# Patient Record
Sex: FEMALE | Race: BLACK OR AFRICAN AMERICAN | Employment: OTHER | URBAN - METROPOLITAN AREA
[De-identification: names, ages, dates, MRNs, and addresses within clinical notes are randomized per-mention and may not be internally consistent; named-entity substitution may affect disease eponyms.]

---

## 2017-07-27 ENCOUNTER — DOCUMENTATION ONLY (OUTPATIENT)
Dept: FAMILY PLANNING/WOMEN'S HEALTH CLINIC | Age: 57
End: 2017-07-27

## 2017-07-27 NOTE — PROGRESS NOTES
Spoke with pt after she qualified for EWL. She has been recommended to have a breast bx and appt was made for her with the [facility] on 8/8/17 at 11am. Appt information given to her and we will meet her there to fill out our EWL paperwork.

## 2017-08-08 ENCOUNTER — OFFICE VISIT (OUTPATIENT)
Dept: SURGERY | Age: 57
End: 2017-08-08

## 2017-08-08 ENCOUNTER — DOCUMENTATION ONLY (OUTPATIENT)
Dept: SURGERY | Age: 57
End: 2017-08-08

## 2017-08-08 ENCOUNTER — HOSPITAL ENCOUNTER (OUTPATIENT)
Dept: LAB | Age: 57
Discharge: HOME | End: 2017-08-08

## 2017-08-08 VITALS
HEART RATE: 90 BPM | BODY MASS INDEX: 53.92 KG/M2 | HEIGHT: 62 IN | WEIGHT: 293 LBS | SYSTOLIC BLOOD PRESSURE: 143 MMHG | DIASTOLIC BLOOD PRESSURE: 78 MMHG

## 2017-08-08 DIAGNOSIS — R59.0 AXILLARY LYMPHADENOPATHY: Primary | ICD-10-CM

## 2017-08-08 PROCEDURE — 88365 INSITU HYBRIDIZATION (FISH): CPT | Performed by: SURGERY

## 2017-08-08 PROCEDURE — 88360 TUMOR IMMUNOHISTOCHEM/MANUAL: CPT | Performed by: SURGERY

## 2017-08-08 PROCEDURE — 88342 IMHCHEM/IMCYTCHM 1ST ANTB: CPT | Performed by: SURGERY

## 2017-08-08 RX ORDER — AMITRIPTYLINE HYDROCHLORIDE 25 MG/1
TABLET, FILM COATED ORAL
COMMUNITY
End: 2022-02-14

## 2017-08-08 RX ORDER — FLUTICASONE PROPIONATE AND SALMETEROL 100; 50 UG/1; UG/1
1 POWDER RESPIRATORY (INHALATION) EVERY 12 HOURS
Status: ON HOLD | COMMUNITY
End: 2022-09-13 | Stop reason: SDUPTHER

## 2017-08-08 RX ORDER — AMLODIPINE BESYLATE 10 MG/1
10 TABLET ORAL
COMMUNITY
End: 2022-08-16

## 2017-08-08 NOTE — PROGRESS NOTES
HISTORY OF PRESENT ILLNESS  Noah Rader is a 65 y.o. female. HPI NEW patient referral for consultation by [place] [address] clinic for bilateral axillary lymphadenopathy identified on screening mammogram.  Patient cannot feel the lumps and does not have any axillary discomfort. She also denies nipple inversion or discharge or breast masses. Night sweats and hot flashes and irregular periods for the past 2 years. No weight loss. No itchy skin. Previous mammogram was > 10 years ago  Obstetric History     No data available      Obstetric Comments   Menarche:  Age 16   LMP: unsure, very irregular. # of Children:  1  Age at Delivery of First Child: 33   Hysterectomy/oophorectomy: no/no. Breast Bx: no.  Hx of Breast Feeding: no. BCP: 8995-5703. Hormone therapy: no.      Family history - Maternal aunt  in her 63's of breast cancer. Another maternal aunt had breast cancer in her 63's and survived. Maternal great aunt  from breast cancer in her 42's. Maternal cousin  from breast cancer in her 42's. Mammogram and ultrasound done in 2017 BI RADS 4B   The breasts are almost entirely fatty. Bilateral prominent axillary lymph nodes, the largest is RIGHT 4.5cm. Review of Systems   Constitutional: Negative. HENT: Negative. Eyes: Negative. Respiratory: Negative. Cardiovascular: Positive for claudication and leg swelling. Gastrointestinal: Positive for constipation. Genitourinary: Positive for frequency. Musculoskeletal: Positive for back pain and joint pain. Skin: Negative. Neurological: Negative. Endo/Heme/Allergies: Bruises/bleeds easily. Psychiatric/Behavioral: Negative. Physical Exam   Constitutional: She is oriented to person, place, and time. She appears well-developed and well-nourished. Cardiovascular: Normal rate, regular rhythm and normal heart sounds.     Pulmonary/Chest: Effort normal and breath sounds normal. Right breast exhibits no inverted nipple, no mass, no nipple discharge, no skin change and no tenderness. Left breast exhibits no inverted nipple, no mass, no nipple discharge, no skin change and no tenderness. Breasts are symmetrical.       Lymphadenopathy:     She has no cervical adenopathy. She has axillary adenopathy. Left axillary: Pectoral adenopathy present. Right: No supraclavicular adenopathy present. Left: No supraclavicular adenopathy present. Neurological: She is alert and oriented to person, place, and time. Skin: Skin is warm and dry. US - Guided Core Biopsy  Indication : Bilateral axillary lymphadenopathy. Palpable LEFT axillary lymph node. Ultrasound Findings: 2 enlarged lymph nodes LEFT, 2 cm and 1.5 cm. RIGHT 4 cm axillary node  Prep : alcohol. Anesthesia : 1% lidocaine with epinephrine, 10 cc. Device : The hand-held 10 gauge BARD needle was inserted through the lesion and captured tissue with real-time Ultrasound Confirmation. .   Core Sampling :  3 cores were obtained from the largest lymph node in the RIGHT axilla. Marker: clip placed   Dressing : Steristrips, gauze and tape. Instructions : Remove gauze this evening. Remove steristrips in one week. Tolerance: Pt tolerated procedure with no discomfort  Pathology : In summary, the combined morphologic and phenotypic findings indicate  benign lymph node with a mixed population of B and T cells. Ben Fairfield is no  evidence of lymphoma or other malignancy. Concordance: yes  ASSESSMENT and PLAN    ICD-10-CM ICD-9-CM    1. Axillary lymphadenopathy R59.0 785.6      RIGHT axillary node biopsy  Mentioned oncology referral if the lymph node is positive for cancer.     Called patient  Benign lymph node  PRN follow up

## 2017-08-08 NOTE — PROGRESS NOTES
[facility]  OFFICE PROCEDURE PROGRESS NOTE        Chart reviewed for the following:   I, Dr. Gaurav Hanson, have reviewed the History, Physical and updated the Allergic reactions for [address] performed immediately prior to start of procedure:   I,Dr. Gaurav Hanson, have performed the following reviews on Aurora Grijalva prior to the start of the procedure:            * Patient was identified by name and date of birth   * Agreement on procedure being performed was verified  * Risks and Benefits explained to the patient  * Procedure site verified and marked as necessary  * Patient was positioned for comfort  * Consent was signed and verified     Time: 1200pm      Date of procedure: 8/8/2017    Procedure performed by:  Dr. Gaurav Hanson    Provider assisted by: Sho Bedoya RN    Patient assisted by: Trenton Holly    How tolerated by patient: Patient tolerated the procedure well without complications. Denies pain post biopsy. Post Procedural Pain Scale: 0    Comments:Written and verbal post biopsy instructions reviewed with and given to patient with her understanding.

## 2017-08-08 NOTE — MR AVS SNAPSHOT
Visit Information Date & Time Provider Department Dept. Phone Encounter #  
 8/8/2017 11:00 AM Gaurav Hanson MD [facility] [phone] [id number] Upcoming Health Maintenance Date Due Hepatitis C Screening 1960 DTaP/Tdap/Td series (1 - Tdap) 5/12/1982 PAP AKA CERVICAL CYTOLOGY 5/12/1982 BREAST CANCER SCRN MAMMOGRAM 5/12/2011 FOBT Q 1 YEAR AGE 50-75 5/12/2011 INFLUENZA AGE 9 TO ADULT 8/1/2017 Allergies as of 8/8/2017  Review Complete On: 8/8/2017 By: Gaurav Hanson MD  
  
 Severity Noted Reaction Type Reactions Nuts [Tree Nut]  08/08/2017    Rash Current Immunizations  Never Reviewed No immunizations on file. Not reviewed this visit You Were Diagnosed With   
  
 Codes Comments Axillary lymphadenopathy    -  Primary ICD-10-CM: R59.0 ICD-9-CM: 515. 6 Vitals BP Pulse Height(growth percentile) Weight(growth percentile) LMP BMI  
 143/78 90 5' 2\" (1.575 m) 302 lb 8 oz (137.2 kg) 10/26/2016 (Approximate) 55.33 kg/m2 Smoking Status Never Smoker Vitals History BMI and BSA Data Body Mass Index Body Surface Area  
 55.33 kg/m 2 2.45 m 2 Your Updated Medication List  
  
   
This list is accurate as of: 8/8/17  4:16 PM.  Always use your most recent med list.  
  
  
  
  
 ADVAIR DISKUS 100-50 mcg/dose diskus inhaler Generic drug:  fluticasone-salmeterol Take 1 Puff by inhalation every twelve (12) hours. amitriptyline 25 mg tablet Commonly known as:  ELAVIL Take  by mouth nightly. amLODIPine 10 mg tablet Commonly known as:  [place] Take  by mouth daily. We Performed the Following SURGICAL PATHOLOGY [NBX3503 Custom] Patient Instructions Breast Lumps: Care Instructions Your Care Instructions Breast lumps are common, especially in women between ages 27 and 48.  Many womens breasts feel lumpy and tender before their menstrual period. Women also may have lumps when they are breastfeeding. Breast lumps may go away after menopause. All new breast lumps in women after menopause should be checked by a doctor. Although lumps may be normal for you, it is important to have your doctor check any lump or thickness that is not like the rest of your breast to make sure it is not cancer. A lump may be larger, harder, or different from the rest of your breast tissue. Follow-up care is a key part of your treatment and safety. Be sure to make and go to all appointments, and call your doctor if you are having problems. Its also a good idea to know your test results and keep a list of the medicines you take. How can you care for yourself at home? · Make an appointment to have a mammogram and other follow-up visits as recommended by your doctor. When should you call for help? Call your doctor now or seek immediate medical care if: 
· You have new changes in a breast, such as: ¨ A lump or thickening in your breast or armpit. ¨ A change in the breast's size or shape. ¨ Skin changes, such as dimples or puckers. ¨ Nipple discharge. ¨ A change in the color or feel of the skin of your breast or the darker area around the nipple (areola). ¨ A change in the shape of the nipple (it may look like it's being pulled into the breast). · You have symptoms of a breast infection, such as: 
¨ Increased pain, swelling, redness, or warmth around a breast. 
¨ Red streaks extending from the breast. 
¨ Pus draining from a breast. 
¨ A fever. Watch closely for changes in your health, and be sure to contact your doctor if: 
· You do not get better as expected. Where can you learn more? Go to http://jackelin-galindo.info/. Enter O815 in the search box to learn more about \"Breast Lumps: Care Instructions. \" Current as of: October 13, 2016 Content Version: 11.3 © 8945-1636 Healthwise, Incorporated. Care instructions adapted under license by [facility] (which disclaims liability or warranty for this information). If you have questions about a medical condition or this instruction, always ask your healthcare professional. [address] any warranty or liability for your use of this information. Introducing [facility]! [address] introduces [employer] patient portal. Now you can access parts of your medical record, email your doctor's office, and request medication refills online. 1. In your internet browser, go to [email or web address] 2. Click on the First Time User? Click Here link in the Sign In box. You will see the New Member Sign Up page. 3. Enter your [employer] Access Code exactly as it appears below. You will not need to use this code after youve completed the sign-up process. If you do not sign up before the expiration date, you must request a new code. · [employer] Access Code: [email or web address] Expires: 11/6/2017 10:31 AM 
 
4. Enter the last four digits of your Social Security Number (xxxx) and Date of Birth (mm/dd/yyyy) as indicated and click Submit. You will be taken to the next sign-up page. 5. Create a [employer] ID. This will be your [employer] login ID and cannot be changed, so think of one that is secure and easy to remember. 6. Create a [employer] password. You can change your password at any time. 7. Enter your Password Reset Question and Answer. This can be used at a later time if you forget your password. 8. Enter your e-mail address. You will receive e-mail notification when new information is available in [address] 9. Click Sign Up. You can now view and download portions of your medical record. 10. Click the Download Summary menu link to download a portable copy of your medical information.  
 
If you have questions, please visit the Frequently Asked Questions section of the edPULSE. Remember, [employer] is NOT to be used for urgent needs. For medical emergencies, dial 911. Now available from your iPhone and Android! Please provide this summary of care documentation to your next provider. If you have any questions after today's visit, please call [phone].

## 2017-08-08 NOTE — PATIENT INSTRUCTIONS
Breast Lumps: Care Instructions  Your Care Instructions  Breast lumps are common, especially in women between ages 27 and 48. Many womens breasts feel lumpy and tender before their menstrual period. Women also may have lumps when they are breastfeeding. Breast lumps may go away after menopause. All new breast lumps in women after menopause should be checked by a doctor. Although lumps may be normal for you, it is important to have your doctor check any lump or thickness that is not like the rest of your breast to make sure it is not cancer. A lump may be larger, harder, or different from the rest of your breast tissue. Follow-up care is a key part of your treatment and safety. Be sure to make and go to all appointments, and call your doctor if you are having problems. Its also a good idea to know your test results and keep a list of the medicines you take. How can you care for yourself at home? · Make an appointment to have a mammogram and other follow-up visits as recommended by your doctor. When should you call for help? Call your doctor now or seek immediate medical care if:  · You have new changes in a breast, such as:  ¨ A lump or thickening in your breast or armpit. ¨ A change in the breast's size or shape. ¨ Skin changes, such as dimples or puckers. ¨ Nipple discharge. ¨ A change in the color or feel of the skin of your breast or the darker area around the nipple (areola). ¨ A change in the shape of the nipple (it may look like it's being pulled into the breast). · You have symptoms of a breast infection, such as:  ¨ Increased pain, swelling, redness, or warmth around a breast.  ¨ Red streaks extending from the breast.  ¨ Pus draining from a breast.  ¨ A fever. Watch closely for changes in your health, and be sure to contact your doctor if:  · You do not get better as expected. Where can you learn more? Go to http://jackelin-galindo.info/.   Enter D488 in the search box to learn more about \"Breast Lumps: Care Instructions. \"  Current as of: October 13, 2016  Content Version: 11.3  © 8264-4924 [employer]High, [employer]. Care instructions adapted under license by [employer] (which disclaims liability or warranty for this information). If you have questions about a medical condition or this instruction, always ask your healthcare professional. [address] any warranty or liability for your use of this information.

## 2017-08-15 NOTE — COMMUNICATION BODY
HISTORY OF PRESENT ILLNESS  Shalom James is a 65 y.o. female. HPI NEW patient referral for consultation by [place] [address] clinic for bilateral axillary lymphadenopathy identified on screening mammogram.  Patient cannot feel the lumps and does not have any axillary discomfort. She also denies nipple inversion or discharge or breast masses. Night sweats and hot flashes and irregular periods for the past 2 years. No weight loss. No itchy skin. Previous mammogram was > 10 years ago  Obstetric History     No data available      Obstetric Comments   Menarche:  Age 16   LMP: unsure, very irregular. # of Children:  1  Age at Delivery of First Child: 33   Hysterectomy/oophorectomy: no/no. Breast Bx: no.  Hx of Breast Feeding: no. BCP: 9114-5907. Hormone therapy: no.      Family history - Maternal aunt  in her 63's of breast cancer. Another maternal aunt had breast cancer in her 63's and survived. Maternal great aunt  from breast cancer in her 42's. Maternal cousin  from breast cancer in her 42's. Mammogram and ultrasound done in 2017 BI RADS 4B   The breasts are almost entirely fatty. Bilateral prominent axillary lymph nodes, the largest is RIGHT 4.5cm. Review of Systems   Constitutional: Negative. HENT: Negative. Eyes: Negative. Respiratory: Negative. Cardiovascular: Positive for claudication and leg swelling. Gastrointestinal: Positive for constipation. Genitourinary: Positive for frequency. Musculoskeletal: Positive for back pain and joint pain. Skin: Negative. Neurological: Negative. Endo/Heme/Allergies: Bruises/bleeds easily. Psychiatric/Behavioral: Negative. Physical Exam   Constitutional: She is oriented to person, place, and time. She appears well-developed and well-nourished. Cardiovascular: Normal rate, regular rhythm and normal heart sounds.     Pulmonary/Chest: Effort normal and breath sounds normal. Right breast exhibits no inverted nipple, no mass, no nipple discharge, no skin change and no tenderness. Left breast exhibits no inverted nipple, no mass, no nipple discharge, no skin change and no tenderness. Breasts are symmetrical.       Lymphadenopathy:     She has no cervical adenopathy. She has axillary adenopathy. Left axillary: Pectoral adenopathy present. Right: No supraclavicular adenopathy present. Left: No supraclavicular adenopathy present. Neurological: She is alert and oriented to person, place, and time. Skin: Skin is warm and dry. US - Guided Core Biopsy  Indication : Bilateral axillary lymphadenopathy. Palpable LEFT axillary lymph node. Ultrasound Findings: 2 enlarged lymph nodes LEFT, 2 cm and 1.5 cm. RIGHT 4 cm axillary node  Prep : alcohol. Anesthesia : 1% lidocaine with epinephrine, 10 cc. Device : The hand-held 10 gauge BARD needle was inserted through the lesion and captured tissue with real-time Ultrasound Confirmation. .   Core Sampling :  3 cores were obtained from the largest lymph node in the RIGHT axilla. Marker: clip placed   Dressing : Steristrips, gauze and tape. Instructions : Remove gauze this evening. Remove steristrips in one week. Tolerance: Pt tolerated procedure with no discomfort  Pathology : In summary, the combined morphologic and phenotypic findings indicate  benign lymph node with a mixed population of B and T cells. Rondi Fails is no  evidence of lymphoma or other malignancy. Concordance: yes  ASSESSMENT and PLAN    ICD-10-CM ICD-9-CM    1. Axillary lymphadenopathy R59.0 785.6      RIGHT axillary node biopsy  Mentioned oncology referral if the lymph node is positive for cancer.     Called patient  Benign lymph node  PRN follow up

## 2022-01-11 ENCOUNTER — TELEPHONE (OUTPATIENT)
Dept: FAMILY PLANNING/WOMEN'S HEALTH CLINIC | Age: 62
End: 2022-01-11

## 2022-01-11 NOTE — TELEPHONE ENCOUNTER
[facility] received a call from patient requesting an appointment for an abnormal pap. Patient would also need a bracket test - left side mass     Previous Provider: Dr Bethanie Cooney     When Diagnosed/Seen : 12/17/21    Best day of the week for Gynecologist appointment?  Tuesday     Do you prefer Morning or afternoon? morning    \"Staff at [facility] will try their best to schedule the appointment base on your preferences, but is not guatanteed due to availability, they will call you with further details and instructions within 5 business days if you do not hear from them \"

## 2022-01-13 NOTE — TELEPHONE ENCOUNTER
On 1/11/2022 Ravindra Ramirez Rn spoke with patient and patient needs clarified. She wants to be enrolled in EWL due to abnormal results of screening mammogram not abnormal pap smear. Patient was also advised that our program does not pay for genetic testing. Per patient she received a free screening mammogram from [facility]. Shalini Lutz called the [facility] and spoke with Mindi Eduardo the Nurse practitioner who states that patient needs additional imaging at this time and that they can cover this service for patient. The NP stated that she will be in touch with patient to follow up and schedule this.     If the patient needs a Bx then she will refer patient to our EWL program.

## 2022-02-01 ENCOUNTER — DOCUMENTATION ONLY (OUTPATIENT)
Dept: SURGERY | Age: 62
End: 2022-02-01

## 2022-02-01 ENCOUNTER — HOSPITAL ENCOUNTER (OUTPATIENT)
Dept: LAB | Age: 62
Discharge: HOME | End: 2022-02-01

## 2022-02-01 ENCOUNTER — OFFICE VISIT (OUTPATIENT)
Dept: SURGERY | Age: 62
End: 2022-02-01
Payer: MEDICAID

## 2022-02-01 VITALS
WEIGHT: 293 LBS | DIASTOLIC BLOOD PRESSURE: 66 MMHG | HEIGHT: 62 IN | SYSTOLIC BLOOD PRESSURE: 181 MMHG | BODY MASS INDEX: 53.92 KG/M2

## 2022-02-01 DIAGNOSIS — N63.20 BREAST MASS, LEFT: Primary | ICD-10-CM

## 2022-02-01 DIAGNOSIS — R92.8 ABNORMAL ULTRASOUND OF BREAST: ICD-10-CM

## 2022-02-01 DIAGNOSIS — R92.8 ABNORMAL ULTRASOUND OF BREAST: Primary | ICD-10-CM

## 2022-02-01 PROCEDURE — 19083 BX BREAST 1ST LESION US IMAG: CPT | Performed by: SURGERY

## 2022-02-01 PROCEDURE — 99203 OFFICE O/P NEW LOW 30 MIN: CPT | Performed by: SURGERY

## 2022-02-01 RX ORDER — METFORMIN HYDROCHLORIDE 500 MG/1
TABLET ORAL 2 TIMES DAILY WITH MEALS
COMMUNITY
End: 2022-02-28

## 2022-02-01 NOTE — PROGRESS NOTES
[facility]  OFFICE PROCEDURE PROGRESS NOTE        Chart reviewed for the following:   Viki Bob MD, have reviewed the History, Physical and updated the Allergic reactions for Renuka Hayden performed immediately prior to start of procedure:   Viki Bob MD, have performed the following reviews on Arturo Patches prior to the start of the procedure:            * Patient was identified by name and date of birth   * Agreement on procedure being performed was verified  * Risks and Benefits explained to the patient  * Procedure site verified and marked as necessary  * Patient was positioned for comfort  * Consent was signed and verified     Time: 14:12      Date of procedure: 2/1/2022    Procedure performed by:  Kenneth Sampson MD    Provider assisted by: Boris Burch RN    Patient assisted by: self    How tolerated by patient: tolerated the procedure well with no complications    Post Procedural Pain Scale: 0 - No Hurt    Comments: I have reviewed the provider's instructions with the patient, answering all questions to her satisfaction.

## 2022-02-01 NOTE — PROGRESS NOTES
HISTORY OF PRESENT ILLNESS  Drew Alvarado is a 62 y.o. female. HPI NEW patient consult referred by Jhoana Fairchild for LEFT breast mass found on screening mammogram.  Pt did not feel a mass. She has some tenderness at the nipple. No other changes to the breast area. Bilateral axillary lymphadenopathy. LEFT axillary lymph node biopsied 2017. bening    Family History:  Maternal Aunt  from Breast cancer dx in 62s  Maternal Aunt had breast cancer in her 66's, survivor  Maternal Great Aunt  from Breast cancer age 36. Chilo Singh aunt's daughter  from breast cancer in her 42's  28 yr old daughter    Mammogram, 22 , BIRADS 4c  LEFT 8:00 1.3cm spiculated mass  Enlarged LEFT axillary nodes, stable since 2017  Past Medical History:   Diagnosis Date    Hypertension     Neuropathy      Past Surgical History:   Procedure Laterality Date    HX CHOLECYSTECTOMY      HX GYN      c section    HX GYN      d&c      OB History    No obstetric history on file. Obstetric Comments   Menarche:  Age 16   LMP: unsure, very irregular. # of Children:  1  Age at Delivery of First Child: 33   Hysterectomy/oophorectomy: no/no. Breast Bx: no.  Hx of Breast Feeding: no. BCP: 8432-7968. Hormone therapy: no.            Family History   Problem Relation Age of Onset    Hypertension Mother     Heart Disease Mother     Heart Disease Father     Hypertension Father     Breast Cancer Maternal Aunt     Breast Cancer Other     Breast Cancer Maternal Aunt      Social History     Tobacco Use    Smoking status: Never Smoker    Smokeless tobacco: Never Used   Substance Use Topics    Alcohol use: Yes     Comment: social      Prior to Admission medications    Medication Sig Start Date End Date Taking? Authorizing Provider   metFORMIN (GLUCOPHAGE) 500 mg tablet Take  by mouth two (2) times daily (with meals).    Yes Provider, Historical   fluticasone-salmeterol (ADVAIR DISKUS) 100-50 mcg/dose diskus inhaler Take 1 Puff by inhalation every twelve (12) hours. Yes Provider, Historical   amLODIPine (NORVASC) 10 mg tablet Take  by mouth daily. Yes Provider, Historical   amitriptyline (ELAVIL) 25 mg tablet Take  by mouth nightly. Yes Provider, Historical      Allergies   Allergen Reactions    Nuts [Tree Nut] Rash       ROS    Physical Exam  Constitutional:       Appearance: She is well-developed. She is obese. Cardiovascular:      Rate and Rhythm: Normal rate and regular rhythm. Heart sounds: Normal heart sounds. Pulmonary:      Effort: Pulmonary effort is normal.      Breath sounds: Normal breath sounds. Chest:   Breasts: Breasts are symmetrical.      Right: No swelling, inverted nipple, mass, nipple discharge, skin change, tenderness, axillary adenopathy or supraclavicular adenopathy. Left: No swelling, inverted nipple, mass, nipple discharge, skin change, tenderness, axillary adenopathy or supraclavicular adenopathy. Lymphadenopathy:      Upper Body:      Right upper body: No supraclavicular or axillary adenopathy. Left upper body: No supraclavicular or axillary adenopathy. Skin:     General: Skin is warm and dry. Neurological:      Mental Status: She is alert and oriented to person, place, and time. US - Guided Core Biopsy  Indication : Left Breast mass upper inner quadrant. Not palpable. Ultrasound Findings: LEFT 8:00 1.3cm spiculated mass  Prep : alcohol. Anesthesia : 1% lidocaine with epinephrine, 6cc. Device : The hand-held 10 gauge BARD needle was inserted through the lesion and captured tissue with real-time Ultrasound Confirmation. .   Core Sampling :  2 cores were obtained. Marker: clip placed   Dressing : Steristrips, gauze and tape. Instructions : Remove gauze this evening. Remove steristrips in one week.    Tolerance: Pt tolerated procedure with no discomfort  Pathology : invasive ductal carcinoma, grade 2, triple negative, ki 40%  Concordance: yes  ASSESSMENT and PLAN    ICD-10-CM ICD-9-CM    1. Breast mass, left  N63.20 611.72    2. Abnormal ultrasound of breast  R92.8 793.89      Total time spent with patient: 30 minutes   LEFT breast mass found on screening mammogram    Pt has a strong family hx of breast cancer  Will send a BRCA test    We have her mammograms    Spoke with patient  Will refer to med onc  Discussed a portacath for chemo. I will biopsy her LEFT axillary enlarged lymph nodes at that time.   She had RIGHT axillary enlarged lymph nodes biopsied 2017

## 2022-02-01 NOTE — H&P (VIEW-ONLY)
HISTORY OF PRESENT ILLNESS  Maggie Miller is a 27[address] [facility] y.o. female. HPI NEW patient consult referred by Anup Montiel for LEFT breast mass found on screening mammogram.  Pt did not feel a mass. She has some tenderness at the nipple. No other changes to the breast area. Bilateral axillary lymphadenopathy. LEFT axillary lymph node biopsied 2017. rhiannoning    Family History:  Maternal Aunt  from Breast cancer dx in 62s  Maternal Aunt had breast cancer in her 66's, survivor  Maternal Great Aunt  from Breast cancer age 36. Lucita Vivas aunt's daughter  from breast cancer in her 42's  28 yr old daughter    Mammogram, 22 , BIRADS 4c  LEFT 8:00 1.3cm spiculated mass  Enlarged LEFT axillary nodes, stable since 2017  Past Medical History:   Diagnosis Date    Hypertension     Neuropathy      Past Surgical History:   Procedure Laterality Date    HX CHOLECYSTECTOMY      HX GYN      c section    HX GYN      d&c      OB History    No obstetric history on file. Obstetric Comments   Menarche:  Age 16   LMP: unsure, very irregular. # of Children:  1  Age at Delivery of First Child: 33   Hysterectomy/oophorectomy: no/no. Breast Bx: no.  Hx of Breast Feeding: no. BCP: 3201-9620. Hormone therapy: no.            Family History   Problem Relation Age of Onset    Hypertension Mother     Heart Disease Mother     Heart Disease Father     Hypertension Father     Breast Cancer Maternal Aunt     Breast Cancer Other     Breast Cancer Maternal Aunt      Social History     Tobacco Use    Smoking status: Never Smoker    Smokeless tobacco: Never Used   Substance Use Topics    Alcohol use: Yes     Comment: social      Prior to Admission medications    Medication Sig Start Date End Date Taking? Authorizing Provider   metFORMIN (GLUCOPHAGE) 500 mg tablet Take  by mouth two (2) times daily (with meals).    Yes Provider, Historical   fluticasone-salmeterol (ADVAIR DISKUS) 100-50 mcg/dose diskus inhaler Take 1 Puff by inhalation every twelve (12) hours. Yes Provider, Historical   amLODIPine (NORVASC) 10 mg tablet Take  by mouth daily. Yes Provider, Historical   amitriptyline (ELAVIL) 25 mg tablet Take  by mouth nightly. Yes Provider, Historical      Allergies   Allergen Reactions    Nuts [Tree Nut] Rash       ROS    Physical Exam  Constitutional:       Appearance: She is well-developed. She is obese. Cardiovascular:      Rate and Rhythm: Normal rate and regular rhythm. Heart sounds: Normal heart sounds. Pulmonary:      Effort: Pulmonary effort is normal.      Breath sounds: Normal breath sounds. Chest:   Breasts: Breasts are symmetrical.      Right: No swelling, inverted nipple, mass, nipple discharge, skin change, tenderness, axillary adenopathy or supraclavicular adenopathy. Left: No swelling, inverted nipple, mass, nipple discharge, skin change, tenderness, axillary adenopathy or supraclavicular adenopathy. Lymphadenopathy:      Upper Body:      Right upper body: No supraclavicular or axillary adenopathy. Left upper body: No supraclavicular or axillary adenopathy. Skin:     General: Skin is warm and dry. Neurological:      Mental Status: She is alert and oriented to person, place, and time. US - Guided Core Biopsy  Indication : Left Breast mass upper inner quadrant. Not palpable. Ultrasound Findings: LEFT 8:00 1.3cm spiculated mass  Prep : alcohol. Anesthesia : 1% lidocaine with epinephrine, 6cc. Device : The hand-held 10 gauge BARD needle was inserted through the lesion and captured tissue with real-time Ultrasound Confirmation. .   Core Sampling :  2 cores were obtained. Marker: clip placed   Dressing : Steristrips, gauze and tape. Instructions : Remove gauze this evening. Remove steristrips in one week.    Tolerance: Pt tolerated procedure with no discomfort  Pathology : invasive ductal carcinoma, grade 2, triple negative, ki 40%  Concordance: yes  ASSESSMENT and PLAN    ICD-10-CM ICD-9-CM    1. Breast mass, left  N63.20 611.72    2. Abnormal ultrasound of breast  R92.8 793.89      Total time spent with patient: 30 minutes   LEFT breast mass found on screening mammogram    Pt has a strong family hx of breast cancer  Will send a BRCA test    We have her mammograms    Spoke with patient  Will refer to med onc  Discussed a portacath for chemo. I will biopsy her LEFT axillary enlarged lymph nodes at that time.   She had RIGHT axillary enlarged lymph nodes biopsied 2017

## 2022-02-02 ENCOUNTER — DOCUMENTATION ONLY (OUTPATIENT)
Dept: SURGERY | Age: 62
End: 2022-02-02

## 2022-02-03 PROBLEM — C50.912 BREAST CANCER, LEFT (HCC): Status: ACTIVE | Noted: 2022-02-03

## 2022-02-07 ENCOUNTER — TELEPHONE (OUTPATIENT)
Dept: SURGERY | Age: 62
End: 2022-02-07

## 2022-02-07 DIAGNOSIS — Z17.1 MALIGNANT NEOPLASM OF LEFT BREAST IN FEMALE, ESTROGEN RECEPTOR NEGATIVE, UNSPECIFIED SITE OF BREAST (HCC): Primary | ICD-10-CM

## 2022-02-07 DIAGNOSIS — C50.912 MALIGNANT NEOPLASM OF LEFT BREAST IN FEMALE, ESTROGEN RECEPTOR NEGATIVE, UNSPECIFIED SITE OF BREAST (HCC): Primary | ICD-10-CM

## 2022-02-07 NOTE — TELEPHONE ENCOUNTER
Correction patient has an appointment with Dr Bhakti Padilla;  2/15/2022 arriving at 12:45 pm  [address] [place] [address] [phone]

## 2022-02-08 ENCOUNTER — TELEPHONE (OUTPATIENT)
Dept: FAMILY PLANNING/WOMEN'S HEALTH CLINIC | Age: 62
End: 2022-02-08

## 2022-02-08 NOTE — TELEPHONE ENCOUNTER
Medicaid application was completed and faxed back to the office by patient. Nurse faxed application to [facility] department of  02/08/22. Application to be process no later than 2/22/2022.  Victorina Osorio RN

## 2022-02-11 ENCOUNTER — NURSE NAVIGATOR (OUTPATIENT)
Dept: CASE MANAGEMENT | Age: 62
End: 2022-02-11

## 2022-02-11 NOTE — NURSE NAVIGATOR
[address]  Breast Navigator Encounter    Name: Lou Jaquez  Age: 62 y.o.  : 1960  Diagnosis: Left breast IDC; ER-/AR-/HER2-    Encounter type:  [x]Initial Navigator Encounter  []Patient Initiated  []Navigator Follow-up []Pre-op  []Post-op []Check-in Prior to First Treatment []Treatment Modality Change   []Other:     Narrative:   Called pt to introduce self/role of Breast Navigator and to discuss upcoming appointment with Dr. Rayne Maher. Explained process for the appointment and what to expect. She plans to bring her daughter with her. No immediate questions for NN at this time. Contact info given and pt encouraged to reach out as needed. Will follow-up.      Interdisciplinary Team:  Med-Onc: Dr. Jean Stanton MD  Surg-Onc: Dr. Kylah Milian MD  Rad-Onc:  Plastics:  :   Nurse Navigator:  CHRISTIAN Ocasio BSN, RN, [facility]  Breast Cancer Nurse [address] [place] [address] Nw  W: [phone]  F: [phone]  [email or web address]   Good Help to Those in [address] [place] [address]

## 2022-02-14 ENCOUNTER — OFFICE VISIT (OUTPATIENT)
Dept: ONCOLOGY | Age: 62
End: 2022-02-14
Payer: MEDICAID

## 2022-02-14 ENCOUNTER — DOCUMENTATION ONLY (OUTPATIENT)
Dept: SURGERY | Age: 62
End: 2022-02-14

## 2022-02-14 VITALS
OXYGEN SATURATION: 94 % | HEART RATE: 88 BPM | SYSTOLIC BLOOD PRESSURE: 166 MMHG | TEMPERATURE: 98 F | BODY MASS INDEX: 53.92 KG/M2 | RESPIRATION RATE: 18 BRPM | HEIGHT: 62 IN | WEIGHT: 293 LBS | DIASTOLIC BLOOD PRESSURE: 68 MMHG

## 2022-02-14 DIAGNOSIS — Z17.1 MALIGNANT NEOPLASM OF LOWER-INNER QUADRANT OF LEFT BREAST IN FEMALE, ESTROGEN RECEPTOR NEGATIVE (HCC): Primary | ICD-10-CM

## 2022-02-14 DIAGNOSIS — C50.312 MALIGNANT NEOPLASM OF LOWER-INNER QUADRANT OF LEFT BREAST IN FEMALE, ESTROGEN RECEPTOR NEGATIVE (HCC): Primary | ICD-10-CM

## 2022-02-14 PROCEDURE — 99205 OFFICE O/P NEW HI 60 MIN: CPT | Performed by: INTERNAL MEDICINE

## 2022-02-14 RX ORDER — LISINOPRIL 10 MG/1
TABLET ORAL
COMMUNITY
End: 2022-04-13

## 2022-02-14 NOTE — PROGRESS NOTES
[facility] [place] at [address], [address] [facility]  Claudene Luke: [phone]  F: [phone]      Reason for Visit:   Tamie Joseph is a 62 y.o. female who is seen in consultation at the request of Dr. Bridgette Shanks for evaluation of therapy for breast cancer    Treatment History:   · 17:  Right ax LN:  Core bx:  Benign  · 22 left breast mass core bx: IDC with sclerosis, 6 mm, gr 2, no LVI, ER negative, DC negative, HER 2 negative at IHC 0, ki67 40%    History of Present Illness: An abnormal mammogram led to the pathology above    FH:  Maternal aunt  of breast cancer in her 62s; maternal aunt breast cancer in her 76s; maternal great aunt  from breast cancer at age 36; great aunt's daughter also  of breast cancer at age 36; no ovarian, prostate, pancreas cancer    Past Medical History:   Diagnosis Date    Hypertension     Neuropathy       Past Surgical History:   Procedure Laterality Date    HX CHOLECYSTECTOMY      HX GYN      c section    HX GYN      d&c      Social History     Tobacco Use    Smoking status: Never Smoker    Smokeless tobacco: Never Used   Substance Use Topics    Alcohol use: Yes     Comment: social      Family History   Problem Relation Age of Onset    Hypertension Mother     Heart Disease Mother     Heart Disease Father     Hypertension Father     Breast Cancer Maternal Aunt     Breast Cancer Other     Breast Cancer Maternal Aunt      Current Outpatient Medications   Medication Sig    metFORMIN (GLUCOPHAGE) 500 mg tablet Take  by mouth two (2) times daily (with meals).  fluticasone-salmeterol (ADVAIR DISKUS) 100-50 mcg/dose diskus inhaler Take 1 Puff by inhalation every twelve (12) hours.  amLODIPine (NORVASC) 10 mg tablet Take  by mouth daily.  amitriptyline (ELAVIL) 25 mg tablet Take  by mouth nightly. No current facility-administered medications for this visit.       Allergies   Allergen Reactions    Nuts [Tree Nut] Rash        Review of Systems: A complete review of systems was obtained, negative except as described above. Physical Exam:   There were no vitals taken for this visit. ECOG PS: 0    Constitutional: Appears well-developed and well-nourished in no apparent distress      Mental status: Alert and awake, Oriented to person/place/time, Able to follow commands    Eyes: EOM normal, Sclera normal, No visible ocular discharge    HENT: Normocephalic, atraumatic    Mouth/Throat: Moist mucous membranes    External Ears normal    Neck: No visualized mass    Pulmonary/Chest: Respiratory effort normal, No visualized signs of difficulty breathing or respiratory distress    Musculoskeletal: Normal gait with no signs of ataxia, Normal range of motion of neck    Neurological: No facial asymmetry (Cranial nerve 7 motor function), No gaze palsy    Skin: No significant exanthematous lesions or discoloration noted on facial skin    Psychiatric: Normal affect, No hallucinations     Left breast:  8:00, 7 cmfn, 1.8 cm mass, no LAD          Results:   No results found for: WBC, HGB, HCT, PLT, MCV, ANEU, HGBPOC, HCTPOC, HGBEXT, HCTEXT, PLTEXT  No results found for: NA, K, CL, CO2, GLU, BUN, CREA, GFRAA, GFRNA, CA, NAPOC, KPOCT, CLPOC, GLUCPOC, IBUN, CREAPOC, ICAI  No results found for: TBILI, ALT, AP, TP, ALB, GLOB      1/13/22 breast US bilateral; L mammogram  L breast mass 8:00, 7 cmfn, 1.3 cm mass  Bilateral LN seen, appear benign    Records reviewed and summarized above. Pathology report(s) reviewed above. Radiology report(s) reviewed above. Assessment/plan:   1. Left IDC, gr 2, triple negative, cT1c cN0 cM0, stage IA, prognostic stage IB    We explained to the patient that the goal of systemic adjuvant therapy is to improve the chances for cure and decrease the risk of relapse.  We explained why a patient can have microscopic cancer spread now even though physical examination, laboratory studies and imaging studies are negative for cancer. We explained that the same treatments used now as adjuvant or preventive treatments rarely if ever are curative in women who develop metastases. We discussed that there is no difference in overall survival between neoadjuvant and adjuvant chemotherapy. We discussed the rationale for neoadjuvant chemotherapy, if chemotherapy is warranted, as it is in this case: to avoid any potential delays in giving chemotherapy following surgery, to be able to see the response of the tumor to chemotherapy, and to potentially downstage the tumor prior to surgery. Discussed with Dr. Aly Sevilla. She will core bx her LN -- if +, will proceed with neoadj chemo; if negative, will consider surgery first for clinical stage I tumor and proceed with adjuvant chemo. If neoadj:    I discussed the potential risks of dose-dense chemotherapy with the patient. (DD AC-T, adriamycin 60 mg/m2; cyclophosphamide 600 mg/m2 q 2 weeks x 4; paclitaxel 80 mg/m2 qweekly x 12). Major toxicities include nausea and vomiting, stomatitis, fatigue, and a small risk of heart damage. Anemia frequently results and occasionally requires transfusions. Neutropenic fever is uncommon, but can be a life-threatening problem. Also, there is a small but increased risk of myelodysplasia and acute leukemia. We provided the patient with detailed information concerning toxicity including frequent toxicities that only last a few days, such as nausea, vomiting, mouth sores, arthralgia, myalgia, and potentially allergic reactions to paclitaxel, as well as toxicities which can be longer lasting including total alopecia, fatigue, anemia and neuropathy. We provided the patient with detailed information concerning the toxicities of their regimen in addition to our verbal discussion.     We discussed the potential addition of carboplatin auc 6 q 3 weeks during weekly paclitaxel as evaluated in CALGB 78529, showing a significant improvement in pCR for patients with stage II-III triple negative breast cancer. Additional side effects of added myelosuppression, fatigue, renal damage with dehydration, and nausea and vomiting were discussed. AUC 5 will be used as that is the dose being used in all known current investigations. Will consider therapy as in keynote 522 as the addition of pembro shows an improvement in [facility], 200 mg q3 weeks during the entire chemo backbone, and then up to 9 cycles adjuvantly    We discussed the risks and benefits of pembrolizumab therapy today. Potential side effects include, but are not limited to fatigue, rash, auto-immune issues, infertility, allergic reactions, and rarely, death. If adjuvant:  Would consider TC    We discussed the toxicities of TC chemotherapy (docetaxel 75mg/m2/cyclophosphamide 600 mg/m2 q 3 weeks x 4)  in detail. This chemotherapy frequently causes a low white blood cell count and a small percent of patients require hospital admission for treatment of neutropenic fever. We explained that on occasion we consider the use of growth factors to minimize this risk. We explained to the patient that some side effects, if they occur, only last a few days including nausea, vomiting, stomatitis, arthralgia, myalgia, and allergic reactions to Taxotere. We told the patient that severe nausea and vomiting were very uncommon and that some side effects, if they occur, will last longer: this includes hair loss, which will be seen in all patients treated with these agents and fatigue, which will be seen in most. The patient was also told that if she is having menstrual function that she could develop chemotherapy-induced amenorrhea and infertility. We also told the patient that there was a very small risk of acute leukemia. We also informed the patient that heart damage is rare with these agents    She will return next week after her LN biopsy to finalize her therapy plan      2.  Emotional well being:  She has excellent support and is coping well with her disease    3. Genetic testing:   discussed potential ramifications of a positive test including the potential need for bilateral mastectomies and bilateral oophorectomies and the risk then for her family members to also have a mutation. VUS also discussed. She has the kit from Dr. Calvin Stephen, but has not yet done the testing    4. DM2:  On metformin    I appreciate the opportunity to participate in Ms. Kacy Wan's care. Signed By: Benny Werner MD      No orders of the defined types were placed in this encounter.

## 2022-02-16 ENCOUNTER — TELEPHONE (OUTPATIENT)
Dept: FAMILY PLANNING/WOMEN'S HEALTH CLINIC | Age: 62
End: 2022-02-16

## 2022-02-16 NOTE — TELEPHONE ENCOUNTER
Nurse contacted patient to follow up; patient states that she received a call DSS that her medicaid (under [place])has been approved. Patient will have follow up appt. In the following days and treatment start date will be decided soon. Patient aware that we are here if she has questions/support. Will [facility] for additional resources.   Vinay Real RN

## 2022-02-17 ENCOUNTER — DOCUMENTATION ONLY (OUTPATIENT)
Dept: SURGERY | Age: 62
End: 2022-02-17

## 2022-02-17 ENCOUNTER — HOSPITAL ENCOUNTER (OUTPATIENT)
Dept: LAB | Age: 62
Discharge: HOME | End: 2022-02-17

## 2022-02-17 ENCOUNTER — OFFICE VISIT (OUTPATIENT)
Dept: SURGERY | Age: 62
End: 2022-02-17
Payer: MEDICAID

## 2022-02-17 VITALS — WEIGHT: 293 LBS | BODY MASS INDEX: 53.92 KG/M2 | HEIGHT: 62 IN

## 2022-02-17 DIAGNOSIS — Z17.1 MALIGNANT NEOPLASM OF LOWER-INNER QUADRANT OF LEFT BREAST IN FEMALE, ESTROGEN RECEPTOR NEGATIVE (HCC): Primary | ICD-10-CM

## 2022-02-17 DIAGNOSIS — R93.89 ABNORMAL ULTRASOUND: ICD-10-CM

## 2022-02-17 DIAGNOSIS — C50.312 MALIGNANT NEOPLASM OF LOWER-INNER QUADRANT OF LEFT BREAST IN FEMALE, ESTROGEN RECEPTOR NEGATIVE (HCC): Primary | ICD-10-CM

## 2022-02-17 PROCEDURE — 10035 PLMT SFT TISS LOCLZJ DEV 1ST: CPT | Performed by: SURGERY

## 2022-02-17 PROCEDURE — 38505 NEEDLE BIOPSY LYMPH NODES: CPT | Performed by: SURGERY

## 2022-02-17 PROCEDURE — 99212 OFFICE O/P EST SF 10 MIN: CPT | Performed by: SURGERY

## 2022-02-17 NOTE — PROGRESS NOTES
HISTORY OF PRESENT ILLNESS  Harmony Blum is a 62 y.o. female. HPI ESTABLISHED patient here today for a LEFT axillary lymph node biopsy per Dr. Jesus Swartz. The patient has a recent diagnosis of LEFT breast cancer. 2/2022 LEFT invasive ductal carcinoma, grade 2, triple neg, Ki 40%    ROS    Physical Exam   deferred  US - Guided Core Biopsy  Indication : Left axillary lymph nodes, palpable. Ultrasound Findings: multiple large, 2+ cm LEFT axillary lymph nodes, absent deshawn  Prep : alcohol. Anesthesia : 1% lidocaine with epinephrine, 6cc. Device : The hand-held 10 gauge BARD needle was inserted through the lesion and captured tissue with real-time Ultrasound Confirmation. .   Core Sampling :  4 cores were obtained. Marker: clip placed   Dressing : Steristrips, gauze and tape. Instructions : Remove gauze this evening. Remove steristrips in one week. Tolerance: Pt tolerated procedure with no discomfort until I place the clip. Pathology : Lymph node, left axilla lymph node, excision:        Negative for metastatic carcinoma. See comment. Negative for morphologic evidence of lymphoma. Negative for granuloma. Concordance:  ASSESSMENT and PLAN    ICD-10-CM ICD-9-CM    1. Malignant neoplasm of lower-inner quadrant of left breast in female, estrogen receptor negative (HCC)  C50.312 174.3     Z17.1 V86.1    2. Abnormal ultrasound  R93.89 793.99 SURGICAL PATHOLOGY     Total time spent with patient: 15 minutes   LEFT axillary lymph nodes biopsied  Pt seeing Dr Jesus Swartz next week for treatment plan based on biopsy results.

## 2022-02-17 NOTE — PROGRESS NOTES
[facility]  OFFICE PROCEDURE PROGRESS NOTE        Chart reviewed for the following:   IDr. Shelley have reviewed the History, Physical and updated the Allergic reactions for [address] performed immediately prior to start of procedure:   IDr. Shelley, have performed the following reviews on Siena Flick prior to the start of the procedure:            * Patient was identified by name and date of birth   * Agreement on procedure being performed was verified  * Risks and Benefits explained to the patient  * Procedure site verified and marked as necessary  * Patient was positioned for comfort  * Consent was signed and verified     Time: 9:30am      Date of procedure: 2/17/2022    Procedure performed by:  Shelley Thompson MD    Provider assisted by: Irma Frank RN    Patient assisted by: Irma Frank RN    How tolerated by patient: Patient tolerated the procedure well without complications. Denies pain post biopsy. Post Procedural Pain Scale: 0 none    Comments: Written and verbal post biopsy instructions reviewed with and given to patient with her understanding.

## 2022-02-23 ENCOUNTER — OFFICE VISIT (OUTPATIENT)
Dept: ONCOLOGY | Age: 62
End: 2022-02-23
Payer: MEDICAID

## 2022-02-23 VITALS
DIASTOLIC BLOOD PRESSURE: 57 MMHG | WEIGHT: 293 LBS | HEART RATE: 82 BPM | RESPIRATION RATE: 18 BRPM | OXYGEN SATURATION: 96 % | SYSTOLIC BLOOD PRESSURE: 162 MMHG | TEMPERATURE: 97.8 F | HEIGHT: 62 IN | BODY MASS INDEX: 53.92 KG/M2

## 2022-02-23 DIAGNOSIS — C50.312 MALIGNANT NEOPLASM OF LOWER-INNER QUADRANT OF LEFT BREAST IN FEMALE, ESTROGEN RECEPTOR NEGATIVE (HCC): Primary | ICD-10-CM

## 2022-02-23 DIAGNOSIS — Z17.1 MALIGNANT NEOPLASM OF LOWER-INNER QUADRANT OF LEFT BREAST IN FEMALE, ESTROGEN RECEPTOR NEGATIVE (HCC): Primary | ICD-10-CM

## 2022-02-23 PROCEDURE — 99215 OFFICE O/P EST HI 40 MIN: CPT | Performed by: INTERNAL MEDICINE

## 2022-02-23 NOTE — PROGRESS NOTES
[facility] [place] [facility] [place] [address] [address] [facility]: [phone]  F: [phone]      Reason for Visit:   Nadeem Coburn is a 62 y.o. female who is seen in consultation at the request of Dr. Lawrence Urrutia for evaluation of therapy for breast cancer    Treatment History:   · 17:  Right ax LN:  Core bx:  Benign  · 22 left breast mass core bx: IDC with sclerosis, 6 mm, gr 2, no LVI, ER negative, IL negative, HER 2 negative at IHC 0, ki67 40%  · 22 lymph node, left axilla excision, negative for metastatic cancer, negative for lymphoma    History of Present Illness: An abnormal mammogram led to the pathology above    Interval history:  Reports gr 1 constipation, gr 1 fatigue, gr 1 nausea, gr 1 hot flashes, gr 1 insomnia    FH:  Maternal aunt  of breast cancer in her 62s; maternal aunt breast cancer in her 76s; maternal great aunt  from breast cancer at age 36; great aunt's daughter also  of breast cancer at age 36; no ovarian, prostate, pancreas cancer    Past Medical History:   Diagnosis Date    Cancer ([facility]     Hypertension     Neuropathy       Past Surgical History:   Procedure Laterality Date    HX CHOLECYSTECTOMY      HX GYN      c section    HX GYN      d&c      Social History     Tobacco Use    Smoking status: Never Smoker    Smokeless tobacco: Never Used   Substance Use Topics    Alcohol use: Yes     Comment: social      Family History   Problem Relation Age of Onset    Hypertension Mother     Heart Disease Mother     Heart Disease Father     Hypertension Father     Breast Cancer Maternal Aunt     Breast Cancer Other     Breast Cancer Maternal Aunt      Current Outpatient Medications   Medication Sig    lisinopriL (PRINIVIL, ZESTRIL) 10 mg tablet Take  by mouth daily.  metFORMIN (GLUCOPHAGE) 500 mg tablet Take  by mouth two (2) times daily (with meals).     fluticasone-salmeterol (ADVAIR DISKUS) 100-50 mcg/dose diskus inhaler Take 1 Puff by inhalation every twelve (12) hours.  amLODIPine (NORVASC) 10 mg tablet Take  by mouth daily. No current facility-administered medications for this visit. Allergies   Allergen Reactions    Nuts [Tree Nut] Rash        Review of Systems: A complete review of systems was obtained, negative except as described above. Physical Exam:     Visit Vitals  BP (!) 162/57 Comment: Pt. took BP med today and reports being asymptomatic. Pulse 82   Temp 97.8 °F (36.6 °C) (Temporal)   Resp 18   Ht 5' 2\" (1.575 m)   Wt 312 lb 12.8 oz (141.9 kg)   SpO2 96%   BMI 57.21 kg/m²     ECOG PS: 0    Constitutional: Appears well-developed and well-nourished in no apparent distress      Mental status: Alert and awake, Oriented to person/place/time, Able to follow commands    Eyes: EOM normal, Sclera normal, No visible ocular discharge    HENT: Normocephalic, atraumatic    Mouth/Throat: Moist mucous membranes    External Ears normal    Neck: No visualized mass    Pulmonary/Chest: Respiratory effort normal, No visualized signs of difficulty breathing or respiratory distress    Musculoskeletal: Normal gait with no signs of ataxia, Normal range of motion of neck    Neurological: No facial asymmetry (Cranial nerve 7 motor function), No gaze palsy    Skin: No significant exanthematous lesions or discoloration noted on facial skin    Psychiatric: Normal affect, No hallucinations     Left breast:  8:00, 7 cmfn, 1.8 cm mass, no LAD          Results:   No results found for: WBC, HGB, HCT, PLT, MCV, ANEU, HGBPOC, HCTPOC, HGBEXT, HCTEXT, PLTEXT, HGBEXT, HCTEXT, PLTEXT  No results found for: NA, K, CL, CO2, GLU, BUN, CREA, GFRAA, GFRNA, CA, NAPOC, KPOCT, CLPOC, GLUCPOC, IBUN, CREAPOC, ICAI  No results found for: TBILI, ALT, AP, TP, ALB, GLOB      1/13/22 breast US bilateral; L mammogram  L breast mass 8:00, 7 cmfn, 1.3 cm mass  Bilateral LN seen, appear benign    Records reviewed and summarized above.   Pathology report(s) reviewed above. Radiology report(s) reviewed above. Assessment/plan:   1. Left IDC, gr 2, triple negative, cT1c cN0 cM0, stage IA, prognostic stage IB    We explained to the patient that the goal of systemic adjuvant therapy is to improve the chances for cure and decrease the risk of relapse. We explained why a patient can have microscopic cancer spread now even though physical examination, laboratory studies and imaging studies are negative for cancer. We explained that the same treatments used now as adjuvant or preventive treatments rarely if ever are curative in women who develop metastases. We discussed that there is no difference in overall survival between neoadjuvant and adjuvant chemotherapy. We discussed the rationale for neoadjuvant chemotherapy, if chemotherapy is warranted, as it is in this case: to avoid any potential delays in giving chemotherapy following surgery, to be able to see the response of the tumor to chemotherapy, and to potentially downstage the tumor prior to surgery. Discussed with Dr. Robson Aguilera. Since LN is negative to date, suggest surgery first for clinical stage I tumor and then  proceed with adjuvant chemo. If adjuvant:  Would consider TC    We discussed the toxicities of TC chemotherapy (docetaxel 75mg/m2/cyclophosphamide 600 mg/m2 q 3 weeks x 4)  in detail. This chemotherapy frequently causes a low white blood cell count and a small percent of patients require hospital admission for treatment of neutropenic fever. We explained that on occasion we consider the use of growth factors to minimize this risk. We explained to the patient that some side effects, if they occur, only last a few days including nausea, vomiting, stomatitis, arthralgia, myalgia, and allergic reactions to Taxotere.  We told the patient that severe nausea and vomiting were very uncommon and that some side effects, if they occur, will last longer: this includes hair loss, which will be seen in all patients treated with these agents and fatigue, which will be seen in most. The patient was also told that if she is having menstrual function that she could develop chemotherapy-induced amenorrhea and infertility. We also told the patient that there was a very small risk of acute leukemia. We also informed the patient that heart damage is rare with these agents    Discussed that if the tumor is greater in size than we think it is, we may re-eval the plan following surgery. She is agreeable to port placement at the time of surgery      2. Emotional well being:  She has excellent support and is coping well with her disease    3. Genetic testing:   discussed potential ramifications of a positive test including the potential need for bilateral mastectomies and bilateral oophorectomies and the risk then for her family members to also have a mutation. VUS also discussed. She has the kit from Dr. Aly Sevilla, but has not yet done the testing    4. DM2:  On metformin    I appreciate the opportunity to participate in Ms. Kanika Wan's care. Signed By: Darrin Wise MD      No orders of the defined types were placed in this encounter.

## 2022-02-23 NOTE — PROGRESS NOTES
Samuel Kendall is a 62 y.o. female Follow up for the evaluation of breast cancer. 1. Have you been to the ER, urgent care clinic since your last visit? Hospitalized since your last visit? No    2. Have you seen or consulted any other health care providers outside of the [address] [place] [address] since your last visit? Include any pap smears or colon screening.  No

## 2022-02-24 ENCOUNTER — TELEPHONE (OUTPATIENT)
Dept: SURGERY | Age: 62
End: 2022-02-24

## 2022-02-25 DIAGNOSIS — C50.912 MALIGNANT NEOPLASM OF LEFT FEMALE BREAST, UNSPECIFIED ESTROGEN RECEPTOR STATUS, UNSPECIFIED SITE OF BREAST (HCC): Primary | ICD-10-CM

## 2022-02-25 NOTE — TELEPHONE ENCOUNTER
Patient notified; Patient Surgery Information Sheet        Patient Name:  Wendy Starr  Surgery Date:  March 2, 2022  Type of Surgery:  left breast lumpectomy, port insertion. Time of Surgery:  11:30 am  Pre-Operative Testing Department will call prior to surgery to determine if you need to come in and will schedule if needed. Arrival Time on the day of Surgery: 9:45 am    Hospital:  [address] [place] [address]    Check in is located:   [place] [[facility]]-go to main entrance , take elevator on left side  past volunteer desk  to 2nd floor, turn right out of elevator, sign in at desk    [address]    Pre-Operative Instructions:       Nothing to eat or drink after midnight the night before surgery  Do not wear makeup, lotion, deodorant, perfume  Please have a  to take you home from the hospital, failure to have a  could result in canceled surgery  All valuables should be left at home, the hospital Is not responsible for valuables  Special Instructions if needed:

## 2022-02-28 ENCOUNTER — HOSPITAL ENCOUNTER (OUTPATIENT)
Dept: PREADMISSION TESTING | Age: 62
Discharge: HOME | End: 2022-02-28
Attending: SURGERY
Payer: MEDICAID

## 2022-02-28 VITALS
WEIGHT: 293 LBS | OXYGEN SATURATION: 98 % | DIASTOLIC BLOOD PRESSURE: 81 MMHG | HEART RATE: 91 BPM | HEIGHT: 62 IN | SYSTOLIC BLOOD PRESSURE: 187 MMHG | RESPIRATION RATE: 20 BRPM | TEMPERATURE: 97.1 F | BODY MASS INDEX: 53.92 KG/M2

## 2022-02-28 LAB
ANION GAP SERPL CALC-SCNC: 7 MMOL/L (ref 5–15)
BUN SERPL-MCNC: 14 MG/DL (ref 6–20)
BUN/CREAT SERPL: 19 (ref 12–20)
CALCIUM SERPL-MCNC: 10.8 MG/DL (ref 8.5–10.1)
CHLORIDE SERPL-SCNC: 109 MMOL/L (ref 97–108)
CO2 SERPL-SCNC: 25 MMOL/L (ref 21–32)
CREAT SERPL-MCNC: 0.73 MG/DL (ref 0.55–1.02)
GLUCOSE SERPL-MCNC: 103 MG/DL (ref 65–100)
POTASSIUM SERPL-SCNC: 4.4 MMOL/L (ref 3.5–5.1)
SODIUM SERPL-SCNC: 141 MMOL/L (ref 136–145)

## 2022-02-28 PROCEDURE — 36415 COLL VENOUS BLD VENIPUNCTURE: CPT

## 2022-02-28 PROCEDURE — 80048 BASIC METABOLIC PNL TOTAL CA: CPT

## 2022-02-28 PROCEDURE — U0005 INFEC AGEN DETEC AMPLI PROBE: HCPCS

## 2022-02-28 PROCEDURE — 93005 ELECTROCARDIOGRAM TRACING: CPT

## 2022-02-28 RX ORDER — METFORMIN HYDROCHLORIDE 500 MG/1
1 TABLET, FILM COATED, EXTENDED RELEASE ORAL
COMMUNITY
End: 2022-08-16

## 2022-02-28 RX ORDER — ALBUTEROL SULFATE 90 UG/1
2 AEROSOL, METERED RESPIRATORY (INHALATION)
COMMUNITY
End: 2022-02-28

## 2022-02-28 RX ORDER — IPRATROPIUM BROMIDE AND ALBUTEROL SULFATE 2.5; .5 MG/3ML; MG/3ML
3 SOLUTION RESPIRATORY (INHALATION)
COMMUNITY
End: 2022-08-16

## 2022-02-28 RX ORDER — ALBUTEROL SULFATE 90 UG/1
AEROSOL, METERED RESPIRATORY (INHALATION)
Status: ON HOLD | COMMUNITY
End: 2022-09-13 | Stop reason: SDUPTHER

## 2022-02-28 NOTE — PERIOP NOTES
Called Main OR and reported patient's weight of 314.7lb to Friday harbor. Lyn Wray in Dr. Wendy Lopez office to notify her that the patient's pharmacy is [facility] in [place]- this pharmacy does not come up in the list of pharmacies in [place].

## 2022-02-28 NOTE — PERIOP NOTES
[facility], [address] [facility]   MAIN OR                                  [phone]   MAIN PRE OP                          [phone]                                                                                AMBULATORY PRE OP          [phone]  PRE-ADMISSION TESTING    [phone]   Surgery Date: Wednesday 3/2/2022       Is surgery arrival time given by surgeon? NO  If NO, [facility] staff will call you between 3 and 7pm the day before your surgery with your arrival time. (If your surgery is on a Monday, we will call you the Friday before.)    Call [phone] after 7pm Monday-Friday if you did not receive this call. INSTRUCTIONS BEFORE YOUR SURGERY   When You  Arrive Arrive at the 2nd [address] [facility] on the day of your surgery  Have your insurance card, photo ID, and any copayment (if needed)   Food   and   Drink NO food or drink after midnight the night before surgery    This means NO water, gum, mints, coffee, juice, etc.  No alcohol (beer, wine, liquor) 24 hours before and after surgery   Medications to   TAKE   Morning of Surgery MEDICATIONS TO TAKE THE MORNING OF SURGERY WITH A SIP OF WATER:    Inhalers (Advair and Albuterol if needed)   Amlodipine    Bring Albuterol Inhaler the day of surgery   Medications  To  STOP      7 days before surgery  Non-Steroidal anti-inflammatory Drugs (NSAID's): for example, Ibuprofen (Advil, Motrin), Naproxen (Aleve)   Aspirin, if taking for pain    Herbal supplements, vitamins, and fish oil   Other:  (Pain medications not listed above, including Tylenol may be taken)   Blood  Thinners     Bathing Clothing  Jewelry  Valuables      If you shower the morning of surgery, please do not apply anything to your skin (lotions, powders, deodorant, or makeup, especially mascara)   Follow Chlorhexidine Care Fusion body wash instructions provided to you during PAT appointment. Begin 3 days prior to surgery.  Do not shave or trim anywhere 24 hours before surgery   Wear your hair loose or down; no pony-tails, buns, or metal hair clips   Wear loose, comfortable, clean clothes   Wear glasses instead of contacts   Leave money, valuables, and jewelry, including body piercings, at home       Going Home - or Spending the Night  SAME-DAY SURGERY: You must have a responsible adult drive you home and stay with you 24 hours after surgery   ADMITS: If your doctor is keeping you in the hospital after surgery, leave personal belongings/luggage in your car until you have a hospital room number. Hospital discharge time is 12 noon  Drivers must be here before 12 noon unless you are told differently   Special Instructions It is now mandated that all surgical patients be tested for COVID-19 prior to surgery. Testing has to be exactly 4 days prior to surgery. Your COVID test date is Today         Patients are advised to self-quarantine at home after testing and prior to your surgery date. You will be notified if your results are positive. What to watch for:   Coronavirus (COVID-19) affects different people in different ways   It also appears with a wide range of symptoms from mild to severe   Signs usually appear 2-14 days after exposure     If you develop any of the following, notify your doctor immediately:  o Fever  o Chills, with or without a shiver  o Muscle pain  o Headache  o Sore throat  o Dry cough  o New loss of taste or smell  o Tiredness      If you develop any of the following, call 911:  o Shortness of breath  o Difficulty breathing  o Chest pain  o New confusion  o Blueness of fingers and/or lips       Follow all instructions so your surgery wont be cancelled. Please, be on time. If a situation occurs and you are delayed the day of surgery, call [phone].     If your physical condition changes (like a fever, cold, flu, etc.) call your surgeon. Home medication(s) reviewed and verified via      LIST   VERBAL   during PAT appointment. The patient was contacted by    IN-PERSON  The patient verbalizes understanding of all instructions and   DOES NOT   need reinforcement.

## 2022-03-01 ENCOUNTER — ANESTHESIA EVENT (OUTPATIENT)
Dept: SURGERY | Age: 62
End: 2022-03-01
Payer: MEDICAID

## 2022-03-01 LAB
ATRIAL RATE: 84 BPM
CALCULATED P AXIS, ECG09: 63 DEGREES
CALCULATED R AXIS, ECG10: 25 DEGREES
CALCULATED T AXIS, ECG11: 69 DEGREES
DIAGNOSIS, 93000: NORMAL
P-R INTERVAL, ECG05: 164 MS
Q-T INTERVAL, ECG07: 352 MS
QRS DURATION, ECG06: 86 MS
QTC CALCULATION (BEZET), ECG08: 415 MS
SARS-COV-2, XPLCVT: NOT DETECTED
SOURCE, COVRS: NORMAL
VENTRICULAR RATE, ECG03: 84 BPM

## 2022-03-02 ENCOUNTER — APPOINTMENT (OUTPATIENT)
Dept: GENERAL RADIOLOGY | Age: 62
End: 2022-03-02
Attending: SURGERY
Payer: MEDICAID

## 2022-03-02 ENCOUNTER — ANESTHESIA (OUTPATIENT)
Dept: SURGERY | Age: 62
End: 2022-03-02
Payer: MEDICAID

## 2022-03-02 ENCOUNTER — HOSPITAL ENCOUNTER (OUTPATIENT)
Age: 62
Setting detail: OUTPATIENT SURGERY
Discharge: HOME | End: 2022-03-02
Attending: SURGERY | Admitting: SURGERY
Payer: MEDICAID

## 2022-03-02 VITALS
DIASTOLIC BLOOD PRESSURE: 78 MMHG | RESPIRATION RATE: 10 BRPM | TEMPERATURE: 97.5 F | SYSTOLIC BLOOD PRESSURE: 169 MMHG | HEART RATE: 100 BPM | BODY MASS INDEX: 53.92 KG/M2 | HEIGHT: 62 IN | OXYGEN SATURATION: 95 % | WEIGHT: 293 LBS

## 2022-03-02 DIAGNOSIS — C50.912 MALIGNANT NEOPLASM OF LEFT FEMALE BREAST, UNSPECIFIED ESTROGEN RECEPTOR STATUS, UNSPECIFIED SITE OF BREAST (HCC): ICD-10-CM

## 2022-03-02 LAB
GLUCOSE BLD STRIP.AUTO-MCNC: 121 MG/DL (ref 65–117)
SERVICE CMNT-IMP: ABNORMAL

## 2022-03-02 PROCEDURE — 71045 X-RAY EXAM CHEST 1 VIEW: CPT

## 2022-03-02 PROCEDURE — 2709999900 HC NON-CHARGEABLE SUPPLY: Performed by: SURGERY

## 2022-03-02 PROCEDURE — 76060000064 HC AMB SURG ANES 2 TO 2.5 HR: Performed by: SURGERY

## 2022-03-02 PROCEDURE — 77030031139 HC SUT VCRL2 J&J -A: Performed by: SURGERY

## 2022-03-02 PROCEDURE — 2709999900 HC NON-CHARGEABLE SUPPLY

## 2022-03-02 PROCEDURE — 88307 TISSUE EXAM BY PATHOLOGIST: CPT

## 2022-03-02 PROCEDURE — 76937 US GUIDE VASCULAR ACCESS: CPT | Performed by: SURGERY

## 2022-03-02 PROCEDURE — 77030002996 HC SUT SLK J&J -A: Performed by: SURGERY

## 2022-03-02 PROCEDURE — 82962 GLUCOSE BLOOD TEST: CPT

## 2022-03-02 PROCEDURE — 74011000250 HC RX REV CODE- 250: Performed by: ANESTHESIOLOGY

## 2022-03-02 PROCEDURE — 77030040922 HC BLNKT HYPOTHRM STRY -A

## 2022-03-02 PROCEDURE — 74011250636 HC RX REV CODE- 250/636: Performed by: ANESTHESIOLOGY

## 2022-03-02 PROCEDURE — 88342 IMHCHEM/IMCYTCHM 1ST ANTB: CPT

## 2022-03-02 PROCEDURE — 88365 INSITU HYBRIDIZATION (FISH): CPT

## 2022-03-02 PROCEDURE — 19302 P-MASTECTOMY W/LN REMOVAL: CPT | Performed by: SURGERY

## 2022-03-02 PROCEDURE — 76210000036 HC AMBSU PH I REC 1.5 TO 2 HR: Performed by: SURGERY

## 2022-03-02 PROCEDURE — 74011000250 HC RX REV CODE- 250: Performed by: STUDENT IN AN ORGANIZED HEALTH CARE EDUCATION/TRAINING PROGRAM

## 2022-03-02 PROCEDURE — 76210000056 HC AMBSU PH II REC 1.5 TO 2 HR: Performed by: SURGERY

## 2022-03-02 PROCEDURE — 36561 INSERT TUNNELED CV CATH: CPT | Performed by: SURGERY

## 2022-03-02 PROCEDURE — C1788 PORT, INDWELLING, IMP: HCPCS | Performed by: SURGERY

## 2022-03-02 PROCEDURE — 88341 IMHCHEM/IMCYTCHM EA ADD ANTB: CPT

## 2022-03-02 PROCEDURE — 77001 FLUOROGUIDE FOR VEIN DEVICE: CPT | Performed by: SURGERY

## 2022-03-02 PROCEDURE — 74011250636 HC RX REV CODE- 250/636: Performed by: STUDENT IN AN ORGANIZED HEALTH CARE EDUCATION/TRAINING PROGRAM

## 2022-03-02 PROCEDURE — 74011250636 HC RX REV CODE- 250/636: Performed by: SURGERY

## 2022-03-02 PROCEDURE — 74011000250 HC RX REV CODE- 250: Performed by: SURGERY

## 2022-03-02 PROCEDURE — 88360 TUMOR IMMUNOHISTOCHEM/MANUAL: CPT

## 2022-03-02 PROCEDURE — 77030040361 HC SLV COMPR DVT MDII -B

## 2022-03-02 PROCEDURE — 76030000004 HC AMB SURG OR TIME 2 TO 2.5: Performed by: SURGERY

## 2022-03-02 DEVICE — POWERPORT CLEARVUE IMPLANTABLE PORT WITH ATTACHABLE 6F POLYURETHANE OPEN-ENDED SINGLE-LUMEN VENOUS CATHETER INTERMEDIATE KIT
Type: IMPLANTABLE DEVICE | Site: CHEST | Status: FUNCTIONAL
Brand: POWERPORT CLEARVUE

## 2022-03-02 RX ORDER — SODIUM CHLORIDE, SODIUM LACTATE, POTASSIUM CHLORIDE, CALCIUM CHLORIDE 600; 310; 30; 20 MG/100ML; MG/100ML; MG/100ML; MG/100ML
125 INJECTION, SOLUTION INTRAVENOUS CONTINUOUS
Status: DISCONTINUED | OUTPATIENT
Start: 2022-03-02 | End: 2022-03-02 | Stop reason: HOSPADM

## 2022-03-02 RX ORDER — BUPIVACAINE HYDROCHLORIDE AND EPINEPHRINE 5; 5 MG/ML; UG/ML
30 INJECTION, SOLUTION EPIDURAL; INTRACAUDAL; PERINEURAL
Status: DISCONTINUED | OUTPATIENT
Start: 2022-03-02 | End: 2022-03-02 | Stop reason: HOSPADM

## 2022-03-02 RX ORDER — NEOSTIGMINE METHYLSULFATE 1 MG/ML
INJECTION, SOLUTION INTRAVENOUS AS NEEDED
Status: DISCONTINUED | OUTPATIENT
Start: 2022-03-02 | End: 2022-03-02 | Stop reason: HOSPADM

## 2022-03-02 RX ORDER — PROPOFOL 10 MG/ML
INJECTION, EMULSION INTRAVENOUS AS NEEDED
Status: DISCONTINUED | OUTPATIENT
Start: 2022-03-02 | End: 2022-03-02 | Stop reason: HOSPADM

## 2022-03-02 RX ORDER — ONDANSETRON 2 MG/ML
INJECTION INTRAMUSCULAR; INTRAVENOUS AS NEEDED
Status: DISCONTINUED | OUTPATIENT
Start: 2022-03-02 | End: 2022-03-02 | Stop reason: HOSPADM

## 2022-03-02 RX ORDER — LIDOCAINE HYDROCHLORIDE 10 MG/ML
0.1 INJECTION, SOLUTION EPIDURAL; INFILTRATION; INTRACAUDAL; PERINEURAL AS NEEDED
Status: DISCONTINUED | OUTPATIENT
Start: 2022-03-02 | End: 2022-03-02 | Stop reason: HOSPADM

## 2022-03-02 RX ORDER — DEXAMETHASONE SODIUM PHOSPHATE 100 MG/10ML
INJECTION INTRAMUSCULAR; INTRAVENOUS AS NEEDED
Status: DISCONTINUED | OUTPATIENT
Start: 2022-03-02 | End: 2022-03-02 | Stop reason: HOSPADM

## 2022-03-02 RX ORDER — SUCCINYLCHOLINE CHLORIDE 20 MG/ML
INJECTION INTRAMUSCULAR; INTRAVENOUS AS NEEDED
Status: DISCONTINUED | OUTPATIENT
Start: 2022-03-02 | End: 2022-03-02 | Stop reason: HOSPADM

## 2022-03-02 RX ORDER — HYDROCODONE BITARTRATE AND ACETAMINOPHEN 5; 325 MG/1; MG/1
1 TABLET ORAL
Qty: 15 TABLET | Refills: 0 | Status: SHIPPED | OUTPATIENT
Start: 2022-03-02 | End: 2022-03-09

## 2022-03-02 RX ORDER — GLYCOPYRROLATE 0.2 MG/ML
INJECTION INTRAMUSCULAR; INTRAVENOUS AS NEEDED
Status: DISCONTINUED | OUTPATIENT
Start: 2022-03-02 | End: 2022-03-02 | Stop reason: HOSPADM

## 2022-03-02 RX ORDER — BUPIVACAINE HYDROCHLORIDE AND EPINEPHRINE 5; 5 MG/ML; UG/ML
INJECTION, SOLUTION EPIDURAL; INTRACAUDAL; PERINEURAL AS NEEDED
Status: DISCONTINUED | OUTPATIENT
Start: 2022-03-02 | End: 2022-03-02 | Stop reason: HOSPADM

## 2022-03-02 RX ORDER — FENTANYL CITRATE 50 UG/ML
INJECTION, SOLUTION INTRAMUSCULAR; INTRAVENOUS AS NEEDED
Status: DISCONTINUED | OUTPATIENT
Start: 2022-03-02 | End: 2022-03-02 | Stop reason: HOSPADM

## 2022-03-02 RX ORDER — ONDANSETRON 2 MG/ML
4 INJECTION INTRAMUSCULAR; INTRAVENOUS AS NEEDED
Status: DISCONTINUED | OUTPATIENT
Start: 2022-03-02 | End: 2022-03-02 | Stop reason: HOSPADM

## 2022-03-02 RX ORDER — MIDAZOLAM HYDROCHLORIDE 1 MG/ML
INJECTION, SOLUTION INTRAMUSCULAR; INTRAVENOUS AS NEEDED
Status: DISCONTINUED | OUTPATIENT
Start: 2022-03-02 | End: 2022-03-02 | Stop reason: HOSPADM

## 2022-03-02 RX ORDER — HYDROMORPHONE HYDROCHLORIDE 1 MG/ML
.5-1 INJECTION, SOLUTION INTRAMUSCULAR; INTRAVENOUS; SUBCUTANEOUS
Status: DISCONTINUED | OUTPATIENT
Start: 2022-03-02 | End: 2022-03-02 | Stop reason: HOSPADM

## 2022-03-02 RX ORDER — LIDOCAINE HYDROCHLORIDE 20 MG/ML
INJECTION, SOLUTION EPIDURAL; INFILTRATION; INTRACAUDAL; PERINEURAL AS NEEDED
Status: DISCONTINUED | OUTPATIENT
Start: 2022-03-02 | End: 2022-03-02 | Stop reason: HOSPADM

## 2022-03-02 RX ORDER — ROCURONIUM BROMIDE 10 MG/ML
INJECTION, SOLUTION INTRAVENOUS AS NEEDED
Status: DISCONTINUED | OUTPATIENT
Start: 2022-03-02 | End: 2022-03-02 | Stop reason: HOSPADM

## 2022-03-02 RX ORDER — HEPARIN SODIUM (PORCINE) LOCK FLUSH IV SOLN 100 UNIT/ML 100 UNIT/ML
SOLUTION INTRAVENOUS AS NEEDED
Status: DISCONTINUED | OUTPATIENT
Start: 2022-03-02 | End: 2022-03-02 | Stop reason: HOSPADM

## 2022-03-02 RX ORDER — IPRATROPIUM BROMIDE AND ALBUTEROL SULFATE 2.5; .5 MG/3ML; MG/3ML
3 SOLUTION RESPIRATORY (INHALATION)
Status: COMPLETED | OUTPATIENT
Start: 2022-03-02 | End: 2022-03-02

## 2022-03-02 RX ADMIN — SODIUM CHLORIDE, POTASSIUM CHLORIDE, SODIUM LACTATE AND CALCIUM CHLORIDE: 600; 310; 30; 20 INJECTION, SOLUTION INTRAVENOUS at 13:43

## 2022-03-02 RX ADMIN — PROPOFOL 200 MG: 10 INJECTION, EMULSION INTRAVENOUS at 12:17

## 2022-03-02 RX ADMIN — LIDOCAINE HYDROCHLORIDE 100 MG: 20 INJECTION, SOLUTION INTRAVENOUS at 12:06

## 2022-03-02 RX ADMIN — PROPOFOL 200 MG: 10 INJECTION, EMULSION INTRAVENOUS at 12:06

## 2022-03-02 RX ADMIN — HYDROMORPHONE HYDROCHLORIDE 0.5 MG: 1 INJECTION, SOLUTION INTRAMUSCULAR; INTRAVENOUS; SUBCUTANEOUS at 14:49

## 2022-03-02 RX ADMIN — FENTANYL CITRATE 50 MCG: 50 INJECTION, SOLUTION INTRAMUSCULAR; INTRAVENOUS at 13:38

## 2022-03-02 RX ADMIN — FENTANYL CITRATE 100 MCG: 50 INJECTION, SOLUTION INTRAMUSCULAR; INTRAVENOUS at 12:03

## 2022-03-02 RX ADMIN — HYDROMORPHONE HYDROCHLORIDE 1 MG: 1 INJECTION, SOLUTION INTRAMUSCULAR; INTRAVENOUS; SUBCUTANEOUS at 14:13

## 2022-03-02 RX ADMIN — FENTANYL CITRATE 50 MCG: 50 INJECTION, SOLUTION INTRAMUSCULAR; INTRAVENOUS at 13:08

## 2022-03-02 RX ADMIN — ROCURONIUM BROMIDE 20 MG: 10 INJECTION INTRAVENOUS at 12:49

## 2022-03-02 RX ADMIN — ONDANSETRON HYDROCHLORIDE 4 MG: 2 SOLUTION INTRAMUSCULAR; INTRAVENOUS at 12:49

## 2022-03-02 RX ADMIN — SUCCINYLCHOLINE CHLORIDE 100 MG: 20 INJECTION, SOLUTION INTRAMUSCULAR; INTRAVENOUS at 12:18

## 2022-03-02 RX ADMIN — DEXAMETHASONE SODIUM PHOSPHATE 10 MG: 10 INJECTION INTRAMUSCULAR; INTRAVENOUS at 12:49

## 2022-03-02 RX ADMIN — HYDROMORPHONE HYDROCHLORIDE 0.5 MG: 1 INJECTION, SOLUTION INTRAMUSCULAR; INTRAVENOUS; SUBCUTANEOUS at 14:31

## 2022-03-02 RX ADMIN — IPRATROPIUM BROMIDE AND ALBUTEROL SULFATE 3 ML: .5; 3 SOLUTION RESPIRATORY (INHALATION) at 15:03

## 2022-03-02 RX ADMIN — SUCCINYLCHOLINE CHLORIDE 100 MG: 20 INJECTION, SOLUTION INTRAMUSCULAR; INTRAVENOUS at 12:06

## 2022-03-02 RX ADMIN — MIDAZOLAM 2 MG: 1 INJECTION, SOLUTION INTRAMUSCULAR; INTRAVENOUS at 12:03

## 2022-03-02 RX ADMIN — Medication 3 MG: at 13:50

## 2022-03-02 RX ADMIN — ROCURONIUM BROMIDE 30 MG: 10 INJECTION INTRAVENOUS at 12:26

## 2022-03-02 RX ADMIN — GLYCOPYRROLATE 0.2 MG: 0.2 INJECTION INTRAMUSCULAR; INTRAVENOUS at 13:51

## 2022-03-02 NOTE — OP NOTES
[facility]difata[facility] Guerin [facility] [address]  [address] [facility] [address]    Name: Margaret Cotto  : 1960    Left Breast Lumpectomy with Axillary Node Dissection  portacath   Operative Report      Date of Surgery: 3/2/2022  Preoperative Diagnosis:  Left breast cancer, LIQ  Postoperative Diagnosis: same   Surgeon: Dr Enrico Ochoa  Anesthesia: MAC  Procedure: Left breast lumpectomy with axillary node dissection  Indication: LEFT breast cancer, triple negative  Enlarged axillary nodes (biopsy negative)     Procedure Note:  Margaret Cotto was brought into the operating room and placed supine on the table. She was induced with general anesthesia. The chest and axillae were then prepped and draped in the usual sterile fashion. 0.5% marcaine was used for local anesthesia. The right internal jugular vein was identified with ultrasound, and under ultrasound guidance an 18 gauge needle was inserted into the internal jugular vein. A guidewire was passed through the needle and under fluoroscopic guidance was advanced into the superior vena cava. A 3cm horizontal incision was made just inferior to the clavicle and a pocket was created for the port. The catheter was tunneled from the neck to the chest incision. A dilator with pull-away sheath was inserted over the wire and the wire and dilator were removed leaving the pull-away sheath in place. Under fluoroscopic guidance the catheter was inserted and positioned at the junction of the right atrium and the superior vena cava. The pull-away sheath was removed . The catheter was trimmed to size and attached to the port. The port was tacked to the pectoralis major fascia with 3-0 Vicryl suture. The Portacath was flushed with Heparin. The incision was closed in 2 layers. The dermis was re-approximated with 3-0 Vicryl and the skin was closed with 4-0 Monocryl. The wounds were dressed with skin glue. A portable chest x-ray will be obtained in the PACU.     A 3cm incision was made in the lower axilla and 4 very large axillary lymph nodes were removed. No other lymph nodes were palpated. The nodes were x-rayed to confirm that the previously biopsied node was included in the specimen. Hemostatis was controlled with clamps and 3-0 vicryl ties. The breast tumor was in the lower inner quadrant and was identified with ultrasound. A 5cm horizontal incision was made. Wide excision of the mass and biopsy clip was performed using sharp dissection and electrocautery. The specimen was marked with sutures for orientation purposes, xrayed with the Mozart and sent to pathology. The Mozart showed that the tumor was adjacent to the posterior margin. A posterior margin was obtained, oriented with sutures and sent to pathology. Hemostasis was controlled with electrocautery and powdered surgicell in the breast and the axilla. The breast and axillary tissues were reapproximated with multiple interrupted 3-0 Vicryl sutures. Both incisions were closed in 2 layers with 3-0 vicryl for the dermis and 4-0 Monocryl for the skin. The wounds were dressed with skin glue and the patient was awakened and extubated and taken to the recovery room. Sponge, needle and instrument counts were reported be correct. Findings:  Large axillary nodes  Estimated Blood Loss:  30 cc       Specimens:   ID Type Source Tests Collected by Time Destination   1 : Left Axillary Lymph Node Dissection Preservative Axillary  Jamil Maravilla MD 3/2/2022 1306 Pathology   2 : Left Breast Lumpectomy Preservative Breast  Jamil Maravilla MD 3/2/2022 1314 Pathology   3 : Left Breast Posterior Margin Preservative Breast  Jamil Maravilla MD 3/2/2022 1327 Pathology      Complications: none.   Difficult intubation  Implants: portacath  Assistant: Veronika Alegria, [address] [facility] and Mary Boykin, [address] [facility]    Signed:  Jorge A Cruz MD

## 2022-03-02 NOTE — PERIOP NOTES
Care assumed from [address] at handoff. Pt was sleepy and required frequent intervention to cough/Deep Breathe to maintain sats off of O2. Discussed previously with anesthesia and was to continue to monitor until able to maintain sats independently without RN intervention. Ready to go now. Spoke briefly with caregiver accompanying pt and the importance of cough/deep breathe and frequent incentive spirometry use with required 10x/hr but suggested 4-5x/every commercial break on TV. Questions asked and answered. Able to maintain sats independent of RN interventions. Discharge and follow up and medication instructions verbalized understood. POST ANESTHESIA CARE    DISCHARGE / TRANSFER NOTE  Franky Torrez was:    [x] discharged        via   [x] Wheelchair          to [x] Private Vehicle     [] transferred   [] Carried   [] Taxi / Vehicle \"for Hire\"  [] Walk out  [] Ambulance / Medical Transportation   [] Stretcher  [] Hospital room _**_          [] Bed      Patient was escorted by:      [x] Nurse   [] Volunteer [] Transporter / Technician  [] Parent      [] Spouse / Family /      Patient verbalized     [x] appreciation and was very pleased with care received   [] frustration with care received       throughout their stay. Patient was discharged in     [x] pleasant mood  [] sad mood  [] mad mood . Pain at discharge/transfer was      3  /10. Discharge, medication and follow-up instructions were verbalized as understood prior to discharge  (if applicable for same-day procedures being discharged.)    All personal belongings have been returned to patient, and patient/family verbally confirm receiving belongings as all present.

## 2022-03-02 NOTE — PERIOP NOTES
Discharge instructions given to daughter, verbalized understanding via teachback. All questions answered.

## 2022-03-02 NOTE — INTERVAL H&P NOTE
Update History & Physical    The Patient's History and Physical of February 1, 2022   was reviewed with the patient and I examined the patient. There was no change. The surgical site was confirmed by the patient and me. Plan:  The risk, benefits, expected outcome, and alternative to the recommended procedure have been discussed with the patient. Patient understands and wants to proceed with the procedure.     Electronically signed by Misty Oconnor MD on 3/2/2022 at 9:55 AM

## 2022-03-02 NOTE — ANESTHESIA POSTPROCEDURE EVALUATION
Procedure(s):  LEFT BREAST LUMPECTOMY WITH LEFT BREAST SENTINEL NODE BIOPSY WITH BLUE DYE AND PORT A CATH INSERTION (URGENT). No value filed. Anesthesia Post Evaluation      Multimodal analgesia: multimodal analgesia used between 6 hours prior to anesthesia start to PACU discharge  Patient location during evaluation: PACU  Patient participation: complete - patient participated  Level of consciousness: awake and alert  Pain management: adequate  Airway patency: patent  Anesthetic complications: no  Cardiovascular status: acceptable  Respiratory status: acceptable  Hydration status: acceptable  Post anesthesia nausea and vomiting:  none  Final Post Anesthesia Temperature Assessment:  Normothermia (36.0-37.5 degrees C)      INITIAL Post-op Vital signs:   Vitals Value Taken Time   /52 03/02/22 1525   Temp 36.4 °C (97.5 °F) 03/02/22 1408   Pulse 93 03/02/22 1527   Resp 10 03/02/22 1527   SpO2 88 % 03/02/22 1527   Vitals shown include unvalidated device data.

## 2022-03-02 NOTE — PERIOP NOTES
Patient states she feels it was difficult to breathe, anesthesia aware, duo-neb given as ordered. Patient still requiring assessment for oxygen needs. Will continue to monitor.

## 2022-03-02 NOTE — DISCHARGE INSTRUCTIONS
Discharge Instructions from Dr. Anya Daniel    Patient Discharge Instructions    Arturo Parrish / [id number] : 1960    Admitted 3/2/2022 Discharged: 3/2/2022   What to do at Home  Diet:Regular  Activity: As tolerated. No driving if taking pain medication. Okay to shower or take a bath. You may chose to wear a bra to sleep in for extra support for the next few days. Pain control: Ice pack 20 minutes of every hour until you go to bed tonight. You may use over-the-counter medication as needed (acetominophen, aspirin, ibuprofen). Tomorrow you may put a heat pack on the breast.  Dressing: The skin glue is waterproof. It is okay to wash normally at this site. If the glue is still present after 10 days you should peel it off. Follow up: If needed, call the office to make an appointment. [phone]. I will call with results within one week. [address] [facility], [address]  Problems/Questions: Call Kenneth Sampson MD on my cell phone. [phone]        DISCHARGE SUMMARY from your Nurse      PATIENT INSTRUCTIONS    After general anesthesia or intravenous sedation, for 24 hours or while taking prescription Narcotics:  · Limit your activities  · Do not drive and operate hazardous machinery  · Do not make important personal or business decisions  · Do  not drink alcoholic beverages  · If you have not urinated within 8 hours after discharge, please contact your surgeon on call.     Report the following to your surgeon:  · Excessive pain, swelling, redness or odor of or around the surgical area  · Temperature over 100.5  · Nausea and vomiting lasting longer than 4 hours or if unable to take medications  · Any signs of decreased circulation or nerve impairment to extremity: change in color, persistent  numbness, tingling, coldness or increase pain  · Any questions      GOOD HELP TO FIGHT AN INFECTION  Here are a few tip to help reduce the chance of getting an infection after surgery:   Wash Your Hands   Good handwashing is the most important thing you and your caregiver can do.  Wash before and after caring for any wounds. Dry your hand with a clean towel.  Wash with soap and water for at least 20 seconds. A TIP: sing the \"Happy Birthday\" song through one time while washing to help with the timing.  Use a hand  in between washings.  Shower   When your surgeon says it is OK to take a shower, use a new bar of antibacterial soap (if that is what you use, and keep that bar of soap ONLY for your use), or antibacterial body wash.  Use a clean wash cloth or sponge when you bathe.  Dry off with a clean towel  after every bath - be careful around any wounds, skin staples, sutures or surgical glue over/on wounds.  Do not enter swimming pools, hot tubs, lakes, rivers and/or ocean until wounds are healed and your doctor/surgeon says it is OK.  Use Clean Sheets   Sleep on freshly laundered sheets after your surgery.  Keep the surgery site covered with a clean, dry bandage (if instructed to do so). If the bandage becomes soiled, reapply a new, dry, clean bandage.  Do not allow pets to sleep with you while your wound is healing.  Lifestyle Modification and Controlling Your Blood Sugar   Smoking slows wound healing. Stop smoking and limit exposure to second-hand smoke.  High blood sugar slows wound healing. Eat a well-balanced diet to provide proper nutrition while healing   Monitor your blood sugar (if you are a diabetic) and take your medications as you are suppose to so you can control you blood sugar after surgery. COUGH AND DEEP BREATHE    Breathing deeply and coughing are very important exercises to do after surgery. Deep breathing and coughing open the little air tubes and air sacks in your lungs. You take deep breaths every day. You may not even notice - it is just something you do when you sigh or yawn.   It is a natural exercise you do to keep these air passages open. After surgery, take deep breaths and cough, on purpose. DIRECTIONS:  · Take 10 to 15 slow deep breaths every hour while awake. · Breathe in deeply, and hold it for 2 seconds. · Exhale slowly through puckered lips, like blowing up a balloon. · After every 4th or 5th deep breath, hug your pillow to your chest or belly and give a hard, deep cough. Yes, it will probably hurt. But doing this exercise is a very important part of healing after surgery. Take your pain medicine to help you do this exercise without too much pain. Coughing and deep breathing help prevent bronchitis and pneumonia after surgery. If you had chest or belly surgery, use a pillow as a \"hug irene\" and hold it tightly to your chest or belly when you cough. ANKLE PUMPS    Ankle pumps increase the circulation of oxygenated blood to your lower extremities and decrease your risk for circulation problems such as blood clots. They also stretch the muscles, tendons and ligaments in your foot and ankle, and prevent joint contracture in the ankle and foot, especially after surgeries on the legs. It is important to do ankle pump exercises regularly after surgery because immobility increases your risk for developing a blood clot. Your doctor may also have you take an Aspirin for the next few days as well. If your doctor did not ask you to take an Aspirin, consult with him before starting Aspirin therapy on your own. The exercise is quite simple. · Slowly point your foot forward, feeling the muscles on the top of your lower leg stretch, and hold this position for 5 seconds. · Next, pull your foot back toward you as far as possible, stretching the calf muscles, and hold that position for 5 seconds. · Repeat with the other foot.   · Perform 10 repetitions every hour while awake for both ankles if possible (down and then up with the foot once is one repetition). You should feel gentle stretching of the muscles in your lower leg when doing this exercise. If you feel pain, or your range of motion is limited, don't push too hard. Only go the limit your joint and muscles will let you go. If you have increasing pain, progressively worsening leg warmth or swelling, STOP the exercise and call your doctor. MEDICATION AND   SIDE EFFECT GUIDE    The [facility] MEDICATION AND SIDE EFFECT GUIDE was provided to the PATIENT AND CARE PROVIDER. Information provided includes instruction about drug purpose and common side effects for the following medications:   · norco 5/325        These are general instructions for a healthy lifestyle:    *   Please give a list of your current medications to your Primary Care Provider. *   Please update this list whenever your medications are discontinued, doses are changed, or new medications (including over-the-counter products) are added. *   Please carry medication information at all times in case of emergency situations. About Smoking  No smoking / No tobacco products  Avoid exposure to second hand smoke     Surgeon General's Warning:  Quitting smoking now greatly reduces serious risk to your health. Obesity, smoking, and sedentary lifestyle greatly increases your risk for illness and disease. A healthy diet, regular physical exercise & weight monitoring are important for maintaining a healthy lifestyle. Congestive Heart Failure  You may be retaining fluid if you have a history of heart failure or if you experience any of the following symptoms:  Weight gain of 3 pounds or more overnight or 5 pounds in a week, increased swelling in your hands or feet or shortness of breath while lying flat in bed. Please call your doctor as soon as you notice any of these symptoms; do not wait until your next office visit.       Recognize signs and symptoms of STROKE:  F -  Face looks uneven  A -  Arms unable to move or move evenly  S -  Speech slurred or non-existent  T -  Time-call 911 as soon as signs and symptoms begin-DO NOT go          back to bed or wait to see if you get better-TIME IS BRAIN. Warning Signs of HEART ATTACK   Call 911 if you have these symptoms:     Chest discomfort. Most heart attacks involve discomfort in the center of the chest that lasts more than a few minutes, or that goes away and comes back. It can feel like uncomfortable pressure, squeezing, fullness, or pain.  Discomfort in other areas of the upper body. Symptoms can include pain or discomfort in one or both arms, the back, neck, jaw, or stomach.  Shortness of breath with or without chest discomfort.  Other signs may include breaking out in a cold sweat, nausea, or lightheadedness. Don't wait more than five minutes to call 911 - MINUTES MATTER! Fast action can save your life. Calling 911 is almost always the fastest way to get lifesaving treatment. Emergency Medical Services staff can begin treatment when they arrive -- up to an hour sooner than if someone gets to the hospital by car. Learning About Coronavirus [phone])  Coronavirus [phone]): Overview  What is coronavirus (COVID-19)? The coronavirus disease (COVID-19) is caused by a virus. It is an illness that was first found in [place], [place], in December 2019. It has since spread worldwide. The virus can cause fever, cough, and trouble breathing. In severe cases, it can cause pneumonia and make it hard to breathe without help. It can cause death. Coronaviruses are a large group of viruses. They cause the common cold. They also cause more serious illnesses like Middle East respiratory syndrome (MERS) and severe acute respiratory syndrome (SARS). COVID-19 is caused by a novel coronavirus. That means it's a new type that has not been seen in people before. This virus spreads person-to-person through droplets from coughing and sneezing.  It can also spread when you are close to someone who is infected. And it can spread when you touch something that has the virus on it, such as a doorknob or a tabletop. What can you do to protect yourself from coronavirus (COVID-19)? The best way to protect yourself from getting sick is to:  · Avoid areas where there is an outbreak. · Avoid contact with people who may be infected. · Wash your hands often with soap or alcohol-based hand sanitizers. · Avoid crowds and try to stay at least 6 feet away from other people. · Wash your hands often, especially after you cough or sneeze. Use soap and water, and scrub for at least 20 seconds. If soap and water aren't available, use an alcohol-based hand . · Avoid touching your mouth, nose, and eyes. What can you do to avoid spreading the virus to others? To help avoid spreading the virus to others:  · Cover your mouth with a tissue when you cough or sneeze. Then throw the tissue in the trash. · Use a disinfectant to clean things that you touch often. · Stay home if you are sick or have been exposed to the virus. Don't go to school, work, or public areas. And don't use public transportation. · If you are sick:  ? Leave your home only if you need to get medical care. But call the doctor's office first so they know you're coming. And wear a face mask, if you have one.  ? If you have a face mask, wear it whenever you're around other people. It can help stop the spread of the virus when you cough or sneeze. ? Clean and disinfect your home every day. Use household  and disinfectant wipes or sprays. Take special care to clean things that you grab with your hands. These include doorknobs, remote controls, phones, and handles on your refrigerator and microwave. And don't forget countertops, tabletops, bathrooms, and computer keyboards. When to call for help  Call 911 anytime you think you may need emergency care. For example, call if:  · You have severe trouble breathing.  (You can't talk at all.)  · You have constant chest pain or pressure. · You are severely dizzy or lightheaded. · You are confused or can't think clearly. · Your face and lips have a blue color. · You pass out (lose consciousness) or are very hard to wake up. Call your doctor now if you develop symptoms such as:  · Shortness of breath. · Fever. · Cough. If you need to get care, call ahead to the doctor's office for instructions before you go. Make sure you wear a face mask, if you have one, to prevent exposing other people to the virus. Where can you get the latest information? The following health organizations are tracking and studying this virus. Their websites contain the most up-to-date information. Prema Jesus also learn what to do if you think you may have been exposed to the virus. · U.S. Centers for Disease Control and Prevention (CDC): The CDC provides updated news about the disease and travel advice. The website also tells you how to prevent the spread of infection. www.cdc.gov  · World Health Organization [facility]): WHO offers information about the virus outbreaks. WHO also has travel advice. www.who.int  Current as of: April 1, 2020               Content Version: 12.4  © 2006-2020 Healthwise, Incorporated. Care instructions adapted under license by your healthcare professional. If you have questions about a medical condition or this instruction, always ask your healthcare professional. [address] any warranty or liability for your use of this information. The discharge information has been reviewed with the patient and caregiver. Any questions and concerns from the patient and caregiver have been addressed. The patient and caregiver verbalized understanding.         CONTENTS FOUND IN YOUR DISCHARGE ENVELOPE:  [x]     Surgeon and Hospital Discharge Instructions  [x]     [facility] Care Provider Card  [x]     Medication & Side Effect Guide            (your newly prescribed medications have been marked/highlighted showing the most common side effects from   the classes of drugs on your prescriptions)  [x]     Medication Prescription(s) x 1 ( [x] These have been sent electronically to your pharmacy by your surgeon,   - OR -       your surgeon has already provided these to you during a previous/pre-op office visit)  []     [address]  []     Physical Therapy Prescription  []     Follow-up Appointment Cards  []     Surgery-related Pictures/Media  []     Pain block and/or block with On-Q Catheter from Anesthesia Service (information included in your instructions above)  []     Medical device information sheets/pamphlets from their    []     School/work excuse note. []     /parent work excuse note. The following personal items collected during your admission are returned to you:   Dental Appliance: Dental Appliances: [address] [place] [address] home  Vision: Visual Aid: Glasses,With patient  Hearing Aid:    Jewelry: Jewelry: None  Clothing: Clothing: Footwear,Pants,Shirt,Undergarments,Socks  Other Valuables:  Other Valuables: Eyeglasses  Valuables sent to safe:

## 2022-03-02 NOTE — PERIOP NOTES
INCENTIVE SPIROMETER  Your nurse may send an incentive spirometer home with you to help with your breathing. The incentive spirometer provides another way to make the lungs work better. DIRECTIONS:  · Exhale - begin with empty lungs. · Place lips around the mouthpiece; take a SLOW and DEEP breath in.  · Keep the small blue \"float\" on the RIGHT between the top and bottom arrows. If you suck the small blue float all the way to the top, YOU ARE CHEATING. SLOW DOWN when you breathe in.  · Your nurse will help you decide your target level for the big blue \"float\" that rises. Usually, that number is over the 1500 level. Your goal number is based upon your height and age, giving an estimate goal for your lung volume. · The sliding arrow on the left side marks your goal.  Always try to go past your goal. Raise the arrow when you make a new goal.    · When you take a full deep breath in, hold it for 2 seconds. Exhale normally. Don't be afraid to push yourself; rest a little between each attempt. Use the Incentive spirometer 10 times every hour while awake. It is OK to break the 10 to 12 attempts into smaller numbers if this exercise makes your tired. For example, perform 2 times every 10 minutes.     The Predictive Nomogram - Inspiratory Capacity  FEMALE   Ht  Age 4'10\"  (58\") 5'  (60\") 5'2\"  (62\") 5'4\"  (64\") 5'6\"  (66\") 5'8\"  (68\") 5'10\"  (70\") 6'  (72\") 6'2\"  (74\")   [phone]     The Predictive Nomogram shows a predicted inspiratory capacity (the amount of air one should be able to breathe in.)    These numbers are based on research, and they take into account three things:    Your gender   Your height (that is the size of your chest/lung capacity), and    Your age (the relative health of your lungs.)    The Incentive Spirometer tool only goes as high as 2500 - everything shown in the dark print. Your nurse can print this page off and [place] your target value. Do your best.  Follow the exercise instructions. (Formula used in the above Nomogram from Manual of Pulmonary Function Testing by OLAF Caicedo.  2021 [facility] 6th ed. P-507, 1994)

## 2022-03-02 NOTE — ANESTHESIA PREPROCEDURE EVALUATION
Relevant Problems   PERSONAL HX & FAMILY HX OF CANCER   (+) Breast cancer, left (HCC)       Anesthetic History               Review of Systems / Medical History  Patient summary reviewed, nursing notes reviewed and pertinent labs reviewed    Pulmonary        Sleep apnea: CPAP    Asthma : well controlled       Neuro/Psych              Cardiovascular    Hypertension              Exercise tolerance: <4 METS     GI/Hepatic/Renal                Endo/Other    Diabetes: poorly controlled, type 2    Morbid obesity and arthritis     Other Findings              Physical Exam    Airway  Mallampati: III  TM Distance: 4 - 6 cm  Neck ROM: short neck   Mouth opening: Diminished (comment)     Cardiovascular  Regular rate and rhythm,  S1 and S2 normal,  no murmur, click, rub, or gallop             Dental    Dentition: Edentulous     Pulmonary  Breath sounds clear to auscultation               Abdominal         Other Findings            Anesthetic Plan    ASA: 3  Anesthesia type: general          Induction: Intravenous  Anesthetic plan and risks discussed with: Patient

## 2022-03-08 ENCOUNTER — TELEPHONE (OUTPATIENT)
Dept: SURGERY | Age: 62
End: 2022-03-08

## 2022-03-08 DIAGNOSIS — Z17.1 MALIGNANT NEOPLASM OF LOWER-INNER QUADRANT OF LEFT BREAST IN FEMALE, ESTROGEN RECEPTOR NEGATIVE (HCC): Primary | ICD-10-CM

## 2022-03-08 DIAGNOSIS — C50.312 MALIGNANT NEOPLASM OF LOWER-INNER QUADRANT OF LEFT BREAST IN FEMALE, ESTROGEN RECEPTOR NEGATIVE (HCC): Primary | ICD-10-CM

## 2022-03-08 RX ORDER — OXYCODONE AND ACETAMINOPHEN 10; 325 MG/1; MG/1
1 TABLET ORAL
Qty: 20 TABLET | Refills: 0 | Status: SHIPPED | OUTPATIENT
Start: 2022-03-08 | End: 2022-03-13

## 2022-03-08 NOTE — TELEPHONE ENCOUNTER
Pt called.    POD#6  She has a lot of pain after surgery  Percocet prescription sent to pharmacy  If pain does not resolve, pt will see me in the office

## 2022-03-15 ENCOUNTER — OFFICE VISIT (OUTPATIENT)
Dept: SURGERY | Age: 62
End: 2022-03-15
Payer: MEDICAID

## 2022-03-15 VITALS — WEIGHT: 293 LBS | BODY MASS INDEX: 53.92 KG/M2 | HEIGHT: 62 IN

## 2022-03-15 DIAGNOSIS — Z85.3 HX OF BREAST CANCER: Primary | ICD-10-CM

## 2022-03-15 DIAGNOSIS — N64.89 SEROMA OF BREAST: ICD-10-CM

## 2022-03-15 PROCEDURE — 99024 POSTOP FOLLOW-UP VISIT: CPT | Performed by: SURGERY

## 2022-03-15 NOTE — PROGRESS NOTES
HISTORY OF PRESENT ILLNESS  You Powell is a 62 y.o. female. HPI ESTABLISHED patient her for POD # 13 LEFT lumpectomy SLNB. She is \"leaking\" from both the breast and axillary incision. The breast incision is draining a larger amount which is darker in color. She has been saturating her night gowns. 2/2022 LEFT lumpectomy 19mm invasive ductal carcinoma, grade 3, 0/3 nodes, triple neg, Ki 40%    ROS    Physical Exam  Chest:   Breasts:      Left: Skin change (unusual thick nodular tissue at the axillary incision) present. No swelling or tenderness. Comments: Thick serous drainage from the breast incision      ASPIRATION OF SEROMA  Indication : Seroma: LEFT breast and axilla  Prep : Alcohol. Guidance : Ultrasound guidance. Yield :  Breast: 50cc of thick serous fluid. Axilla: 70cc sero was aspirated with an 18 gauge needle. Effect : Seromas aspirated. Blood drained into the axillary cavity after the aspiration   Tolerance: Pt tolerated the procedure with minimal discomfort  Disposition:  Follow up in one week if swelling recurs  ASSESSMENT and PLAN    ICD-10-CM ICD-9-CM    1. Hx of breast cancer  Z85.3 V10.3    2. Seroma of breast  N64.89 998.13      1. Breast and axillary seromas aspirated  2. In the axilla I hit a blood vessel so the cavity will fill up with a hematoma  3. Breast seroma may continue to drain over the next few days  4.  Follow up 1 week  5. appt with Dr Williams Quinn in 2 weeks  Pt will remain out of work until then

## 2022-03-15 NOTE — LETTER
3/15/2022 12:48 PM    Ms. Bella Hollis  [address]    The patient is being seen by [facility] for LEFT breast cancer. Her surgery was 3/2/22. She will need to be out of work through 3/29/22. If you have any further questions please feel free to call us.           Sincerely,      Lloyd Roman MD

## 2022-03-20 PROBLEM — C50.912 BREAST CANCER, LEFT (HCC): Status: ACTIVE | Noted: 2022-02-03

## 2022-03-22 ENCOUNTER — OFFICE VISIT (OUTPATIENT)
Dept: SURGERY | Age: 62
End: 2022-03-22
Payer: MEDICAID

## 2022-03-22 VITALS — HEIGHT: 62 IN | BODY MASS INDEX: 53.92 KG/M2 | WEIGHT: 293 LBS

## 2022-03-22 DIAGNOSIS — N64.89 SEROMA OF BREAST: ICD-10-CM

## 2022-03-22 DIAGNOSIS — Z85.3 HX OF BREAST CANCER: Primary | ICD-10-CM

## 2022-03-22 PROCEDURE — 99024 POSTOP FOLLOW-UP VISIT: CPT | Performed by: SURGERY

## 2022-03-22 NOTE — PROGRESS NOTES
HISTORY OF PRESENT ILLNESS  Drew Alvarado is a 62 y.o. female. HPI ESTABLISHED patient 3 weeks s/p LEFT breast lumpectomy and SLNB. Large seromas aspirated 1 weeks ago. She has some drainage from the axilla, none from the breast. She describes the breast incision as being \"pink and irritated. \"   2/2022 LEFT lumpectomy 19mm invasive ductal carcinoma, grade 3, 0/3 nodes, triple neg, Ki 40%    ROS    Physical Exam  Chest:   Breasts:      Left: Skin change (bumpy scar in the axilla.  skin is peeling) and tenderness present. ASPIRATION OF SEROMA  Indication : Seroma: LEFT axilla and breast  Prep : Alcohol. Lidocaine injected in the breast before aspiration  Guidance : Ultrasound guidance. Yield : breast 45cc thick serous and axilla 5cc serous fluid was aspirated with a 14 gauge needle. Effect : Seromas aspirated. Tolerance: Pt tolerated the procedure with minimal discomfort  Disposition:  Follow up in one week if swelling recurs  ASSESSMENT and PLAN    ICD-10-CM ICD-9-CM    1. Hx of breast cancer  Z85.3 V10.3    2.  Seroma of breast  N64.89 998.13      Breast and axillary seromas aspirated  appt with Dr Tiffani Verdin next week

## 2022-03-24 ENCOUNTER — TELEPHONE (OUTPATIENT)
Dept: FAMILY PLANNING/WOMEN'S HEALTH CLINIC | Age: 62
End: 2022-03-24

## 2022-03-24 NOTE — TELEPHONE ENCOUNTER
[facility] faxed patients bills from when she was under EWL. BS LAB  [facility] office visit   [address] pathology    This nurse has sent information to our vendors to apply charges under [employer]. Nurse called patient to let pt aware that we have received the fax and that I have notified the vendors. That it may take a few weeks for billing problem to be solved. Pt did not answer- left voicemail on identifiable voicemail about us receiving fax with bills and that we are working on this.  Balaji Akhtar, CHRISTIAN

## 2022-03-29 ENCOUNTER — DOCUMENTATION ONLY (OUTPATIENT)
Dept: SURGERY | Age: 62
End: 2022-03-29

## 2022-03-29 ENCOUNTER — OFFICE VISIT (OUTPATIENT)
Dept: ONCOLOGY | Age: 62
End: 2022-03-29
Payer: MEDICAID

## 2022-03-29 ENCOUNTER — RESEARCH ENCOUNTER (OUTPATIENT)
Dept: ONCOLOGY | Age: 62
End: 2022-03-29

## 2022-03-29 VITALS
BODY MASS INDEX: 57.61 KG/M2 | DIASTOLIC BLOOD PRESSURE: 76 MMHG | SYSTOLIC BLOOD PRESSURE: 195 MMHG | HEIGHT: 62 IN | HEART RATE: 86 BPM | OXYGEN SATURATION: 98 % | RESPIRATION RATE: 18 BRPM | TEMPERATURE: 98.7 F

## 2022-03-29 DIAGNOSIS — C50.312 MALIGNANT NEOPLASM OF LOWER-INNER QUADRANT OF LEFT BREAST IN FEMALE, ESTROGEN RECEPTOR NEGATIVE (HCC): Primary | ICD-10-CM

## 2022-03-29 DIAGNOSIS — D47.Z2 CASTLEMAN DISEASE (HCC): ICD-10-CM

## 2022-03-29 DIAGNOSIS — Z17.1 MALIGNANT NEOPLASM OF LOWER-INNER QUADRANT OF LEFT BREAST IN FEMALE, ESTROGEN RECEPTOR NEGATIVE (HCC): Primary | ICD-10-CM

## 2022-03-29 PROCEDURE — 99215 OFFICE O/P EST HI 40 MIN: CPT | Performed by: INTERNAL MEDICINE

## 2022-03-29 RX ORDER — DEXAMETHASONE 4 MG/1
TABLET ORAL
Qty: 16 TABLET | Refills: 1 | Status: SHIPPED | OUTPATIENT
Start: 2022-03-29 | End: 2022-05-04 | Stop reason: SDUPTHER

## 2022-03-29 RX ORDER — ONDANSETRON HYDROCHLORIDE 8 MG/1
8 TABLET, FILM COATED ORAL
Qty: 30 TABLET | Refills: 3 | Status: SHIPPED | OUTPATIENT
Start: 2022-03-29 | End: 2022-08-16

## 2022-03-29 RX ORDER — LIDOCAINE AND PRILOCAINE 25; 25 MG/G; MG/G
CREAM TOPICAL
Qty: 30 G | Refills: 0 | Status: SHIPPED | OUTPATIENT
Start: 2022-03-29

## 2022-03-29 RX ORDER — PROCHLORPERAZINE MALEATE 10 MG
10 TABLET ORAL
Qty: 30 TABLET | Refills: 2 | Status: SHIPPED | OUTPATIENT
Start: 2022-03-29 | End: 2022-08-16

## 2022-03-29 NOTE — PROGRESS NOTES
Received request for medical records and disability forms from [place] on 03/28/2022. Called patient. Patient paid 25.00 for disability forms. Will scan receipt to Robin Morin forms to [employer] and fax ov notes to [phone]. Will scan disability and receipt in cc.

## 2022-03-29 NOTE — PROGRESS NOTES
[facility] [place] [address] [facility]  Jacky Nedra: [phone]  F: [phone]      Reason for Visit:   Harmony Blum is a 62 y.o. female who is seen in follow up for breast cancer    Breast Surgeon: Dr. 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 Reports gr 2 loss of appetite, gr 1 bleeding, gr 1 constipation, gr 2 fatigue, gr 1 nausea, gr 1 hot flashes, gr 1 insomnia, gr 1 pain/cramping to left underarm and menstrual cramps rated 4/10, gr 1 dyspnea, gr 1 tachycardia, gr 1 itching, gr 2 arm pain due to surgery, gr 2 edema, gr 1 headache, gr 1 urinary frequency, gr 2 urinary incontinence, gr 1 rectal discomfort.      LMP: 3/7/22    FH:  Maternal aunt  of breast cancer in her 62s; maternal aunt breast cancer in her 76s; maternal great aunt  from breast cancer at age 36; great aunt's daughter also  of breast cancer at age 36; no ovarian, prostate, pancreas cancer    Past Medical History:   Diagnosis Date    Arrhythmia     heart murmur    Arthritis     Right knee, DJD left knee    Asthma     Cancer ([address]     COVID-19 2021    Diabetes ([address]     Pre diabetes    Hypertension     Morbid obesity ([address]     Neuropathy     Patient reports numbness and tingling in her legs and feet from her back    Sleep apnea     was ordered a CPAP years ago, but never used      Past Surgical History: Procedure Laterality Date    HX BREAST LUMPECTOMY Left 3/2/2022    LEFT BREAST LUMPECTOMY WITH LEFT BREAST SENTINEL NODE BIOPSY WITH BLUE DYE AND PORT A CATH INSERTION (URGENT) performed by Elo Arreola MD at [address]    HX CHOLECYSTECTOMY      HX GYN      c section    HX GYN      d&c    HX WISDOM TEETH EXTRACTION Bilateral       Social History     Tobacco Use    Smoking status: Never Smoker    Smokeless tobacco: Never Used   Substance Use Topics    Alcohol use: Yes     Comment: social once per month      Family History   Problem Relation Age of Onset    Hypertension Mother     Heart Disease Mother     Heart Disease Father     Hypertension Father     Breast Cancer Maternal Aunt     Breast Cancer Other     Breast Cancer Maternal Aunt      Current Outpatient Medications   Medication Sig    lidocaine-prilocaine (EMLA) topical cream Apply thin layer to port 30-60 minutes prior to infusion    dexAMETHasone (DECADRON) 4 mg tablet Take 8mg (2 tablets) by mouth twice daily on the day before and day after chemotherapy.  ondansetron hcl (ZOFRAN) 8 mg tablet Take 1 Tablet by mouth every eight (8) hours as needed for Nausea or Vomiting.  prochlorperazine (Compazine) 10 mg tablet Take 1 Tablet by mouth every six (6) hours as needed for Nausea or Vomiting.  albuterol-ipratropium (DUO-NEB) 2.5 mg-0.5 mg/3 ml nebu 3 mL by Nebulization route every six (6) hours as needed for Wheezing.  albuterol (PROVENTIL HFA, VENTOLIN HFA, PROAIR HFA) 90 mcg/actuation inhaler Take  by inhalation every six (6) hours as needed for Wheezing.  metFORMIN (GLUMETZA ER) 500 mg TG24 24 hour tablet Take 1 Tablet by mouth every morning.  lisinopriL (PRINIVIL, ZESTRIL) 10 mg tablet Take  by mouth every morning.  fluticasone-salmeterol (ADVAIR DISKUS) 100-50 mcg/dose diskus inhaler Take 1 Puff by inhalation every twelve (12) hours.  amLODIPine (NORVASC) 10 mg tablet Take 10 mg by mouth every morning. No current facility-administered medications for this visit. Allergies   Allergen Reactions    Nuts [Tree Nut] Rash and Itching     Throat itching        Review of Systems: A complete review of systems was obtained, negative except as described above.     Physical Exam:     Visit Vitals  BP (!) 195/76 Comment: Pt. reports slight Headache and did take BP med this AM.   Pulse 86   Temp 98.7 °F (37.1 °C) (Temporal)   Resp 18   Ht 5' 2\" (1.575 m)   LMP 01/28/2021 (Approximate)   SpO2 98%   BMI 57.61 kg/m²     ECOG PS: 0    Constitutional: Appears well-developed and well-nourished in no apparent distress      Mental status: Alert and awake, Oriented to person/place/time, Able to follow commands    Eyes: EOM normal, Sclera normal, No visible ocular discharge    HENT: Normocephalic, atraumatic    Mouth/Throat: Moist mucous membranes    External Ears normal    Neck: No visualized mass    Pulmonary/Chest: Respiratory effort normal, No visualized signs of difficulty breathing or respiratory distress    Musculoskeletal: Normal gait with no signs of ataxia, Normal range of motion of neck    Neurological: No facial asymmetry (Cranial nerve 7 motor function), No gaze palsy    Skin: No significant exanthematous lesions or discoloration noted on facial skin    Psychiatric: Normal affect, No hallucinations     Left breast:  8:00, 7 cmfn, 1.8 cm mass, no LAD          Results:   No results found for: WBC, HGB, HCT, PLT, MCV, ANEU, HGBPOC, HCTPOC, HGBEXT, HCTEXT, PLTEXT, HGBEXT, HCTEXT, PLTEXT  Lab Results   Component Value Date/Time    Sodium 141 02/28/2022 03:05 PM    Potassium 4.4 02/28/2022 03:05 PM    Chloride 109 (H) 02/28/2022 03:05 PM    CO2 25 02/28/2022 03:05 PM    Glucose 103 (H) 02/28/2022 03:05 PM    BUN 14 02/28/2022 03:05 PM    Creatinine 0.73 02/28/2022 03:05 PM    GFR est AA >60 02/28/2022 03:05 PM    GFR est non-AA >60 02/28/2022 03:05 PM    Calcium 10.8 (H) 02/28/2022 03:05 PM    Glucose (POC) 121 (H) 03/02/2022 10:23 AM     No results found for: TBILI, ALT, AP, TP, ALB, GLOB      1/13/22 breast US bilateral; L mammogram  L breast mass 8:00, 7 cmfn, 1.3 cm mass  Bilateral LN seen, appear benign    Records reviewed and summarized above. Pathology report(s) reviewed above. Radiology report(s) reviewed above. Assessment/plan:   1. 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 We explained why a patient can have microscopic cancer spread now even though physical examination, laboratory studies and imaging studies are negative for cancer. We explained that the same treatments used now as adjuvant or preventive treatments rarely if ever are curative in women who develop metastases. Port placed by Dr. Julian Lyon 3/2/22. We discussed the toxicities of TC chemotherapy (docetaxel 75mg/m2/cyclophosphamide 600 mg/m2 q 3 weeks x 4)  in detail. This chemotherapy frequently causes a low white blood cell count and a small percent of patients require hospital admission for treatment of neutropenic fever. We explained that on occasion we consider the use of growth factors to minimize this risk. We explained to the patient that some side effects, if they occur, only last a few days including nausea, vomiting, stomatitis, arthralgia, myalgia, and allergic reactions to Taxotere.  We told the patient that severe nausea and vomiting were very uncommon and that some side effects, if they occur, will last longer: this includes hair loss, which will be seen in all patients treated with these agents and fatigue, which will be seen in most. The patient was also told that if she is having menstrual function that she could develop chemotherapy-induced amenorrhea and infertility. We also told the patient that there was a very small risk of acute leukemia. We also informed the patient that heart damage is rare with these agents    She is agreeable to this and signed informed consent. Plan to start 4/13/22    Cold caps discussed, not interested    Discussed risks of COVID19 and precautions to take. Discussed oral and peripheral cryotherapy; she may be a candidate for neuropathy study, will have research meet with her today    The patient was given presciptions for a wig, emla cream, decadron to take 8 mg bid the day before and day after chemo, zofran and compazine. Neulasta the following day. 2. Emotional well being:  She has excellent support and is coping well with her disease    3. Genetic testing:   discussed potential ramifications of a positive test including the potential need for bilateral mastectomies and bilateral oophorectomies and the risk then for her family members to also have a mutation. VUS also discussed. She has the kit from Dr. Tara Jaquez, but has not yet done the testing. She would prefer for lab draw with start of chemotherapy. 4. DM2:  On metformin. Discussed increased blood glucose with dexamethasone. 5. 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 Will get PET scan    I personally saw and evaluated the patient and performed the entire medical decision making. The history, physical exam, and documentation were performed by Charley Suarez NP. I reviewed and verified the above documentation and modified it as needed. Left TN breast cancer, s/p surgery, discussed TC today, will start on 4/13/22, teaching done, rx in. PET scan for Castelman's disease. RTC 4/13/22    I appreciate the opportunity to participate in Ms. Naren Ashford's care.     Signed By: Vanessa Goldberg MD      No orders of the defined types were placed in this encounter.

## 2022-03-29 NOTE — PROGRESS NOTES
Lou Jaquez is a 62 y.o. female Follow up for the evaluation of breast cancer. 1. Have you been to the ER, urgent care clinic since your last visit? Hospitalized since your last visit? No    2. Have you seen or consulted any other health care providers outside of the [address] [place] [address] since your last visit? Include any pap smears or colon screening.  No

## 2022-03-29 NOTE — PROGRESS NOTES
Lucía Galvan is a 62 y.o. female  Provided patient with a chemotherapy treatment education packet including Docetaxel and Cyclophosphamide handouts, a Common Side Effects of Chemotherapy handout, as well as information regarding breast cancer stages printed from Cancer. net and an article on how to safely handle bodily secretions and waste after chemotherapy printed from [employer]. RN reviewed packet with the patient and opportunity was provided for questions and concerns.

## 2022-03-29 NOTE — PROGRESS NOTES
Met with patient to discuss [phone]. We discussed the study in detail. I answered any questions she had at this time. I will follow up with her at the end of the week.

## 2022-04-05 ENCOUNTER — HOSPITAL ENCOUNTER (OUTPATIENT)
Dept: INFUSION THERAPY | Age: 62
End: 2022-04-05

## 2022-04-06 ENCOUNTER — RESEARCH ENCOUNTER (OUTPATIENT)
Dept: ONCOLOGY | Age: 62
End: 2022-04-06

## 2022-04-06 NOTE — PROGRESS NOTES
Attempted to reach patient regarding missed appt to sign consent and have labs drawn for clinical trial on 4/5/22. No answer- left voicemail requesting return call.

## 2022-04-07 NOTE — PROGRESS NOTES
Spoke with patient who states she has changed her mind and no longer wishes to participate in the clinical trial.

## 2022-04-08 PROBLEM — Z51.11 ENCOUNTER FOR ANTINEOPLASTIC CHEMOTHERAPY: Status: ACTIVE | Noted: 2022-04-08

## 2022-04-08 PROBLEM — Z76.89 PREVENTION OF CHEMOTHERAPY-INDUCED NEUTROPENIA: Status: ACTIVE | Noted: 2022-04-08

## 2022-04-10 RX ORDER — HEPARIN 100 UNIT/ML
300-500 SYRINGE INTRAVENOUS AS NEEDED
Status: CANCELLED
Start: 2022-04-13

## 2022-04-10 RX ORDER — DIPHENHYDRAMINE HYDROCHLORIDE 50 MG/ML
25 INJECTION, SOLUTION INTRAMUSCULAR; INTRAVENOUS AS NEEDED
Status: CANCELLED
Start: 2022-04-13

## 2022-04-10 RX ORDER — HEPARIN 100 UNIT/ML
300-500 SYRINGE INTRAVENOUS AS NEEDED
Status: CANCELLED
Start: 2022-05-25

## 2022-04-10 RX ORDER — PALONOSETRON 0.05 MG/ML
0.25 INJECTION, SOLUTION INTRAVENOUS ONCE
Status: CANCELLED | OUTPATIENT
Start: 2022-05-25 | End: 2022-05-25

## 2022-04-10 RX ORDER — SODIUM CHLORIDE 9 MG/ML
10 INJECTION INTRAMUSCULAR; INTRAVENOUS; SUBCUTANEOUS AS NEEDED
Status: CANCELLED | OUTPATIENT
Start: 2022-05-25

## 2022-04-10 RX ORDER — PALONOSETRON 0.05 MG/ML
0.25 INJECTION, SOLUTION INTRAVENOUS ONCE
Status: CANCELLED | OUTPATIENT
Start: 2022-06-15 | End: 2022-06-15

## 2022-04-10 RX ORDER — DEXAMETHASONE SODIUM PHOSPHATE 100 MG/10ML
10 INJECTION INTRAMUSCULAR; INTRAVENOUS ONCE
Status: CANCELLED | OUTPATIENT
Start: 2022-04-13 | End: 2022-04-13

## 2022-04-10 RX ORDER — ACETAMINOPHEN 325 MG/1
650 TABLET ORAL AS NEEDED
Status: CANCELLED
Start: 2022-04-13

## 2022-04-10 RX ORDER — ONDANSETRON 2 MG/ML
8 INJECTION INTRAMUSCULAR; INTRAVENOUS AS NEEDED
Status: CANCELLED | OUTPATIENT
Start: 2022-04-13

## 2022-04-10 RX ORDER — HYDROCORTISONE SODIUM SUCCINATE 100 MG/2ML
100 INJECTION, POWDER, FOR SOLUTION INTRAMUSCULAR; INTRAVENOUS AS NEEDED
Status: CANCELLED | OUTPATIENT
Start: 2022-05-04

## 2022-04-10 RX ORDER — SODIUM CHLORIDE 9 MG/ML
25 INJECTION, SOLUTION INTRAVENOUS CONTINUOUS
Status: CANCELLED | OUTPATIENT
Start: 2022-05-25

## 2022-04-10 RX ORDER — ALBUTEROL SULFATE 0.83 MG/ML
2.5 SOLUTION RESPIRATORY (INHALATION) AS NEEDED
Status: CANCELLED
Start: 2022-04-13

## 2022-04-10 RX ORDER — DIPHENHYDRAMINE HYDROCHLORIDE 50 MG/ML
25 INJECTION, SOLUTION INTRAMUSCULAR; INTRAVENOUS AS NEEDED
Status: CANCELLED
Start: 2022-05-25

## 2022-04-10 RX ORDER — HEPARIN 100 UNIT/ML
300-500 SYRINGE INTRAVENOUS AS NEEDED
Status: CANCELLED
Start: 2022-05-04

## 2022-04-10 RX ORDER — PALONOSETRON 0.05 MG/ML
0.25 INJECTION, SOLUTION INTRAVENOUS ONCE
Status: CANCELLED | OUTPATIENT
Start: 2022-04-13 | End: 2022-04-13

## 2022-04-10 RX ORDER — ALBUTEROL SULFATE 0.83 MG/ML
2.5 SOLUTION RESPIRATORY (INHALATION) AS NEEDED
Status: CANCELLED
Start: 2022-05-04

## 2022-04-10 RX ORDER — DIPHENHYDRAMINE HYDROCHLORIDE 50 MG/ML
50 INJECTION, SOLUTION INTRAMUSCULAR; INTRAVENOUS AS NEEDED
Status: CANCELLED
Start: 2022-05-25

## 2022-04-10 RX ORDER — ALBUTEROL SULFATE 0.83 MG/ML
2.5 SOLUTION RESPIRATORY (INHALATION) AS NEEDED
Status: CANCELLED
Start: 2022-06-15

## 2022-04-10 RX ORDER — DEXAMETHASONE SODIUM PHOSPHATE 100 MG/10ML
10 INJECTION INTRAMUSCULAR; INTRAVENOUS ONCE
Status: CANCELLED | OUTPATIENT
Start: 2022-05-25 | End: 2022-05-25

## 2022-04-10 RX ORDER — PALONOSETRON 0.05 MG/ML
0.25 INJECTION, SOLUTION INTRAVENOUS ONCE
Status: CANCELLED | OUTPATIENT
Start: 2022-05-04 | End: 2022-05-04

## 2022-04-10 RX ORDER — ACETAMINOPHEN 325 MG/1
650 TABLET ORAL AS NEEDED
Status: CANCELLED
Start: 2022-05-04

## 2022-04-10 RX ORDER — EPINEPHRINE 1 MG/ML
0.3 INJECTION, SOLUTION, CONCENTRATE INTRAVENOUS AS NEEDED
Status: CANCELLED | OUTPATIENT
Start: 2022-04-13

## 2022-04-10 RX ORDER — ONDANSETRON 2 MG/ML
8 INJECTION INTRAMUSCULAR; INTRAVENOUS AS NEEDED
Status: CANCELLED | OUTPATIENT
Start: 2022-06-15

## 2022-04-10 RX ORDER — SODIUM CHLORIDE 9 MG/ML
10 INJECTION INTRAMUSCULAR; INTRAVENOUS; SUBCUTANEOUS AS NEEDED
Status: CANCELLED | OUTPATIENT
Start: 2022-04-13

## 2022-04-10 RX ORDER — SODIUM CHLORIDE 0.9 % (FLUSH) 0.9 %
10 SYRINGE (ML) INJECTION AS NEEDED
Status: CANCELLED | OUTPATIENT
Start: 2022-05-04

## 2022-04-10 RX ORDER — HYDROCORTISONE SODIUM SUCCINATE 100 MG/2ML
100 INJECTION, POWDER, FOR SOLUTION INTRAMUSCULAR; INTRAVENOUS AS NEEDED
Status: CANCELLED | OUTPATIENT
Start: 2022-04-13

## 2022-04-10 RX ORDER — HYDROCORTISONE SODIUM SUCCINATE 100 MG/2ML
100 INJECTION, POWDER, FOR SOLUTION INTRAMUSCULAR; INTRAVENOUS AS NEEDED
Status: CANCELLED | OUTPATIENT
Start: 2022-06-15

## 2022-04-10 RX ORDER — HYDROCORTISONE SODIUM SUCCINATE 100 MG/2ML
100 INJECTION, POWDER, FOR SOLUTION INTRAMUSCULAR; INTRAVENOUS AS NEEDED
Status: CANCELLED | OUTPATIENT
Start: 2022-05-25

## 2022-04-10 RX ORDER — SODIUM CHLORIDE 9 MG/ML
25 INJECTION, SOLUTION INTRAVENOUS CONTINUOUS
Status: CANCELLED | OUTPATIENT
Start: 2022-05-04

## 2022-04-10 RX ORDER — ACETAMINOPHEN 325 MG/1
650 TABLET ORAL AS NEEDED
Status: CANCELLED
Start: 2022-05-25

## 2022-04-10 RX ORDER — DIPHENHYDRAMINE HYDROCHLORIDE 50 MG/ML
25 INJECTION, SOLUTION INTRAMUSCULAR; INTRAVENOUS AS NEEDED
Status: CANCELLED
Start: 2022-06-15

## 2022-04-10 RX ORDER — DIPHENHYDRAMINE HYDROCHLORIDE 50 MG/ML
50 INJECTION, SOLUTION INTRAMUSCULAR; INTRAVENOUS AS NEEDED
Status: CANCELLED
Start: 2022-05-04

## 2022-04-10 RX ORDER — ALBUTEROL SULFATE 0.83 MG/ML
2.5 SOLUTION RESPIRATORY (INHALATION) AS NEEDED
Status: CANCELLED
Start: 2022-05-25

## 2022-04-10 RX ORDER — SODIUM CHLORIDE 9 MG/ML
10 INJECTION INTRAMUSCULAR; INTRAVENOUS; SUBCUTANEOUS AS NEEDED
Status: CANCELLED | OUTPATIENT
Start: 2022-05-04

## 2022-04-10 RX ORDER — SODIUM CHLORIDE 0.9 % (FLUSH) 0.9 %
10 SYRINGE (ML) INJECTION AS NEEDED
Status: CANCELLED | OUTPATIENT
Start: 2022-05-25

## 2022-04-10 RX ORDER — EPINEPHRINE 1 MG/ML
0.3 INJECTION, SOLUTION, CONCENTRATE INTRAVENOUS AS NEEDED
Status: CANCELLED | OUTPATIENT
Start: 2022-06-15

## 2022-04-10 RX ORDER — DIPHENHYDRAMINE HYDROCHLORIDE 50 MG/ML
50 INJECTION, SOLUTION INTRAMUSCULAR; INTRAVENOUS AS NEEDED
Status: CANCELLED
Start: 2022-04-13

## 2022-04-10 RX ORDER — DIPHENHYDRAMINE HYDROCHLORIDE 50 MG/ML
50 INJECTION, SOLUTION INTRAMUSCULAR; INTRAVENOUS AS NEEDED
Status: CANCELLED
Start: 2022-06-15

## 2022-04-10 RX ORDER — SODIUM CHLORIDE 0.9 % (FLUSH) 0.9 %
10 SYRINGE (ML) INJECTION AS NEEDED
Status: CANCELLED | OUTPATIENT
Start: 2022-04-13

## 2022-04-10 RX ORDER — SODIUM CHLORIDE 9 MG/ML
25 INJECTION, SOLUTION INTRAVENOUS CONTINUOUS
Status: CANCELLED | OUTPATIENT
Start: 2022-06-15

## 2022-04-10 RX ORDER — ACETAMINOPHEN 325 MG/1
650 TABLET ORAL AS NEEDED
Status: CANCELLED
Start: 2022-06-15

## 2022-04-10 RX ORDER — DEXAMETHASONE SODIUM PHOSPHATE 100 MG/10ML
10 INJECTION INTRAMUSCULAR; INTRAVENOUS ONCE
Status: CANCELLED | OUTPATIENT
Start: 2022-05-04 | End: 2022-05-04

## 2022-04-10 RX ORDER — HEPARIN 100 UNIT/ML
300-500 SYRINGE INTRAVENOUS AS NEEDED
Status: CANCELLED
Start: 2022-06-15

## 2022-04-10 RX ORDER — SODIUM CHLORIDE 9 MG/ML
25 INJECTION, SOLUTION INTRAVENOUS CONTINUOUS
Status: CANCELLED | OUTPATIENT
Start: 2022-04-13

## 2022-04-10 RX ORDER — EPINEPHRINE 1 MG/ML
0.3 INJECTION, SOLUTION, CONCENTRATE INTRAVENOUS AS NEEDED
Status: CANCELLED | OUTPATIENT
Start: 2022-05-04

## 2022-04-10 RX ORDER — ONDANSETRON 2 MG/ML
8 INJECTION INTRAMUSCULAR; INTRAVENOUS AS NEEDED
Status: CANCELLED | OUTPATIENT
Start: 2022-05-25

## 2022-04-10 RX ORDER — SODIUM CHLORIDE 0.9 % (FLUSH) 0.9 %
10 SYRINGE (ML) INJECTION AS NEEDED
Status: CANCELLED | OUTPATIENT
Start: 2022-06-15

## 2022-04-10 RX ORDER — EPINEPHRINE 1 MG/ML
0.3 INJECTION, SOLUTION, CONCENTRATE INTRAVENOUS AS NEEDED
Status: CANCELLED | OUTPATIENT
Start: 2022-05-25

## 2022-04-10 RX ORDER — DIPHENHYDRAMINE HYDROCHLORIDE 50 MG/ML
25 INJECTION, SOLUTION INTRAMUSCULAR; INTRAVENOUS AS NEEDED
Status: CANCELLED
Start: 2022-05-04

## 2022-04-10 RX ORDER — ONDANSETRON 2 MG/ML
8 INJECTION INTRAMUSCULAR; INTRAVENOUS AS NEEDED
Status: CANCELLED | OUTPATIENT
Start: 2022-05-04

## 2022-04-10 RX ORDER — SODIUM CHLORIDE 9 MG/ML
10 INJECTION INTRAMUSCULAR; INTRAVENOUS; SUBCUTANEOUS AS NEEDED
Status: CANCELLED | OUTPATIENT
Start: 2022-06-15

## 2022-04-10 RX ORDER — DEXAMETHASONE SODIUM PHOSPHATE 100 MG/10ML
10 INJECTION INTRAMUSCULAR; INTRAVENOUS ONCE
Status: CANCELLED | OUTPATIENT
Start: 2022-06-15 | End: 2022-06-15

## 2022-04-11 PROBLEM — Z51.11 ENCOUNTER FOR ANTINEOPLASTIC CHEMOTHERAPY: Status: ACTIVE | Noted: 2022-04-08

## 2022-04-11 PROBLEM — Z76.89 PREVENTION OF CHEMOTHERAPY-INDUCED NEUTROPENIA: Status: ACTIVE | Noted: 2022-04-08

## 2022-04-13 ENCOUNTER — HOSPITAL ENCOUNTER (OUTPATIENT)
Dept: INFUSION THERAPY | Age: 62
Discharge: HOME | End: 2022-04-13
Payer: MEDICAID

## 2022-04-13 ENCOUNTER — OFFICE VISIT (OUTPATIENT)
Dept: ONCOLOGY | Age: 62
End: 2022-04-13
Payer: MEDICAID

## 2022-04-13 VITALS
TEMPERATURE: 98.2 F | HEIGHT: 62 IN | DIASTOLIC BLOOD PRESSURE: 41 MMHG | SYSTOLIC BLOOD PRESSURE: 113 MMHG | BODY MASS INDEX: 53.92 KG/M2 | OXYGEN SATURATION: 97 % | RESPIRATION RATE: 20 BRPM | HEART RATE: 105 BPM | WEIGHT: 293 LBS

## 2022-04-13 VITALS
HEART RATE: 101 BPM | WEIGHT: 293 LBS | BODY MASS INDEX: 53.92 KG/M2 | DIASTOLIC BLOOD PRESSURE: 57 MMHG | TEMPERATURE: 98.2 F | SYSTOLIC BLOOD PRESSURE: 121 MMHG | RESPIRATION RATE: 20 BRPM | HEIGHT: 62 IN | OXYGEN SATURATION: 97 %

## 2022-04-13 DIAGNOSIS — C50.312 MALIGNANT NEOPLASM OF LOWER-INNER QUADRANT OF LEFT BREAST IN FEMALE, ESTROGEN RECEPTOR NEGATIVE (HCC): ICD-10-CM

## 2022-04-13 DIAGNOSIS — Z17.1 MALIGNANT NEOPLASM OF LOWER-INNER QUADRANT OF LEFT BREAST IN FEMALE, ESTROGEN RECEPTOR NEGATIVE (HCC): Primary | ICD-10-CM

## 2022-04-13 DIAGNOSIS — C50.312 MALIGNANT NEOPLASM OF LOWER-INNER QUADRANT OF LEFT BREAST IN FEMALE, ESTROGEN RECEPTOR NEGATIVE (HCC): Primary | ICD-10-CM

## 2022-04-13 DIAGNOSIS — Z51.11 ENCOUNTER FOR ANTINEOPLASTIC CHEMOTHERAPY: ICD-10-CM

## 2022-04-13 DIAGNOSIS — Z51.11 ENCOUNTER FOR ANTINEOPLASTIC CHEMOTHERAPY: Primary | ICD-10-CM

## 2022-04-13 DIAGNOSIS — Z76.89 PREVENTION OF CHEMOTHERAPY-INDUCED NEUTROPENIA: ICD-10-CM

## 2022-04-13 DIAGNOSIS — Z17.1 MALIGNANT NEOPLASM OF LOWER-INNER QUADRANT OF LEFT BREAST IN FEMALE, ESTROGEN RECEPTOR NEGATIVE (HCC): ICD-10-CM

## 2022-04-13 LAB
ALBUMIN SERPL-MCNC: 3.2 G/DL (ref 3.5–5)
ALBUMIN/GLOB SERPL: 0.7 {RATIO} (ref 1.1–2.2)
ALP SERPL-CCNC: 88 U/L (ref 45–117)
ALT SERPL-CCNC: 22 U/L (ref 12–78)
ANION GAP SERPL CALC-SCNC: 11 MMOL/L (ref 5–15)
AST SERPL-CCNC: 11 U/L (ref 15–37)
BASOPHILS # BLD: 0 K/UL (ref 0–0.1)
BASOPHILS NFR BLD: 0 % (ref 0–1)
BILIRUB SERPL-MCNC: 0.2 MG/DL (ref 0.2–1)
BUN SERPL-MCNC: 21 MG/DL (ref 6–20)
BUN/CREAT SERPL: 21 (ref 12–20)
CALCIUM SERPL-MCNC: 11.1 MG/DL (ref 8.5–10.1)
CHLORIDE SERPL-SCNC: 105 MMOL/L (ref 97–108)
CO2 SERPL-SCNC: 22 MMOL/L (ref 21–32)
CREAT SERPL-MCNC: 0.99 MG/DL (ref 0.55–1.02)
DIFFERENTIAL METHOD BLD: ABNORMAL
EOSINOPHIL # BLD: 0 K/UL (ref 0–0.4)
EOSINOPHIL NFR BLD: 0 % (ref 0–7)
ERYTHROCYTE [DISTWIDTH] IN BLOOD BY AUTOMATED COUNT: 15.2 % (ref 11.5–14.5)
GLOBULIN SER CALC-MCNC: 4.5 G/DL (ref 2–4)
GLUCOSE SERPL-MCNC: 222 MG/DL (ref 65–100)
HBV SURFACE AB SER QL: NONREACTIVE
HBV SURFACE AB SER-ACNC: <3.1 MIU/ML
HBV SURFACE AG SER QL: <0.1 INDEX
HBV SURFACE AG SER QL: NEGATIVE
HCT VFR BLD AUTO: 38.2 % (ref 35–47)
HGB BLD-MCNC: 11.8 G/DL (ref 11.5–16)
IMM GRANULOCYTES # BLD AUTO: 0.2 K/UL (ref 0–0.04)
IMM GRANULOCYTES NFR BLD AUTO: 2 % (ref 0–0.5)
LYMPHOCYTES # BLD: 0.7 K/UL (ref 0.8–3.5)
LYMPHOCYTES NFR BLD: 6 % (ref 12–49)
MCH RBC QN AUTO: 27.6 PG (ref 26–34)
MCHC RBC AUTO-ENTMCNC: 30.9 G/DL (ref 30–36.5)
MCV RBC AUTO: 89.3 FL (ref 80–99)
MONOCYTES # BLD: 0.2 K/UL (ref 0–1)
MONOCYTES NFR BLD: 2 % (ref 5–13)
NEUTS SEG # BLD: 10.8 K/UL (ref 1.8–8)
NEUTS SEG NFR BLD: 90 % (ref 32–75)
NRBC # BLD: 0 K/UL (ref 0–0.01)
NRBC BLD-RTO: 0 PER 100 WBC
PLATELET # BLD AUTO: 367 K/UL (ref 150–400)
PMV BLD AUTO: 10.4 FL (ref 8.9–12.9)
POTASSIUM SERPL-SCNC: 4.3 MMOL/L (ref 3.5–5.1)
PROT SERPL-MCNC: 7.7 G/DL (ref 6.4–8.2)
RBC # BLD AUTO: 4.28 M/UL (ref 3.8–5.2)
RBC MORPH BLD: ABNORMAL
SODIUM SERPL-SCNC: 138 MMOL/L (ref 136–145)
WBC # BLD AUTO: 11.9 K/UL (ref 3.6–11)

## 2022-04-13 PROCEDURE — 87340 HEPATITIS B SURFACE AG IA: CPT

## 2022-04-13 PROCEDURE — 85025 COMPLETE CBC W/AUTO DIFF WBC: CPT

## 2022-04-13 PROCEDURE — 96377 APPLICATON ON-BODY INJECTOR: CPT

## 2022-04-13 PROCEDURE — 86706 HEP B SURFACE ANTIBODY: CPT

## 2022-04-13 PROCEDURE — 74011250636 HC RX REV CODE- 250/636: Performed by: INTERNAL MEDICINE

## 2022-04-13 PROCEDURE — 96417 CHEMO IV INFUS EACH ADDL SEQ: CPT

## 2022-04-13 PROCEDURE — 80053 COMPREHEN METABOLIC PANEL: CPT

## 2022-04-13 PROCEDURE — 36415 COLL VENOUS BLD VENIPUNCTURE: CPT

## 2022-04-13 PROCEDURE — 99215 OFFICE O/P EST HI 40 MIN: CPT | Performed by: INTERNAL MEDICINE

## 2022-04-13 PROCEDURE — 77030012965 HC NDL HUBR BBMI -A

## 2022-04-13 PROCEDURE — 96361 HYDRATE IV INFUSION ADD-ON: CPT

## 2022-04-13 PROCEDURE — 96413 CHEMO IV INFUSION 1 HR: CPT

## 2022-04-13 PROCEDURE — 74011000250 HC RX REV CODE- 250: Performed by: INTERNAL MEDICINE

## 2022-04-13 PROCEDURE — 96375 TX/PRO/DX INJ NEW DRUG ADDON: CPT

## 2022-04-13 PROCEDURE — 86704 HEP B CORE ANTIBODY TOTAL: CPT

## 2022-04-13 PROCEDURE — 82397 CHEMILUMINESCENT ASSAY: CPT

## 2022-04-13 RX ORDER — SODIUM CHLORIDE 0.9 % (FLUSH) 0.9 %
10 SYRINGE (ML) INJECTION AS NEEDED
Status: DISPENSED | OUTPATIENT
Start: 2022-04-13 | End: 2022-04-13

## 2022-04-13 RX ORDER — ACETAMINOPHEN 325 MG/1
650 TABLET ORAL AS NEEDED
Status: DISCONTINUED | OUTPATIENT
Start: 2022-04-13 | End: 2022-04-14 | Stop reason: HOSPADM

## 2022-04-13 RX ORDER — DEXAMETHASONE SODIUM PHOSPHATE 100 MG/10ML
10 INJECTION INTRAMUSCULAR; INTRAVENOUS ONCE
Status: COMPLETED | OUTPATIENT
Start: 2022-04-13 | End: 2022-04-13

## 2022-04-13 RX ORDER — HEPARIN 100 UNIT/ML
300-500 SYRINGE INTRAVENOUS AS NEEDED
Status: ACTIVE | OUTPATIENT
Start: 2022-04-13 | End: 2022-04-13

## 2022-04-13 RX ORDER — EPINEPHRINE 1 MG/ML
0.3 INJECTION, SOLUTION, CONCENTRATE INTRAVENOUS AS NEEDED
Status: DISCONTINUED | OUTPATIENT
Start: 2022-04-13 | End: 2022-04-14 | Stop reason: HOSPADM

## 2022-04-13 RX ORDER — SODIUM CHLORIDE 9 MG/ML
10 INJECTION INTRAMUSCULAR; INTRAVENOUS; SUBCUTANEOUS AS NEEDED
Status: ACTIVE | OUTPATIENT
Start: 2022-04-13 | End: 2022-04-13

## 2022-04-13 RX ORDER — DIPHENHYDRAMINE HYDROCHLORIDE 50 MG/ML
25 INJECTION, SOLUTION INTRAMUSCULAR; INTRAVENOUS AS NEEDED
Status: DISCONTINUED | OUTPATIENT
Start: 2022-04-13 | End: 2022-04-14 | Stop reason: HOSPADM

## 2022-04-13 RX ORDER — ALBUTEROL SULFATE 90 UG/1
2 AEROSOL, METERED RESPIRATORY (INHALATION) AS NEEDED
Status: DISCONTINUED | OUTPATIENT
Start: 2022-04-13 | End: 2022-04-14 | Stop reason: HOSPADM

## 2022-04-13 RX ORDER — DIPHENHYDRAMINE HYDROCHLORIDE 50 MG/ML
50 INJECTION, SOLUTION INTRAMUSCULAR; INTRAVENOUS AS NEEDED
Status: DISCONTINUED | OUTPATIENT
Start: 2022-04-13 | End: 2022-04-14 | Stop reason: HOSPADM

## 2022-04-13 RX ORDER — SODIUM CHLORIDE 9 MG/ML
25 INJECTION, SOLUTION INTRAVENOUS CONTINUOUS
Status: DISCONTINUED | OUTPATIENT
Start: 2022-04-13 | End: 2022-04-14 | Stop reason: HOSPADM

## 2022-04-13 RX ORDER — HYDROCORTISONE SODIUM SUCCINATE 100 MG/2ML
100 INJECTION, POWDER, FOR SOLUTION INTRAMUSCULAR; INTRAVENOUS AS NEEDED
Status: DISCONTINUED | OUTPATIENT
Start: 2022-04-13 | End: 2022-04-14 | Stop reason: HOSPADM

## 2022-04-13 RX ORDER — LISINOPRIL AND HYDROCHLOROTHIAZIDE 12.5; 2 MG/1; MG/1
1 TABLET ORAL DAILY
COMMUNITY
End: 2022-08-16

## 2022-04-13 RX ORDER — ONDANSETRON 2 MG/ML
8 INJECTION INTRAMUSCULAR; INTRAVENOUS AS NEEDED
Status: DISCONTINUED | OUTPATIENT
Start: 2022-04-13 | End: 2022-04-14 | Stop reason: HOSPADM

## 2022-04-13 RX ORDER — PALONOSETRON 0.05 MG/ML
0.25 INJECTION, SOLUTION INTRAVENOUS ONCE
Status: COMPLETED | OUTPATIENT
Start: 2022-04-13 | End: 2022-04-13

## 2022-04-13 RX ADMIN — SODIUM CHLORIDE 25 ML/HR: 9 INJECTION, SOLUTION INTRAVENOUS at 12:28

## 2022-04-13 RX ADMIN — PEGFILGRASTIM 6 MG: KIT SUBCUTANEOUS at 15:15

## 2022-04-13 RX ADMIN — SODIUM CHLORIDE, PRESERVATIVE FREE 10 ML: 5 INJECTION INTRAVENOUS at 10:10

## 2022-04-13 RX ADMIN — CYCLOPHOSPHAMIDE 1500 MG: 200 INJECTION, SOLUTION INTRAVENOUS at 14:26

## 2022-04-13 RX ADMIN — DOCETAXEL ANHYDROUS 188 MG: 10 INJECTION, SOLUTION INTRAVENOUS at 13:14

## 2022-04-13 RX ADMIN — PALONOSETRON 0.25 MG: 0.05 INJECTION, SOLUTION INTRAVENOUS at 12:32

## 2022-04-13 RX ADMIN — DEXAMETHASONE SODIUM PHOSPHATE 10 MG: 10 INJECTION INTRAMUSCULAR; INTRAVENOUS at 12:29

## 2022-04-13 RX ADMIN — SODIUM CHLORIDE, PRESERVATIVE FREE 10 ML: 5 INJECTION INTRAVENOUS at 15:22

## 2022-04-13 RX ADMIN — SODIUM CHLORIDE 500 ML: 9 INJECTION, SOLUTION INTRAVENOUS at 12:35

## 2022-04-13 RX ADMIN — Medication 500 UNITS: at 15:22

## 2022-04-13 NOTE — PROGRESS NOTES
Sejal Pérez is a 62 y.o. female Follow up for the evaluation of breast cancer. 1. Have you been to the ER, urgent care clinic since your last visit? Hospitalized since your last visit? No    2. Have you seen or consulted any other health care providers outside of the [address] [place] [address] since your last visit? Include any pap smears or colon screening.  No

## 2022-04-13 NOTE — PROGRESS NOTES

## 2022-04-13 NOTE — PROGRESS NOTES
[facility] - Chemotherapy Progress Note    0940 Pt admit to [facility] for C1D1 TC in stable condition. Assessment completed. No new concerns voiced. PAC with positive blood return. Labs were drawn and sent for processing. Pt proceeded to scheduled appt.       Chemotherapy Flowsheet 4/13/2022   Cycle C1D1   Date 4/13/2022   Drug / Regimen TC   Pre Hydration 500cc bolus   Notes Neulasta OBI         VS  Patient Vitals for the past 12 hrs:   Temp Pulse Resp BP SpO2   04/13/22 1500 -- (!) 101 -- (!) 121/57 --   04/13/22 0952 98.2 °F (36.8 °C) (!) 105 20 (!) 113/41 97 %         Medications:  Medications Administered     0.9% sodium chloride infusion     Admin Date  04/13/2022 Action  New Bag Dose  25 mL/hr Rate  25 mL/hr Route  IntraVENous Administered By  Remington Sutton RN          0.9% sodium chloride injection 10 mL     Admin Date  04/13/2022 Action  Given Dose  10 mL Route  IntraVENous Administered By  Remington Sutton RN           Admin Date  04/13/2022 Action  Given Dose  10 mL Route  IntraVENous Administered By  Remington Sutton RN          cyclophosphamide (CYTOXAN) 1,500 mg in 0.9% sodium chloride 250 mL, overfill volume 25 mL chemo infusion     Admin Date  04/13/2022 Action  New Bag Dose  1,500 mg Rate  565 mL/hr Route  IntraVENous Administered By  Remington Sutton RN          dexamethasone (DECADRON) 10 mg/mL injection 10 mg     Admin Date  04/13/2022 Action  Given Dose  10 mg Route  IntraVENous Administered By  Remington Sutton RN          DOCEtaxeL (TAXOTERE) 188 mg in 0.9% sodium chloride 250 mL, overfill volume 25 mL chemo infusion     Admin Date  04/13/2022 Action  New Bag Dose  188 mg Rate  293.8 mL/hr Route  IntraVENous Administered By  Remington Sutton RN          heparin (porcine) pf 300-500 Units     Admin Date  04/13/2022 Action  Given Dose  500 Units Route  InterCATHeter Administered By  Remington Sutton RN          palonosetron HCl (ALOXI) injection 0.25 mg     Admin Date  04/13/2022 Action  Given Dose  0.25 mg Route  IntraVENous Administered By  Grace Trujillo RN          pegfilgrastim (NEULASTA) wearable SQ injector 6 mg     Admin Date  04/13/2022 Action  Given Dose  6 mg Route  SubCUTAneous Administered By  Grace Trujillo RN          sodium chloride (NS) flush 10 mL     Admin Date  04/13/2022 Action  Given Dose  10 mL Route  IntraVENous Administered By  Grace Trujillo RN          sodium chloride 0.9 % bolus infusion 500 mL     Admin Date  04/13/2022 Action  New Bag Dose  500 mL Route  IntraVENous Administered By  Grace Trujillo, CHRISTIAN                  Education provided to patient about Neulasta On Body Injector including: side effects, how/when to remove the device, as well as what to do in the event of device malfunction. Opportunity for questions was provided and all questions were answered. Patient verbalized understanding. 1520 Pt tolerated treatment well. PAC maintained positive blood return throughout treatment, flushed with positive blood return at conclusion. D/c home in no distress. Pt aware of next appointment scheduled for 5/4 at 0930. Labs  Recent Results (from the past 12 hour(s))   CBC WITH AUTOMATED DIFF    Collection Time: 04/13/22  9:56 AM   Result Value Ref Range    WBC 11.9 (H) 3.6 - 11.0 K/uL    RBC 4.28 3.80 - 5.20 M/uL    HGB 11.8 11.5 - 16.0 g/dL    HCT 38.2 35.0 - 47.0 %    MCV 89.3 80.0 - 99.0 FL    MCH 27.6 26.0 - 34.0 PG    MCHC 30.9 30.0 - 36.5 g/dL    RDW 15.2 (H) 11.5 - 14.5 %    PLATELET 717 602 - 104 K/uL    MPV 10.4 8.9 - 12.9 FL    NRBC 0.0 0  WBC    ABSOLUTE NRBC 0.00 0.00 - 0.01 K/uL    NEUTROPHILS 90 (H) 32 - 75 %    LYMPHOCYTES 6 (L) 12 - 49 %    MONOCYTES 2 (L) 5 - 13 %    EOSINOPHILS 0 0 - 7 %    BASOPHILS 0 0 - 1 %    IMMATURE GRANULOCYTES 2 (H) 0.0 - 0.5 %    ABS. NEUTROPHILS 10.8 (H) 1.8 - 8.0 K/UL    ABS.  LYMPHOCYTES 0.7 (L) 0.8 - 3.5 K/UL ABS. MONOCYTES 0.2 0.0 - 1.0 K/UL    ABS. EOSINOPHILS 0.0 0.0 - 0.4 K/UL    ABS. BASOPHILS 0.0 0.0 - 0.1 K/UL    ABS. IMM. GRANS. 0.2 (H) 0.00 - 0.04 K/UL    DF SMEAR SCANNED      RBC COMMENTS ANISOCYTOSIS  1+       METABOLIC PANEL, COMPREHENSIVE    Collection Time: 04/13/22  9:56 AM   Result Value Ref Range    Sodium 138 136 - 145 mmol/L    Potassium 4.3 3.5 - 5.1 mmol/L    Chloride 105 97 - 108 mmol/L    CO2 22 21 - 32 mmol/L    Anion gap 11 5 - 15 mmol/L    Glucose 222 (H) 65 - 100 mg/dL    BUN 21 (H) 6 - 20 MG/DL    Creatinine 0.99 0.55 - 1.02 MG/DL    BUN/Creatinine ratio 21 (H) 12 - 20      GFR est AA >60 >60 ml/min/1.73m2    GFR est non-AA 57 (L) >60 ml/min/1.73m2    Calcium 11.1 (H) 8.5 - 10.1 MG/DL    Bilirubin, total 0.2 0.2 - 1.0 MG/DL    ALT (SGPT) 22 12 - 78 U/L    AST (SGOT) 11 (L) 15 - 37 U/L    Alk. phosphatase 88 45 - 117 U/L    Protein, total 7.7 6.4 - 8.2 g/dL    Albumin 3.2 (L) 3.5 - 5.0 g/dL    Globulin 4.5 (H) 2.0 - 4.0 g/dL    A-G Ratio 0.7 (L) 1.1 - 2.2     HEP B SURFACE AG    Collection Time: 04/13/22  9:56 AM   Result Value Ref Range    Hepatitis B surface Ag <0.10 Index    Hep B surface Ag Interp. Negative NEG     HEP B SURFACE AB    Collection Time: 04/13/22  9:56 AM   Result Value Ref Range    Hepatitis B surface Ab <3.10 mIU/mL    Hep B surface Ab Interp.  NONREACTIVE NR

## 2022-04-13 NOTE — PROGRESS NOTES
[facility] [place] [address] [address] [facility]  Angelia Colea: [phone]  F: [phone]      Reason for Visit:   Valorie Rivera is a 62 y.o. female who is seen in follow up for breast cancer    Breast Surgeon: Dr. 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 Reports gr 1 bleeding, gr 1 constipation, gr 1 fatigue, gr 1 hot flashes, gr 1 insomnia, gr 1 pain/cramping rated 6/10 to left upper thigh and lower back, gr 2 dyspnea, gr 1 tachycardia, gr 1 neuropathy, gr 2 edema, gr 1 urinary frequency. LMP: 22 and continues today.      FH:  Maternal aunt  of breast cancer in her 62s; maternal aunt breast cancer in her 76s; maternal great aunt  from breast cancer at age 36; great aunt's daughter also  of breast cancer at age 36; no ovarian, prostate, pancreas cancer    Past Medical History:   Diagnosis Date    Arrhythmia     heart murmur    Arthritis     Right knee, DJD left knee    Asthma     Cancer ([address]     COVID-19 2021    Diabetes ([address]     Pre diabetes    Hypertension     Morbid obesity ([address]     Neuropathy     Patient reports numbness and tingling in her legs and feet from her back    Sleep apnea     was ordered a CPAP years ago, but never used      Past Surgical History:   Procedure Laterality Date    HX BREAST LUMPECTOMY Left 3/2/2022    LEFT BREAST LUMPECTOMY WITH LEFT BREAST SENTINEL NODE BIOPSY WITH BLUE DYE AND PORT A CATH INSERTION (URGENT) performed by Funmilayo Amador MD at [address] HX CHOLECYSTECTOMY      HX GYN      c section    HX GYN      d&c    HX WISDOM TEETH EXTRACTION Bilateral       Social History     Tobacco Use    Smoking status: Never Smoker    Smokeless tobacco: Never Used   Substance Use Topics    Alcohol use: Yes     Comment: social once per month      Family History   Problem Relation Age of Onset    Hypertension Mother     Heart Disease Mother     Heart Disease Father     Hypertension Father     Breast Cancer Maternal Aunt     Breast Cancer Other     Breast Cancer Maternal Aunt      Current Outpatient Medications   Medication Sig    lisinopril-hydroCHLOROthiazide (PRINZIDE, ZESTORETIC) 20-12.5 mg per tablet Take 1 Tablet by mouth daily.  lidocaine-prilocaine (EMLA) topical cream Apply thin layer to port 30-60 minutes prior to infusion    dexAMETHasone (DECADRON) 4 mg tablet Take 8mg (2 tablets) by mouth twice daily on the day before and day after chemotherapy.  ondansetron hcl (ZOFRAN) 8 mg tablet Take 1 Tablet by mouth every eight (8) hours as needed for Nausea or Vomiting.  prochlorperazine (Compazine) 10 mg tablet Take 1 Tablet by mouth every six (6) hours as needed for Nausea or Vomiting.  albuterol-ipratropium (DUO-NEB) 2.5 mg-0.5 mg/3 ml nebu 3 mL by Nebulization route every six (6) hours as needed for Wheezing.  albuterol (PROVENTIL HFA, VENTOLIN HFA, PROAIR HFA) 90 mcg/actuation inhaler Take  by inhalation every six (6) hours as needed for Wheezing.  metFORMIN (GLUMETZA ER) 500 mg TG24 24 hour tablet Take 1 Tablet by mouth every morning.  fluticasone-salmeterol (ADVAIR DISKUS) 100-50 mcg/dose diskus inhaler Take 1 Puff by inhalation every twelve (12) hours.  amLODIPine (NORVASC) 10 mg tablet Take 10 mg by mouth every morning.     lisinopriL (PRINIVIL, ZESTRIL) 10 mg tablet Take  by mouth every morning. (Patient not taking: Reported on 4/13/2022)     No current facility-administered medications for this visit. Facility-Administered Medications Ordered in Other Visits   Medication Dose Route Frequency    sodium chloride (NS) flush 10 mL  10 mL IntraVENous PRN    0.9% sodium chloride injection 10 mL  10 mL IntraVENous PRN    heparin (porcine) pf 300-500 Units  300-500 Units InterCATHeter PRN      Allergies   Allergen Reactions    Nuts [Tree Nut] Rash and Itching     Throat itching        Review of Systems: A complete review of systems was obtained, negative except as described above. Physical Exam:     Visit Vitals  BP (!) 113/41   Pulse (!) 105   Temp 98.2 °F (36.8 °C) (Temporal)   Resp 20   Ht 5' 2\" (1.575 m)   Wt 313 lb (142 kg)   LMP 01/28/2021 (Approximate)   SpO2 97%   BMI 57.25 kg/m²     ECOG PS: 0    Constitutional: Appears well-developed and well-nourished in no apparent distress      Mental status: Alert and awake, Oriented to person/place/time, Able to follow commands    Eyes: EOM normal, Sclera normal, No visible ocular discharge    HENT: Normocephalic, atraumatic    Mouth/Throat: Moist mucous membranes    External Ears normal    Neck: No visualized mass    Pulmonary/Chest: Respiratory effort normal, No visualized signs of difficulty breathing or respiratory distress    Musculoskeletal: Normal gait with no signs of ataxia, Normal range of motion of neck    Neurological: No facial asymmetry (Cranial nerve 7 motor function), No gaze palsy    Skin: No significant exanthematous lesions or discoloration noted on facial skin    Psychiatric: Normal affect, No hallucinations           Results:     Lab Results   Component Value Date/Time    WBC 11.9 (H) 04/13/2022 09:56 AM    HGB 11.8 04/13/2022 09:56 AM    HCT 38.2 04/13/2022 09:56 AM    PLATELET 280 67/48/0923 09:56 AM    MCV 89.3 04/13/2022 09:56 AM    ABS.  NEUTROPHILS 10.8 (H) 04/13/2022 09:56 AM     Lab Results   Component Value Date/Time Sodium 138 04/13/2022 09:56 AM    Potassium 4.3 04/13/2022 09:56 AM    Chloride 105 04/13/2022 09:56 AM    CO2 22 04/13/2022 09:56 AM    Glucose 222 (H) 04/13/2022 09:56 AM    BUN 21 (H) 04/13/2022 09:56 AM    Creatinine 0.99 04/13/2022 09:56 AM    GFR est AA >60 04/13/2022 09:56 AM    GFR est non-AA 57 (L) 04/13/2022 09:56 AM    Calcium 11.1 (H) 04/13/2022 09:56 AM    Glucose (POC) 121 (H) 03/02/2022 10:23 AM     Lab Results   Component Value Date/Time    Bilirubin, total 0.2 04/13/2022 09:56 AM    ALT (SGPT) 22 04/13/2022 09:56 AM    Alk. phosphatase 88 04/13/2022 09:56 AM    Protein, total 7.7 04/13/2022 09:56 AM    Albumin 3.2 (L) 04/13/2022 09:56 AM    Globulin 4.5 (H) 04/13/2022 09:56 AM         1/13/22 breast US bilateral; L mammogram  L breast mass 8:00, 7 cmfn, 1.3 cm mass  Bilateral LN seen, appear benign    Records reviewed and summarized above. Pathology report(s) reviewed above. Radiology report(s) reviewed above. Assessment/plan:   1. 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 We explained why a patient can have microscopic cancer spread now even though physical examination, laboratory studies and imaging studies are negative for cancer. We explained that the same treatments used now as adjuvant or preventive treatments rarely if ever are curative in women who develop metastases. Port placed by Dr. Etelvina Alonso 3/2/22. We discussed the toxicities of TC chemotherapy (docetaxel 75mg/m2/cyclophosphamide 600 mg/m2 q 3 weeks x 4)  in detail.  This chemotherapy frequently causes a low white blood cell count and a small percent of patients require hospital admission for treatment of neutropenic fever. We explained that on occasion we consider the use of growth factors to minimize this risk. We explained to the patient that some side effects, if they occur, only last a few days including nausea, vomiting, stomatitis, arthralgia, myalgia, and allergic reactions to Taxotere. We told the patient that severe nausea and vomiting were very uncommon and that some side effects, if they occur, will last longer: this includes hair loss, which will be seen in all patients treated with these agents and fatigue, which will be seen in most. The patient was also told that if she is having menstrual function that she could develop chemotherapy-induced amenorrhea and infertility. We also told the patient that there was a very small risk of acute leukemia. We also informed the patient that heart damage is rare with these agents    She is agreeable to this and signed informed consent. She presents today for C1 TC, reviewed medications, side effects, and when to call clinic. Cold caps discussed, not interested    Discussed risks of COVID19 and precautions to take. Discussed oral and peripheral cryotherapy; she may be a candidate for neuropathy study, she declined    The patient was given presciptions for a wig, emla cream, decadron to take 8 mg bid the day before and day after chemo, zofran and compazine. Neulasta the following day. We will plan to see the patient in follow up at least once per cycle, or sooner if symptoms warrant. Labs with each cycle to monitor for toxicity      2. Emotional well being:  She has excellent support and is coping well with her disease    3. Genetic testing:   discussed potential ramifications of a positive test including the potential need for bilateral mastectomies and bilateral oophorectomies and the risk then for her family members to also have a mutation. VUS also discussed.   She has the kit from Dr. Ismael Whitfield, but has not yet done the testing. She would prefer for lab draw, will do today. 4. DM2:  On metformin. Discussed increased blood glucose with dexamethasone. BG is 222 today, she will follow up with PCP and check BS at home BID. 5. 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 Will get PET scan after treatment. 6. Back pain: radiates to left leg with some numbness. Started 2/2022, improved with tylenol. Discussed weight loss. 7. Hypercalcemia:  Unclear as to why; will monitor; also slightly high 2/28/22; will check PTHrp; she will cut back on Ca in diet    I personally saw and evaluated the patient and performed the entire medical decision making. The history, physical exam, and documentation were performed by Jihan Elizadle NP. I reviewed and verified the above documentation and modified it as needed. Left breast cancer, TN, TC #1 today. Ok to proceed. Will get PET for Castelman's following chemo. Will check HER 2 as I stated above on 3/2 specimen. Hypercalcemia, will check PTHrp today and lower calcium in diet; hyperglycemia, due to steroids, she will call PCP. RTC 3 weeks    I appreciate the opportunity to participate in Ms. Landry Wan's care. Signed By: Dara Bowles MD      No orders of the defined types were placed in this encounter.

## 2022-04-14 LAB — HBV CORE AB SERPL QL IA: NEGATIVE

## 2022-04-18 ENCOUNTER — TELEPHONE (OUTPATIENT)
Dept: ONCOLOGY | Age: 62
End: 2022-04-18

## 2022-04-18 DIAGNOSIS — G89.3 CANCER ASSOCIATED PAIN: Primary | ICD-10-CM

## 2022-04-18 RX ORDER — HYDROCODONE BITARTRATE AND ACETAMINOPHEN 5; 325 MG/1; MG/1
1 TABLET ORAL
Qty: 10 TABLET | Refills: 0 | Status: SHIPPED | OUTPATIENT
Start: 2022-04-18 | End: 2022-04-25

## 2022-04-18 NOTE — PROGRESS NOTES
Severe pain due to neulasta, steroids not helping, nor advil. Will call in norco 5/325 mg q6h prn pain #10 given.   done by staff

## 2022-04-18 NOTE — TELEPHONE ENCOUNTER
Monica Nieto is a 62 y.o. female  Patient called to let us know if taking the dexamethasone had helped any. Patient stated that there has been no change. Per Dr. Checo Banuelos orders, I instructed the patient to take another 4 mg of dexamethasone and 2-3 tablets of ibuprofen. I also instructed the patient to get some nexium to help prevent heartburn, acid reflux, and possible ulcers due to all the medication she is taking. I informed the patient that I would be calling her around 5-5:30 PM to check on her and see if there was any relief.

## 2022-04-18 NOTE — TELEPHONE ENCOUNTER
Kirsty Ontiveros is a 62 y.o. female  Patient called in stating that she is having tingling and burning in her left thigh. She stated that she is having pressure in her lower back as well as both legs. She is also having tingling and burning in those areas as well, with the left thigh being the worst. After speaking with Dr. Zaire Morrison, I instructed the patient to take 8 MG of Dexamethasone now to help with the pain. I informed the patient that it will take a few hours for it to start working and won't get rid of it completely but it should help. I instructed the patient to call back later this afternoon to see how she is doing. Patient was agreeable to this plan.

## 2022-04-18 NOTE — TELEPHONE ENCOUNTER
Mynor Yoo is a 62 y.o. female  Called patient to check up on her. Patient has had very little relief. Dr. Ximena Silva sent in a prescription for Norco. I instructed the patient to use it sparingly. I also instructed the patient to take another 4 MG of Dexamethasone tonight and to call me in the morning to let me know how she is feeling. Patient was agreeable to that plan.

## 2022-04-19 ENCOUNTER — TELEPHONE (OUTPATIENT)
Dept: ONCOLOGY | Age: 62
End: 2022-04-19

## 2022-04-19 LAB — PTH RELATED PROT SERPL-SCNC: <2 PMOL/L

## 2022-04-19 NOTE — TELEPHONE ENCOUNTER
Patient called to speak with the nurse to update her. I spoke with the patient. Patient stated that she is doing a lot better today. She was about to get some sleep last night. She took 4 MG of Dexamethasone yesterday night along with one Norco pill around 7 PM. That started to give her some relief. Around 1 AM patient took another Norco tablet before she was able to lie down and sleep. Patient stated she was able to sleep until 5 AM before she woke up to go to the bathroom, then she was able to return to sleep. Patient has not had to take any more pain medication as of 09:30AM this morning. Patient sounded a lot better and stated that she felt a lot better. She is still having the burning in the thigh but overall better. Patient will continue to use the pain medication sparingly when it is needed. Patient has no other concerns or questions at this time.

## 2022-04-20 ENCOUNTER — TELEPHONE (OUTPATIENT)
Dept: ONCOLOGY | Age: 62
End: 2022-04-20

## 2022-04-20 NOTE — TELEPHONE ENCOUNTER
Darlene Bonilla is a 62 y.o. female  Called patient to speak to her about her concerns. Patient wanted to know if she needed to take her Dexamethasone with the Norco. I instructed the patient that she did not need to keep taking it with the pain medication. She was just to take it the day before chemo as usually. Patient also had some concerns about some soft/loose stools she has been having. She had multiple bowel movements yesterday in a short period of time, to which she took Immodium to help with. I instructed her that she should continue to use the Immodium when she has lots of loose bowel movements within a short period of time. Patient also stated that her anus was burning and Preparation H was not helping with it. She keeps it with her as she is prone to hemorrhoids. I instructed the patient to get some over the counter diaper rash cream like A&D or Desitin. I also instructed her to get some flushable wipes too use instead of toilet paper to ease the pain of wiping. I also reminded the patient to get Nexium to take to help with the GI upset that might be due to all the steroids and pain medications she has taken in the past 5 days due to side effects from the Neulasta injection. Patient stated that she understood and would go get those things. Patient will call back if she has any more questions or concerns.

## 2022-04-20 NOTE — TELEPHONE ENCOUNTER
Patient called requesting a call back to discuss some pain medications she was put on. Please advise.  # [phone]

## 2022-04-26 ENCOUNTER — DOCUMENTATION ONLY (OUTPATIENT)
Dept: SURGERY | Age: 62
End: 2022-04-26

## 2022-04-26 NOTE — PROGRESS NOTES
Received a  request from Georgette Milian from [place] [id number] by fax on 04/25/22 requesting medical records along with a return to work certificate. Sent Work Certificate to Ritu Sheriff.  Faxed medical records and Work certificate to [phone]

## 2022-04-27 ENCOUNTER — NURSE NAVIGATOR (OUTPATIENT)
Dept: CASE MANAGEMENT | Age: 62
End: 2022-04-27

## 2022-04-27 NOTE — NURSE NAVIGATOR
[address]  Breast Navigator Encounter    Name: Frannie Chaudhry  Age: 62 y.o.  : 1960  Diagnosis: Left breast IDC; ER-/AK-/HER2-    Encounter type:  []Initial Navigator Encounter  []Patient Initiated  [x]Navigator Follow-up []Pre-op  []Post-op []Check-in Prior to First Treatment []Treatment Modality Change   []Other:     Narrative:   Called pt to check in. Pt reports doing okay. She said last week was rough due to side effects, however this week she is feeling much better. No questions or concerns for NN at this time.      Interdisciplinary Team:  Med-Onc: Dr. Edi Rowley MD  Surg-Onc: Dr. Bhakti Marsh MD  Rad-Onc:  Plastics:  : Terry Rollins LCSW  Nurse Navigator:  CHRISTIAN Bland BSN, RN, [facility]  Breast Cancer Nurse [address] [place] [address]  W: [phone]  F: [phone]  [email or web address]   Good Help to Those in [facility]

## 2022-05-04 ENCOUNTER — TELEPHONE (OUTPATIENT)
Dept: INTERNAL MEDICINE CLINIC | Age: 62
End: 2022-05-04

## 2022-05-04 ENCOUNTER — HOSPITAL ENCOUNTER (OUTPATIENT)
Dept: INFUSION THERAPY | Age: 62
Discharge: HOME | End: 2022-05-04
Payer: MEDICAID

## 2022-05-04 ENCOUNTER — OFFICE VISIT (OUTPATIENT)
Dept: ONCOLOGY | Age: 62
End: 2022-05-04
Payer: MEDICAID

## 2022-05-04 VITALS
WEIGHT: 293 LBS | DIASTOLIC BLOOD PRESSURE: 71 MMHG | HEIGHT: 62 IN | SYSTOLIC BLOOD PRESSURE: 143 MMHG | BODY MASS INDEX: 53.92 KG/M2

## 2022-05-04 VITALS
HEIGHT: 62 IN | HEART RATE: 104 BPM | BODY MASS INDEX: 53.92 KG/M2 | OXYGEN SATURATION: 94 % | RESPIRATION RATE: 20 BRPM | TEMPERATURE: 98.7 F | WEIGHT: 293 LBS

## 2022-05-04 DIAGNOSIS — Z17.1 MALIGNANT NEOPLASM OF LOWER-INNER QUADRANT OF LEFT BREAST IN FEMALE, ESTROGEN RECEPTOR NEGATIVE (HCC): ICD-10-CM

## 2022-05-04 DIAGNOSIS — Z17.1 MALIGNANT NEOPLASM OF LOWER-INNER QUADRANT OF LEFT BREAST IN FEMALE, ESTROGEN RECEPTOR NEGATIVE (HCC): Primary | ICD-10-CM

## 2022-05-04 DIAGNOSIS — C50.312 MALIGNANT NEOPLASM OF LOWER-INNER QUADRANT OF LEFT BREAST IN FEMALE, ESTROGEN RECEPTOR NEGATIVE (HCC): Primary | ICD-10-CM

## 2022-05-04 DIAGNOSIS — G89.3 CANCER ASSOCIATED PAIN: ICD-10-CM

## 2022-05-04 DIAGNOSIS — E83.52 HYPERCALCEMIA: ICD-10-CM

## 2022-05-04 DIAGNOSIS — Z51.11 ENCOUNTER FOR ANTINEOPLASTIC CHEMOTHERAPY: ICD-10-CM

## 2022-05-04 DIAGNOSIS — Z76.89 PREVENTION OF CHEMOTHERAPY-INDUCED NEUTROPENIA: ICD-10-CM

## 2022-05-04 DIAGNOSIS — C50.312 MALIGNANT NEOPLASM OF LOWER-INNER QUADRANT OF LEFT BREAST IN FEMALE, ESTROGEN RECEPTOR NEGATIVE (HCC): ICD-10-CM

## 2022-05-04 DIAGNOSIS — Z51.11 ENCOUNTER FOR ANTINEOPLASTIC CHEMOTHERAPY: Primary | ICD-10-CM

## 2022-05-04 LAB
ALBUMIN SERPL-MCNC: 3 G/DL (ref 3.5–5)
ALBUMIN/GLOB SERPL: 0.7 {RATIO} (ref 1.1–2.2)
ALP SERPL-CCNC: 89 U/L (ref 45–117)
ALT SERPL-CCNC: 25 U/L (ref 12–78)
ANION GAP SERPL CALC-SCNC: 8 MMOL/L (ref 5–15)
AST SERPL-CCNC: 16 U/L (ref 15–37)
BASOPHILS # BLD: 0 K/UL (ref 0–0.1)
BASOPHILS NFR BLD: 0 % (ref 0–1)
BILIRUB SERPL-MCNC: 0.8 MG/DL (ref 0.2–1)
BUN SERPL-MCNC: 12 MG/DL (ref 6–20)
BUN/CREAT SERPL: 12 (ref 12–20)
CALCIUM SERPL-MCNC: 11 MG/DL (ref 8.5–10.1)
CHLORIDE SERPL-SCNC: 107 MMOL/L (ref 97–108)
CO2 SERPL-SCNC: 24 MMOL/L (ref 21–32)
CREAT SERPL-MCNC: 1.03 MG/DL (ref 0.55–1.02)
DIFFERENTIAL METHOD BLD: ABNORMAL
EOSINOPHIL # BLD: 0.2 K/UL (ref 0–0.4)
EOSINOPHIL NFR BLD: 1 % (ref 0–7)
ERYTHROCYTE [DISTWIDTH] IN BLOOD BY AUTOMATED COUNT: 18.5 % (ref 11.5–14.5)
GLOBULIN SER CALC-MCNC: 4.3 G/DL (ref 2–4)
GLUCOSE SERPL-MCNC: 184 MG/DL (ref 65–100)
HCT VFR BLD AUTO: 36.9 % (ref 35–47)
HGB BLD-MCNC: 11.1 G/DL (ref 11.5–16)
IMM GRANULOCYTES # BLD AUTO: 0 K/UL
IMM GRANULOCYTES NFR BLD AUTO: 0 %
LYMPHOCYTES # BLD: 0.6 K/UL (ref 0.8–3.5)
LYMPHOCYTES NFR BLD: 3 % (ref 12–49)
MCH RBC QN AUTO: 27.5 PG (ref 26–34)
MCHC RBC AUTO-ENTMCNC: 30.1 G/DL (ref 30–36.5)
MCV RBC AUTO: 91.3 FL (ref 80–99)
MONOCYTES # BLD: 1.3 K/UL (ref 0–1)
MONOCYTES NFR BLD: 6 % (ref 5–13)
NEUTS BAND NFR BLD MANUAL: 4 % (ref 0–6)
NEUTS SEG # BLD: 18.8 K/UL (ref 1.8–8)
NEUTS SEG NFR BLD: 86 % (ref 32–75)
NRBC # BLD: 0 K/UL (ref 0–0.01)
NRBC BLD-RTO: 0 PER 100 WBC
PLATELET # BLD AUTO: 358 K/UL (ref 150–400)
PMV BLD AUTO: 10 FL (ref 8.9–12.9)
POTASSIUM SERPL-SCNC: 4.4 MMOL/L (ref 3.5–5.1)
PROT SERPL-MCNC: 7.3 G/DL (ref 6.4–8.2)
RBC # BLD AUTO: 4.04 M/UL (ref 3.8–5.2)
RBC MORPH BLD: ABNORMAL
SODIUM SERPL-SCNC: 139 MMOL/L (ref 136–145)
WBC # BLD AUTO: 20.9 K/UL (ref 3.6–11)

## 2022-05-04 PROCEDURE — 96375 TX/PRO/DX INJ NEW DRUG ADDON: CPT

## 2022-05-04 PROCEDURE — 83970 ASSAY OF PARATHORMONE: CPT

## 2022-05-04 PROCEDURE — 96377 APPLICATON ON-BODY INJECTOR: CPT

## 2022-05-04 PROCEDURE — 74011000250 HC RX REV CODE- 250: Performed by: INTERNAL MEDICINE

## 2022-05-04 PROCEDURE — 74011250636 HC RX REV CODE- 250/636: Performed by: INTERNAL MEDICINE

## 2022-05-04 PROCEDURE — 96361 HYDRATE IV INFUSION ADD-ON: CPT

## 2022-05-04 PROCEDURE — 74011250636 HC RX REV CODE- 250/636: Performed by: NURSE PRACTITIONER

## 2022-05-04 PROCEDURE — 96417 CHEMO IV INFUS EACH ADDL SEQ: CPT

## 2022-05-04 PROCEDURE — 96413 CHEMO IV INFUSION 1 HR: CPT

## 2022-05-04 PROCEDURE — 99215 OFFICE O/P EST HI 40 MIN: CPT | Performed by: INTERNAL MEDICINE

## 2022-05-04 PROCEDURE — 85025 COMPLETE CBC W/AUTO DIFF WBC: CPT

## 2022-05-04 PROCEDURE — 77030016057 HC NDL HUBR APOL -B

## 2022-05-04 PROCEDURE — 36415 COLL VENOUS BLD VENIPUNCTURE: CPT

## 2022-05-04 PROCEDURE — 80053 COMPREHEN METABOLIC PANEL: CPT

## 2022-05-04 RX ORDER — SODIUM CHLORIDE 0.9 % (FLUSH) 0.9 %
10 SYRINGE (ML) INJECTION AS NEEDED
Status: DISPENSED | OUTPATIENT
Start: 2022-05-04 | End: 2022-05-04

## 2022-05-04 RX ORDER — FUROSEMIDE 20 MG/1
20 TABLET ORAL ONCE
Qty: 1 TABLET | Refills: 0 | Status: SHIPPED | OUTPATIENT
Start: 2022-05-04 | End: 2022-05-04

## 2022-05-04 RX ORDER — PALONOSETRON 0.05 MG/ML
0.25 INJECTION, SOLUTION INTRAVENOUS ONCE
Status: COMPLETED | OUTPATIENT
Start: 2022-05-04 | End: 2022-05-04

## 2022-05-04 RX ORDER — HEPARIN 100 UNIT/ML
300-500 SYRINGE INTRAVENOUS AS NEEDED
Status: ACTIVE | OUTPATIENT
Start: 2022-05-04 | End: 2022-05-04

## 2022-05-04 RX ORDER — HYDROCODONE BITARTRATE AND ACETAMINOPHEN 5; 325 MG/1; MG/1
1 TABLET ORAL
Qty: 14 TABLET | Refills: 0 | Status: SHIPPED | OUTPATIENT
Start: 2022-05-04 | End: 2022-05-09

## 2022-05-04 RX ORDER — DEXAMETHASONE 4 MG/1
TABLET ORAL
Qty: 50 TABLET | Refills: 1 | Status: SHIPPED | OUTPATIENT
Start: 2022-05-04 | End: 2022-09-13

## 2022-05-04 RX ORDER — SODIUM CHLORIDE 9 MG/ML
10 INJECTION INTRAMUSCULAR; INTRAVENOUS; SUBCUTANEOUS AS NEEDED
Status: ACTIVE | OUTPATIENT
Start: 2022-05-04 | End: 2022-05-04

## 2022-05-04 RX ORDER — SODIUM CHLORIDE 9 MG/ML
25 INJECTION, SOLUTION INTRAVENOUS CONTINUOUS
Status: DISPENSED | OUTPATIENT
Start: 2022-05-04 | End: 2022-05-04

## 2022-05-04 RX ORDER — DEXAMETHASONE SODIUM PHOSPHATE 100 MG/10ML
10 INJECTION INTRAMUSCULAR; INTRAVENOUS ONCE
Status: COMPLETED | OUTPATIENT
Start: 2022-05-04 | End: 2022-05-04

## 2022-05-04 RX ADMIN — SODIUM CHLORIDE, PRESERVATIVE FREE 10 ML: 5 INJECTION INTRAVENOUS at 15:43

## 2022-05-04 RX ADMIN — DOCETAXEL ANHYDROUS 188 MG: 10 INJECTION, SOLUTION INTRAVENOUS at 13:58

## 2022-05-04 RX ADMIN — Medication 500 UNITS: at 15:43

## 2022-05-04 RX ADMIN — PALONOSETRON 0.25 MG: 0.05 INJECTION, SOLUTION INTRAVENOUS at 13:12

## 2022-05-04 RX ADMIN — DEXAMETHASONE SODIUM PHOSPHATE 10 MG: 10 INJECTION INTRAMUSCULAR; INTRAVENOUS at 13:16

## 2022-05-04 RX ADMIN — PEGFILGRASTIM 6 MG: KIT SUBCUTANEOUS at 15:55

## 2022-05-04 RX ADMIN — CYCLOPHOSPHAMIDE 1500 MG: 200 INJECTION, SOLUTION INTRAVENOUS at 15:06

## 2022-05-04 RX ADMIN — SODIUM CHLORIDE 1000 ML: 9 INJECTION, SOLUTION INTRAVENOUS at 12:05

## 2022-05-04 RX ADMIN — SODIUM CHLORIDE 25 ML/HR: 9 INJECTION, SOLUTION INTRAVENOUS at 13:12

## 2022-05-04 RX ADMIN — SODIUM CHLORIDE 500 ML: 9 INJECTION, SOLUTION INTRAVENOUS at 13:22

## 2022-05-04 NOTE — PROGRESS NOTES
Luevenia Shone is a 62 y.o. female Follow up for the evaluation of breast cancer. 1. Have you been to the ER, urgent care clinic since your last visit? Hospitalized since your last visit? No    2. Have you seen or consulted any other health care providers outside of the [address] [place] [address] since your last visit? Include any pap smears or colon screening.  No

## 2022-05-04 NOTE — PROGRESS NOTES
[facility] [place] [facility] [place] [address] [address] [facility] [address] 100[address] [facility] [place]: [phone]  F: [phone]      Reason for Visit:   Karan Martinez is a 62 y.o. female who is seen in follow up for breast cancer    Breast Surgeon: Dr. 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 Reports gr 1 constipation, gr 1 fatigue, gr 2 hair loss, gr 1 hot flashes, gr 2 insomnia, gr 1 loss of cognition, gr 1 anxiety/depression, gr 1 pain rated 7/10 described as burning to left thigh, gr 1 mouth sores, gr 2 dyspnea, gr 2 rapid heartbeat, gr 1 itching, gr 3 neuropathy, gr 1 edema, gr 1 headache, gr 1 urinary frequency, gr 1 urinary leakage, gr 1 loss of libido, gr 1 vaginal dryness.      LMP: 22    FH:  Maternal aunt  of breast cancer in her 62s; maternal aunt breast cancer in her 76s; maternal great aunt  from breast cancer at age 36; great aunt's daughter also  of breast cancer at age 36; no ovarian, prostate, pancreas cancer    Past Medical History:   Diagnosis Date    Arrhythmia     heart murmur    Arthritis     Right knee, DJD left knee    Asthma     Cancer ([address]     COVID-19 2021    Diabetes ([address]     Pre diabetes    Hypertension     Morbid obesity ([address]     Neuropathy     Patient reports numbness and tingling in her legs and feet from her back    Sleep apnea     was ordered a CPAP years ago, but never used      Past Surgical History:   Procedure Laterality Date    HX BREAST LUMPECTOMY Left 3/2/2022    LEFT BREAST LUMPECTOMY WITH LEFT BREAST SENTINEL NODE BIOPSY WITH BLUE DYE AND PORT A CATH INSERTION (URGENT) performed by Carlotta Mckeon MD at [address] HX CHOLECYSTECTOMY      HX GYN      c section    HX GYN      d&c    HX WISDOM TEETH EXTRACTION Bilateral       Social History     Tobacco Use    Smoking status: Never Smoker    Smokeless tobacco: Never Used   Substance Use Topics    Alcohol use: Yes     Comment: social once per month      Family History   Problem Relation Age of Onset    Hypertension Mother     Heart Disease Mother     Heart Disease Father     Hypertension Father     Breast Cancer Maternal Aunt     Breast Cancer Other     Breast Cancer Maternal Aunt      Current Outpatient Medications   Medication Sig    lisinopril-hydroCHLOROthiazide (PRINZIDE, ZESTORETIC) 20-12.5 mg per tablet Take 1 Tablet by mouth daily.  lidocaine-prilocaine (EMLA) topical cream Apply thin layer to port 30-60 minutes prior to infusion    dexAMETHasone (DECADRON) 4 mg tablet Take 8mg (2 tablets) by mouth twice daily on the day before and day after chemotherapy.  ondansetron hcl (ZOFRAN) 8 mg tablet Take 1 Tablet by mouth every eight (8) hours as needed for Nausea or Vomiting.  prochlorperazine (Compazine) 10 mg tablet Take 1 Tablet by mouth every six (6) hours as needed for Nausea or Vomiting.  albuterol-ipratropium (DUO-NEB) 2.5 mg-0.5 mg/3 ml nebu 3 mL by Nebulization route every six (6) hours as needed for Wheezing.  albuterol (PROVENTIL HFA, VENTOLIN HFA, PROAIR HFA) 90 mcg/actuation inhaler Take  by inhalation every six (6) hours as needed for Wheezing.  metFORMIN (GLUMETZA ER) 500 mg TG24 24 hour tablet Take 1 Tablet by mouth every morning.  fluticasone-salmeterol (ADVAIR DISKUS) 100-50 mcg/dose diskus inhaler Take 1 Puff by inhalation every twelve (12) hours.     amLODIPine (NORVASC) 10 mg tablet Take 10 mg by mouth every morning. No current facility-administered medications for this visit. Facility-Administered Medications Ordered in Other Visits   Medication Dose Route Frequency    sodium chloride 0.9 % bolus infusion 1,000 mL  1,000 mL IntraVENous ONCE      Allergies   Allergen Reactions    Nuts [Tree Nut] Rash and Itching     Throat itching        Review of Systems: A complete review of systems was obtained, negative except as described above. Physical Exam:     Visit Vitals  Pulse (!) 104   Temp 98.7 °F (37.1 °C) (Temporal)   Resp 20   Ht 5' 2\" (1.575 m)   Wt 322 lb (146.1 kg)   LMP 01/28/2021 (Approximate)   SpO2 94%   BMI 58.89 kg/m²     ECOG PS: 0    Constitutional: Appears well-developed and well-nourished in no apparent distress      Mental status: Alert and awake, Oriented to person/place/time, Able to follow commands    Eyes: EOM normal, Sclera normal, No visible ocular discharge    HENT: Normocephalic, atraumatic    Mouth/Throat: Moist mucous membranes    External Ears normal    Neck: No visualized mass    Pulmonary/Chest: Respiratory effort normal, No visualized signs of difficulty breathing or respiratory distress, diminished breath sounds, 3+ bilateral LE edmea    Musculoskeletal: Normal gait with no signs of ataxia, Normal range of motion of neck    Neurological: No facial asymmetry (Cranial nerve 7 motor function), No gaze palsy    Skin: No significant exanthematous lesions or discoloration noted on facial skin    Psychiatric: Normal affect, No hallucinations       Results:     Lab Results   Component Value Date/Time    WBC 20.9 (H) 05/04/2022 09:25 AM    HGB 11.1 (L) 05/04/2022 09:25 AM    HCT 36.9 05/04/2022 09:25 AM    PLATELET 653 53/20/6576 09:25 AM    MCV 91.3 05/04/2022 09:25 AM    ABS.  NEUTROPHILS 18.8 (H) 05/04/2022 09:25 AM     Lab Results   Component Value Date/Time    Sodium 139 05/04/2022 09:25 AM    Potassium 4.4 05/04/2022 09:25 AM    Chloride 107 05/04/2022 09:25 AM    CO2 24 05/04/2022 09:25 AM    Glucose 184 (H) 05/04/2022 09:25 AM    BUN 12 05/04/2022 09:25 AM    Creatinine 1.03 (H) 05/04/2022 09:25 AM    GFR est AA >60 05/04/2022 09:25 AM    GFR est non-AA 55 (L) 05/04/2022 09:25 AM    Calcium 11.0 (H) 05/04/2022 09:25 AM    Glucose (POC) 121 (H) 03/02/2022 10:23 AM     Lab Results   Component Value Date/Time    Bilirubin, total 0.8 05/04/2022 09:25 AM    ALT (SGPT) 25 05/04/2022 09:25 AM    Alk. phosphatase 89 05/04/2022 09:25 AM    Protein, total 7.3 05/04/2022 09:25 AM    Albumin 3.0 (L) 05/04/2022 09:25 AM    Globulin 4.3 (H) 05/04/2022 09:25 AM         1/13/22 breast US bilateral; L mammogram  L breast mass 8:00, 7 cmfn, 1.3 cm mass  Bilateral LN seen, appear benign    Records reviewed and summarized above. Pathology report(s) reviewed above. Radiology report(s) reviewed above. Assessment/plan:   1. 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 We explained to the patient that the goal of systemic adjuvant therapy is to improve the chances for cure and decrease the risk of relapse. We explained why a patient can have microscopic cancer spread now even though physical examination, laboratory studies and imaging studies are negative for cancer. We explained that the same treatments used now as adjuvant or preventive treatments rarely if ever are curative in women who develop metastases. Port placed by Dr. Aly Hartley 3/2/22. We discussed the toxicities of TC chemotherapy (docetaxel 75mg/m2/cyclophosphamide 600 mg/m2 q 3 weeks x 4)  in detail.  This chemotherapy frequently causes a low white blood cell count and a small percent of patients require hospital admission for treatment of neutropenic fever. We explained that on occasion we consider the use of growth factors to minimize this risk. We explained to the patient that some side effects, if they occur, only last a few days including nausea, vomiting, stomatitis, arthralgia, myalgia, and allergic reactions to Taxotere. We told the patient that severe nausea and vomiting were very uncommon and that some side effects, if they occur, will last longer: this includes hair loss, which will be seen in all patients treated with these agents and fatigue, which will be seen in most. The patient was also told that if she is having menstrual function that she could develop chemotherapy-induced amenorrhea and infertility. We also told the patient that there was a very small risk of acute leukemia. We also informed the patient that heart damage is rare with these agents    She is agreeable to this and signed informed consent. She presents today for C2 TC. She tolerated with increased back pain resolved with norco and gr 1 edema. Labs reviewed, will proceed with treatment today as planned. Cold caps discussed, not interested    Discussed risks of COVID19 and precautions to take. Discussed oral and peripheral cryotherapy; she may be a candidate for neuropathy study, she declined    The patient was given presciptions for a wig, emla cream, decadron to take 8 mg bid the day before and day after chemo, zofran and compazine. Neulasta the following day. We will plan to see the patient in follow up at least once per cycle, or sooner if symptoms warrant. Labs with each cycle to monitor for toxicity      2. Emotional well being:  She has excellent support and is coping well with her disease. She wishes to see LCSW to discuss living will.      3. Genetic testing:   discussed potential ramifications of a positive test including the potential need for bilateral mastectomies and bilateral oophorectomies and the risk then for her family members to also have a mutation. VUS also discussed. She has the kit from Dr. Ale Jacinto, but has not yet done the testing. She would prefer for lab draw, drawn 4/15/22 pending results. 4. DM2:  On metformin. Discussed increased blood glucose with dexamethasone. BG is 184 today. She states she has not been checking her BG at home. Discussed importance of monitoring. Will refer to [facility], pharmD    5. 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 Will get PET scan after treatment. 6. Back pain: radiates to left leg with some numbness. Started 2/2022, improved with tylenol. Discussed weight loss. Worsened 4 days after chemo and improved with Norco. Lower back pain now back to baseline. 7. Hypercalcemia:  Unclear as to why; PTHrp <2, she did not cut back on Ca in diet. Will order bone scan for further evaluation. Wonder if this is related to Castelman's (POEMS?); will check PTH intact    8. Rectal Irritation: denies diarrhea, improved with barrier cream and wet wipes    9. Lower extremity edema: most likely due to taxotere; will give 20 mg of lasix to take tomorrow    10. Increased cr: Will give 1 L IVF today    11. Bone pain with neulasta: Will have her take 4 mg dex sat night and 4 mg Sunday morning and 4 mg Sunday night; also has norco 5-325 mg q6h prn pain, #14 given, no refills,  reviewed by staff    I personally saw and evaluated the patient and performed the entire medical decision making. The history, physical exam, and documentation were performed by Gio Amador NP. I reviewed and verified the above documentation and modified it as needed. Left breast cancer, TN, TC #2 today, ok to proceed. Will get bone scan to eval back pain. High calcium may be related to castelman's, will check PTH intact. Will refer to [facility] for glucose management. For edema, will give one 20 mg PO lasix tomorrow.   1 L IVF today for increased cr. Refilled norco for bone pain and also planned steroids to help with that and LE edema        I appreciate the opportunity to participate in Ms. Ailyn Wan's care. Signed By: Andrew Hernández MD      No orders of the defined types were placed in this encounter.

## 2022-05-04 NOTE — TELEPHONE ENCOUNTER
Pharmacist/Oncology Collaboration for Hyperglycemia/Diabetes Management    S/O: Tiffany Peterson is a 62 y.o. female referred by Dr. Yoandy Mcnamara for steroid-induced hyperglycemia. Called patient to see how things were going and establish relationship to help with her blood sugar management. Oncology Diagnosis: Breast cancer    Current Oncology Regimen: TC - round 2 today  Dex 8mg BID day before and day after    Relevant PMH: pre-diabetes (per patient)    PCP - [facility] 1996 in [facility] Tho [address], NP    HPI Diabetes: patient states that she had pre-diabetes but not full on diabetes. Her PCP started her on metformin 6-7 months ago. Current Diabetes Regimen:  Metformin 500mg 1 tab daily - takes in the morning - unsure if ER or regular release - will try to confirm    ROS:   Patient denies hypoglycemic and hyperglycemic signs/symptoms, chest pain, shortness of breath, neuropathy, vision changes, and any foot changes. Self-Monitoring Blood Glucose:  Not currently checking - has not been able to figure out how to use her machine. Cousin who is a CMA is at her house so she's going to get her to help.     Data reviewed:  No results found for: HBA1C, [id number], [id number]    Lab Results   Component Value Date/Time    Glucose 184 (H) 05/04/2022 09:25 AM    Glucose 222 (H) 04/13/2022 09:56 AM    Glucose 103 (H) 02/28/2022 03:05 PM    Glucose (POC) 121 (H) 03/02/2022 10:23 AM       Lab Results   Component Value Date/Time    Sodium 139 05/04/2022 09:25 AM    Potassium 4.4 05/04/2022 09:25 AM    Chloride 107 05/04/2022 09:25 AM    CO2 24 05/04/2022 09:25 AM    Anion gap 8 05/04/2022 09:25 AM    Glucose 184 (H) 05/04/2022 09:25 AM    BUN 12 05/04/2022 09:25 AM    Creatinine 1.03 (H) 05/04/2022 09:25 AM    BUN/Creatinine ratio 12 05/04/2022 09:25 AM    GFR est AA >60 05/04/2022 09:25 AM    GFR est non-AA 55 (L) 05/04/2022 09:25 AM    Calcium 11.0 (H) 05/04/2022 09:25 AM    Bilirubin, total 0.8 05/04/2022 09:25 AM Alk. phosphatase 89 05/04/2022 09:25 AM    Protein, total 7.3 05/04/2022 09:25 AM    Albumin 3.0 (L) 05/04/2022 09:25 AM    Globulin 4.3 (H) 05/04/2022 09:25 AM    A-G Ratio 0.7 (L) 05/04/2022 09:25 AM    ALT (SGPT) 25 05/04/2022 09:25 AM    AST (SGOT) 16 05/04/2022 09:25 AM         Assessment/Plan:     1. Pre-Diabetes:  Patients blood sugars have spiked with dexamethasone and unclear where they are running on a daily basis. Tried to help patient trouble shoot issues with her monitor and explained how to get a good sample. Advised patient if she is unable to get a reading tonight with the help of her family member who is a CMA, we can do a virtual visit tomorrow so that I can try to walk her through it. Advised patient that we needed to know where sugars were running with and without steroids to better be able to manage them when they increase with steroids. Patient give my contact information and advised to call me tomorrow with         All changes made to patient regimen per collaborative practice agreement. Patient verbalized understanding of the information presented and all of the patients questions were answered. AVS was handed to the patient and information reviewed if patient was seen in office. Patient advised to call me directly at [phone] or Oncology team with any additional questions or concerns. Information regarding visit will also be sent to PCP to ensure continuity of care. Follow-up: tomorrow      Uriah Brooks, PharmD, BCPS, [facility] [address] in place:  Yes   Recommendation Provided To: Patient/Caregiver: 0 via Telephone   Intervention Accepted By: Patient/Caregiver: 0   Time Spent (min): 30

## 2022-05-04 NOTE — PROGRESS NOTES
[facility] Progress Note    Date: May 4, 2022    Name: Frannie Chaudhry    MRN: [id number]         : 1960    Ms. Alena Villa Arrived ambulatory and in no distress for C2D1 of TC + Hydration + Neulasta Regimen. Assessment was completed by Perico Orlando, RN, no acute issues at this time, no new complaints voiced. Right chest wall port accessed without difficulty, labs drawn & sent for processing. Chemotherapy Flowsheet 2022   Cycle C 2 D 1   Date 2022   Drug / Regimen TC/OBI   Pre Hydration given   Pre Meds given   Notes Neulasta OBI     Patient proceed to appointment with Dr. Riya Kee. Ms. Naye Kitchen vitals were reviewed. Visit Vitals  BP (!) 143/71   Ht 5' 2\" (1.575 m)   Wt 146.3 kg (322 lb 8 oz)   BMI 58.99 kg/m²       Lab results were obtained and reviewed. Recent Results (from the past 12 hour(s))   METABOLIC PANEL, COMPREHENSIVE    Collection Time: 22  9:25 AM   Result Value Ref Range    Sodium 139 136 - 145 mmol/L    Potassium 4.4 3.5 - 5.1 mmol/L    Chloride 107 97 - 108 mmol/L    CO2 24 21 - 32 mmol/L    Anion gap 8 5 - 15 mmol/L    Glucose 184 (H) 65 - 100 mg/dL    BUN 12 6 - 20 MG/DL    Creatinine 1.03 (H) 0.55 - 1.02 MG/DL    BUN/Creatinine ratio 12 12 - 20      GFR est AA >60 >60 ml/min/1.73m2    GFR est non-AA 55 (L) >60 ml/min/1.73m2    Calcium 11.0 (H) 8.5 - 10.1 MG/DL    Bilirubin, total 0.8 0.2 - 1.0 MG/DL    ALT (SGPT) 25 12 - 78 U/L    AST (SGOT) 16 15 - 37 U/L    Alk.  phosphatase 89 45 - 117 U/L    Protein, total 7.3 6.4 - 8.2 g/dL    Albumin 3.0 (L) 3.5 - 5.0 g/dL    Globulin 4.3 (H) 2.0 - 4.0 g/dL    A-G Ratio 0.7 (L) 1.1 - 2.2     CBC WITH AUTOMATED DIFF    Collection Time: 22  9:25 AM   Result Value Ref Range    WBC 20.9 (H) 3.6 - 11.0 K/uL    RBC 4.04 3.80 - 5.20 M/uL    HGB 11.1 (L) 11.5 - 16.0 g/dL    HCT 36.9 35.0 - 47.0 %    MCV 91.3 80.0 - 99.0 FL    MCH 27.5 26.0 - 34.0 PG    MCHC 30.1 30.0 - 36.5 g/dL    RDW 18.5 (H) 11.5 - 14.5 %    PLATELET 747 679 - 111 K/uL    MPV 10.0 8.9 - 12.9 FL    NRBC 0.0 0  WBC    ABSOLUTE NRBC 0.00 0.00 - 0.01 K/uL    NEUTROPHILS 86 (H) 32 - 75 %    BAND NEUTROPHILS 4 0 - 6 %    LYMPHOCYTES 3 (L) 12 - 49 %    MONOCYTES 6 5 - 13 %    EOSINOPHILS 1 0 - 7 %    BASOPHILS 0 0 - 1 %    IMMATURE GRANULOCYTES 0 %    ABS. NEUTROPHILS 18.8 (H) 1.8 - 8.0 K/UL    ABS. LYMPHOCYTES 0.6 (L) 0.8 - 3.5 K/UL    ABS. MONOCYTES 1.3 (H) 0.0 - 1.0 K/UL    ABS. EOSINOPHILS 0.2 0.0 - 0.4 K/UL    ABS. BASOPHILS 0.0 0.0 - 0.1 K/UL    ABS. IMM.  GRANS. 0.0 K/UL    DF MANUAL      RBC COMMENTS POLYCHROMASIA  1+           Medications:  Medications Administered     0.9% sodium chloride infusion     Admin Date  05/04/2022 Action  New Bag Dose  25 mL/hr Rate  25 mL/hr Route  IntraVENous Administered By  Allyn Ta RN          cyclophosphamide (CYTOXAN) 1,500 mg in 0.9% sodium chloride 250 mL, overfill volume 25 mL chemo infusion     Admin Date  05/04/2022 Action  New Bag Dose  1,500 mg Rate  565 mL/hr Route  IntraVENous Administered By  Allyn Ta RN          dexamethasone (DECADRON) 10 mg/mL injection 10 mg     Admin Date  05/04/2022 Action  Given Dose  10 mg Route  IntraVENous Administered By  Allyn Ta RN          DOCEtaxeL (TAXOTERE) 188 mg in 0.9% sodium chloride 250 mL, overfill volume 25 mL chemo infusion     Admin Date  05/04/2022 Action  New Bag Dose  188 mg Rate  293.8 mL/hr Route  IntraVENous Administered By  Allyn Ta RN          heparin (porcine) pf 300-500 Units     Admin Date  05/04/2022 Action  Given Dose  500 Units Route  InterCATHeter Administered By  Allyn Ta RN          palonosetron HCl (ALOXI) injection 0.25 mg     Admin Date  05/04/2022 Action  Given Dose  0.25 mg Route  IntraVENous Administered By  Allyn Ta RN          pegfilgrastim (NEULASTA) wearable SQ injector 6 mg     Admin Date  05/04/2022 Action  Given Dose  6 mg Route  SubCUTAneous Administered By  Allyn Ta RN          sodium chloride (NS) flush 10 mL     Admin Date  05/04/2022 Action  Given Dose  10 mL Route  IntraVENous Administered By  Renae Callahan, RN          sodium chloride 0.9 % bolus infusion 1,000 mL     Admin Date  05/04/2022 Action  New Bag Dose  1,000 mL Rate  1,000 mL/hr Route  IntraVENous Administered By  Jose Daniel Bah RN          sodium chloride 0.9 % bolus infusion 500 mL     Admin Date  05/04/2022 Action  New Bag Dose  500 mL Rate   Route  IntraVENous Administered By  Renae Callahan RN              Neulasta OBI attached to Upper left arm. Device enabled. Education provided to patient about Neulasta On Body Injector including: side effects, how/when to remove the device, as well as what to do in the event of device malfunction. Opportunity for questions was provided and all questions were answered. Patient verbalized understanding. Ms. Neo Brown tolerated treatment well and was discharged from [place] [id number] in stable condition at 1550. Port de-accessed, flushed & heparinized per protocol. She is to return on May 25 2022 at 1030am for her next appointment.     Sloane Soriano RN  May 4, 2022

## 2022-05-05 LAB
CALCIUM SERPL-MCNC: 9.5 MG/DL (ref 8.5–10.1)
PTH-INTACT SERPL-MCNC: 173.5 PG/ML (ref 18.4–88)

## 2022-05-06 ENCOUNTER — TELEPHONE (OUTPATIENT)
Dept: ONCOLOGY | Age: 62
End: 2022-05-06

## 2022-05-06 DIAGNOSIS — C50.312 MALIGNANT NEOPLASM OF LOWER-INNER QUADRANT OF LEFT BREAST IN FEMALE, ESTROGEN RECEPTOR NEGATIVE (HCC): ICD-10-CM

## 2022-05-06 DIAGNOSIS — R79.89 ELEVATED PTHRP LEVEL: ICD-10-CM

## 2022-05-06 DIAGNOSIS — E83.52 HYPERCALCEMIA: Primary | ICD-10-CM

## 2022-05-06 DIAGNOSIS — Z17.1 MALIGNANT NEOPLASM OF LOWER-INNER QUADRANT OF LEFT BREAST IN FEMALE, ESTROGEN RECEPTOR NEGATIVE (HCC): ICD-10-CM

## 2022-05-06 NOTE — TELEPHONE ENCOUNTER
Called to patient to discuss elevated PTH-intact and recommendation to see Endocrinology. No answer and was unable to leave voicemail. Endocrinology referral placed. Will attempt to call patient again later today. What Is The Reason For Today's Visit?: Full Body Skin Examination What Is The Reason For Today's Visit? (Being Monitored For X): concerning skin lesions on an annual basis

## 2022-05-06 NOTE — TELEPHONE ENCOUNTER
Patient left a voicemail stating that she is having her job fax over papers for extended leave. She would like for Dr. Kathy Mao to fill out paperwork and send back to her job.  Please advise

## 2022-05-08 PROBLEM — Z51.11 ENCOUNTER FOR ANTINEOPLASTIC CHEMOTHERAPY: Status: RESOLVED | Noted: 2022-04-08 | Resolved: 2022-05-08

## 2022-05-10 ENCOUNTER — TELEPHONE (OUTPATIENT)
Dept: ONCOLOGY | Age: 62
End: 2022-05-10

## 2022-05-10 NOTE — TELEPHONE ENCOUNTER
Spoke wit Dr. Abby Danielle office to schedule patients appointment. September 6th @ 1:30pm  Dr. Kalli Matos [phone] [facility]    [phone]      Spoke with patient who informed me that she will give me a call back.

## 2022-05-24 ENCOUNTER — HOSPITAL ENCOUNTER (OUTPATIENT)
Dept: NUCLEAR MEDICINE | Age: 62
Discharge: HOME | End: 2022-05-24
Attending: NURSE PRACTITIONER
Payer: MEDICAID

## 2022-05-24 ENCOUNTER — TELEPHONE (OUTPATIENT)
Dept: ONCOLOGY | Age: 62
End: 2022-05-24

## 2022-05-24 DIAGNOSIS — E83.52 HYPERCALCEMIA: ICD-10-CM

## 2022-05-24 DIAGNOSIS — C50.312 MALIGNANT NEOPLASM OF LOWER-INNER QUADRANT OF LEFT BREAST IN FEMALE, ESTROGEN RECEPTOR NEGATIVE (HCC): ICD-10-CM

## 2022-05-24 DIAGNOSIS — Z17.1 MALIGNANT NEOPLASM OF LOWER-INNER QUADRANT OF LEFT BREAST IN FEMALE, ESTROGEN RECEPTOR NEGATIVE (HCC): ICD-10-CM

## 2022-05-24 PROCEDURE — 78306 BONE IMAGING WHOLE BODY: CPT

## 2022-05-24 NOTE — TELEPHONE ENCOUNTER
[facility] [place]  [phone]    05/24/2022 at 2:48 pm--This user attempted to call patient but received no answer, it got to the voicemail part and it stated to enter the voicemail number? 05/24/2022 at 2:55 pm--This user received a phone call from patient, I let her know that her bone scan was normal. Patient verbalized understanding and did not have any question's or concerns.        ----- Message from Fredy Riddle MD sent at 5/24/2022  2:45 PM EDT -----  Please let she know this is normal

## 2022-05-25 ENCOUNTER — OFFICE VISIT (OUTPATIENT)
Dept: ONCOLOGY | Age: 62
End: 2022-05-25
Payer: MEDICAID

## 2022-05-25 ENCOUNTER — DOCUMENTATION ONLY (OUTPATIENT)
Dept: ONCOLOGY | Age: 62
End: 2022-05-25

## 2022-05-25 ENCOUNTER — HOSPITAL ENCOUNTER (OUTPATIENT)
Dept: INFUSION THERAPY | Age: 62
Discharge: HOME | End: 2022-05-25
Payer: MEDICAID

## 2022-05-25 VITALS
OXYGEN SATURATION: 95 % | HEIGHT: 62 IN | BODY MASS INDEX: 53.92 KG/M2 | SYSTOLIC BLOOD PRESSURE: 155 MMHG | TEMPERATURE: 97.3 F | WEIGHT: 293 LBS | RESPIRATION RATE: 22 BRPM | HEART RATE: 115 BPM | DIASTOLIC BLOOD PRESSURE: 67 MMHG

## 2022-05-25 VITALS
TEMPERATURE: 98.1 F | HEIGHT: 62 IN | BODY MASS INDEX: 53.92 KG/M2 | OXYGEN SATURATION: 96 % | WEIGHT: 293 LBS | DIASTOLIC BLOOD PRESSURE: 64 MMHG | HEART RATE: 101 BPM | SYSTOLIC BLOOD PRESSURE: 135 MMHG | RESPIRATION RATE: 20 BRPM

## 2022-05-25 DIAGNOSIS — Z17.1 MALIGNANT NEOPLASM OF LOWER-INNER QUADRANT OF LEFT BREAST IN FEMALE, ESTROGEN RECEPTOR NEGATIVE (HCC): Primary | ICD-10-CM

## 2022-05-25 DIAGNOSIS — Z76.89 PREVENTION OF CHEMOTHERAPY-INDUCED NEUTROPENIA: ICD-10-CM

## 2022-05-25 DIAGNOSIS — C50.312 MALIGNANT NEOPLASM OF LOWER-INNER QUADRANT OF LEFT BREAST IN FEMALE, ESTROGEN RECEPTOR NEGATIVE (HCC): ICD-10-CM

## 2022-05-25 DIAGNOSIS — Z51.11 ENCOUNTER FOR ANTINEOPLASTIC CHEMOTHERAPY: Primary | ICD-10-CM

## 2022-05-25 DIAGNOSIS — C50.312 MALIGNANT NEOPLASM OF LOWER-INNER QUADRANT OF LEFT BREAST IN FEMALE, ESTROGEN RECEPTOR NEGATIVE (HCC): Primary | ICD-10-CM

## 2022-05-25 DIAGNOSIS — Z17.1 MALIGNANT NEOPLASM OF LOWER-INNER QUADRANT OF LEFT BREAST IN FEMALE, ESTROGEN RECEPTOR NEGATIVE (HCC): ICD-10-CM

## 2022-05-25 DIAGNOSIS — G89.3 CANCER ASSOCIATED PAIN: ICD-10-CM

## 2022-05-25 DIAGNOSIS — Z51.11 ENCOUNTER FOR ANTINEOPLASTIC CHEMOTHERAPY: ICD-10-CM

## 2022-05-25 LAB
ALBUMIN SERPL-MCNC: 2.9 G/DL (ref 3.5–5)
ALBUMIN/GLOB SERPL: 0.8 {RATIO} (ref 1.1–2.2)
ALP SERPL-CCNC: 73 U/L (ref 45–117)
ALT SERPL-CCNC: 23 U/L (ref 12–78)
ANION GAP SERPL CALC-SCNC: 13 MMOL/L (ref 5–15)
AST SERPL-CCNC: 15 U/L (ref 15–37)
BASOPHILS # BLD: 0 K/UL (ref 0–0.1)
BASOPHILS NFR BLD: 0 % (ref 0–1)
BILIRUB SERPL-MCNC: 0.2 MG/DL (ref 0.2–1)
BUN SERPL-MCNC: 19 MG/DL (ref 6–20)
BUN/CREAT SERPL: 17 (ref 12–20)
CALCIUM SERPL-MCNC: 10.3 MG/DL (ref 8.5–10.1)
CHLORIDE SERPL-SCNC: 106 MMOL/L (ref 97–108)
CO2 SERPL-SCNC: 21 MMOL/L (ref 21–32)
CREAT SERPL-MCNC: 1.09 MG/DL (ref 0.55–1.02)
DIFFERENTIAL METHOD BLD: ABNORMAL
EOSINOPHIL # BLD: 0 K/UL (ref 0–0.4)
EOSINOPHIL NFR BLD: 0 % (ref 0–7)
ERYTHROCYTE [DISTWIDTH] IN BLOOD BY AUTOMATED COUNT: 19.6 % (ref 11.5–14.5)
GLOBULIN SER CALC-MCNC: 3.8 G/DL (ref 2–4)
GLUCOSE SERPL-MCNC: 177 MG/DL (ref 65–100)
HCT VFR BLD AUTO: 33.3 % (ref 35–47)
HGB BLD-MCNC: 10.1 G/DL (ref 11.5–16)
IMM GRANULOCYTES # BLD AUTO: 0.4 K/UL (ref 0–0.04)
IMM GRANULOCYTES NFR BLD AUTO: 3 % (ref 0–0.5)
LYMPHOCYTES # BLD: 1 K/UL (ref 0.8–3.5)
LYMPHOCYTES NFR BLD: 7 % (ref 12–49)
MCH RBC QN AUTO: 27.9 PG (ref 26–34)
MCHC RBC AUTO-ENTMCNC: 30.3 G/DL (ref 30–36.5)
MCV RBC AUTO: 92 FL (ref 80–99)
MONOCYTES # BLD: 0.6 K/UL (ref 0–1)
MONOCYTES NFR BLD: 4 % (ref 5–13)
NEUTS SEG # BLD: 12.3 K/UL (ref 1.8–8)
NEUTS SEG NFR BLD: 86 % (ref 32–75)
NRBC # BLD: 0 K/UL (ref 0–0.01)
NRBC BLD-RTO: 0 PER 100 WBC
PLATELET # BLD AUTO: 331 K/UL (ref 150–400)
PMV BLD AUTO: 9.3 FL (ref 8.9–12.9)
POTASSIUM SERPL-SCNC: 4.5 MMOL/L (ref 3.5–5.1)
PROT SERPL-MCNC: 6.7 G/DL (ref 6.4–8.2)
RBC # BLD AUTO: 3.62 M/UL (ref 3.8–5.2)
RBC MORPH BLD: ABNORMAL
SODIUM SERPL-SCNC: 140 MMOL/L (ref 136–145)
WBC # BLD AUTO: 14.3 K/UL (ref 3.6–11)

## 2022-05-25 PROCEDURE — 96417 CHEMO IV INFUS EACH ADDL SEQ: CPT

## 2022-05-25 PROCEDURE — 96377 APPLICATON ON-BODY INJECTOR: CPT

## 2022-05-25 PROCEDURE — 74011250636 HC RX REV CODE- 250/636: Performed by: NURSE PRACTITIONER

## 2022-05-25 PROCEDURE — 74011000250 HC RX REV CODE- 250: Performed by: INTERNAL MEDICINE

## 2022-05-25 PROCEDURE — 99215 OFFICE O/P EST HI 40 MIN: CPT | Performed by: INTERNAL MEDICINE

## 2022-05-25 PROCEDURE — 96361 HYDRATE IV INFUSION ADD-ON: CPT

## 2022-05-25 PROCEDURE — 96375 TX/PRO/DX INJ NEW DRUG ADDON: CPT

## 2022-05-25 PROCEDURE — 77030012965 HC NDL HUBR BBMI -A

## 2022-05-25 PROCEDURE — 96413 CHEMO IV INFUSION 1 HR: CPT

## 2022-05-25 PROCEDURE — 80053 COMPREHEN METABOLIC PANEL: CPT

## 2022-05-25 PROCEDURE — 74011250636 HC RX REV CODE- 250/636: Performed by: INTERNAL MEDICINE

## 2022-05-25 PROCEDURE — 36415 COLL VENOUS BLD VENIPUNCTURE: CPT

## 2022-05-25 PROCEDURE — 85025 COMPLETE CBC W/AUTO DIFF WBC: CPT

## 2022-05-25 RX ORDER — SODIUM CHLORIDE 9 MG/ML
10 INJECTION INTRAMUSCULAR; INTRAVENOUS; SUBCUTANEOUS AS NEEDED
Status: DISCONTINUED | OUTPATIENT
Start: 2022-05-25 | End: 2022-05-26 | Stop reason: HOSPADM

## 2022-05-25 RX ORDER — DEXAMETHASONE SODIUM PHOSPHATE 100 MG/10ML
10 INJECTION INTRAMUSCULAR; INTRAVENOUS ONCE
Status: COMPLETED | OUTPATIENT
Start: 2022-05-25 | End: 2022-05-25

## 2022-05-25 RX ORDER — HYDROCODONE BITARTRATE AND ACETAMINOPHEN 5; 325 MG/1; MG/1
1 TABLET ORAL
Qty: 10 TABLET | Refills: 0 | Status: SHIPPED | OUTPATIENT
Start: 2022-05-25 | End: 2022-05-28

## 2022-05-25 RX ORDER — SODIUM CHLORIDE 9 MG/ML
25 INJECTION, SOLUTION INTRAVENOUS CONTINUOUS
Status: DISCONTINUED | OUTPATIENT
Start: 2022-05-25 | End: 2022-05-26 | Stop reason: HOSPADM

## 2022-05-25 RX ORDER — HEPARIN 100 UNIT/ML
300-500 SYRINGE INTRAVENOUS AS NEEDED
Status: DISCONTINUED | OUTPATIENT
Start: 2022-05-25 | End: 2022-05-26 | Stop reason: HOSPADM

## 2022-05-25 RX ORDER — PALONOSETRON 0.05 MG/ML
0.25 INJECTION, SOLUTION INTRAVENOUS ONCE
Status: COMPLETED | OUTPATIENT
Start: 2022-05-25 | End: 2022-05-25

## 2022-05-25 RX ORDER — SODIUM CHLORIDE 0.9 % (FLUSH) 0.9 %
10 SYRINGE (ML) INJECTION AS NEEDED
Status: DISCONTINUED | OUTPATIENT
Start: 2022-05-25 | End: 2022-05-26 | Stop reason: HOSPADM

## 2022-05-25 RX ADMIN — SODIUM CHLORIDE, PRESERVATIVE FREE 10 ML: 5 INJECTION INTRAVENOUS at 15:25

## 2022-05-25 RX ADMIN — SODIUM CHLORIDE 500 ML: 9 INJECTION, SOLUTION INTRAVENOUS at 12:49

## 2022-05-25 RX ADMIN — PEGFILGRASTIM 6 MG: KIT SUBCUTANEOUS at 15:23

## 2022-05-25 RX ADMIN — DOCETAXEL ANHYDROUS 188 MG: 10 INJECTION, SOLUTION INTRAVENOUS at 13:40

## 2022-05-25 RX ADMIN — PALONOSETRON 0.25 MG: 0.05 INJECTION, SOLUTION INTRAVENOUS at 12:41

## 2022-05-25 RX ADMIN — SODIUM CHLORIDE 25 ML/HR: 9 INJECTION, SOLUTION INTRAVENOUS at 12:38

## 2022-05-25 RX ADMIN — CYCLOPHOSPHAMIDE 1500 MG: 200 INJECTION, SOLUTION INTRAVENOUS at 14:48

## 2022-05-25 RX ADMIN — DEXAMETHASONE SODIUM PHOSPHATE 10 MG: 10 INJECTION INTRAMUSCULAR; INTRAVENOUS at 12:43

## 2022-05-25 RX ADMIN — Medication 500 UNITS: at 15:25

## 2022-05-25 NOTE — PROGRESS NOTES
[facility] at [address], [address] [facility]  [place]: [phone]  F: [phone]      Reason for Visit:   Kirsty Ontiveros is a 65 y.o. female who is seen in follow up for breast cancer    Breast Surgeon: Dr. 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 Reports gr 1 constipation, gr 2 fatigue, gr 2 hair loss, gr 1 chills, gr 1 hot flashes, gr 2 insomnia, gr 1 anxiety/depression, gr 1 pain/cramping to lower back rated 5/10, gr 1 dyspnea, gr 1 tachycardia, gr 1 neuropathy, gr 2 edema, gr 1 urinary frequency, gr 1 urinary leakage.      LMP: 22    FH:  Maternal aunt  of breast cancer in her 62s; maternal aunt breast cancer in her 76s; maternal great aunt  from breast cancer at age 36; great aunt's daughter also  of breast cancer at age 36; no ovarian, prostate, pancreas cancer    Past Medical History:   Diagnosis Date    Arrhythmia     heart murmur    Arthritis     Right knee, DJD left knee    Asthma     Cancer ([address]     COVID-19 2021    Diabetes ([address]     Pre diabetes    Hypertension     Morbid obesity ([address]     Neuropathy     Patient reports numbness and tingling in her legs and feet from her back    Sleep apnea     was ordered a CPAP years ago, but never used      Past Surgical History: Procedure Laterality Date    HX BREAST LUMPECTOMY Left 3/2/2022    LEFT BREAST LUMPECTOMY WITH LEFT BREAST SENTINEL NODE BIOPSY WITH BLUE DYE AND PORT A CATH INSERTION (URGENT) performed by Arina Julian MD at [address] HX CHOLECYSTECTOMY      HX GYN      c section    HX GYN      d&c    HX WISDOM TEETH EXTRACTION Bilateral       Social History     Tobacco Use    Smoking status: Never Smoker    Smokeless tobacco: Never Used   Substance Use Topics    Alcohol use: Yes     Comment: social once per month      Family History   Problem Relation Age of Onset    Hypertension Mother     Heart Disease Mother     Heart Disease Father     Hypertension Father     Breast Cancer Maternal Aunt     Breast Cancer Other     Breast Cancer Maternal Aunt      Current Outpatient Medications   Medication Sig    HYDROcodone-acetaminophen (NORCO) 5-325 mg per tablet Take 1 Tablet by mouth every six (6) hours as needed for Pain for up to 3 days. Max Daily Amount: 4 Tablets.  dexAMETHasone (DECADRON) 4 mg tablet Take 8mg (2 tablets) by mouth twice daily on the day before and day after chemotherapy.  lisinopril-hydroCHLOROthiazide (PRINZIDE, ZESTORETIC) 20-12.5 mg per tablet Take 1 Tablet by mouth daily.  lidocaine-prilocaine (EMLA) topical cream Apply thin layer to port 30-60 minutes prior to infusion    ondansetron hcl (ZOFRAN) 8 mg tablet Take 1 Tablet by mouth every eight (8) hours as needed for Nausea or Vomiting.  prochlorperazine (Compazine) 10 mg tablet Take 1 Tablet by mouth every six (6) hours as needed for Nausea or Vomiting.  albuterol-ipratropium (DUO-NEB) 2.5 mg-0.5 mg/3 ml nebu 3 mL by Nebulization route every six (6) hours as needed for Wheezing.  albuterol (PROVENTIL HFA, VENTOLIN HFA, PROAIR HFA) 90 mcg/actuation inhaler Take  by inhalation every six (6) hours as needed for Wheezing.  metFORMIN (GLUMETZA ER) 500 mg TG24 24 hour tablet Take 1 Tablet by mouth every morning.  fluticasone-salmeterol (ADVAIR DISKUS) 100-50 mcg/dose diskus inhaler Take 1 Puff by inhalation every twelve (12) hours.  amLODIPine (NORVASC) 10 mg tablet Take 10 mg by mouth every morning. No current facility-administered medications for this visit. Allergies   Allergen Reactions    Nuts [Tree Nut] Rash and Itching     Throat itching        Review of Systems: A complete review of systems was obtained, negative except as described above. Physical Exam:     Visit Vitals  BP (!) 155/67   Pulse (!) 115   Temp 97.3 °F (36.3 °C) (Temporal)   Resp 22   Ht 5' 2\" (1.575 m)   Wt 323 lb (146.5 kg)   LMP 01/28/2021 (Approximate)   SpO2 95%   BMI 59.08 kg/m²     ECOG PS: 0    Constitutional: Appears well-developed and well-nourished in no apparent distress      Mental status: Alert and awake, Oriented to person/place/time, Able to follow commands    Eyes: EOM normal, Sclera normal, No visible ocular discharge    HENT: Normocephalic, atraumatic    Mouth/Throat: Moist mucous membranes    External Ears normal    Neck: No visualized mass    Pulmonary/Chest: Respiratory effort normal, No visualized signs of difficulty breathing or respiratory distress, diminished breath sounds, 3+ bilateral LE edmea    Musculoskeletal: Normal gait with no signs of ataxia, Normal range of motion of neck    Neurological: No facial asymmetry (Cranial nerve 7 motor function), No gaze palsy    Skin: No significant exanthematous lesions or discoloration noted on facial skin    Psychiatric: Normal affect, No hallucinations       Results:     Lab Results   Component Value Date/Time    WBC 20.9 (H) 05/04/2022 09:25 AM    HGB 11.1 (L) 05/04/2022 09:25 AM    HCT 36.9 05/04/2022 09:25 AM    PLATELET 157 68/04/9770 09:25 AM    MCV 91.3 05/04/2022 09:25 AM    ABS.  NEUTROPHILS 18.8 (H) 05/04/2022 09:25 AM     Lab Results   Component Value Date/Time    Sodium 139 05/04/2022 09:25 AM    Potassium 4.4 05/04/2022 09:25 AM    Chloride 107 05/04/2022 09:25 AM    CO2 24 05/04/2022 09:25 AM    Glucose 184 (H) 05/04/2022 09:25 AM    BUN 12 05/04/2022 09:25 AM    Creatinine 1.03 (H) 05/04/2022 09:25 AM    GFR est AA >60 05/04/2022 09:25 AM    GFR est non-AA 55 (L) 05/04/2022 09:25 AM    Calcium 9.5 05/04/2022 03:42 PM    Glucose (POC) 121 (H) 03/02/2022 10:23 AM     Lab Results   Component Value Date/Time    Bilirubin, total 0.8 05/04/2022 09:25 AM    ALT (SGPT) 25 05/04/2022 09:25 AM    Alk. phosphatase 89 05/04/2022 09:25 AM    Protein, total 7.3 05/04/2022 09:25 AM    Albumin 3.0 (L) 05/04/2022 09:25 AM    Globulin 4.3 (H) 05/04/2022 09:25 AM         1/13/22 breast US bilateral; L mammogram  L breast mass 8:00, 7 cmfn, 1.3 cm mass  Bilateral LN seen, appear benign    Records reviewed and summarized above. Pathology report(s) reviewed above. Radiology report(s) reviewed above. Assessment/plan:   1. 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 We explained to the patient that the goal of systemic adjuvant therapy is to improve the chances for cure and decrease the risk of relapse. We explained why a patient can have microscopic cancer spread now even though physical examination, laboratory studies and imaging studies are negative for cancer. We explained that the same treatments used now as adjuvant or preventive treatments rarely if ever are curative in women who develop metastases. Port placed by Dr. Caleb Palomares 3/2/22. We discussed the toxicities of TC chemotherapy (docetaxel 75mg/m2/cyclophosphamide 600 mg/m2 q 3 weeks x 4)  in detail.  This chemotherapy frequently causes a low white blood cell count and a small percent of patients require hospital admission for treatment of neutropenic fever. We explained that on occasion we consider the use of growth factors to minimize this risk. We explained to the patient that some side effects, if they occur, only last a few days including nausea, vomiting, stomatitis, arthralgia, myalgia, and allergic reactions to Taxotere. We told the patient that severe nausea and vomiting were very uncommon and that some side effects, if they occur, will last longer: this includes hair loss, which will be seen in all patients treated with these agents and fatigue, which will be seen in most. The patient was also told that if she is having menstrual function that she could develop chemotherapy-induced amenorrhea and infertility. We also told the patient that there was a very small risk of acute leukemia. We also informed the patient that heart damage is rare with these agents    She is agreeable to this and signed informed consent. She presents today for C3 TC. She tolerated cycle 2 with improved bone pain, swelling to LE stable. Labs reviewed, will proceed with treatment today as planned. Cold caps discussed, not interested    Discussed risks of COVID19 and precautions to take. Discussed oral and peripheral cryotherapy; she may be a candidate for neuropathy study, she declined    The patient was given presciptions for a wig, emla cream, decadron to take 8 mg bid the day before and day after chemo, zofran and compazine. Neulasta the following day. We will plan to see the patient in follow up at least once per cycle, or sooner if symptoms warrant. Labs with each cycle for intensive monitoring for toxicity      2. Emotional well being:  She has excellent support and is coping well with her disease. She wishes to see LCSW to discuss living will.      3. Genetic testing:   discussed potential ramifications of a positive test including the potential need for bilateral mastectomies and bilateral oophorectomies and the risk then for her family members to also have a mutation. VUS also discussed. She has the kit from Dr. Isatu Hope, but has not yet done the testing. She would prefer for lab draw, drawn 4/15/22 negative. 4. DM2:  On metformin. Discussed increased blood glucose with dexamethasone. Her fasting BG has ranged from 125 - 132. Referred to Sunil Dominguez pharmD - she states she is not following up with her. 5. 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 Will get PET scan after treatment. 6. Back pain: radiates to left leg with some numbness. Started 2/2022, improved with tylenol. Discussed weight loss. Worsened 4 days after chemo and improved with Norco. Lower back pain now back to baseline. 7. Hypercalcemia:   PTHrp <2. PTH, intact 173.5 on 5/4/22 suggestive of primary hyperparathyroidism. Referred to Endocrinology and has appt scheduled 9/6/22, will attempt to get sooner appointment. Bone scan negative 5/25/22. 8. Rectal Irritation: denies diarrhea, improved with barrier cream and wet wipes    9. Lower extremity edema: most likely due to taxotere; will give 20 mg of lasix to take tomorrow    10. Increased cr: Will give IV fluids PRN in OPIC    11. Bone pain with neulasta: Will have her take 4 mg dex sat night and 4 mg Sunday morning and 4 mg Sunday night; also has norco 5-325 mg q6h prn pain, #10 given, no refills,  reviewed. This regimen greatly improved tolerability. I personally saw and evaluated the patient and performed the entire medical decision making. The history, physical exam, and documentation were performed by Leonette Blizzard, NP. I reviewed and verified the above documentation and modified it as needed. Genetic testing negative, reviewed with pt. Left breast TN cancer, on TC #3 today, ok to proceed, labs reviewed. Having severe bone pain with neulasta, refilled norco, #10 given. RTC 3 weeks. Will order PET at next visit.   IVF in opic for increased cr. Referred to endocrine for increased calcium. Bone scan negative    I appreciate the opportunity to participate in Ms. Merlin Wan's care. Signed By: Apryl Parra MD      No orders of the defined types were placed in this encounter.

## 2022-05-25 NOTE — PROGRESS NOTES
Rogelio Bass is a 65 y.o. female Follow up for the evaluation of breast cancer. 1. Have you been to the ER, urgent care clinic since your last visit? Hospitalized since your last visit? No    2. Have you seen or consulted any other health care providers outside of the [address] [place] [address] since your last visit? Include any pap smears or colon screening.  No

## 2022-05-25 NOTE — PROGRESS NOTES
[facility]  Oncology Social Work  Encounter     Date of ACP Conversation: 5/25/2022     ACP conversation length (minutes):  30 minutes     Patient has prior advance directive? Today, patient completed his AMD.     Today, patient named (two) health care agents:    Primary Decision Maker ([address]): Tristen Ambrose  Relationship to patient: daughter  Phone number: [phone]  [x] Named in a scanned document   [] Legal Next of Kin  [] Guardian     Secondary Decision Maker ([address]):  Wayne Mcleanuzair   Relationship to patient:  cousin  Phone number: [phone]  [x] Named in a scanned document   [] Legal Next of Kin  [] Guardian     ACP documents you current have include:  [x] Advance Directive or Living Will  [] Durable Do Not Resuscitate  [] Physician Orders for Scope of Treatment (POST)  [x] Medical Power of   [] Other    Patient decided in the event death is imminent and medical treatment will not help with recovery, then she does not want treatments to prolong life but wants to be made comfortable. Patient also decided in the event he is unaware of his, surroundings, or others and it is reasonably certain awareness will never be recovered, then she would like to try treatments to prolong life for a period of 7 days in the hope of improvement of her condition. Original AMD provided to patient. Copies of AMD provided for healthcare agents and scanned into [employer].

## 2022-05-25 NOTE — PROGRESS NOTES
[facility] VISIT NOTE  Date: May 25, 2022    Name: Beck Hall    MRN: [id number]         : 1960    Ms. Merly Bowles Arrived ambulatory and in no distress for C3D1 of Taxotere+ Cytoxan+ Neulasta Regimen. Assessment was completed by Macarena Eugene RN, no acute issues at this time, no new complaints voiced. Chest wall port accessed by assessment nurse without difficulty, labs drawn & sent for processing. 1. Do you have any symptoms of COVID-19? SOB, coughing, fever, or generally not feeling well NO    2. Have you been exposed to COVID-19 recently? NO    3. Have you had any recent contact with family/friend that has a pending COVID test? NO       Chemotherapy Flowsheet 2022   Cycle C3D1   Date 2022   Drug / Regimen TC   Pre Hydration 500 cc Bolus   Pre Meds given   Notes given+ Neulasta OBI           Ms. Wan's vitals were reviewed. Patient Vitals for the past 12 hrs:   Temp Pulse Resp BP SpO2   22 1519 98.1 °F (36.7 °C) (!) 101 20 135/64 96 %   22 1043 97.3 °F (36.3 °C) (!) 115 22 (!) 155/67 95 %         Lab results were obtained and reviewed. Recent Results (from the past 12 hour(s))   CBC WITH AUTOMATED DIFF    Collection Time: 22 10:45 AM   Result Value Ref Range    WBC 14.3 (H) 3.6 - 11.0 K/uL    RBC 3.62 (L) 3.80 - 5.20 M/uL    HGB 10.1 (L) 11.5 - 16.0 g/dL    HCT 33.3 (L) 35.0 - 47.0 %    MCV 92.0 80.0 - 99.0 FL    MCH 27.9 26.0 - 34.0 PG    MCHC 30.3 30.0 - 36.5 g/dL    RDW 19.6 (H) 11.5 - 14.5 %    PLATELET 225 978 - 941 K/uL    MPV 9.3 8.9 - 12.9 FL    NRBC 0.0 0  WBC    ABSOLUTE NRBC 0.00 0.00 - 0.01 K/uL    NEUTROPHILS 86 (H) 32 - 75 %    LYMPHOCYTES 7 (L) 12 - 49 %    MONOCYTES 4 (L) 5 - 13 %    EOSINOPHILS 0 0 - 7 %    BASOPHILS 0 0 - 1 %    IMMATURE GRANULOCYTES 3 (H) 0.0 - 0.5 %    ABS. NEUTROPHILS 12.3 (H) 1.8 - 8.0 K/UL    ABS. LYMPHOCYTES 1.0 0.8 - 3.5 K/UL    ABS. MONOCYTES 0.6 0.0 - 1.0 K/UL    ABS. EOSINOPHILS 0.0 0.0 - 0.4 K/UL    ABS. BASOPHILS 0.0 0.0 - 0.1 K/UL    ABS. IMM. GRANS. 0.4 (H) 0.00 - 0.04 K/UL    DF SMEAR SCANNED      RBC COMMENTS NORMOCYTIC, NORMOCHROMIC     METABOLIC PANEL, COMPREHENSIVE    Collection Time: 05/25/22 10:45 AM   Result Value Ref Range    Sodium 140 136 - 145 mmol/L    Potassium 4.5 3.5 - 5.1 mmol/L    Chloride 106 97 - 108 mmol/L    CO2 21 21 - 32 mmol/L    Anion gap 13 5 - 15 mmol/L    Glucose 177 (H) 65 - 100 mg/dL    BUN 19 6 - 20 MG/DL    Creatinine 1.09 (H) 0.55 - 1.02 MG/DL    BUN/Creatinine ratio 17 12 - 20      GFR est AA >60 >60 ml/min/1.73m2    GFR est non-AA 51 (L) >60 ml/min/1.73m2    Calcium 10.3 (H) 8.5 - 10.1 MG/DL    Bilirubin, total 0.2 0.2 - 1.0 MG/DL    ALT (SGPT) 23 12 - 78 U/L    AST (SGOT) 15 15 - 37 U/L    Alk.  phosphatase 73 45 - 117 U/L    Protein, total 6.7 6.4 - 8.2 g/dL    Albumin 2.9 (L) 3.5 - 5.0 g/dL    Globulin 3.8 2.0 - 4.0 g/dL    A-G Ratio 0.8 (L) 1.1 - 2.2         Medications received:  Medications Administered     0.9% sodium chloride infusion     Admin Date  05/25/2022 Action  New Bag Dose  25 mL/hr Rate  25 mL/hr Route  IntraVENous Administered By  Gris Wolff RN          0.9% sodium chloride injection 10 mL     Admin Date  05/25/2022 Action  Given Dose  10 mL Route  IntraVENous Administered By  Gris Wolff RN          cyclophosphamide (CYTOXAN) 1,500 mg in 0.9% sodium chloride 250 mL, overfill volume 25 mL chemo infusion     Admin Date  05/25/2022 Action  New Bag Dose  1,500 mg Rate  565 mL/hr Route  IntraVENous Administered By  Gris Wolff RN          dexamethasone (DECADRON) 10 mg/mL injection 10 mg     Admin Date  05/25/2022 Action  Given Dose  10 mg Route  IntraVENous Administered By  Gris Wolff RN          DOCEtaxeL (TAXOTERE) 188 mg in 0.9% sodium chloride 250 mL, overfill volume 25 mL chemo infusion     Admin Date  05/25/2022 Action  New Bag Dose  188 mg Rate  293.8 mL/hr Route  IntraVENous Administered By  Gris Wolff, CHRISTIAN          heparin (porcine) pf 300-500 Units Admin Date  05/25/2022 Action  Given Dose  500 Units Route  InterCATHeter Administered By  Corey Marquis RN          palonosetron HCl (ALOXI) injection 0.25 mg     Admin Date  05/25/2022 Action  Given Dose  0.25 mg Route  IntraVENous Administered By  Corey Marquis RN          pegfilgrastim (NEULASTA) wearable SQ injector 6 mg     Admin Date  05/25/2022 Action  Given Dose  6 mg Route  SubCUTAneous Administered By  Corey Marquis RN          sodium chloride 0.9 % bolus infusion 500 mL     Admin Date  05/25/2022 Action  New Bag Dose  500 mL Route  IntraVENous Administered By  Corey Marquis RN               Education provided to patient about Neulasta On Body Injector including: side effects, how/when to remove the device, as well as what to do in the event of device malfunction. Opportunity for questions was provided and all questions were answered. Patient verbalized understanding. Ms. Jeanine Holguin tolerated treatment well and was discharged from [place] [address] in stable condition at [phone]. Port de-accessed, flushed & heparinized per protocol. She is to return on  Brittani 15, 2022 at 0930 for her next appointment.     Kaylyn Platt RN  May 25, 2022    Future Appointments:  Future Appointments   Date Time Provider Moraima [place]   6/15/2022  9:30 AM [facility] [place]   6/15/2022 10:15 AM Gaby Gillis NP [facility]   9/6/2022  1:30 PM Majo Garcia MD RDE [facility] BS AMB

## 2022-06-13 ENCOUNTER — TELEPHONE (OUTPATIENT)
Dept: INTERNAL MEDICINE CLINIC | Age: 62
End: 2022-06-13

## 2022-06-13 NOTE — TELEPHONE ENCOUNTER
Called patient to check on blood sugars. Not able to leave voicemail. Will try again later.      Donald Masters, BraydenD, BCPS, CDCES

## 2022-06-15 ENCOUNTER — HOSPITAL ENCOUNTER (OUTPATIENT)
Dept: INFUSION THERAPY | Age: 62
Discharge: HOME | End: 2022-06-15
Payer: MEDICAID

## 2022-06-15 ENCOUNTER — DOCUMENTATION ONLY (OUTPATIENT)
Dept: ONCOLOGY | Age: 62
End: 2022-06-15

## 2022-06-15 ENCOUNTER — OFFICE VISIT (OUTPATIENT)
Dept: ONCOLOGY | Age: 62
End: 2022-06-15
Payer: MEDICAID

## 2022-06-15 VITALS
TEMPERATURE: 98.9 F | HEART RATE: 83 BPM | BODY MASS INDEX: 53.92 KG/M2 | WEIGHT: 293 LBS | RESPIRATION RATE: 22 BRPM | HEIGHT: 62 IN | OXYGEN SATURATION: 95 % | DIASTOLIC BLOOD PRESSURE: 42 MMHG | SYSTOLIC BLOOD PRESSURE: 110 MMHG

## 2022-06-15 VITALS
DIASTOLIC BLOOD PRESSURE: 65 MMHG | TEMPERATURE: 98.9 F | RESPIRATION RATE: 22 BRPM | WEIGHT: 293 LBS | BODY MASS INDEX: 53.92 KG/M2 | HEART RATE: 90 BPM | OXYGEN SATURATION: 95 % | SYSTOLIC BLOOD PRESSURE: 148 MMHG | HEIGHT: 62 IN

## 2022-06-15 DIAGNOSIS — Z17.1 MALIGNANT NEOPLASM OF LOWER-INNER QUADRANT OF LEFT BREAST IN FEMALE, ESTROGEN RECEPTOR NEGATIVE (HCC): Primary | ICD-10-CM

## 2022-06-15 DIAGNOSIS — K62.89 RECTAL PAIN: ICD-10-CM

## 2022-06-15 DIAGNOSIS — Z76.89 PREVENTION OF CHEMOTHERAPY-INDUCED NEUTROPENIA: ICD-10-CM

## 2022-06-15 DIAGNOSIS — C50.312 MALIGNANT NEOPLASM OF LOWER-INNER QUADRANT OF LEFT BREAST IN FEMALE, ESTROGEN RECEPTOR NEGATIVE (HCC): Primary | ICD-10-CM

## 2022-06-15 DIAGNOSIS — D47.Z2 CASTLEMAN'S DISEASE (HCC): ICD-10-CM

## 2022-06-15 DIAGNOSIS — Z51.11 ENCOUNTER FOR ANTINEOPLASTIC CHEMOTHERAPY: ICD-10-CM

## 2022-06-15 DIAGNOSIS — C50.312 MALIGNANT NEOPLASM OF LOWER-INNER QUADRANT OF LEFT BREAST IN FEMALE, ESTROGEN RECEPTOR NEGATIVE (HCC): ICD-10-CM

## 2022-06-15 DIAGNOSIS — Z17.1 MALIGNANT NEOPLASM OF LOWER-INNER QUADRANT OF LEFT BREAST IN FEMALE, ESTROGEN RECEPTOR NEGATIVE (HCC): ICD-10-CM

## 2022-06-15 DIAGNOSIS — R60.0 BILATERAL LOWER EXTREMITY EDEMA: ICD-10-CM

## 2022-06-15 DIAGNOSIS — Z51.11 ENCOUNTER FOR ANTINEOPLASTIC CHEMOTHERAPY: Primary | ICD-10-CM

## 2022-06-15 LAB
ALBUMIN SERPL-MCNC: 2.9 G/DL (ref 3.5–5)
ALBUMIN/GLOB SERPL: 0.8 {RATIO} (ref 1.1–2.2)
ALP SERPL-CCNC: 66 U/L (ref 45–117)
ALT SERPL-CCNC: 30 U/L (ref 12–78)
ANION GAP SERPL CALC-SCNC: 9 MMOL/L (ref 5–15)
AST SERPL-CCNC: 15 U/L (ref 15–37)
BASOPHILS # BLD: 0 K/UL (ref 0–0.1)
BASOPHILS NFR BLD: 0 % (ref 0–1)
BILIRUB SERPL-MCNC: 0.2 MG/DL (ref 0.2–1)
BUN SERPL-MCNC: 28 MG/DL (ref 6–20)
BUN/CREAT SERPL: 30 (ref 12–20)
CALCIUM SERPL-MCNC: 10.4 MG/DL (ref 8.5–10.1)
CHLORIDE SERPL-SCNC: 110 MMOL/L (ref 97–108)
CO2 SERPL-SCNC: 22 MMOL/L (ref 21–32)
CREAT SERPL-MCNC: 0.92 MG/DL (ref 0.55–1.02)
DIFFERENTIAL METHOD BLD: ABNORMAL
EOSINOPHIL # BLD: 0 K/UL (ref 0–0.4)
EOSINOPHIL NFR BLD: 0 % (ref 0–7)
ERYTHROCYTE [DISTWIDTH] IN BLOOD BY AUTOMATED COUNT: 22.2 % (ref 11.5–14.5)
GLOBULIN SER CALC-MCNC: 3.8 G/DL (ref 2–4)
GLUCOSE SERPL-MCNC: 120 MG/DL (ref 65–100)
HCT VFR BLD AUTO: 33.5 % (ref 35–47)
HGB BLD-MCNC: 10.1 G/DL (ref 11.5–16)
IMM GRANULOCYTES # BLD AUTO: 0.4 K/UL (ref 0–0.04)
IMM GRANULOCYTES NFR BLD AUTO: 2 % (ref 0–0.5)
LYMPHOCYTES # BLD: 0.9 K/UL (ref 0.8–3.5)
LYMPHOCYTES NFR BLD: 5 % (ref 12–49)
MCH RBC QN AUTO: 28.3 PG (ref 26–34)
MCHC RBC AUTO-ENTMCNC: 30.1 G/DL (ref 30–36.5)
MCV RBC AUTO: 93.8 FL (ref 80–99)
MONOCYTES # BLD: 2.1 K/UL (ref 0–1)
MONOCYTES NFR BLD: 12 % (ref 5–13)
NEUTS SEG # BLD: 14.4 K/UL (ref 1.8–8)
NEUTS SEG NFR BLD: 81 % (ref 32–75)
NRBC # BLD: 0.02 K/UL (ref 0–0.01)
NRBC BLD-RTO: 0.1 PER 100 WBC
PLATELET # BLD AUTO: 304 K/UL (ref 150–400)
PMV BLD AUTO: 9 FL (ref 8.9–12.9)
POTASSIUM SERPL-SCNC: 4.2 MMOL/L (ref 3.5–5.1)
PROT SERPL-MCNC: 6.7 G/DL (ref 6.4–8.2)
RBC # BLD AUTO: 3.57 M/UL (ref 3.8–5.2)
RBC MORPH BLD: ABNORMAL
SODIUM SERPL-SCNC: 141 MMOL/L (ref 136–145)
WBC # BLD AUTO: 17.8 K/UL (ref 3.6–11)

## 2022-06-15 PROCEDURE — 96377 APPLICATON ON-BODY INJECTOR: CPT

## 2022-06-15 PROCEDURE — 96375 TX/PRO/DX INJ NEW DRUG ADDON: CPT

## 2022-06-15 PROCEDURE — 96417 CHEMO IV INFUS EACH ADDL SEQ: CPT

## 2022-06-15 PROCEDURE — 74011250636 HC RX REV CODE- 250/636: Performed by: NURSE PRACTITIONER

## 2022-06-15 PROCEDURE — 96413 CHEMO IV INFUSION 1 HR: CPT

## 2022-06-15 PROCEDURE — 96361 HYDRATE IV INFUSION ADD-ON: CPT

## 2022-06-15 PROCEDURE — 99215 OFFICE O/P EST HI 40 MIN: CPT | Performed by: INTERNAL MEDICINE

## 2022-06-15 PROCEDURE — 74011000250 HC RX REV CODE- 250: Performed by: INTERNAL MEDICINE

## 2022-06-15 PROCEDURE — 74011250636 HC RX REV CODE- 250/636: Performed by: INTERNAL MEDICINE

## 2022-06-15 PROCEDURE — 77030012965 HC NDL HUBR BBMI -A

## 2022-06-15 PROCEDURE — 85025 COMPLETE CBC W/AUTO DIFF WBC: CPT

## 2022-06-15 PROCEDURE — 36415 COLL VENOUS BLD VENIPUNCTURE: CPT

## 2022-06-15 PROCEDURE — 80053 COMPREHEN METABOLIC PANEL: CPT

## 2022-06-15 RX ORDER — SODIUM CHLORIDE 0.9 % (FLUSH) 0.9 %
10 SYRINGE (ML) INJECTION AS NEEDED
Status: DISPENSED | OUTPATIENT
Start: 2022-06-15 | End: 2022-06-15

## 2022-06-15 RX ORDER — DEXAMETHASONE SODIUM PHOSPHATE 100 MG/10ML
10 INJECTION INTRAMUSCULAR; INTRAVENOUS ONCE
Status: COMPLETED | OUTPATIENT
Start: 2022-06-15 | End: 2022-06-15

## 2022-06-15 RX ORDER — SODIUM CHLORIDE 9 MG/ML
25 INJECTION, SOLUTION INTRAVENOUS CONTINUOUS
Status: DISPENSED | OUTPATIENT
Start: 2022-06-15 | End: 2022-06-15

## 2022-06-15 RX ORDER — HYDROCORTISONE 10 MG/G
CREAM TOPICAL
Qty: 30 G | Refills: 1 | Status: SHIPPED | OUTPATIENT
Start: 2022-06-15 | End: 2022-06-22

## 2022-06-15 RX ORDER — HEPARIN 100 UNIT/ML
300-500 SYRINGE INTRAVENOUS AS NEEDED
Status: ACTIVE | OUTPATIENT
Start: 2022-06-15 | End: 2022-06-15

## 2022-06-15 RX ORDER — SODIUM CHLORIDE 9 MG/ML
10 INJECTION INTRAMUSCULAR; INTRAVENOUS; SUBCUTANEOUS AS NEEDED
Status: ACTIVE | OUTPATIENT
Start: 2022-06-15 | End: 2022-06-15

## 2022-06-15 RX ORDER — PALONOSETRON 0.05 MG/ML
0.25 INJECTION, SOLUTION INTRAVENOUS ONCE
Status: COMPLETED | OUTPATIENT
Start: 2022-06-15 | End: 2022-06-15

## 2022-06-15 RX ORDER — FUROSEMIDE 20 MG/1
20 TABLET ORAL DAILY
Qty: 2 TABLET | Refills: 0 | Status: SHIPPED | OUTPATIENT
Start: 2022-06-15 | End: 2022-06-17

## 2022-06-15 RX ADMIN — SODIUM CHLORIDE 25 ML/HR: 9 INJECTION, SOLUTION INTRAVENOUS at 12:26

## 2022-06-15 RX ADMIN — DOCETAXEL ANHYDROUS 188 MG: 10 INJECTION, SOLUTION INTRAVENOUS at 13:22

## 2022-06-15 RX ADMIN — DEXAMETHASONE SODIUM PHOSPHATE 10 MG: 10 INJECTION INTRAMUSCULAR; INTRAVENOUS at 12:28

## 2022-06-15 RX ADMIN — SODIUM CHLORIDE, PRESERVATIVE FREE 10 ML: 5 INJECTION INTRAVENOUS at 15:23

## 2022-06-15 RX ADMIN — PEGFILGRASTIM 6 MG: KIT SUBCUTANEOUS at 15:20

## 2022-06-15 RX ADMIN — PALONOSETRON 0.25 MG: 0.25 INJECTION, SOLUTION INTRAVENOUS at 12:30

## 2022-06-15 RX ADMIN — Medication 500 UNITS: at 15:23

## 2022-06-15 RX ADMIN — SODIUM CHLORIDE 500 ML: 9 INJECTION, SOLUTION INTRAVENOUS at 12:33

## 2022-06-15 RX ADMIN — CYCLOPHOSPHAMIDE 1500 MG: 200 INJECTION, SOLUTION INTRAVENOUS at 14:33

## 2022-06-15 NOTE — PROGRESS NOTES
[facility] - Chemotherapy Progress Note    4809 Pt admit to [facility] for C4D1 TC in stable condition. Assessment completed. PAC with 1\" lang with positive blood return. Labs were drawn and sent for processing. Pt proceeded to scheduled appt. Pt reported diarrhea and rectal pain with some blood in stool. Pt states she notified provider.      Chemotherapy Flowsheet 6/15/2022   Cycle C4D1   Date 6/15/2022   Drug / Regimen TC   Pre Hydration -   Pre Meds -   Notes -         VS  Patient Vitals for the past 12 hrs:   Temp Pulse Resp BP SpO2   06/15/22 1515 -- 83 -- (!) 110/42 --   06/15/22 0937 98.9 °F (37.2 °C) 90 22 (!) 148/65 95 %         Medications:  Medications Administered     0.9% sodium chloride infusion     Admin Date  06/15/2022 Action  New Bag Dose  25 mL/hr Rate  25 mL/hr Route  IntraVENous Administered By  Kesha Forte RN          0.9% sodium chloride injection 10 mL     Admin Date  06/15/2022 Action  Given Dose  10 mL Route  IntraVENous Administered By  Kesha Forte RN          cyclophosphamide (CYTOXAN) 1,500 mg in 0.9% sodium chloride 250 mL, overfill volume 25 mL chemo infusion     Admin Date  06/15/2022 Action  New Bag Dose  1,500 mg Rate  565 mL/hr Route  IntraVENous Administered By  Kesha Forte RN          dexamethasone (DECADRON) 10 mg/mL injection 10 mg     Admin Date  06/15/2022 Action  Given Dose  10 mg Route  IntraVENous Administered By  Kesha Forte RN          DOCEtaxeL (TAXOTERE) 188 mg in 0.9% sodium chloride 250 mL, overfill volume 25 mL chemo infusion     Admin Date  06/15/2022 Action  New Bag Dose  188 mg Rate  293.8 mL/hr Route  IntraVENous Administered By  Kesha Forte RN          heparin (porcine) pf 300-500 Units     Admin Date  06/15/2022 Action  Given Dose  500 Units Route  InterCATHeter Administered By  Kesha Forte RN          palonosetron HCl (ALOXI) injection 0.25 mg     Admin Date  06/15/2022 Action  Given Dose  0.25 mg Route  IntraVENous Administered By  Leticia Meza RN          pegfilgrastim (NEULASTA) wearable SQ injector 6 mg     Admin Date  06/15/2022 Action  Given Dose  6 mg Route  SubCUTAneous Administered By  Leticia Meza RN          sodium chloride 0.9 % bolus infusion 500 mL     Admin Date  06/15/2022 Action  New Bag Dose  500 mL Route  IntraVENous Administered By  Leticia Meza RN              Education provided to patient about Neulasta On Body Injector including: side effects, how/when to remove the device, as well as what to do in the event of device malfunction. Opportunity for questions was provided and all questions were answered. Patient verbalized understanding. Two nurses verified prior to administering:  drug name, drug dose, infusion volume or drug volume when prepared in a syringe, rate of administration, route of administration, expiration dates and/or times, appearance and physical integrity of the drugs, rate set on infusion pump, when used, sequencing of drug administration    1525 Pt tolerated treatment well. PAC maintained positive blood return throughout treatment, flushed with positive blood return at conclusion. D/c home in no distress.      Labs  Recent Results (from the past 12 hour(s))   CBC WITH AUTOMATED DIFF    Collection Time: 06/15/22  9:39 AM   Result Value Ref Range    WBC 17.8 (H) 3.6 - 11.0 K/uL    RBC 3.57 (L) 3.80 - 5.20 M/uL    HGB 10.1 (L) 11.5 - 16.0 g/dL    HCT 33.5 (L) 35.0 - 47.0 %    MCV 93.8 80.0 - 99.0 FL    MCH 28.3 26.0 - 34.0 PG    MCHC 30.1 30.0 - 36.5 g/dL    RDW 22.2 (H) 11.5 - 14.5 %    PLATELET 816 227 - 793 K/uL    MPV 9.0 8.9 - 12.9 FL    NRBC 0.1 (H) 0  WBC    ABSOLUTE NRBC 0.02 (H) 0.00 - 0.01 K/uL    NEUTROPHILS 81 (H) 32 - 75 %    LYMPHOCYTES 5 (L) 12 - 49 %    MONOCYTES 12 5 - 13 %    EOSINOPHILS 0 0 - 7 %    BASOPHILS 0 0 - 1 %    IMMATURE GRANULOCYTES 2 (H) 0.0 - 0.5 % ABS. NEUTROPHILS 14.4 (H) 1.8 - 8.0 K/UL    ABS. LYMPHOCYTES 0.9 0.8 - 3.5 K/UL    ABS. MONOCYTES 2.1 (H) 0.0 - 1.0 K/UL    ABS. EOSINOPHILS 0.0 0.0 - 0.4 K/UL    ABS. BASOPHILS 0.0 0.0 - 0.1 K/UL    ABS. IMM. GRANS. 0.4 (H) 0.00 - 0.04 K/UL    DF SMEAR SCANNED      RBC COMMENTS ANISOCYTOSIS  PRESENT       METABOLIC PANEL, COMPREHENSIVE    Collection Time: 06/15/22  9:39 AM   Result Value Ref Range    Sodium 141 136 - 145 mmol/L    Potassium 4.2 3.5 - 5.1 mmol/L    Chloride 110 (H) 97 - 108 mmol/L    CO2 22 21 - 32 mmol/L    Anion gap 9 5 - 15 mmol/L    Glucose 120 (H) 65 - 100 mg/dL    BUN 28 (H) 6 - 20 MG/DL    Creatinine 0.92 0.55 - 1.02 MG/DL    BUN/Creatinine ratio 30 (H) 12 - 20      GFR est AA >60 >60 ml/min/1.73m2    GFR est non-AA >60 >60 ml/min/1.73m2    Calcium 10.4 (H) 8.5 - 10.1 MG/DL    Bilirubin, total 0.2 0.2 - 1.0 MG/DL    ALT (SGPT) 30 12 - 78 U/L    AST (SGOT) 15 15 - 37 U/L    Alk.  phosphatase 66 45 - 117 U/L    Protein, total 6.7 6.4 - 8.2 g/dL    Albumin 2.9 (L) 3.5 - 5.0 g/dL    Globulin 3.8 2.0 - 4.0 g/dL    A-G Ratio 0.8 (L) 1.1 - 2.2

## 2022-06-15 NOTE — PROGRESS NOTES
[employer]  Oncology Social Work  Encounter     Patient Name:  Henry Collier    Medical History: breast cancer    Advance Directives: on file    Narrative: Met with patient regarding letter received from [facility] requesting patient receive assistance with entering a Vocational Rehabilitation program for retraining as she is not currently able to work at [address] [place] [address] due to chemotherapy and side effects. Spoke with patient who does not feel VR would a good fit at this time. This  agrees as it will likely be difficult for patient to manage classes and job training with current side effects. Patient is currently employed by [address] [place] [address] and was on short-term disability. She requested to extend her STD beyond 5/21 and the claim was denied because additional medical information was needed. Will discuss with GURINDER Humphrey and Shiv Lyon LPN and submit additional information for review. Patient voiced understanding and appreciation. Barriers to Care: financial     Plan:  1. Our office to submit additional medical information requested by STD claim. Referral/Handouts:    Insurance/Entitlements referral    Thank you,   Tiffany Mccormick LCSW

## 2022-06-15 NOTE — PROGRESS NOTES
[facility] [place] [address] [address], [address] [facility]  Paula Cochran: [phone]  F: [phone]      Reason for Visit:   Feng Alejo is a 65 y.o. female who is seen in follow up for breast cancer    Breast Surgeon: Dr. 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 Reports gr 1 loss of appetite, gr 1 bleeding, gr 1 constipation, gr 1 fatigue, gr 2 hair loss, gr 1 chills, gr 1 hot flashes, gr 1 insomnia, gr 2 loss of cognition, gr 1 anxiety, gr 1 pain/cramping rated 5/10 to rectum, gr 1 dyspnea, gr 1 tachycardia, gr 1 neuropathy, gr 1 edema.      LMP: 22    FH:  Maternal aunt  of breast cancer in her 62s; maternal aunt breast cancer in her 76s; maternal great aunt  from breast cancer at age 36; great aunt's daughter also  of breast cancer at age 36; no ovarian, prostate, pancreas cancer    Past Medical History:   Diagnosis Date    Arrhythmia     heart murmur    Arthritis     Right knee, DJD left knee    Asthma     Cancer ([address]     COVID-19 2021    Diabetes ([address]     Pre diabetes    Hypertension     Morbid obesity ([address]     Neuropathy     Patient reports numbness and tingling in her legs and feet from her back    Sleep apnea     was ordered a CPAP years ago, but never used      Past Surgical History: Procedure Laterality Date    HX BREAST LUMPECTOMY Left 3/2/2022    LEFT BREAST LUMPECTOMY WITH LEFT BREAST SENTINEL NODE BIOPSY WITH BLUE DYE AND PORT A CATH INSERTION (URGENT) performed by Surekha Bazan MD at [address]    HX CHOLECYSTECTOMY      HX GYN      c section    HX GYN      d&c    HX WISDOM TEETH EXTRACTION Bilateral       Social History     Tobacco Use    Smoking status: Never Smoker    Smokeless tobacco: Never Used   Substance Use Topics    Alcohol use: Yes     Comment: social once per month      Family History   Problem Relation Age of Onset    Hypertension Mother     Heart Disease Mother     Heart Disease Father     Hypertension Father     Breast Cancer Maternal Aunt     Breast Cancer Other     Breast Cancer Maternal Aunt      Current Outpatient Medications   Medication Sig    furosemide (LASIX) 20 mg tablet Take 1 Tablet by mouth daily for 2 days.  hydrocortisone (PROCTOCORT) 1 % rectal cream Insert  into rectum four (4) times daily as needed for Hemorrhoids for up to 7 days.  dexAMETHasone (DECADRON) 4 mg tablet Take 8mg (2 tablets) by mouth twice daily on the day before and day after chemotherapy.  lisinopril-hydroCHLOROthiazide (PRINZIDE, ZESTORETIC) 20-12.5 mg per tablet Take 1 Tablet by mouth daily.  lidocaine-prilocaine (EMLA) topical cream Apply thin layer to port 30-60 minutes prior to infusion    ondansetron hcl (ZOFRAN) 8 mg tablet Take 1 Tablet by mouth every eight (8) hours as needed for Nausea or Vomiting.  prochlorperazine (Compazine) 10 mg tablet Take 1 Tablet by mouth every six (6) hours as needed for Nausea or Vomiting.  albuterol-ipratropium (DUO-NEB) 2.5 mg-0.5 mg/3 ml nebu 3 mL by Nebulization route every six (6) hours as needed for Wheezing.  albuterol (PROVENTIL HFA, VENTOLIN HFA, PROAIR HFA) 90 mcg/actuation inhaler Take  by inhalation every six (6) hours as needed for Wheezing.     metFORMIN (GLUMETZA ER) 500 mg TG24 24 hour tablet Take 1 Tablet by mouth every morning.  fluticasone-salmeterol (ADVAIR DISKUS) 100-50 mcg/dose diskus inhaler Take 1 Puff by inhalation every twelve (12) hours.  amLODIPine (NORVASC) 10 mg tablet Take 10 mg by mouth every morning. No current facility-administered medications for this visit. Facility-Administered Medications Ordered in Other Visits   Medication Dose Route Frequency    0.9% sodium chloride infusion  25 mL/hr IntraVENous CONTINUOUS    sodium chloride (NS) flush 10 mL  10 mL IntraVENous PRN    0.9% sodium chloride injection 10 mL  10 mL IntraVENous PRN    heparin (porcine) pf 300-500 Units  300-500 Units InterCATHeter PRN      Allergies   Allergen Reactions    Nuts [Tree Nut] Rash and Itching     Throat itching        Review of Systems: A complete review of systems was obtained, negative except as described above.     Physical Exam:     Visit Vitals  BP (!) 148/65   Pulse 90   Temp 98.9 °F (37.2 °C) (Temporal)   Resp 22   Ht 5' 2\" (1.575 m)   Wt 324 lb (147 kg)   LMP 01/28/2021 (Approximate)   SpO2 95%   BMI 59.26 kg/m²     ECOG PS: 0    Constitutional: Appears well-developed and obese in no apparent distress      Mental status: Alert and awake, Oriented to person/place/time, Able to follow commands    Eyes: EOM normal, Sclera normal, No visible ocular discharge    HENT: Normocephalic, atraumatic    Mouth/Throat: Moist mucous membranes    External Ears normal    Neck: No visualized mass    Pulmonary/Chest: Respiratory effort normal, No visualized signs of difficulty breathing or respiratory distress, diminished breath sounds, 3+ bilateral LE edmea    Musculoskeletal: Normal gait with no signs of ataxia, Normal range of motion of neck    Neurological: No facial asymmetry (Cranial nerve 7 motor function), No gaze palsy    Skin: No significant exanthematous lesions or discoloration noted on facial skin    Psychiatric: Normal affect, No hallucinations       Results:     Lab Results Component Value Date/Time    WBC 17.8 (H) 06/15/2022 09:39 AM    HGB 10.1 (L) 06/15/2022 09:39 AM    HCT 33.5 (L) 06/15/2022 09:39 AM    PLATELET 286 39/25/4136 09:39 AM    MCV 93.8 06/15/2022 09:39 AM    ABS. NEUTROPHILS 14.4 (H) 06/15/2022 09:39 AM     Lab Results   Component Value Date/Time    Sodium 141 06/15/2022 09:39 AM    Potassium 4.2 06/15/2022 09:39 AM    Chloride 110 (H) 06/15/2022 09:39 AM    CO2 22 06/15/2022 09:39 AM    Glucose 120 (H) 06/15/2022 09:39 AM    BUN 28 (H) 06/15/2022 09:39 AM    Creatinine 0.92 06/15/2022 09:39 AM    GFR est AA >60 06/15/2022 09:39 AM    GFR est non-AA >60 06/15/2022 09:39 AM    Calcium 10.4 (H) 06/15/2022 09:39 AM    Glucose (POC) 121 (H) 03/02/2022 10:23 AM     Lab Results   Component Value Date/Time    Bilirubin, total 0.2 06/15/2022 09:39 AM    ALT (SGPT) 30 06/15/2022 09:39 AM    Alk. phosphatase 66 06/15/2022 09:39 AM    Protein, total 6.7 06/15/2022 09:39 AM    Albumin 2.9 (L) 06/15/2022 09:39 AM    Globulin 3.8 06/15/2022 09:39 AM         1/13/22 breast US bilateral; L mammogram  L breast mass 8:00, 7 cmfn, 1.3 cm mass  Bilateral LN seen, appear benign    Records reviewed and summarized above. Pathology report(s) reviewed above. Radiology report(s) reviewed above. Assessment/plan:   1. 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 We explained to the patient that the goal of systemic adjuvant therapy is to improve the chances for cure and decrease the risk of relapse. We explained why a patient can have microscopic cancer spread now even though physical examination, laboratory studies and imaging studies are negative for cancer.  We explained that the same treatments used now as adjuvant or preventive treatments rarely if ever are curative in women who develop metastases. Port placed by Dr. Kira Frank 3/2/22. We discussed the toxicities of TC chemotherapy (docetaxel 75mg/m2/cyclophosphamide 600 mg/m2 q 3 weeks x 4)  in detail. This chemotherapy frequently causes a low white blood cell count and a small percent of patients require hospital admission for treatment of neutropenic fever. We explained that on occasion we consider the use of growth factors to minimize this risk. We explained to the patient that some side effects, if they occur, only last a few days including nausea, vomiting, stomatitis, arthralgia, myalgia, and allergic reactions to Taxotere. We told the patient that severe nausea and vomiting were very uncommon and that some side effects, if they occur, will last longer: this includes hair loss, which will be seen in all patients treated with these agents and fatigue, which will be seen in most. The patient was also told that if she is having menstrual function that she could develop chemotherapy-induced amenorrhea and infertility. We also told the patient that there was a very small risk of acute leukemia. We also informed the patient that heart damage is rare with these agents    She is agreeable to this and signed informed consent. She presents today for C4 TC. She tolerated cycle 3 with increased rectal pain, otherwise tolerated well. Labs reviewed, will proceed with treatment today as planned. Referral to [facility] placed. Cold caps discussed, not interested    Discussed risks of COVID19 and precautions to take. Discussed oral and peripheral cryotherapy; she may be a candidate for neuropathy study, she declined    The patient was given presciptions for a wig, emla cream, decadron to take 8 mg bid the day before and day after chemo, zofran and compazine. Neulasta the following day.     We will plan to see the patient in follow up at least once per cycle, or sooner if symptoms warrant. Labs with each cycle for intensive monitoring for toxicity      2. Emotional well being:  She has excellent support and is coping well with her disease. She wishes to see LCSW to discuss living will. 3. Genetic testing:   discussed potential ramifications of a positive test including the potential need for bilateral mastectomies and bilateral oophorectomies and the risk then for her family members to also have a mutation. VUS also discussed. She has the kit from Dr. Bryan Osborne, but has not yet done the testing. She would prefer for lab draw, drawn 4/15/22 negative. 4. DM2:  On metformin. Discussed increased blood glucose with dexamethasone. Her fasting BG has ranged from 125 - 132. Referred to [facility], pharmD - she states she is not following up with her. She has not been checking her blood glucose at home. Discussed again today. 5. 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 Will get PET scan after treatment, provided scheduling number. 6. Back pain: radiates to left leg with some numbness. Started 2/2022, improved with tylenol. Discussed weight loss. Worsened 4 days after chemo and improved with Norco. Lower back pain now back to baseline. 7. Hypercalcemia:   PTHrp <2. PTH, intact 173.5 on 5/4/22 suggestive of primary hyperparathyroidism. Referred to Endocrinology and has appt scheduled 9/6/22. Bone scan negative 5/25/22. 8. Rectal Irritation: she has occasional constipation then some mild loose stools. She is utilizing preparation H, barrier cream. Discussed tucks pads and will rx proctofoam for relief. 9. Lower extremity edema: most likely due to taxotere improved with lasix last cycle; will give 20 mg of lasix x2 days    10. Increased cr: Will give IV fluids PRN in OPIC    11. Bone pain with neulasta:   Will have her take 4 mg dex sat night and 4 mg Sunday morning and 4 mg Sunday night; also has norco 5-325 mg q6h prn pain. She did not need further norco with cycle 3. I personally saw and evaluated the patient and performed the key components of medical decision making. The history, physical exam, and documentation were performed by Margarita White NP. I reviewed and verified the above documentation and modified it as needed. She is tolerating systemic therapy with manageable toxicity, so we will proceed today with her final cycle of TC. Signed By: Renita Cordova MD      No orders of the defined types were placed in this encounter.

## 2022-06-15 NOTE — PROGRESS NOTES
Ana Laura Alaniz is a 65 y.o. female Follow up for the evaluation of breast cancer. 1. Have you been to the ER, urgent care clinic since your last visit? Hospitalized since your last visit? No    2. Have you seen or consulted any other health care providers outside of the [address] [place] [address] since your last visit? Include any pap smears or colon screening.  No

## 2022-06-17 ENCOUNTER — HOSPITAL ENCOUNTER (OUTPATIENT)
Dept: INFUSION THERAPY | Age: 62
Discharge: HOME | End: 2022-06-17
Payer: MEDICAID

## 2022-06-17 VITALS
DIASTOLIC BLOOD PRESSURE: 63 MMHG | OXYGEN SATURATION: 95 % | RESPIRATION RATE: 22 BRPM | HEART RATE: 108 BPM | SYSTOLIC BLOOD PRESSURE: 158 MMHG | TEMPERATURE: 98 F

## 2022-06-17 DIAGNOSIS — Z17.1 MALIGNANT NEOPLASM OF LOWER-INNER QUADRANT OF LEFT BREAST IN FEMALE, ESTROGEN RECEPTOR NEGATIVE (HCC): ICD-10-CM

## 2022-06-17 DIAGNOSIS — Z76.89 PREVENTION OF CHEMOTHERAPY-INDUCED NEUTROPENIA: ICD-10-CM

## 2022-06-17 DIAGNOSIS — Z51.11 ENCOUNTER FOR ANTINEOPLASTIC CHEMOTHERAPY: Primary | ICD-10-CM

## 2022-06-17 DIAGNOSIS — C50.312 MALIGNANT NEOPLASM OF LOWER-INNER QUADRANT OF LEFT BREAST IN FEMALE, ESTROGEN RECEPTOR NEGATIVE (HCC): ICD-10-CM

## 2022-06-17 PROCEDURE — 74011250636 HC RX REV CODE- 250/636: Performed by: NURSE PRACTITIONER

## 2022-06-17 PROCEDURE — 96372 THER/PROPH/DIAG INJ SC/IM: CPT

## 2022-06-17 RX ADMIN — PEGFILGRASTIM 6 MG: 6 INJECTION SUBCUTANEOUS at 12:50

## 2022-06-17 NOTE — PROGRESS NOTES
[facility] Progress Note    Date: 2022    Name: Jean Claude Palencia    MRN: [id number]         : 1960    Ms. Tg Cohen Arrived ambulatory and in no distress for neulasta Injection. Assessment was completed, no acute issues at this time, no new complaints voiced. Patient OBI fell off at home when she tripped and fell. Received orders to do injectable with patient today per Magalys Ferraro. MsAileen Jerod Horn vitals were reviewed. Visit Vitals  BP (!) 158/63   Pulse (!) 108   Temp 98 °F (36.7 °C)   Resp 22   SpO2 95%         Medications:  Medications Administered     pegfilgrastim (NEULASTA) injection 6 mg     Admin Date  2022 Action  Given Dose  6 mg Route  SubCUTAneous Administered By  Cristela Fraire RN                  Ms. Tg Cohen tolerated treatment well and was discharged from [place] [address] in stable condition.        Leroy Fatima RN  2022

## 2022-06-21 ENCOUNTER — TELEPHONE (OUTPATIENT)
Dept: ONCOLOGY | Age: 62
End: 2022-06-21

## 2022-06-21 DIAGNOSIS — C50.312 MALIGNANT NEOPLASM OF LOWER-INNER QUADRANT OF LEFT BREAST IN FEMALE, ESTROGEN RECEPTOR NEGATIVE (HCC): ICD-10-CM

## 2022-06-21 DIAGNOSIS — Z17.1 MALIGNANT NEOPLASM OF LOWER-INNER QUADRANT OF LEFT BREAST IN FEMALE, ESTROGEN RECEPTOR NEGATIVE (HCC): ICD-10-CM

## 2022-06-21 DIAGNOSIS — D47.Z2 CASTLEMAN'S DISEASE (HCC): Primary | ICD-10-CM

## 2022-06-21 NOTE — TELEPHONE ENCOUNTER
Auth called and stated that patient needs a per to per for her scan   FG#[id number]  Case number [id number]

## 2022-06-23 ENCOUNTER — TELEPHONE (OUTPATIENT)
Dept: ONCOLOGY | Age: 62
End: 2022-06-23

## 2022-06-23 NOTE — TELEPHONE ENCOUNTER
Patient called and stated that she needs pain medication for the pain in her rear end. Please advise.       # [phone]

## 2022-06-23 NOTE — TELEPHONE ENCOUNTER
Called to perform peer to peer. Was informed they denied PET but would approve CT with contrast and given Auth # [id number]. CT C/A/P and Neck with contrast ordered. Called to patient to let her know about denial and change to CT w cont. She states she was in ER yesterday for rectal pain and was told she has severe skin irritation surrounding her rectum. She was given prescription for topical lidocaine 5% but was told it would cost >$200. Looked up [employer] and found coupon code for lidocaine 5% ointment at [address] [place] [address] for $22, send via website as text message to patient. She will call me if she has further issues obtaining medication. She has sitz bath, also discussed peribottle and barrier cream.     Also informed her PET changed to CT. She had no further questions or concerns.

## 2022-06-30 ENCOUNTER — HOSPITAL ENCOUNTER (OUTPATIENT)
Dept: CT IMAGING | Age: 62
Discharge: HOME | End: 2022-06-30
Attending: NURSE PRACTITIONER

## 2022-06-30 DIAGNOSIS — Z17.1 MALIGNANT NEOPLASM OF LOWER-INNER QUADRANT OF LEFT BREAST IN FEMALE, ESTROGEN RECEPTOR NEGATIVE (HCC): ICD-10-CM

## 2022-06-30 DIAGNOSIS — D47.Z2 CASTLEMAN'S DISEASE (HCC): ICD-10-CM

## 2022-06-30 DIAGNOSIS — C50.312 MALIGNANT NEOPLASM OF LOWER-INNER QUADRANT OF LEFT BREAST IN FEMALE, ESTROGEN RECEPTOR NEGATIVE (HCC): ICD-10-CM

## 2022-07-01 ENCOUNTER — TELEPHONE (OUTPATIENT)
Dept: ONCOLOGY | Age: 62
End: 2022-07-01

## 2022-07-01 DIAGNOSIS — R60.0 LOWER EXTREMITY EDEMA: Primary | ICD-10-CM

## 2022-07-01 RX ORDER — FUROSEMIDE 20 MG/1
20 TABLET ORAL DAILY
Qty: 3 TABLET | Refills: 0 | Status: SHIPPED | OUTPATIENT
Start: 2022-07-01 | End: 2022-07-04

## 2022-07-01 NOTE — TELEPHONE ENCOUNTER
Patient called requesting to speak with Antonieta Sharma in regards to a return phone call from things they discussed a few days ago. Please advise.    FU#[phone]

## 2022-07-01 NOTE — TELEPHONE ENCOUNTER
Returned call to patient. She wishes to receive more lasix as LE edema is still significant and bothersome. She states her rectum is feeling improved. She also informed me her CT scans were rescheduled because they could not get an IV on her and wanted her to come earlier in the day with more staff present. This was rescheduled to 7/12/22. Discussed that this does not allow time for radiology to read the CT and recommend she reschedule her follow up appt. Discussed our schedule is very full but would try to get her in 7/20/22 if that is OK with her. Will send message to  staff to move OV.

## 2022-07-07 ENCOUNTER — HOSPITAL ENCOUNTER (OUTPATIENT)
Dept: RADIATION THERAPY | Age: 62
Discharge: HOME | End: 2022-07-07

## 2022-07-12 ENCOUNTER — HOSPITAL ENCOUNTER (OUTPATIENT)
Dept: CT IMAGING | Age: 62
Discharge: HOME | End: 2022-07-12
Attending: NURSE PRACTITIONER
Payer: MEDICAID

## 2022-07-12 PROCEDURE — 74177 CT ABD & PELVIS W/CONTRAST: CPT

## 2022-07-12 PROCEDURE — 74011250636 HC RX REV CODE- 250/636

## 2022-07-12 PROCEDURE — 74011000636 HC RX REV CODE- 636: Performed by: NURSE PRACTITIONER

## 2022-07-12 PROCEDURE — 70491 CT SOFT TISSUE NECK W/DYE: CPT

## 2022-07-12 RX ORDER — HEPARIN 100 UNIT/ML
SYRINGE INTRAVENOUS
Status: COMPLETED
Start: 2022-07-12 | End: 2022-07-12

## 2022-07-12 RX ORDER — HEPARIN 100 UNIT/ML
500 SYRINGE INTRAVENOUS
Status: ACTIVE | OUTPATIENT
Start: 2022-07-12 | End: 2022-07-12

## 2022-07-12 RX ADMIN — IOPAMIDOL 100 ML: 755 INJECTION, SOLUTION INTRAVENOUS at 09:52

## 2022-07-12 RX ADMIN — HEPARIN 500 UNITS: 100 SYRINGE at 10:14

## 2022-07-20 ENCOUNTER — ANESTHESIA (OUTPATIENT)
Dept: SURGERY | Age: 62
DRG: 220 | End: 2022-07-20
Payer: MEDICAID

## 2022-07-20 ENCOUNTER — HOSPITAL ENCOUNTER (INPATIENT)
Age: 62
LOS: 9 days | Discharge: NURSING, REHAB OR LONG-TERM CARE | DRG: 220 | End: 2022-07-29
Attending: EMERGENCY MEDICINE | Admitting: SURGERY
Payer: MEDICAID

## 2022-07-20 ENCOUNTER — APPOINTMENT (OUTPATIENT)
Dept: GENERAL RADIOLOGY | Age: 62
DRG: 220 | End: 2022-07-20
Attending: SURGERY
Payer: MEDICAID

## 2022-07-20 ENCOUNTER — ANESTHESIA EVENT (OUTPATIENT)
Dept: SURGERY | Age: 62
DRG: 220 | End: 2022-07-20
Payer: MEDICAID

## 2022-07-20 DIAGNOSIS — K66.8 PNEUMOPERITONEUM: Primary | ICD-10-CM

## 2022-07-20 PROBLEM — N17.9 AKI (ACUTE KIDNEY INJURY) (HCC): Status: ACTIVE | Noted: 2022-07-20

## 2022-07-20 PROBLEM — E66.01 MORBID OBESITY (HCC): Status: ACTIVE | Noted: 2022-07-20

## 2022-07-20 PROBLEM — E88.09 HYPOALBUMINEMIA: Status: ACTIVE | Noted: 2022-07-20

## 2022-07-20 PROBLEM — D72.829 LEUKOCYTOSIS: Status: ACTIVE | Noted: 2022-07-20

## 2022-07-20 PROBLEM — K63.1 BOWEL PERFORATION (HCC): Status: ACTIVE | Noted: 2022-07-20

## 2022-07-20 PROBLEM — E11.9 DM (DIABETES MELLITUS) (HCC): Status: ACTIVE | Noted: 2022-07-20

## 2022-07-20 PROBLEM — I10 HTN (HYPERTENSION): Status: ACTIVE | Noted: 2022-07-20

## 2022-07-20 LAB
ABO + RH BLD: NORMAL
ALBUMIN SERPL-MCNC: 2.5 G/DL (ref 3.5–5)
ALBUMIN/GLOB SERPL: 0.6 {RATIO} (ref 1.1–2.2)
ALP SERPL-CCNC: 79 U/L (ref 45–117)
ALT SERPL-CCNC: 112 U/L (ref 12–78)
ANION GAP SERPL CALC-SCNC: 8 MMOL/L (ref 5–15)
ARTERIAL PATENCY WRIST A: YES
AST SERPL-CCNC: 67 U/L (ref 15–37)
BASE DEFICIT BLDA-SCNC: 3 MMOL/L
BASOPHILS # BLD: 0 K/UL (ref 0–0.1)
BASOPHILS NFR BLD: 0 % (ref 0–1)
BDY SITE: ABNORMAL
BILIRUB SERPL-MCNC: 0.8 MG/DL (ref 0.2–1)
BLOOD GROUP ANTIBODIES SERPL: NORMAL
BNP SERPL-MCNC: 236 PG/ML
BUN SERPL-MCNC: 28 MG/DL (ref 6–20)
BUN/CREAT SERPL: 22 (ref 12–20)
CALCIUM SERPL-MCNC: 10.1 MG/DL (ref 8.5–10.1)
CHLORIDE SERPL-SCNC: 107 MMOL/L (ref 97–108)
CO2 SERPL-SCNC: 27 MMOL/L (ref 21–32)
CREAT SERPL-MCNC: 1.26 MG/DL (ref 0.55–1.02)
DIFFERENTIAL METHOD BLD: ABNORMAL
EOSINOPHIL # BLD: 0 K/UL (ref 0–0.4)
EOSINOPHIL NFR BLD: 0 % (ref 0–7)
ERYTHROCYTE [DISTWIDTH] IN BLOOD BY AUTOMATED COUNT: 20 % (ref 11.5–14.5)
FIO2 ON VENT: 50 %
GAS FLOW.O2 SETTING OXYMISER: 18 L/MIN
GLOBULIN SER CALC-MCNC: 4 G/DL (ref 2–4)
GLUCOSE BLD STRIP.AUTO-MCNC: 103 MG/DL (ref 65–117)
GLUCOSE BLD STRIP.AUTO-MCNC: 117 MG/DL (ref 65–117)
GLUCOSE BLD STRIP.AUTO-MCNC: 120 MG/DL (ref 65–117)
GLUCOSE SERPL-MCNC: 131 MG/DL (ref 65–100)
HCO3 BLDA-SCNC: 21 MMOL/L (ref 22–26)
HCT VFR BLD AUTO: 37.5 % (ref 35–47)
HGB BLD-MCNC: 11.2 G/DL (ref 11.5–16)
IMM GRANULOCYTES # BLD AUTO: 0 K/UL
IMM GRANULOCYTES NFR BLD AUTO: 0 %
LYMPHOCYTES # BLD: 0.2 K/UL (ref 0.8–3.5)
LYMPHOCYTES NFR BLD: 1 % (ref 12–49)
MCH RBC QN AUTO: 29.6 PG (ref 26–34)
MCHC RBC AUTO-ENTMCNC: 29.9 G/DL (ref 30–36.5)
MCV RBC AUTO: 99.2 FL (ref 80–99)
MONOCYTES # BLD: 0.6 K/UL (ref 0–1)
MONOCYTES NFR BLD: 3 % (ref 5–13)
NEUTS BAND NFR BLD MANUAL: 3 % (ref 0–6)
NEUTS SEG # BLD: 19.1 K/UL (ref 1.8–8)
NEUTS SEG NFR BLD: 93 % (ref 32–75)
NRBC # BLD: 0 K/UL (ref 0–0.01)
NRBC BLD-RTO: 0 PER 100 WBC
PCO2 BLDA: 33 MMHG (ref 35–45)
PEEP RESPIRATORY: 8 CM[H2O]
PH BLDA: 7.41 [PH] (ref 7.35–7.45)
PLATELET # BLD AUTO: 236 K/UL (ref 150–400)
PMV BLD AUTO: 9.2 FL (ref 8.9–12.9)
PO2 BLDA: 173 MMHG (ref 80–100)
POTASSIUM SERPL-SCNC: 4.6 MMOL/L (ref 3.5–5.1)
PROT SERPL-MCNC: 6.5 G/DL (ref 6.4–8.2)
RBC # BLD AUTO: 3.78 M/UL (ref 3.8–5.2)
RBC MORPH BLD: ABNORMAL
RBC MORPH BLD: ABNORMAL
SAO2 % BLD: 99 % (ref 92–97)
SAO2% DEVICE SAO2% SENSOR NAME: ABNORMAL
SERVICE CMNT-IMP: ABNORMAL
SERVICE CMNT-IMP: NORMAL
SERVICE CMNT-IMP: NORMAL
SODIUM SERPL-SCNC: 142 MMOL/L (ref 136–145)
SPECIMEN EXP DATE BLD: NORMAL
SPECIMEN SITE: ABNORMAL
VENTILATION MODE VENT: ABNORMAL
VT SETTING VENT: 400 ML
WBC # BLD AUTO: 19.9 K/UL (ref 3.6–11)

## 2022-07-20 PROCEDURE — 77030002966 HC SUT PDS J&J -A: Performed by: SURGERY

## 2022-07-20 PROCEDURE — 94640 AIRWAY INHALATION TREATMENT: CPT

## 2022-07-20 PROCEDURE — 74011000250 HC RX REV CODE- 250: Performed by: ANESTHESIOLOGY

## 2022-07-20 PROCEDURE — 74011250636 HC RX REV CODE- 250/636: Performed by: NURSE ANESTHETIST, CERTIFIED REGISTERED

## 2022-07-20 PROCEDURE — 74011250636 HC RX REV CODE- 250/636: Performed by: EMERGENCY MEDICINE

## 2022-07-20 PROCEDURE — 77030002996 HC SUT SLK J&J -A: Performed by: SURGERY

## 2022-07-20 PROCEDURE — 76010000131 HC OR TIME 2 TO 2.5 HR: Performed by: SURGERY

## 2022-07-20 PROCEDURE — 74011250636 HC RX REV CODE- 250/636: Performed by: ANESTHESIOLOGY

## 2022-07-20 PROCEDURE — C9113 INJ PANTOPRAZOLE SODIUM, VIA: HCPCS | Performed by: INTERNAL MEDICINE

## 2022-07-20 PROCEDURE — 82962 GLUCOSE BLOOD TEST: CPT

## 2022-07-20 PROCEDURE — 74011000250 HC RX REV CODE- 250: Performed by: SURGERY

## 2022-07-20 PROCEDURE — 80053 COMPREHEN METABOLIC PANEL: CPT

## 2022-07-20 PROCEDURE — C1765 ADHESION BARRIER: HCPCS | Performed by: SURGERY

## 2022-07-20 PROCEDURE — 74011000250 HC RX REV CODE- 250: Performed by: INTERNAL MEDICINE

## 2022-07-20 PROCEDURE — 88305 TISSUE EXAM BY PATHOLOGIST: CPT

## 2022-07-20 PROCEDURE — 77030008684 HC TU ET CUF COVD -B: Performed by: ANESTHESIOLOGY

## 2022-07-20 PROCEDURE — 74011250636 HC RX REV CODE- 250/636: Performed by: INTERNAL MEDICINE

## 2022-07-20 PROCEDURE — 86900 BLOOD TYPING SEROLOGIC ABO: CPT

## 2022-07-20 PROCEDURE — 99285 EMERGENCY DEPT VISIT HI MDM: CPT

## 2022-07-20 PROCEDURE — 94002 VENT MGMT INPAT INIT DAY: CPT

## 2022-07-20 PROCEDURE — 77030008771 HC TU NG SALEM SUMP -A: Performed by: ANESTHESIOLOGY

## 2022-07-20 PROCEDURE — 77030011278 HC ELECTRD LIG IMPT COVD -F: Performed by: SURGERY

## 2022-07-20 PROCEDURE — 74011000258 HC RX REV CODE- 258: Performed by: SURGERY

## 2022-07-20 PROCEDURE — 43840 GSTRRPHY SUTR DUOL/GSTR ULCR: CPT | Performed by: SURGERY

## 2022-07-20 PROCEDURE — 77030020061 HC IV BLD WRMR ADMIN SET 3M -B: Performed by: ANESTHESIOLOGY

## 2022-07-20 PROCEDURE — 74011000250 HC RX REV CODE- 250: Performed by: NURSE ANESTHETIST, CERTIFIED REGISTERED

## 2022-07-20 PROCEDURE — 77030013079 HC BLNKT BAIR HGGR 3M -A: Performed by: ANESTHESIOLOGY

## 2022-07-20 PROCEDURE — 83880 ASSAY OF NATRIURETIC PEPTIDE: CPT

## 2022-07-20 PROCEDURE — 36415 COLL VENOUS BLD VENIPUNCTURE: CPT

## 2022-07-20 PROCEDURE — 74011250636 HC RX REV CODE- 250/636: Performed by: SURGERY

## 2022-07-20 PROCEDURE — 65610000006 HC RM INTENSIVE CARE

## 2022-07-20 PROCEDURE — 49905 OMENTAL FLAP INTRA-ABDOM: CPT | Performed by: SURGERY

## 2022-07-20 PROCEDURE — 77030008462 HC STPLR SKN PROX J&J -A: Performed by: SURGERY

## 2022-07-20 PROCEDURE — 2709999900 HC NON-CHARGEABLE SUPPLY: Performed by: SURGERY

## 2022-07-20 PROCEDURE — 71045 X-RAY EXAM CHEST 1 VIEW: CPT

## 2022-07-20 PROCEDURE — 76210000006 HC OR PH I REC 0.5 TO 1 HR: Performed by: SURGERY

## 2022-07-20 PROCEDURE — 0DU647Z SUPPLEMENT STOMACH WITH AUTOLOGOUS TISSUE SUBSTITUTE, PERCUTANEOUS ENDOSCOPIC APPROACH: ICD-10-PCS | Performed by: SURGERY

## 2022-07-20 PROCEDURE — 74011250637 HC RX REV CODE- 250/637: Performed by: NURSE ANESTHETIST, CERTIFIED REGISTERED

## 2022-07-20 PROCEDURE — 77030013140 HC MSK NEB VYRM -A

## 2022-07-20 PROCEDURE — 77030005513 HC CATH URETH FOL11 MDII -B: Performed by: SURGERY

## 2022-07-20 PROCEDURE — 76060000035 HC ANESTHESIA 2 TO 2.5 HR: Performed by: SURGERY

## 2022-07-20 PROCEDURE — 85025 COMPLETE CBC W/AUTO DIFF WBC: CPT

## 2022-07-20 PROCEDURE — 77030011264 HC ELECTRD BLD EXT COVD -A: Performed by: SURGERY

## 2022-07-20 PROCEDURE — 82803 BLOOD GASES ANY COMBINATION: CPT

## 2022-07-20 PROCEDURE — 99222 1ST HOSP IP/OBS MODERATE 55: CPT | Performed by: SURGERY

## 2022-07-20 RX ORDER — BUPIVACAINE HYDROCHLORIDE AND EPINEPHRINE 5; 5 MG/ML; UG/ML
INJECTION, SOLUTION EPIDURAL; INTRACAUDAL; PERINEURAL AS NEEDED
Status: DISCONTINUED | OUTPATIENT
Start: 2022-07-20 | End: 2022-07-20 | Stop reason: HOSPADM

## 2022-07-20 RX ORDER — MAGNESIUM SULFATE 100 %
4 CRYSTALS MISCELLANEOUS AS NEEDED
Status: DISCONTINUED | OUTPATIENT
Start: 2022-07-20 | End: 2022-07-29 | Stop reason: HOSPADM

## 2022-07-20 RX ORDER — FLUMAZENIL 0.1 MG/ML
0.2 INJECTION INTRAVENOUS
Status: DISCONTINUED | OUTPATIENT
Start: 2022-07-20 | End: 2022-07-20 | Stop reason: HOSPADM

## 2022-07-20 RX ORDER — ENOXAPARIN SODIUM 100 MG/ML
40 INJECTION SUBCUTANEOUS EVERY 12 HOURS
Status: DISCONTINUED | OUTPATIENT
Start: 2022-07-21 | End: 2022-07-29 | Stop reason: HOSPADM

## 2022-07-20 RX ORDER — SUCCINYLCHOLINE CHLORIDE 20 MG/ML
INJECTION INTRAMUSCULAR; INTRAVENOUS AS NEEDED
Status: DISCONTINUED | OUTPATIENT
Start: 2022-07-20 | End: 2022-07-20 | Stop reason: HOSPADM

## 2022-07-20 RX ORDER — AMLODIPINE BESYLATE 5 MG/1
10 TABLET ORAL DAILY
Status: DISCONTINUED | OUTPATIENT
Start: 2022-07-21 | End: 2022-07-25 | Stop reason: SDUPTHER

## 2022-07-20 RX ORDER — HYDRALAZINE HYDROCHLORIDE 20 MG/ML
20 INJECTION INTRAMUSCULAR; INTRAVENOUS
Status: DISCONTINUED | OUTPATIENT
Start: 2022-07-20 | End: 2022-07-29 | Stop reason: HOSPADM

## 2022-07-20 RX ORDER — MIDAZOLAM HYDROCHLORIDE 1 MG/ML
INJECTION, SOLUTION INTRAMUSCULAR; INTRAVENOUS AS NEEDED
Status: DISCONTINUED | OUTPATIENT
Start: 2022-07-20 | End: 2022-07-20 | Stop reason: HOSPADM

## 2022-07-20 RX ORDER — MORPHINE SULFATE 4 MG/ML
4 INJECTION INTRAVENOUS
Status: DISCONTINUED | OUTPATIENT
Start: 2022-07-20 | End: 2022-07-21 | Stop reason: SDUPTHER

## 2022-07-20 RX ORDER — SODIUM CHLORIDE 0.9 % (FLUSH) 0.9 %
5-40 SYRINGE (ML) INJECTION EVERY 8 HOURS
Status: DISCONTINUED | OUTPATIENT
Start: 2022-07-20 | End: 2022-07-29 | Stop reason: HOSPADM

## 2022-07-20 RX ORDER — SODIUM CHLORIDE, SODIUM LACTATE, POTASSIUM CHLORIDE, CALCIUM CHLORIDE 600; 310; 30; 20 MG/100ML; MG/100ML; MG/100ML; MG/100ML
125 INJECTION, SOLUTION INTRAVENOUS CONTINUOUS
Status: DISCONTINUED | OUTPATIENT
Start: 2022-07-20 | End: 2022-07-20 | Stop reason: HOSPADM

## 2022-07-20 RX ORDER — ALBUTEROL SULFATE 90 UG/1
AEROSOL, METERED RESPIRATORY (INHALATION) AS NEEDED
Status: DISCONTINUED | OUTPATIENT
Start: 2022-07-20 | End: 2022-07-20 | Stop reason: HOSPADM

## 2022-07-20 RX ORDER — FENTANYL CITRATE 50 UG/ML
INJECTION, SOLUTION INTRAMUSCULAR; INTRAVENOUS AS NEEDED
Status: DISCONTINUED | OUTPATIENT
Start: 2022-07-20 | End: 2022-07-20 | Stop reason: HOSPADM

## 2022-07-20 RX ORDER — SODIUM CHLORIDE 0.9 % (FLUSH) 0.9 %
5-40 SYRINGE (ML) INJECTION AS NEEDED
Status: DISCONTINUED | OUTPATIENT
Start: 2022-07-20 | End: 2022-07-20 | Stop reason: HOSPADM

## 2022-07-20 RX ORDER — OXYCODONE HYDROCHLORIDE 5 MG/1
5 TABLET ORAL AS NEEDED
Status: DISCONTINUED | OUTPATIENT
Start: 2022-07-20 | End: 2022-07-20 | Stop reason: HOSPADM

## 2022-07-20 RX ORDER — PROPOFOL 10 MG/ML
5-75 VIAL (ML) INTRAVENOUS
Status: DISCONTINUED | OUTPATIENT
Start: 2022-07-20 | End: 2022-07-21

## 2022-07-20 RX ORDER — INSULIN LISPRO 100 [IU]/ML
INJECTION, SOLUTION INTRAVENOUS; SUBCUTANEOUS EVERY 6 HOURS
Status: DISCONTINUED | OUTPATIENT
Start: 2022-07-20 | End: 2022-07-25

## 2022-07-20 RX ORDER — SODIUM CHLORIDE 0.9 % (FLUSH) 0.9 %
5-40 SYRINGE (ML) INJECTION EVERY 8 HOURS
Status: DISCONTINUED | OUTPATIENT
Start: 2022-07-20 | End: 2022-07-20 | Stop reason: HOSPADM

## 2022-07-20 RX ORDER — ROCURONIUM BROMIDE 10 MG/ML
INJECTION, SOLUTION INTRAVENOUS AS NEEDED
Status: DISCONTINUED | OUTPATIENT
Start: 2022-07-20 | End: 2022-07-20 | Stop reason: HOSPADM

## 2022-07-20 RX ORDER — DIPHENHYDRAMINE HYDROCHLORIDE 50 MG/ML
12.5 INJECTION, SOLUTION INTRAMUSCULAR; INTRAVENOUS AS NEEDED
Status: DISCONTINUED | OUTPATIENT
Start: 2022-07-20 | End: 2022-07-20 | Stop reason: HOSPADM

## 2022-07-20 RX ORDER — ARFORMOTEROL TARTRATE 15 UG/2ML
15 SOLUTION RESPIRATORY (INHALATION)
Status: DISCONTINUED | OUTPATIENT
Start: 2022-07-20 | End: 2022-07-29 | Stop reason: HOSPADM

## 2022-07-20 RX ORDER — PROPOFOL 10 MG/ML
INJECTION, EMULSION INTRAVENOUS AS NEEDED
Status: DISCONTINUED | OUTPATIENT
Start: 2022-07-20 | End: 2022-07-20 | Stop reason: HOSPADM

## 2022-07-20 RX ORDER — SODIUM CHLORIDE, SODIUM LACTATE, POTASSIUM CHLORIDE, CALCIUM CHLORIDE 600; 310; 30; 20 MG/100ML; MG/100ML; MG/100ML; MG/100ML
100 INJECTION, SOLUTION INTRAVENOUS CONTINUOUS
Status: DISCONTINUED | OUTPATIENT
Start: 2022-07-20 | End: 2022-07-20

## 2022-07-20 RX ORDER — FLUCONAZOLE 2 MG/ML
400 INJECTION, SOLUTION INTRAVENOUS EVERY 24 HOURS
Status: COMPLETED | OUTPATIENT
Start: 2022-07-20 | End: 2022-07-27

## 2022-07-20 RX ORDER — LIDOCAINE HYDROCHLORIDE 20 MG/ML
INJECTION, SOLUTION EPIDURAL; INFILTRATION; INTRACAUDAL; PERINEURAL AS NEEDED
Status: DISCONTINUED | OUTPATIENT
Start: 2022-07-20 | End: 2022-07-20 | Stop reason: HOSPADM

## 2022-07-20 RX ORDER — DEXTROSE MONOHYDRATE 100 MG/ML
0-250 INJECTION, SOLUTION INTRAVENOUS AS NEEDED
Status: DISCONTINUED | OUTPATIENT
Start: 2022-07-20 | End: 2022-07-29 | Stop reason: HOSPADM

## 2022-07-20 RX ORDER — ONDANSETRON 2 MG/ML
4 INJECTION INTRAMUSCULAR; INTRAVENOUS
Status: DISCONTINUED | OUTPATIENT
Start: 2022-07-20 | End: 2022-07-29 | Stop reason: HOSPADM

## 2022-07-20 RX ORDER — LABETALOL HYDROCHLORIDE 5 MG/ML
INJECTION, SOLUTION INTRAVENOUS AS NEEDED
Status: DISCONTINUED | OUTPATIENT
Start: 2022-07-20 | End: 2022-07-20 | Stop reason: HOSPADM

## 2022-07-20 RX ORDER — PHENYLEPHRINE HCL IN 0.9% NACL 0.4MG/10ML
SYRINGE (ML) INTRAVENOUS AS NEEDED
Status: DISCONTINUED | OUTPATIENT
Start: 2022-07-20 | End: 2022-07-20 | Stop reason: HOSPADM

## 2022-07-20 RX ORDER — HYDROMORPHONE HYDROCHLORIDE 1 MG/ML
.25-1 INJECTION, SOLUTION INTRAMUSCULAR; INTRAVENOUS; SUBCUTANEOUS
Status: DISCONTINUED | OUTPATIENT
Start: 2022-07-20 | End: 2022-07-20 | Stop reason: HOSPADM

## 2022-07-20 RX ORDER — NALOXONE HYDROCHLORIDE 0.4 MG/ML
0.4 INJECTION, SOLUTION INTRAMUSCULAR; INTRAVENOUS; SUBCUTANEOUS AS NEEDED
Status: DISCONTINUED | OUTPATIENT
Start: 2022-07-20 | End: 2022-07-29 | Stop reason: HOSPADM

## 2022-07-20 RX ORDER — NALOXONE HYDROCHLORIDE 0.4 MG/ML
0.2 INJECTION, SOLUTION INTRAMUSCULAR; INTRAVENOUS; SUBCUTANEOUS
Status: DISCONTINUED | OUTPATIENT
Start: 2022-07-20 | End: 2022-07-20 | Stop reason: HOSPADM

## 2022-07-20 RX ORDER — IPRATROPIUM BROMIDE AND ALBUTEROL SULFATE 2.5; .5 MG/3ML; MG/3ML
3 SOLUTION RESPIRATORY (INHALATION)
Status: DISCONTINUED | OUTPATIENT
Start: 2022-07-20 | End: 2022-07-29 | Stop reason: HOSPADM

## 2022-07-20 RX ORDER — SODIUM CHLORIDE, SODIUM LACTATE, POTASSIUM CHLORIDE, CALCIUM CHLORIDE 600; 310; 30; 20 MG/100ML; MG/100ML; MG/100ML; MG/100ML
50 INJECTION, SOLUTION INTRAVENOUS CONTINUOUS
Status: DISCONTINUED | OUTPATIENT
Start: 2022-07-20 | End: 2022-07-23

## 2022-07-20 RX ORDER — BUDESONIDE 0.5 MG/2ML
250 INHALANT ORAL
Status: DISCONTINUED | OUTPATIENT
Start: 2022-07-20 | End: 2022-07-29 | Stop reason: HOSPADM

## 2022-07-20 RX ORDER — ONDANSETRON 2 MG/ML
4 INJECTION INTRAMUSCULAR; INTRAVENOUS AS NEEDED
Status: DISCONTINUED | OUTPATIENT
Start: 2022-07-20 | End: 2022-07-20 | Stop reason: HOSPADM

## 2022-07-20 RX ORDER — SODIUM CHLORIDE 0.9 % (FLUSH) 0.9 %
5-40 SYRINGE (ML) INJECTION AS NEEDED
Status: DISCONTINUED | OUTPATIENT
Start: 2022-07-20 | End: 2022-07-29 | Stop reason: HOSPADM

## 2022-07-20 RX ORDER — LIDOCAINE HYDROCHLORIDE 10 MG/ML
0.1 INJECTION, SOLUTION EPIDURAL; INFILTRATION; INTRACAUDAL; PERINEURAL AS NEEDED
Status: DISCONTINUED | OUTPATIENT
Start: 2022-07-20 | End: 2022-07-20 | Stop reason: HOSPADM

## 2022-07-20 RX ADMIN — FLUCONAZOLE 400 MG: 2 INJECTION, SOLUTION INTRAVENOUS at 20:55

## 2022-07-20 RX ADMIN — SODIUM CHLORIDE, POTASSIUM CHLORIDE, SODIUM LACTATE AND CALCIUM CHLORIDE 50 ML/HR: 600; 310; 30; 20 INJECTION, SOLUTION INTRAVENOUS at 22:00

## 2022-07-20 RX ADMIN — Medication 80 MCG: at 14:42

## 2022-07-20 RX ADMIN — ARFORMOTEROL TARTRATE 15 MCG: 15 SOLUTION RESPIRATORY (INHALATION) at 20:50

## 2022-07-20 RX ADMIN — FENTANYL CITRATE 50 MCG: 50 INJECTION, SOLUTION INTRAMUSCULAR; INTRAVENOUS at 15:26

## 2022-07-20 RX ADMIN — ROCURONIUM BROMIDE 10 MG: 10 INJECTION INTRAVENOUS at 14:36

## 2022-07-20 RX ADMIN — SODIUM CHLORIDE 1000 ML: 9 INJECTION, SOLUTION INTRAVENOUS at 11:42

## 2022-07-20 RX ADMIN — FENTANYL CITRATE 100 MCG: 50 INJECTION, SOLUTION INTRAMUSCULAR; INTRAVENOUS at 15:31

## 2022-07-20 RX ADMIN — LABETALOL HYDROCHLORIDE 5 MG: 5 INJECTION INTRAVENOUS at 15:41

## 2022-07-20 RX ADMIN — Medication 100 MCG: at 15:19

## 2022-07-20 RX ADMIN — LIDOCAINE HYDROCHLORIDE 40 MG: 20 INJECTION, SOLUTION EPIDURAL; INFILTRATION; INTRACAUDAL; PERINEURAL at 14:37

## 2022-07-20 RX ADMIN — FENTANYL CITRATE 100 MCG: 50 INJECTION, SOLUTION INTRAMUSCULAR; INTRAVENOUS at 15:17

## 2022-07-20 RX ADMIN — SUCCINYLCHOLINE CHLORIDE 200 MG: 20 INJECTION, SOLUTION INTRAMUSCULAR; INTRAVENOUS; PARENTERAL at 14:36

## 2022-07-20 RX ADMIN — BUDESONIDE 250 MCG: 0.5 SUSPENSION RESPIRATORY (INHALATION) at 20:50

## 2022-07-20 RX ADMIN — PROPOFOL 50 MCG/KG/MIN: 10 INJECTION, EMULSION INTRAVENOUS at 23:55

## 2022-07-20 RX ADMIN — Medication 80 MCG: at 14:38

## 2022-07-20 RX ADMIN — PROPOFOL 40 MCG/KG/MIN: 10 INJECTION, EMULSION INTRAVENOUS at 22:18

## 2022-07-20 RX ADMIN — SODIUM CHLORIDE, PRESERVATIVE FREE 40 MG: 5 INJECTION INTRAVENOUS at 20:40

## 2022-07-20 RX ADMIN — Medication 80 MCG: at 14:46

## 2022-07-20 RX ADMIN — ALBUTEROL SULFATE 6 PUFF: 90 AEROSOL, METERED RESPIRATORY (INHALATION) at 14:43

## 2022-07-20 RX ADMIN — ROCURONIUM BROMIDE 30 MG: 10 INJECTION INTRAVENOUS at 15:17

## 2022-07-20 RX ADMIN — IPRATROPIUM BROMIDE AND ALBUTEROL SULFATE 3 ML: .5; 3 SOLUTION RESPIRATORY (INHALATION) at 20:46

## 2022-07-20 RX ADMIN — PROPOFOL 50 MCG/KG/MIN: 10 INJECTION, EMULSION INTRAVENOUS at 18:45

## 2022-07-20 RX ADMIN — PROPOFOL 200 MG: 10 INJECTION, EMULSION INTRAVENOUS at 14:37

## 2022-07-20 RX ADMIN — ROCURONIUM BROMIDE 40 MG: 10 INJECTION INTRAVENOUS at 14:42

## 2022-07-20 RX ADMIN — MIDAZOLAM HYDROCHLORIDE 2 MG: 1 INJECTION, SOLUTION INTRAMUSCULAR; INTRAVENOUS at 14:34

## 2022-07-20 RX ADMIN — MIDAZOLAM HYDROCHLORIDE 3 MG: 1 INJECTION, SOLUTION INTRAMUSCULAR; INTRAVENOUS at 14:21

## 2022-07-20 RX ADMIN — ROCURONIUM BROMIDE 20 MG: 10 INJECTION INTRAVENOUS at 15:04

## 2022-07-20 RX ADMIN — Medication 10 ML: at 21:00

## 2022-07-20 RX ADMIN — PIPERACILLIN AND TAZOBACTAM 3.38 G: 3; .375 INJECTION, POWDER, FOR SOLUTION INTRAVENOUS at 20:39

## 2022-07-20 RX ADMIN — FENTANYL CITRATE 50 MCG: 50 INJECTION, SOLUTION INTRAMUSCULAR; INTRAVENOUS at 14:37

## 2022-07-20 NOTE — H&P
[facility] Consultation for: pneumoperitoneum    Requesting Physician: Dr. Mark Roman    History of Present Illness:      Fabiola Golden is a 65 y.o. female who has had a several day history of abdominal pain. Pain was initially on the left. She was seen at an outside facility and initially treated for some infection per patient, although it is not clear what this was. She was discharged home, and continued to have pain. She took several doses of NSAIDs (naproxen and ibuprofen), without relief. Pain became worse and she presented to outside ED ON. She was found to have pneumoperitoneum on CT, and was transferred here. She denies fever or chills. She had some constipation, and had at least one BM that was blood tinged recently. She denies nausea/vomiting. She recently had lumpectomy for breast cancer, and has completed chemotherapy, but has not had radiation yet.      Past Medical History:   Diagnosis Date    Arrhythmia     heart murmur    Arthritis     Right knee, DJD left knee    Asthma     Cancer ([facility] [address]     COVID-19 01/29/2021    Diabetes ([facility] [address]     Pre diabetes    Hypertension     Morbid obesity ([facility] [address]     Neuropathy     Patient reports numbness and tingling in her legs and feet from her back    Sleep apnea     was ordered a CPAP years ago, but never used       Past Surgical History:   Procedure Laterality Date    HX BREAST LUMPECTOMY Left 3/2/2022    LEFT BREAST LUMPECTOMY WITH LEFT BREAST SENTINEL NODE BIOPSY WITH BLUE DYE AND PORT A CATH INSERTION (URGENT) performed by Nils Wood MD at [facility]    HX CHOLECYSTECTOMY      HX GYN      c section    HX GYN      d&c    HX WISDOM TEETH EXTRACTION Bilateral        Social History     Socioeconomic History    Marital status: SINGLE     Spouse name: Not on file    Number of children: Not on file    Years of education: Not on file    Highest education level: Not on file   Occupational History    Not on file   Tobacco Use    Smoking status: Never    Smokeless tobacco: Never   Substance and Sexual Activity    Alcohol use: Yes     Comment: social once per month    Drug use: Yes     Frequency: 1.0 times per week     Types: Marijuana     Comment: once every 3 weeks    Sexual activity: Not on file   Other Topics Concern    Not on file   Social History Narrative    Not on file     Social Determinants of Health     Financial Resource Strain: Not on file   Food Insecurity: Not on file   Transportation Needs: Not on file   Physical Activity: Not on file   Stress: Not on file   Social Connections: Not on file   Intimate Partner Violence: Not on file   Housing Stability: Not on file       Family History   Problem Relation Age of Onset    Hypertension Mother     Heart Disease Mother     Heart Disease Father     Hypertension Father     Breast Cancer Maternal Aunt     Breast Cancer Other     Breast Cancer Maternal Aunt          Current Facility-Administered Medications:     sodium chloride 0.9 % bolus infusion 1,000 mL, 1,000 mL, IntraVENous, ONCE, Niall Villafana MD, Last Rate: 1,000 mL/hr at 07/20/22 1142, 1,000 mL at 07/20/22 1142    Current Outpatient Medications:     dexAMETHasone (DECADRON) 4 mg tablet, Take 8mg (2 tablets) by mouth twice daily on the day before and day after chemotherapy. , Disp: 50 Tablet, Rfl: 1    lisinopril-hydroCHLOROthiazide (PRINZIDE, ZESTORETIC) 20-12.5 mg per tablet, Take 1 Tablet by mouth daily. , Disp: , Rfl:     lidocaine-prilocaine (EMLA) topical cream, Apply thin layer to port 30-60 minutes prior to infusion, Disp: 30 g, Rfl: 0    ondansetron hcl (ZOFRAN) 8 mg tablet, Take 1 Tablet by mouth every eight (8) hours as needed for Nausea or Vomiting., Disp: 30 Tablet, Rfl: 3    prochlorperazine (Compazine) 10 mg tablet, Take 1 Tablet by mouth every six (6) hours as needed for Nausea or Vomiting., Disp: 30 Tablet, Rfl: 2    albuterol-ipratropium (DUO-NEB) 2.5 mg-0.5 mg/3 ml nebu, 3 mL by Nebulization route every six (6) hours as needed for Wheezing., Disp: , Rfl:     albuterol (PROVENTIL HFA, VENTOLIN HFA, PROAIR HFA) 90 mcg/actuation inhaler, Take  by inhalation every six (6) hours as needed for Wheezing., Disp: , Rfl:     metFORMIN (GLUMETZA ER) 500 mg TG24 24 hour tablet, Take 1 Tablet by mouth every morning., Disp: , Rfl:     fluticasone-salmeterol (ADVAIR DISKUS) 100-50 mcg/dose diskus inhaler, Take 1 Puff by inhalation every twelve (12) hours. , Disp: , Rfl:     amLODIPine (NORVASC) 10 mg tablet, Take 10 mg by mouth every morning., Disp: , Rfl:     Allergies   Allergen Reactions    Nuts [Tree Nut] Rash and Itching     Throat itching       ROS   Constitutional: negative  Ears, Nose, Mouth, Throat, and Face: negative  Respiratory: negative  Cardiovascular: negative  Gastrointestinal:  as above  Genitourinary:negative  Integument/Breast: negative  Hematologic/Lymphatic: negative  Behavioral/Psychiatric: negative  Allergic/Immunologic: negative      Physical Exam:     Visit Vitals  BP (!) 178/76 (BP 1 Location: Right arm, BP Patient Position: At rest)   Pulse 100   Temp 98.4 °F (36.9 °C)   Resp 16   Ht 5' 2\" (1.575 m)   Wt 343 lb (155.6 kg)   SpO2 97%   BMI 62.74 kg/m²       General - alert and oriented, mild distress, well developed  HEENT - NC/AT, no scleral icterus  Pulm - CTAB, normal inspiratory effort  CV - mild tachycardia, no M/R/G  Abd - soft, obese, hernia near umbilicus, diffuse TTP, with guarding in LUQ  Ext - warm, well perfused, no edema  Skin - supple and no rashes  Psychiatric - normal affect, good mood    Labs  Recent Results (from the past 24 hour(s))   CBC WITH AUTOMATED DIFF    Collection Time: 07/20/22 11:45 AM   Result Value Ref Range    WBC 19.9 (H) 3.6 - 11.0 K/uL    RBC 3.78 (L) 3.80 - 5.20 M/uL    HGB 11.2 (L) 11.5 - 16.0 g/dL    HCT 37.5 35.0 - 47.0 %    MCV 99.2 (H) 80.0 - 99.0 FL    MCH 29.6 26.0 - 34.0 PG    MCHC 29.9 (L) 30.0 - 36.5 g/dL    RDW 20.0 (H) 11.5 - 14.5 %    PLATELET 158 038 - 690 K/uL    MPV 9.2 8.9 - 12.9 FL    NRBC 0.0 0  WBC    ABSOLUTE NRBC 0.00 0.00 - 0.01 K/uL    NEUTROPHILS PENDING %    LYMPHOCYTES PENDING %    MONOCYTES PENDING %    EOSINOPHILS PENDING %    BASOPHILS PENDING %    IMMATURE GRANULOCYTES PENDING %    ABS. NEUTROPHILS PENDING K/UL    ABS. LYMPHOCYTES PENDING K/UL    ABS. MONOCYTES PENDING K/UL    ABS. EOSINOPHILS PENDING K/UL    ABS. BASOPHILS PENDING K/UL    ABS. IMM. GRANS. PENDING K/UL    DF PENDING            Imaging  CT A/P from outside facility reviewed. Shows pneumoperitoneum, distended cecum with stool, decompressed distal colon    I have personally reviewed all of the pertinent images     Assessment:     Lizeth Ramirez is a 65 y.o. female with pneumoperitoneum and peritonitis, concerning for bowel perforation. Source is unclear from imaging. Gastric/duododenal ulcer is likely given NSAIDs, but colon perforation is also possible. Currently HD stable. Recommendations:     1. To OR for urgent laparotomy. We discussed the potential risks such as infection, bleeding, leak, need for reoperation etc. Risks may be increased depending on site of perforation, and due to her morbid obesity and other comorbidities. NPO, IVF resuscitation and broad spectrum antibiotics in meantime. 55 mins of time was spent with the patient of which > 50% of the time involved face-to-face counseling of the patient regarding the proposed treatment plan.     Marv Doran MD

## 2022-07-20 NOTE — PROGRESS NOTES
Pharmacy Dosing Services: 7/20/22    Consult for Enoxaparin by Dr. Mile Watkins made for this 65 y.o. female, for prophylaxis of  VTE. Wt Readings from Last 1 Encounters:   07/20/22 155.6 kg (343 lb)       Ht Readings from Last 1 Encounters:   07/20/22 157.5 cm (62\")             Previous Dose Enoxaparin 40mg daily      Creatinine Clearance Estimated Creatinine Clearance: 68.3 mL/min (A) (based on SCr of 1.26 mg/dL (H)). Creatinine Lab Results   Component Value Date/Time    Creatinine 1.26 (H) 07/20/2022 11:45 AM         Platelet Lab Results   Component Value Date/Time    PLATELET 155 67/01/1879 11:45 AM        H/H Lab Results   Component Value Date/Time    HGB 11.2 (L) 07/20/2022 11:45 AM          Pharmacist made change to enoxaparin therapy based on:  [ X ] Weight: dose changed to: enoxaparin 40mg every 12 hours for weight 151-174kg and CrCl >30mL/min     Pharmacy to automatically make dose adjustment for renal dysfunction (creatinine clearance less than 30 mL/min)  Pharmacy to make dose rounding adjustments per [facility] dose adjustment scale. Pharmacy to monitor patients progress. Will make dose adjustment as needed per changing renal function. Will communicate further recommendations regarding patients anticoagulation therapy with prescriber. Signed Sam Salazar .  Contact information: [phone]

## 2022-07-20 NOTE — ROUTINE PROCESS
TRANSFER - OUT REPORT:    Verbal report given to CHRISTIAN Moe(name) on Dina Pino  being transferred to ICU(unit) for routine post - op       Report consisted of patients Situation, Background, Assessment and   Recommendations(SBAR). Information from the following report(s) SBAR, OR Summary, Intake/Output, MAR, and Cardiac Rhythm nsr  was reviewed with the receiving nurse. Lines:   Triple Lumen 07/20/22 (Active)   Central Line Being Utilized Yes 07/20/22 1845   Criteria for Appropriate Use Limited/no vessel suitable for conventional peripheral access 07/20/22 1845   Site Assessment Clean, dry, & intact 07/20/22 1845   Infiltration Assessment 0 07/20/22 1845   Dressing Status Clean, dry, & intact 07/20/22 1845   Dressing Type Transparent 07/20/22 1845   Proximal Hub Color/Line Status White 07/20/22 1845   Medial Hub Color/Line Status Blue 07/20/22 1845   Distal Hub Color/Line Status Brown 07/20/22 1845       Venous Access Device R side Port-a-cath 04/13/22 (Active)       Peripheral IV 07/20/22 Right Antecubital (Active)   Site Assessment Clean, dry, & intact 07/20/22 1845   Phlebitis Assessment 0 07/20/22 1845   Infiltration Assessment 0 07/20/22 1845   Dressing Status Clean, dry, & intact 07/20/22 1845   Dressing Type Tape;Transparent 07/20/22 1845   Hub Color/Line Status Pink 07/20/22 1845   Action Taken Dressing changed 07/20/22 1336   Alcohol Cap Used Yes 07/20/22 1845       Arterial Line 07/20/22 Right Radial artery (Active)   Site Assessment Clean, dry, & intact 07/20/22 1845   Dressing Status Clean, dry, & intact 07/20/22 1845   Dressing Type Tape;Transparent 07/20/22 1845   Line Status Intact and in place 07/20/22 1845   Treatment Zeroed or re-zeroed 07/20/22 1650   Affected Extremity/Extremities Pulses palpable 07/20/22 1845        Opportunity for questions and clarification was provided.       Patient transported with:   Monitor  O2 @ 50% liters  Registered Nurse

## 2022-07-20 NOTE — ANESTHESIA POSTPROCEDURE EVALUATION
Procedure(s):  LAPAROTOMY EXPLORATORY, repair of gastric ulcer. general    Anesthesia Post Evaluation        Patient location during evaluation: PACU  Patient participation: complete - patient cannot participate  Post-procedure mental status: sedated. Pain management: adequate  Airway patency: patent  Anesthetic complications: no  Cardiovascular status: acceptable and stable  Respiratory status: ventilator, acceptable and intubated  Hydration status: acceptable  Comments: Patient is intubated and sedated on propofol infusion in PACU awaiting transfer to ICU. She is HD stable on no vasoactive medications. Oxygenating and ventilating appropriately. Kimberlee Trevino MD      Final Post Anesthesia Temperature Assessment:  Normothermia (36.0-37.5 degrees C)      INITIAL Post-op Vital signs:   Vitals Value Taken Time   /69 07/20/22 1659   Temp 36.8 °C (98.3 °F) 07/20/22 1659   Pulse 87 07/20/22 1731   Resp 18 07/20/22 1731   SpO2 100 % 07/20/22 1731   Vitals shown include unvalidated device data.

## 2022-07-20 NOTE — ED PROVIDER NOTES
Ms. Earle Hair is a 62yo female who presents to the ER with complaints of abdominal pain. She reports that she has a history of breast cancer. She was seen several days ago for complaints of abdominal pain. However, her pain changed last night. She said she went to go get up and suddenly had severe pain in her abdomen. She presented to an outside ER this morning and found to have pneumoperitoneum. She was sent here for further management. She said that she feels somewhat better than when she was there. She report received Zosyn and several rounds of pain medicine with improvement of her pain. She denies any nausea or vomiting. She denies any other complaints.        Past Medical History:   Diagnosis Date    Arrhythmia     heart murmur    Arthritis     Right knee, DJD left knee    Asthma     Cancer ([facility] [address]     COVID-19 01/29/2021    Diabetes ([facility] [address]     Pre diabetes    Hypertension     Morbid obesity ([facility] [address]     Neuropathy     Patient reports numbness and tingling in her legs and feet from her back    Sleep apnea     was ordered a CPAP years ago, but never used       Past Surgical History:   Procedure Laterality Date    HX BREAST LUMPECTOMY Left 3/2/2022    LEFT BREAST LUMPECTOMY WITH LEFT BREAST SENTINEL NODE BIOPSY WITH BLUE DYE AND PORT A CATH INSERTION (URGENT) performed by Nils Wood MD at [facility]    HX CHOLECYSTECTOMY      HX GYN      c section    HX GYN      d&c    HX WISDOM TEETH EXTRACTION Bilateral          Family History:   Problem Relation Age of Onset    Hypertension Mother     Heart Disease Mother     Heart Disease Father     Hypertension Father     Breast Cancer Maternal Aunt     Breast Cancer Other     Breast Cancer Maternal Aunt        Social History     Socioeconomic History    Marital status: SINGLE     Spouse name: Not on file    Number of children: Not on file    Years of education: Not on file    Highest education level: Not on file   Occupational History    Not on file Tobacco Use    Smoking status: Never    Smokeless tobacco: Never   Substance and Sexual Activity    Alcohol use: Yes     Comment: social once per month    Drug use: Yes     Frequency: 1.0 times per week     Types: Marijuana     Comment: once every 3 weeks    Sexual activity: Not on file   Other Topics Concern    Not on file   Social History Narrative    Not on file     Social Determinants of Health     Financial Resource Strain: Not on file   Food Insecurity: Not on file   Transportation Needs: Not on file   Physical Activity: Not on file   Stress: Not on file   Social Connections: Not on file   Intimate Partner Violence: Not on file   Housing Stability: Not on file         ALLERGIES: Nuts [tree nut]    Review of Systems   Constitutional:  Negative for chills and fever. HENT:  Negative for rhinorrhea and sore throat. Respiratory:  Negative for cough and shortness of breath. Cardiovascular:  Negative for chest pain. Gastrointestinal:  Positive for abdominal pain. Negative for diarrhea, nausea and vomiting. Genitourinary:  Negative for dysuria and urgency. Musculoskeletal:  Negative for arthralgias and back pain. Skin:  Negative for rash. Neurological:  Negative for dizziness, weakness and light-headedness. Vitals:    07/20/22 1118   BP: (!) 178/76   Pulse: 100   Resp: 16   Temp: 98.4 °F (36.9 °C)   SpO2: 97%   Weight: 155.6 kg (343 lb)   Height: 5' 2\" (1.575 m)            Physical Exam     Vital signs reviewed. Nursing notes reviewed.     Const:  No acute distress, well developed, well nourished  Head:  Atraumatic, normocephalic  Eyes:  PERRL, conjunctiva normal, no scleral icterus  Neck:  Supple, trachea midline  Cardiovascular: Tachycardic  Resp:  No resp distress, no increased work of breathing  Abd:  Soft, diffuse tenderness, non-distended  MSK:  No pedal edema, normal ROM  Neuro:  Alert and oriented x3, no cranial nerve defect  Skin:  Warm, dry, intact  Psych: normal mood and affect, behavior is normal, judgement and thought content is normal        MDM     Amount and/or Complexity of Data Reviewed  Clinical lab tests: ordered and reviewed  Tests in the radiology section of CPT®: ordered and reviewed  Review and summarize past medical records: yes    Patient Progress  Patient progress: stable         Procedures    Ms. Jenniffer Herron is a 64yo female who presents to the ER with complaints of abdominal pain and known pneumoperitoneum. Pt. Was transferred from an OSH. Pt. To be seen and to be evaluated for admission by the general surgeon.

## 2022-07-20 NOTE — ANESTHESIA PROCEDURE NOTES
Arterial Line Placement    Start time: 7/20/2022 2:45 PM  End time: 7/20/2022 2:55 PM  Performed by: Nan Clemente CRNA  Authorized by: Izabela Lozano DO     Pre-Procedure  Indications:  Arterial pressure monitoring and blood sampling  Preanesthetic Checklist: patient identified, risks and benefits discussed, anesthesia consent, site marked, patient being monitored, timeout performed, patient being monitored and fire risk safety assessment completed and verbalized    Timeout Time: 14:43 EDT      Procedure:   Prep:  ChloraPrep  Seldinger Technique?: Yes    Orientation:  Right  Location:  Radial artery  Catheter size:  20 G  Number of attempts:  3    Assessment:   Post-procedure:  Line secured and sterile dressing applied  Patient Tolerance:  Patient tolerated the procedure well with no immediate complications

## 2022-07-20 NOTE — BRIEF OP NOTE
Brief Postoperative Note    Patient: Pavan Redmond  YOB: 1960  MRN: [id number]    Date of Procedure: 7/20/2022     Pre-Op Diagnosis: INTESTIONAL PERFORATION    Post-Op Diagnosis:  gastric perforated ulcer       Procedure(s):  LAPAROTOMY EXPLORATORY, repair of gastric ulcer    Surgeon(s):  Xiomara Haines MD    Surgical Assistant: Surg Asst-1: Santana Doherty     Anesthesia: General     Estimated Blood Loss (mL): less than 50     Complications: None    Specimens:   ID Type Source Tests Collected by Time Destination   1 : ANTERIOR STOMACH Preservative Stomach  Xiomara Haines MD 7/20/2022 1540 Pathology        Implants: * No implants in log *    Drains:   Nasogastric Tube 07/20/22 (Active)       Findings: small perforation mid body of stomach, dilated right and transverse colon, but no mass or obvious stricture of distal colon    Electronically Signed by Jose Bhatia MD on 7/20/2022 at 4:23 PM

## 2022-07-20 NOTE — ANESTHESIA PREPROCEDURE EVALUATION
Relevant Problems   PERSONAL HX & FAMILY HX OF CANCER   (+) Breast cancer, left (HCC)       Anesthetic History     Increased risk of difficult airway     Comments: Slow emergence  Difficulty breathing post-operatively     Review of Systems / Medical History  Patient summary reviewed, nursing notes reviewed and pertinent labs reviewed    Pulmonary        Sleep apnea: No treatment    Asthma : well controlled       Neuro/Psych             Comments: Neuropathy Cardiovascular    Hypertension          Hyperlipidemia    Exercise tolerance: <4 METS     GI/Hepatic/Renal     GERD: well controlled           Endo/Other    Diabetes: type 2    Morbid obesity, arthritis and cancer    Comments: Super obesity BMI = > 60  Left Breast cancer Other Findings   Comments: Swelling LUE           Physical Exam    Airway  Mallampati: III    Neck ROM: normal range of motion   Mouth opening: Normal     Cardiovascular    Rhythm: regular  Rate: normal         Dental    Dentition: Full upper dentures and Full lower dentures     Pulmonary      Decreased breath sounds: bilateral           Abdominal         Other Findings            Anesthetic Plan    ASA: 4, emergent  Anesthesia type: general            Anesthetic plan and risks discussed with: Patient      Informed consent obtained. Explained risks are high for this procedure with her underlying status.

## 2022-07-20 NOTE — ANESTHESIA PROCEDURE NOTES
Central Line Placement    Start time: 7/20/2022 2:54 PM  End time: 7/20/2022 3:01 PM  Performed by: Mary Miller DO  Authorized by: Mary Miller DO     Indications: vascular access  Preanesthetic Checklist: patient identified, risks and benefits discussed, anesthesia consent, patient being monitored and timeout performed    Timeout Time: 14:53 EDT       Pre-procedure: All elements of maximal sterile barrier technique followed?  Yes    2% Chlorhexidine for cutaneous antisepsis, Hand hygiene performed prior to catheter insertion and Ultrasound guidance    Sterile Ultrasound Technique followed?: Yes            Procedure:   Prep:  ChloraPrep  Location: internal jugular  Orientation:  Left  Patient position:  Trendelenburg  Catheter type:  Triple lumen  Catheter size:  7 Fr  Catheter length:  20 cm  Number of attempts:  1  Successful placement: Yes      Assessment:   Post-procedure:  Catheter secured, sterile dressing applied and sterile dressing with CHG applied  Assessment:  Blood return through all ports, free fluid flow and guidewire removal verified  Insertion:  Uncomplicated  Patient tolerance:  Patient tolerated the procedure well with no immediate complications

## 2022-07-20 NOTE — PROGRESS NOTES
.. TRANSFER - IN REPORT:    Verbal report received from PACU nurse(name) on Hilary Philip  being received from PACU(unit) for routine progression of care      Report consisted of patients Situation, Background, Assessment and   Recommendations(SBAR). Information from the following report(s) SBAR, Kardex, Intake/Output, MAR, and Cardiac Rhythm NSR  was reviewed with the receiving nurse. Opportunity for questions and clarification was provided. Assessment completed upon patients arrival to unit and care assumed. Alvarez Nelson .Primary Nurse Petrona Zayas RN and Ricardo RN performed a dual skin assessment on this patient Impairment noted- see wound doc flow sheet  Eliecer score is see flowsheet     2015- Admission assessment complete. Pt coughs with suction and withdraws L leg from painful stimulus. Pt does not open eyes to painful stimulus but does grimace. No movement in BUE and RLE. Pupils equal, round, reactive, and 3mm. S1S2 heard. Radial and pedal pulses palpable. +3 pitting edema noted in BUE and +4 pitting edema in BLE. Breath sounds clear/dim. Bowel sounds active. Pt turned, bathed, and oral care/suctioning complete. Wound noted on pt's sacrum. Zinc cream and Mepilex applied. Wound care consult placed. Daughter at bedside and updated on pt condition    2145- Dr. Andrew Whitfield on monitor to see patient    2200- Oral care/suctioning completed and pt turned. Restraints applied. 2240- Attempted to wean sedation, pt appears agitated, coughing over vent, systolic BP in 241W, opens eyes spontaneously but does not follow commands. Propofol increased--see MAR    2254- Per Dr. Brielle Daniels keep NGT to low continuous suction. 0000- Reassessment complete--see flowsheet. Pt turned and oral care/suctioning complete. Restraints assessed. 0200- Pt turned and oral care/suctioning complete. Restraints assessed. 0400- Reassessment complete--see flowsheet. Pt turned and oral care/suctioning complete. Restraints assessed. 0600- Pt turned and oral care/suctioning complete. Restraints assessed. Nadine Carl .Bedside shift change report given to CHRISTIAN Allison (oncoming nurse) by Emilee Grover RN (offgoing nurse). Report included the following information SBAR, Kardex, Intake/Output, MAR, and Cardiac Rhythm NSR .

## 2022-07-21 ENCOUNTER — APPOINTMENT (OUTPATIENT)
Dept: GENERAL RADIOLOGY | Age: 62
DRG: 220 | End: 2022-07-21
Attending: INTERNAL MEDICINE
Payer: MEDICAID

## 2022-07-21 LAB
ANION GAP SERPL CALC-SCNC: 6 MMOL/L (ref 5–15)
APPEARANCE UR: ABNORMAL
ARTERIAL PATENCY WRIST A: ABNORMAL
ARTERIAL PATENCY WRIST A: YES
BACTERIA URNS QL MICRO: NEGATIVE /HPF
BASE DEFICIT BLDA-SCNC: 0.3 MMOL/L
BASE EXCESS BLDA CALC-SCNC: 1.9 MMOL/L
BASOPHILS # BLD: 0 K/UL (ref 0–0.1)
BASOPHILS NFR BLD: 0 % (ref 0–1)
BDY SITE: ABNORMAL
BDY SITE: ABNORMAL
BILIRUB UR QL: NEGATIVE
BNP SERPL-MCNC: 220 PG/ML
BUN SERPL-MCNC: 21 MG/DL (ref 6–20)
BUN/CREAT SERPL: 23 (ref 12–20)
CALCIUM SERPL-MCNC: 9.4 MG/DL (ref 8.5–10.1)
CHLORIDE SERPL-SCNC: 110 MMOL/L (ref 97–108)
CO2 SERPL-SCNC: 27 MMOL/L (ref 21–32)
COLOR UR: ABNORMAL
CREAT SERPL-MCNC: 0.93 MG/DL (ref 0.55–1.02)
DIFFERENTIAL METHOD BLD: ABNORMAL
EOSINOPHIL # BLD: 0 K/UL (ref 0–0.4)
EOSINOPHIL NFR BLD: 0 % (ref 0–7)
EPITH CASTS URNS QL MICRO: ABNORMAL /LPF
ERYTHROCYTE [DISTWIDTH] IN BLOOD BY AUTOMATED COUNT: 19.9 % (ref 11.5–14.5)
EST. AVERAGE GLUCOSE BLD GHB EST-MCNC: 117 MG/DL
FIO2 ON VENT: 40 %
FIO2 ON VENT: 50 %
GAS FLOW.O2 SETTING OXYMISER: 18 L/MIN
GLUCOSE BLD STRIP.AUTO-MCNC: 112 MG/DL (ref 65–117)
GLUCOSE BLD STRIP.AUTO-MCNC: 114 MG/DL (ref 65–117)
GLUCOSE BLD STRIP.AUTO-MCNC: 125 MG/DL (ref 65–117)
GLUCOSE SERPL-MCNC: 141 MG/DL (ref 65–100)
GLUCOSE UR STRIP.AUTO-MCNC: NEGATIVE MG/DL
HBA1C MFR BLD: 5.7 % (ref 4–5.6)
HCO3 BLDA-SCNC: 23 MMOL/L (ref 22–26)
HCO3 BLDA-SCNC: 25 MMOL/L (ref 22–26)
HCT VFR BLD AUTO: 30 % (ref 35–47)
HGB BLD-MCNC: 9.1 G/DL (ref 11.5–16)
HGB UR QL STRIP: NEGATIVE
HYALINE CASTS URNS QL MICRO: ABNORMAL /LPF (ref 0–5)
IMM GRANULOCYTES # BLD AUTO: 0 K/UL
IMM GRANULOCYTES NFR BLD AUTO: 0 %
KETONES UR QL STRIP.AUTO: NEGATIVE MG/DL
LEUKOCYTE ESTERASE UR QL STRIP.AUTO: NEGATIVE
LYMPHOCYTES # BLD: 0.2 K/UL (ref 0.8–3.5)
LYMPHOCYTES NFR BLD: 2 % (ref 12–49)
MAGNESIUM SERPL-MCNC: 2.2 MG/DL (ref 1.6–2.4)
MCH RBC QN AUTO: 29.8 PG (ref 26–34)
MCHC RBC AUTO-ENTMCNC: 30.3 G/DL (ref 30–36.5)
MCV RBC AUTO: 98.4 FL (ref 80–99)
MONOCYTES # BLD: 0.6 K/UL (ref 0–1)
MONOCYTES NFR BLD: 6 % (ref 5–13)
NEUTS BAND NFR BLD MANUAL: 13 % (ref 0–6)
NEUTS SEG # BLD: 9.6 K/UL (ref 1.8–8)
NEUTS SEG NFR BLD: 79 % (ref 32–75)
NITRITE UR QL STRIP.AUTO: NEGATIVE
NRBC # BLD: 0 K/UL (ref 0–0.01)
NRBC BLD-RTO: 0 PER 100 WBC
PCO2 BLDA: 34 MMHG (ref 35–45)
PCO2 BLDA: 35 MMHG (ref 35–45)
PEEP RESPIRATORY: 5 CM[H2O]
PEEP RESPIRATORY: 8 CM[H2O]
PH BLDA: 7.45 [PH] (ref 7.35–7.45)
PH BLDA: 7.47 [PH] (ref 7.35–7.45)
PH UR STRIP: 5.5 [PH] (ref 5–8)
PLATELET # BLD AUTO: 173 K/UL (ref 150–400)
PMV BLD AUTO: 8.9 FL (ref 8.9–12.9)
PO2 BLDA: 162 MMHG (ref 80–100)
PO2 BLDA: 75 MMHG (ref 80–100)
POTASSIUM SERPL-SCNC: 4.3 MMOL/L (ref 3.5–5.1)
PRESSURE SUPPORT SETTING VENT: 5 CM[H2O]
PROT UR STRIP-MCNC: NEGATIVE MG/DL
RBC # BLD AUTO: 3.05 M/UL (ref 3.8–5.2)
RBC #/AREA URNS HPF: ABNORMAL /HPF (ref 0–5)
RBC MORPH BLD: ABNORMAL
RBC MORPH BLD: ABNORMAL
SAO2 % BLD: 96 % (ref 92–97)
SAO2 % BLD: 99 % (ref 92–97)
SAO2% DEVICE SAO2% SENSOR NAME: ABNORMAL
SAO2% DEVICE SAO2% SENSOR NAME: ABNORMAL
SERVICE CMNT-IMP: ABNORMAL
SERVICE CMNT-IMP: NORMAL
SERVICE CMNT-IMP: NORMAL
SODIUM SERPL-SCNC: 143 MMOL/L (ref 136–145)
SP GR UR REFRACTOMETRY: >1.03 (ref 1–1.03)
SPECIMEN SITE: ABNORMAL
SPECIMEN SITE: ABNORMAL
UA: UC IF INDICATED,UAUC: ABNORMAL
UROBILINOGEN UR QL STRIP.AUTO: 1 EU/DL (ref 0.2–1)
VENTILATION MODE VENT: ABNORMAL
VENTILATION MODE VENT: ABNORMAL
VT SETTING VENT: 400 ML
WBC # BLD AUTO: 10.4 K/UL (ref 3.6–11)
WBC URNS QL MICRO: ABNORMAL /HPF (ref 0–4)

## 2022-07-21 PROCEDURE — 74011000258 HC RX REV CODE- 258: Performed by: SURGERY

## 2022-07-21 PROCEDURE — 94640 AIRWAY INHALATION TREATMENT: CPT

## 2022-07-21 PROCEDURE — 83880 ASSAY OF NATRIURETIC PEPTIDE: CPT

## 2022-07-21 PROCEDURE — 82803 BLOOD GASES ANY COMBINATION: CPT

## 2022-07-21 PROCEDURE — 36415 COLL VENOUS BLD VENIPUNCTURE: CPT

## 2022-07-21 PROCEDURE — 74011000250 HC RX REV CODE- 250: Performed by: SURGERY

## 2022-07-21 PROCEDURE — 65610000006 HC RM INTENSIVE CARE

## 2022-07-21 PROCEDURE — 83036 HEMOGLOBIN GLYCOSYLATED A1C: CPT

## 2022-07-21 PROCEDURE — 2709999900 HC NON-CHARGEABLE SUPPLY

## 2022-07-21 PROCEDURE — 81001 URINALYSIS AUTO W/SCOPE: CPT

## 2022-07-21 PROCEDURE — 74011250636 HC RX REV CODE- 250/636: Performed by: NURSE PRACTITIONER

## 2022-07-21 PROCEDURE — 74011000258 HC RX REV CODE- 258: Performed by: NURSE PRACTITIONER

## 2022-07-21 PROCEDURE — 82962 GLUCOSE BLOOD TEST: CPT

## 2022-07-21 PROCEDURE — 74011250636 HC RX REV CODE- 250/636: Performed by: SURGERY

## 2022-07-21 PROCEDURE — 77030020847 HC STATLOK BARD -A

## 2022-07-21 PROCEDURE — 85025 COMPLETE CBC W/AUTO DIFF WBC: CPT

## 2022-07-21 PROCEDURE — 74011250636 HC RX REV CODE- 250/636: Performed by: INTERNAL MEDICINE

## 2022-07-21 PROCEDURE — 74011000250 HC RX REV CODE- 250: Performed by: INTERNAL MEDICINE

## 2022-07-21 PROCEDURE — C9113 INJ PANTOPRAZOLE SODIUM, VIA: HCPCS | Performed by: INTERNAL MEDICINE

## 2022-07-21 PROCEDURE — 74011250636 HC RX REV CODE- 250/636: Performed by: ANESTHESIOLOGY

## 2022-07-21 PROCEDURE — 83735 ASSAY OF MAGNESIUM: CPT

## 2022-07-21 PROCEDURE — 36600 WITHDRAWAL OF ARTERIAL BLOOD: CPT

## 2022-07-21 PROCEDURE — 71045 X-RAY EXAM CHEST 1 VIEW: CPT

## 2022-07-21 PROCEDURE — 74011250637 HC RX REV CODE- 250/637: Performed by: SURGERY

## 2022-07-21 PROCEDURE — 80048 BASIC METABOLIC PNL TOTAL CA: CPT

## 2022-07-21 PROCEDURE — 77030029065 HC DRSG HEMO QCLOT ZMED -B

## 2022-07-21 PROCEDURE — 94003 VENT MGMT INPAT SUBQ DAY: CPT

## 2022-07-21 RX ORDER — HYDRALAZINE HYDROCHLORIDE 20 MG/ML
20 INJECTION INTRAMUSCULAR; INTRAVENOUS ONCE
Status: ACTIVE | OUTPATIENT
Start: 2022-07-21 | End: 2022-07-21

## 2022-07-21 RX ORDER — LABETALOL HCL 20 MG/4 ML
20 SYRINGE (ML) INTRAVENOUS
Status: DISCONTINUED | OUTPATIENT
Start: 2022-07-21 | End: 2022-07-29 | Stop reason: HOSPADM

## 2022-07-21 RX ORDER — FUROSEMIDE 10 MG/ML
20 INJECTION INTRAMUSCULAR; INTRAVENOUS ONCE
Status: COMPLETED | OUTPATIENT
Start: 2022-07-21 | End: 2022-07-21

## 2022-07-21 RX ORDER — MICONAZOLE NITRATE 2 %
POWDER (GRAM) TOPICAL 2 TIMES DAILY
Status: DISCONTINUED | OUTPATIENT
Start: 2022-07-21 | End: 2022-07-29 | Stop reason: HOSPADM

## 2022-07-21 RX ORDER — HYDROMORPHONE HYDROCHLORIDE 1 MG/ML
1 INJECTION, SOLUTION INTRAMUSCULAR; INTRAVENOUS; SUBCUTANEOUS
Status: DISCONTINUED | OUTPATIENT
Start: 2022-07-21 | End: 2022-07-23

## 2022-07-21 RX ADMIN — ENOXAPARIN SODIUM 40 MG: 100 INJECTION SUBCUTANEOUS at 08:37

## 2022-07-21 RX ADMIN — HYDROMORPHONE HYDROCHLORIDE 1 MG: 1 INJECTION, SOLUTION INTRAMUSCULAR; INTRAVENOUS; SUBCUTANEOUS at 10:47

## 2022-07-21 RX ADMIN — BUDESONIDE 250 MCG: 0.5 SUSPENSION RESPIRATORY (INHALATION) at 19:31

## 2022-07-21 RX ADMIN — FUROSEMIDE 20 MG: 10 INJECTION, SOLUTION INTRAMUSCULAR; INTRAVENOUS at 09:50

## 2022-07-21 RX ADMIN — HYDROMORPHONE HYDROCHLORIDE: 10 INJECTION INTRAMUSCULAR; INTRAVENOUS; SUBCUTANEOUS at 11:23

## 2022-07-21 RX ADMIN — HYDRALAZINE HYDROCHLORIDE 20 MG: 20 INJECTION INTRAMUSCULAR; INTRAVENOUS at 10:01

## 2022-07-21 RX ADMIN — SODIUM CHLORIDE, PRESERVATIVE FREE 40 MG: 5 INJECTION INTRAVENOUS at 20:06

## 2022-07-21 RX ADMIN — Medication 10 ML: at 22:00

## 2022-07-21 RX ADMIN — MORPHINE SULFATE 4 MG: 4 INJECTION INTRAVENOUS at 08:49

## 2022-07-21 RX ADMIN — SODIUM CHLORIDE, PRESERVATIVE FREE 40 MG: 5 INJECTION INTRAVENOUS at 08:38

## 2022-07-21 RX ADMIN — ARFORMOTEROL TARTRATE 15 MCG: 15 SOLUTION RESPIRATORY (INHALATION) at 07:30

## 2022-07-21 RX ADMIN — MICONAZOLE NITRATE 2 % TOPICAL POWDER: at 18:16

## 2022-07-21 RX ADMIN — ENOXAPARIN SODIUM 40 MG: 100 INJECTION SUBCUTANEOUS at 20:07

## 2022-07-21 RX ADMIN — PIPERACILLIN AND TAZOBACTAM 4.5 G: 4; .5 INJECTION, POWDER, FOR SOLUTION INTRAVENOUS at 19:53

## 2022-07-21 RX ADMIN — PROPOFOL 50 MCG/KG/MIN: 10 INJECTION, EMULSION INTRAVENOUS at 06:31

## 2022-07-21 RX ADMIN — Medication 10 ML: at 13:09

## 2022-07-21 RX ADMIN — PIPERACILLIN AND TAZOBACTAM 4.5 G: 4; .5 INJECTION, POWDER, FOR SOLUTION INTRAVENOUS at 12:46

## 2022-07-21 RX ADMIN — ARFORMOTEROL TARTRATE 15 MCG: 15 SOLUTION RESPIRATORY (INHALATION) at 19:31

## 2022-07-21 RX ADMIN — PROPOFOL 50 MCG/KG/MIN: 10 INJECTION, EMULSION INTRAVENOUS at 04:22

## 2022-07-21 RX ADMIN — Medication 10 ML: at 05:15

## 2022-07-21 RX ADMIN — LABETALOL HYDROCHLORIDE 20 MG: 5 INJECTION, SOLUTION INTRAVENOUS at 10:55

## 2022-07-21 RX ADMIN — BUDESONIDE 250 MCG: 0.5 SUSPENSION RESPIRATORY (INHALATION) at 07:30

## 2022-07-21 RX ADMIN — PROPOFOL 50 MCG/KG/MIN: 10 INJECTION, EMULSION INTRAVENOUS at 02:07

## 2022-07-21 RX ADMIN — FLUCONAZOLE 400 MG: 2 INJECTION, SOLUTION INTRAVENOUS at 20:07

## 2022-07-21 RX ADMIN — PIPERACILLIN AND TAZOBACTAM 3.38 G: 3; .375 INJECTION, POWDER, FOR SOLUTION INTRAVENOUS at 02:07

## 2022-07-21 NOTE — PROGRESS NOTES
Spiritual Care Assessment/Progress Note  [address]      NAME: Roxi Fish      MRN: [id number]  AGE: 65 y.o. SEX: female  Nondenominational Affiliation: Jain   Language: English     7/21/2022     Total Time (in minutes): 44     Spiritual Assessment begun in [facility]ENS[facility] through conversation with:         [x]Patient        [x] Family    [] Friend(s)        Reason for Consult: Initial/Spiritual assessment, critical care     Spiritual beliefs: (Please include comment if needed)     [x] Identifies with a dilma tradition:    Jain      [] Supported by a dilma community:            [] Claims no spiritual orientation:           [] Seeking spiritual identity:                [] Adheres to an individual form of spirituality:           [] Not able to assess:                           Identified resources for coping:      [x] Prayer                               [] Music                  [] Guided Imagery     [x] Family/friends                 [] Pet visits     [] Devotional reading                         [] Unknown     [] Other:                                               Interventions offered during this visit: (See comments for more details)    Patient Interventions: Affirmation of emotions/emotional suffering, Affirmation of dilma, Denominational/blessing, Catharsis/review of pertinent events in supportive environment, Coping skills reviewed/reinforced, Initial/Spiritual assessment, Critical care, Life review/legacy, Normalization of emotional/spiritual concerns, Prayer (assurance of), Prayer (actual), Nondenominational beliefs/image of God discussed     Family/Friend(s):  Affirmation of emotions/emotional suffering, Affirmation of dilma, Denominational/blessing, Catharsis/review of pertinent events in supportive environment, Initial Assessment, Life review/legacy, Nondenominational beliefs/image of God discussed, Prayer (actual), Prayer (assurance of), Normalization of emotional/spiritual concerns     Plan of Care:     [] Support spiritual and/or cultural needs    [] Support AMD and/or advance care planning process      [] Support grieving process   [] Coordinate Rites and/or Rituals    [] Coordination with community clergy   [] No spiritual needs identified at this time   [] Detailed Plan of Care below (See Comments)  [] Make referral to Music Therapy  [] Make referral to Pet Therapy     [] Make referral to Addiction services  [] Make referral to [facility]  [] Make referral to Spiritual Care Partner  [] No future visits requested        [x] Contact Spiritual Care for further referrals     Comments:   Initial spiritual care assessment with Ms. Pavan Redmond in ICU. Ms Catalina Schlatter is connected to IV's and equipment and awake and alert. She is accompanied by her one and only daughter, Benny Griffin. Pt speaks softly at this time, as it is hard for to talk much now. Both shared much about their lives. Ms Catalina Schlatter shared her strong dilma in God which is a strength and great hope for her. She also shared about her medical condition. She asked for prayer and  prayed for her, and listened empathetically with a caring, supportive presence, and comforting words. They both expressed much appreciation and welcomed further visits.      Chaplain Bertrand Raines M.Div.  Bebo Cortez ([id number])

## 2022-07-21 NOTE — CONSULTS
Consult Date: 7/21/2022    Consults Consulted by hospitalist for assistance with ICU mgmt of perforated gastric ulcer. Subjective     ( Pt intubated at time of eval). 64 y/o with pmh DM, HTN, obesity, PARMINDER, breast cancer admitted with abd pain and found to have perforated gastric ulcer. Taken to OR for repair 7/20. Brought back to ICU on vent. Pt on 50% and 8 of PEEP at time of eval.  Not requiring pressors.       Past Medical History:   Diagnosis Date    Arrhythmia     heart murmur    Arthritis     Right knee, DJD left knee    Asthma     Cancer ([facility] [address]     COVID-19 01/29/2021    Diabetes ([facility] [address]     Pre diabetes    Hypertension     Morbid obesity ([facility] [address]     Neuropathy     Patient reports numbness and tingling in her legs and feet from her back    Sleep apnea     was ordered a CPAP years ago, but never used      Past Surgical History:   Procedure Laterality Date    HX BREAST LUMPECTOMY Left 3/2/2022    LEFT BREAST LUMPECTOMY WITH LEFT BREAST SENTINEL NODE BIOPSY WITH BLUE DYE AND PORT A CATH INSERTION (URGENT) performed by Madeline Bejarano MD at [address]    HX CHOLECYSTECTOMY      HX GYN      c section    HX GYN      d&c    HX WISDOM TEETH EXTRACTION Bilateral      Family History   Problem Relation Age of Onset    Hypertension Mother     Heart Disease Mother     Heart Disease Father     Hypertension Father     Breast Cancer Maternal Aunt     Breast Cancer Other     Breast Cancer Maternal Aunt       Social History     Tobacco Use    Smoking status: Never    Smokeless tobacco: Never   Substance Use Topics    Alcohol use: Yes     Comment: social once per month       Current Facility-Administered Medications   Medication Dose Route Frequency Provider Last Rate Last Admin    sodium chloride (NS) flush 5-40 mL  5-40 mL IntraVENous Q8H Rocio Smith MD   10 mL at 07/20/22 2100    sodium chloride (NS) flush 5-40 mL  5-40 mL IntraVENous PRN Rocio Smith MD        lactated Ringers infusion  50 mL/hr IntraVENous CONTINUOUS Anuradha Oneill MD 50 mL/hr at 07/20/22 2200 50 mL/hr at 07/20/22 2200    morphine injection 4 mg  4 mg IntraVENous Q3H PRN Callie Eid MD        naloxone [facility]) injection 0.4 mg  0.4 mg IntraVENous PRN Callie Eid MD        ondansetron [facility]) injection 4 mg  4 mg IntraVENous Q4H PRN Callie Eid MD        enoxaparin (LOVENOX) injection 40 mg  40 mg SubCUTAneous Q12H Ninfa Haley MD        piperacillin-tazobactam (ZOSYN) 3.375 g in 0.9% sodium chloride (MBP/ADV) 100 mL MBP  3.375 g IntraVENous Q8H Callie Eid MD 25 mL/hr at 07/20/22 2039 3.375 g at 07/20/22 2039    piperacillin-tazobactam (ZOSYN) 4.5 g in 0.9% sodium chloride (MBP/ADV) 100 mL MBP  4.5 g IntraVENous ONCE Ninfa Haley MD        arformoteroL (BROVANA) neb solution 15 mcg  15 mcg Nebulization BID RT Callie Eid MD   15 mcg at 07/20/22 2050    And    budesonide (PULMICORT) 500 mcg/2 ml nebulizer suspension  250 mcg Nebulization BID RT Callie Eid MD   250 mcg at 07/20/22 2050    albuterol-ipratropium (DUO-NEB) 2.5 MG-0.5 MG/3 ML  3 mL Nebulization Q6H PRN Callie Eid MD   3 mL at 07/20/22 2046    glucose chewable tablet 16 g  4 Tablet Oral PRN Callie Eid MD        glucagon (GLUCAGEN) injection 1 mg  1 mg IntraMUSCular PRN Callie Eid MD        dextrose 10% infusion 0-250 mL  0-250 mL IntraVENous PRN Callie Eid MD        insulin lispro (HUMALOG) injection   SubCUTAneous Q6H Callie Eid MD        fluconazole (DIFLUCAN) 400mg/200 mL IVPB (premix)  400 mg IntraVENous Q24H Callie Eid  mL/hr at 07/20/22 2055 400 mg at 07/20/22 2055    hydrALAZINE (APRESOLINE) 20 mg/mL injection 20 mg  20 mg IntraVENous Q6H PRN Citlaly Meeks MD        propofol (DIPRIVAN) 10 mg/mL infusion  5-75 mcg/kg/min IntraVENous TITRATE Marta Scott MD 46.7 mL/hr at 07/20/22 2355 50 mcg/kg/min at 07/20/22 2355    pantoprazole (PROTONIX) 40 mg in 0.9% sodium chloride 10 mL injection  40 mg IntraVENous Q12H Zari Meeks MD   40 mg at 07/20/22 2040    amLODIPine (NORVASC) tablet 10 mg  10 mg Per NG tube DAILY Atul Jacobs MD            Review of Systems  Unable to obtain  Objective   Gen: intubated, sedated, obese  HEENT: ETT in place  Chest: symmetrical chest rise  CV: r/r/r  Abd: obese, non-distended, post op  Ext: well perfused  Neuro: not moving spontaneously or following commands at time of my eval.      Vital signs for last 24 hours:  Visit Vitals  BP (!) 149/67 (BP 1 Location: Right arm, BP Patient Position: At rest)   Pulse 81   Temp 97.9 °F (36.6 °C)   Resp 18   Ht 5' 2\" (1.575 m)   Wt 155.6 kg (343 lb 0.6 oz)   LMP 01/28/2021 (Approximate)   SpO2 100%   BMI 62.74 kg/m²       Intake/Output this shift:  Current Shift: 07/20 1901 - 07/21 0700  In: 192.1 [I.V.:192.1]  Out: 650 [Urine:650]  Last 3 Shifts: No intake/output data recorded. Data Review:   Recent Results (from the past 24 hour(s))   METABOLIC PANEL, COMPREHENSIVE    Collection Time: 07/20/22 11:45 AM   Result Value Ref Range    Sodium 142 136 - 145 mmol/L    Potassium 4.6 3.5 - 5.1 mmol/L    Chloride 107 97 - 108 mmol/L    CO2 27 21 - 32 mmol/L    Anion gap 8 5 - 15 mmol/L    Glucose 131 (H) 65 - 100 mg/dL    BUN 28 (H) 6 - 20 MG/DL    Creatinine 1.26 (H) 0.55 - 1.02 MG/DL    BUN/Creatinine ratio 22 (H) 12 - 20      GFR est AA 52 (L) >60 ml/min/1.73m2    GFR est non-AA 43 (L) >60 ml/min/1.73m2    Calcium 10.1 8.5 - 10.1 MG/DL    Bilirubin, total 0.8 0.2 - 1.0 MG/DL    ALT (SGPT) 112 (H) 12 - 78 U/L    AST (SGOT) 67 (H) 15 - 37 U/L    Alk.  phosphatase 79 45 - 117 U/L    Protein, total 6.5 6.4 - 8.2 g/dL    Albumin 2.5 (L) 3.5 - 5.0 g/dL    Globulin 4.0 2.0 - 4.0 g/dL    A-G Ratio 0.6 (L) 1.1 - 2.2     CBC WITH AUTOMATED DIFF    Collection Time: 07/20/22 11:45 AM   Result Value Ref Range    WBC 19.9 (H) 3.6 - 11.0 K/uL    RBC 3.78 (L) 3.80 - 5.20 M/uL    HGB 11.2 (L) 11.5 - 16.0 g/dL HCT 37.5 35.0 - 47.0 %    MCV 99.2 (H) 80.0 - 99.0 FL    MCH 29.6 26.0 - 34.0 PG    MCHC 29.9 (L) 30.0 - 36.5 g/dL    RDW 20.0 (H) 11.5 - 14.5 %    PLATELET 774 645 - 160 K/uL    MPV 9.2 8.9 - 12.9 FL    NRBC 0.0 0  WBC    ABSOLUTE NRBC 0.00 0.00 - 0.01 K/uL    NEUTROPHILS 93 (H) 32 - 75 %    BAND NEUTROPHILS 3 0 - 6 %    LYMPHOCYTES 1 (L) 12 - 49 %    MONOCYTES 3 (L) 5 - 13 %    EOSINOPHILS 0 0 - 7 %    BASOPHILS 0 0 - 1 %    IMMATURE GRANULOCYTES 0 %    ABS. NEUTROPHILS 19.1 (H) 1.8 - 8.0 K/UL    ABS. LYMPHOCYTES 0.2 (L) 0.8 - 3.5 K/UL    ABS. MONOCYTES 0.6 0.0 - 1.0 K/UL    ABS. EOSINOPHILS 0.0 0.0 - 0.4 K/UL    ABS. BASOPHILS 0.0 0.0 - 0.1 K/UL    ABS. IMM.  GRANS. 0.0 K/UL    DF MANUAL      RBC COMMENTS ANISOCYTOSIS  2+        RBC COMMENTS MACROCYTOSIS  1+       NT-PRO BNP    Collection Time: 07/20/22 11:45 AM   Result Value Ref Range    NT pro- (H) <125 PG/ML   GLUCOSE, POC    Collection Time: 07/20/22  1:40 PM   Result Value Ref Range    Glucose (POC) 120 (H) 65 - 117 mg/dL    Performed by [address]    Collection Time: 07/20/22  2:50 PM   Result Value Ref Range    Crossmatch Expiration 07/23/2022,2359     ABO/Rh(D) B POSITIVE     Antibody screen NEG    BLOOD GAS, ARTERIAL    Collection Time: 07/20/22  5:35 PM   Result Value Ref Range    pH 7.41 7.35 - 7.45      PCO2 33 (L) 35 - 45 mmHg    PO2 173 (H) 80 - 100 mmHg    O2 SAT 99 (H) 92 - 97 %    BICARBONATE 21 (L) 22 - 26 mmol/L    BASE DEFICIT 3.0 mmol/L    O2 METHOD VENT      FIO2 50 %    MODE ASSIST CONTROL      Tidal volume 400.0      SET RATE 18      PEEP/CPAP 8.0      Sample source ARTERIAL      SITE DRAWN FROM ARTERIAL LINE      MARY'S TEST YES     GLUCOSE, POC    Collection Time: 07/20/22  5:40 PM   Result Value Ref Range    Glucose (POC) 117 65 - 117 mg/dL    Performed by Marlys Price, POC    Collection Time: 07/20/22 11:00 PM   Result Value Ref Range    Glucose (POC) 103 65 - 117 mg/dL    Performed by [facility]        66 y/o with hx DM, HTN, PARMINDER, obesity, breast cancer admitted with perforated gastric ulcer:    Gastric Ulcer Perf:  -- s/p repair 7/20  -- continue zosyn and fluconazole  -- PPI    Acute Resp Failure:  -- continue to wean O2 for goal sat >92%  -- SBT when meeting criteria, extubate when passing  -- might be challenging extubation given body habitus, surgery, ext.    -- pt appears like she could be difficult re-intubation. Will keep restrained to avoid accidental self extubation. HTN:  -- amlodipine ordered by held by CCM until cleared by surgery for PO  -- would use PRN hydral if needed until more stable. Leukocytosis:  -- secondary to perf'd gastric ulcer  -- getting work up for other potential etiologies    DM:  -- SSI for goal <180    Morbid Obesity:  -- complicates all aspects of care    CCM time 40min. Pt seen and evaluated via tele interaction. Audio and video used for this encounter. Pt at risk of life threatening deterioration from perforated gastric ulcer.

## 2022-07-21 NOTE — PROGRESS NOTES
Abdifatah Guerin [facility]  [address], [address] [place] [address]  [phone]      Medical Progress Note      NAME: Gayle Anglin   :  1960  MRM:  [id number]    Date/Time: 2022  12:03 PM         Subjective:     Chief Complaint: \"I'm okay\"     Pt seen and examined. Currently on SBT. Very tender on exam. Dr. Erika Rowley notified and evaluated as per his note    ROS:  Pt intubated      Objective:       Vitals:          Last 24hrs VS reviewed since prior progress note. Most recent are:    Visit Vitals  BP (!) 178/63   Pulse (!) 119   Temp 98.6 °F (37 °C)   Resp 13   Ht 5' 2\" (1.575 m)   Wt 155.6 kg (343 lb 0.6 oz)   SpO2 100%   BMI 62.74 kg/m²     SpO2 Readings from Last 6 Encounters:   22 100%   22 95%   06/15/22 95%   06/15/22 95%   22 96%   22 95%    O2 Flow Rate (L/min): 6 l/min     Intake/Output Summary (Last 24 hours) at 2022 1203  Last data filed at 2022 0600  Gross per 24 hour   Intake 518.95 ml   Output 1050 ml   Net -531.05 ml          Exam:     Physical Exam:    Gen: Pt intubated. On SBT. Morbidly obese. Anasarcic  HEENT:  Pink conjunctivae, PERRL, hearing intact to voice, moist mucous membranes  Neck:  Supple, without masses, thyroid non-tender  Resp:  No accessory muscle use, clear breath sounds without wheezes rales or rhonchi  Card: tachycardic. No murmurs, normal S1, S2 without thrills, bruits or peripheral edema  Abd: (+) tenderness to even light palpation. Hypoactive. Midline dressing C/D/I   Musc:  No cyanosis or clubbing  Skin:  No rashes or ulcers, skin turgor is good  Neuro:  Cranial nerves 3-12 are grossly intact   Psych:  Intubated     Medications Reviewed: (see below)    Lab Data Reviewed: (see below)    ______________________________________________________________________    Medications:     Current Facility-Administered Medications   Medication Dose Route Frequency    hydrALAZINE (APRESOLINE) 20 mg/mL injection 20 mg  20 mg IntraVENous ONCE    HYDROmorphone (DILAUDID) injection 1 mg  1 mg IntraVENous Q4H PRN    labetaloL (NORMODYNE;TRANDATE) 20 mg/4 mL (5 mg/mL) injection 20 mg  20 mg IntraVENous Q6H PRN    HYDROmorphone 30 mg/30 mL (DILAUDID) PCA   IntraVENous CONTINUOUS    piperacillin-tazobactam (ZOSYN) 4.5 g in 0.9% sodium chloride (MBP/ADV) 100 mL MBP  4.5 g IntraVENous Q8H    sodium chloride (NS) flush 5-40 mL  5-40 mL IntraVENous Q8H    sodium chloride (NS) flush 5-40 mL  5-40 mL IntraVENous PRN    lactated Ringers infusion  50 mL/hr IntraVENous CONTINUOUS    naloxone (NARCAN) injection 0.4 mg  0.4 mg IntraVENous PRN    ondansetron (ZOFRAN) injection 4 mg  4 mg IntraVENous Q4H PRN    enoxaparin (LOVENOX) injection 40 mg  40 mg SubCUTAneous Q12H    arformoteroL (BROVANA) neb solution 15 mcg  15 mcg Nebulization BID RT    And    budesonide (PULMICORT) 500 mcg/2 ml nebulizer suspension  250 mcg Nebulization BID RT    albuterol-ipratropium (DUO-NEB) 2.5 MG-0.5 MG/3 ML  3 mL Nebulization Q6H PRN    glucose chewable tablet 16 g  4 Tablet Oral PRN    glucagon (GLUCAGEN) injection 1 mg  1 mg IntraMUSCular PRN    dextrose 10% infusion 0-250 mL  0-250 mL IntraVENous PRN    insulin lispro (HUMALOG) injection   SubCUTAneous Q6H    fluconazole (DIFLUCAN) 400mg/200 mL IVPB (premix)  400 mg IntraVENous Q24H    hydrALAZINE (APRESOLINE) 20 mg/mL injection 20 mg  20 mg IntraVENous Q6H PRN    pantoprazole (PROTONIX) 40 mg in 0.9% sodium chloride 10 mL injection  40 mg IntraVENous Q12H    [Held by provider] amLODIPine (NORVASC) tablet 10 mg  10 mg Per NG tube DAILY            Lab Review:     Recent Labs     07/21/22  0141 07/20/22  1145   WBC 10.4 19.9*   HGB 9.1* 11.2*   HCT 30.0* 37.5    236     Recent Labs     07/21/22  0141 07/20/22  1145    142   K 4.3 4.6   * 107   CO2 27 27   * 131*   BUN 21* 28*   CREA 0.93 1.26*   CA 9.4 10.1   MG 2.2  --    ALB  --  2.5*   ALT  --  112*     No components found for: [place] Assessment / Plan:   1) Gastric ulcer perforation - s/p repair  -on zosyn and fluconazole as per surgery  -IV PPI BID  -defer need for imaging to gen surg  -start PCA due to pain       2) Breast cancer, left ([facility] [address] (2/3/2022)  -outpt management  -high risk for DVT; on DVT prophylaxis as per gen surgery      3) HTN (hypertension) (7/20/2022)  -optimize pain control   -PRN hydralazine, labetolol   -hold ACE/HCTZ for now      4) DM (diabetes mellitus) ([facility] [address] (7/20/2022)  -ISS  -BG checks q6      5) Leukocytosis (7/20/2022) - likely 2/2 peritonitis  -on zosyn  -UA not c/w infection   -CXR without PNA      6) KIAN (acute kidney injury) ([facility] [address] (7/20/2022) - resolved  -monitor with diuresis       7) Morbid obesity ([facility] [address] (7/20/2022)  -weight loss      8) Hypoalbuminemia (7/20/2022) - ?poor nutritional status  -UA without proteinuria  -check prealbumin  -IV diuresis  -would benefit from nutrition eval once able to tolerate PO     Total time spent with patient: 28 [address] discussed with: Patient, Nursing Staff, Consultant/Specialist, and >50% of time spent in counseling and coordination of care    Discussed:  Care Plan    Prophylaxis:  Lovenox    Disposition:  Home w/Family           ___________________________________________________    Attending Physician: Yuri Kenney MD

## 2022-07-21 NOTE — PROGRESS NOTES
7/21/2022  2:19 PM  CM completed assessment w/ pt and Dtr Yessica jai in person, CM wore mask at all times. Charted demographics verified, pt lives w/ daughter Corwin Balbuena in a mobile home, there are 4 entry steps w/ handrail. At baseline pt reports to be ambulatory, independent w/ ADLS, drives,no fall history. Family can assist at DC   Reason for Admission:  Emergent for  Bowel Perforation  Pt is a 64 yo AAF who [facility] c/o abdominal pain for several days. PMHx:  Arrhythmia        heart murmur    Arthritis       Right knee, DJD left knee    Asthma      Cancer ([facility] [address]      COVID-19 01/29/2021    Diabetes ([facility] [address]       Pre diabetes    Hypertension      Morbid obesity ([facility] [address]      Neuropathy       Patient reports numbness and tingling in her legs and feet from her back    Sleep apnea       was ordered a CPAP years ago, but never used                       RUR Score:    18 % Moderate Risk of Readmission/Yellow               PCP: First and Last name:   Cele German NP     Name of Practice:    Are you a current patient: Yes/No: yes    Approximate date of last visit: > 30 days   Can you participate in a virtual visit if needed: NO    Do you (patient/family) have any concerns for transition/discharge? Pt lives w/ daughter Corwin Balbuena who is supportive              Plan for utilizing home health:   PT, [facility] hawley pending, pt has not had HH or Rehab in past  DME: None  Rx: Medicaid, pt uses [facility] in [place] ans is usually covered for her medications  COVID Vax Hx;  Moderna, 2 jabs in 2021, 1 booster 2022  Current Advanced Directive/Advance Care Plan:  Full Code      Healthcare Decision Maker:   Click here to complete [address] including selection of the Healthcare Decision Maker Relationship (ie \"Primary\")              Transition of Care Plan:        RUR 18 % Moderate Risk of Readmission/Yellow   LOS 1 Day  Hospital admission for medical management   Surgery consult, POD #1 Ex-Lap w/ gastric perforation repair  Wound Care consult  PT, OT chandan pending, pt's insurance has no SNF benefit   CM to follow through for treatment/response  DC when stable to home w/ family assistance and HH vs None  Outpatient follow up Surgery, PCP  Family will transport  Care Management Interventions  PCP Verified by CM: Yes (Demi Bella NP, last visit > 30 days )  Palliative Care Criteria Met (RRAT>21 & CHF Dx)?: No  Mode of Transport at Discharge: Self (Family)  Physical Therapy Consult: Yes  Occupational Therapy Consult: Yes  Speech Therapy Consult: No  Support Systems: Child(shaun) (pt lives w/ Dtr in pvt residence, at baseline pt is ambulatory, iADLS, drives,)  Confirm Follow Up Transport: Family  Discharge Location  Patient Expects to be Discharged to[de-identified] Home with home health  DAMI Pena       12:17 PM  CM attempted to complete assessment w/ pt's Dtr Sherrie Carballo via phone, she is on her way to [facility], will meet w/ her when she arrives.   DAMI Pena

## 2022-07-21 NOTE — CONSULTS
[facility]    ICU TEAM Progress Note    Name: Gayle Anglin   : 1960   MRN: [id number]   Date: 2022      Subjective:   Progress Note: 2022      Ms Gayle Anglin is a 64 yo female with a hx of breast cancer and morbid obesity who presented to the ED with pneumoperitoneum. She received zosyn and was taken to the OR for ex-lap. In the OR, she was found to have a perorated gastric ulcer. She was transferred to the ICU intubated and sedated. She is undergoing SAT/SBT this am and will be extubated if she passes. She is reporting significant post-op pain and the plan for post-extubation pain management is dilaudid PCA.     POD:1 Day Post-Op    S/P: Procedure(s):  LAPAROTOMY EXPLORATORY, repair of gastric ulcer    Active Problem List:     Problem List  Date Reviewed: 2022            Codes Class    * (Principal) Bowel perforation (HCC) ICD-10-CM: K63.1  ICD-9-CM: 569.83         HTN (hypertension) ICD-10-CM: I10  ICD-9-CM: 401.9         DM (diabetes mellitus) ([facility] [address] ICD-10-CM: E11.9  ICD-9-CM: 250.00         Leukocytosis ICD-10-CM: D72.829  ICD-9-CM: 288.60         KIAN (acute kidney injury) ([facility] [address] ICD-10-CM: N17.9  ICD-9-CM: 584.9         Morbid obesity ([address] ICD-10-CM: E66.01  ICD-9-CM: 278.01         Hypoalbuminemia ICD-10-CM: E88.09  ICD-9-CM: 273.8         Encounter for antineoplastic chemotherapy ICD-10-CM: Z51.11  ICD-9-CM: V58.11         Prevention of chemotherapy-induced neutropenia ICD-10-CM: Z76.89  ICD-9-CM: V72.85         Breast cancer, left ([address] ICD-10-CM: D08.952  ICD-9-CM: 174.9     Overview Addendum 2022 10:34 PM by Sirena Davis MD     2022 LEFT lumpectomy 19mm invasive ductal carcinoma, grade 3, 0/3 nodes, triple neg, Ki 40%  TC  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 Past Surgical History:      has a past surgical history that includes hx cholecystectomy; hx gyn; hx gyn; hx wisdom teeth extraction (Bilateral); and hx breast lumpectomy (Left, 3/2/2022). Home Medications:     Prior to Admission medications    Medication Sig Start Date End Date Taking? Authorizing Provider   lisinopril-hydroCHLOROthiazide (PRINZIDE, ZESTORETIC) 20-12.5 mg per tablet Take 1 Tablet by mouth daily. Yes Provider, Historical   lidocaine-prilocaine (EMLA) topical cream Apply thin layer to port 30-60 minutes prior to infusion 3/29/22  Yes Milagro Olguin NP   albuterol-ipratropium (DUO-NEB) 2.5 mg-0.5 mg/3 ml nebu 3 mL by Nebulization route every six (6) hours as needed for Wheezing. Yes Provider, Historical   albuterol (PROVENTIL HFA, VENTOLIN HFA, PROAIR HFA) 90 mcg/actuation inhaler Take  by inhalation every six (6) hours as needed for Wheezing. Yes Provider, Historical   metFORMIN (GLUMETZA ER) 500 mg TG24 24 hour tablet Take 1 Tablet by mouth every morning. Yes Provider, Historical   fluticasone propion-salmeteroL (ADVAIR/WIXELA) 100-50 mcg/dose diskus inhaler Take 1 Puff by inhalation every twelve (12) hours. Yes Provider, Historical   amLODIPine (NORVASC) 10 mg tablet Take 10 mg by mouth every morning. Yes Provider, Historical   dexAMETHasone (DECADRON) 4 mg tablet Take 8mg (2 tablets) by mouth twice daily on the day before and day after chemotherapy. 5/4/22   Teresita Anderson MD   ondansetron hcl [facility]) 8 mg tablet Take 1 Tablet by mouth every eight (8) hours as needed for Nausea or Vomiting. Patient not taking: Reported on 7/20/2022 3/29/22   Joann DORSEY NP   prochlorperazine (Compazine) 10 mg tablet Take 1 Tablet by mouth every six (6) hours as needed for Nausea or Vomiting.   Patient not taking: Reported on 7/20/2022 3/29/22   Chelita Medina NP       Allergies/Social/Family History: Allergies   Allergen Reactions    Nuts [Tree Nut] Rash and Itching     Throat itching      Social History     Tobacco Use    Smoking status: Never    Smokeless tobacco: Never   Substance Use Topics    Alcohol use: Yes     Comment: social once per month      Family History   Problem Relation Age of Onset    Hypertension Mother     Heart Disease Mother     Heart Disease Father     Hypertension Father     Breast Cancer Maternal Aunt     Breast Cancer Other     Breast Cancer Maternal Aunt        Review of Systems:     As per HPI    Objective:   Vital Signs:  Visit Vitals  BP (!) 188/71   Pulse 95   Temp 98.6 °F (37 °C)   Resp 13   Ht 5' 2\" (1.575 m)   Wt 155.6 kg (343 lb 0.6 oz)   LMP 2021 (Approximate)   SpO2 100%   BMI 62.74 kg/m²      O2 Device: Endotracheal tube Temp (24hrs), Av.4 °F (36.9 °C), Min:97.9 °F (36.6 °C), Max:98.7 °F (37.1 °C)           Intake/Output:     Intake/Output Summary (Last 24 hours) at 2022 1041  Last data filed at 2022 0600  Gross per 24 hour   Intake 518.95 ml   Output 1050 ml   Net -531.05 ml       Physical Exam:    General:  Intubated, awake, follows commands  Eye:  PERRLA  Neurologic:  Awake, alert, follows commands in all extremities, shakes head yes/ no  Neck:  Supple, no JVD  Lungs:  CTAB  Heart:  RRR, 2+ pulses, 2+ edema of extremities  Abdomen: Morbid obesity, ex-lap site covered with dressing; no drainage noted on dressing  Skin:  c/d/I w exception of above    LABS AND  DATA: Personally reviewed  Recent Labs     22  0141 22  1145   WBC 10.4 19.9*   HGB 9.1* 11.2*   HCT 30.0* 37.5    236     Recent Labs     22  0141 22  1145    142   K 4.3 4.6   * 107   CO2 27 27   BUN 21* 28*   CREA 0.93 1.26*   * 131*   CA 9.4 10.1   MG 2.2  --      Recent Labs     22  1145   AP 79   TP 6.5   ALB 2.5*   GLOB 4.0     No results for input(s): INR, PTP, APTT, INREXT in the last 72 hours.    No results for input(s): PHI, PCO2I, PO2I, FIO2I in the last 72 hours. No results for input(s): CPK, CKMB, TROIQ, BNPP in the last 72 hours. Ventilator Settings:  Mode Rate Tidal Volume Pressure FiO2 PEEP   Spontaneous   400 ml  5 cm H2O 40 % 5 cm H20     Peak airway pressure: 32 cm H2O    Minute ventilation: 6.6 l/min        MEDS: Reviewed    Chest X-Ray: personally reviewed and report checked    Assessment and Plan     Gastric Ulcer Perforation: S/P surgical repair 7/20.  - Continue zosyn/fluc  - Surgery following, appreciate recs  - OGT to low intermittent suction  - Continue NPO status  - Dilaudid PCA for post-op pain  - BID PPI    Mechanical ventilation: Intubated for airway protection  - Undergoing SAT/SBT with plan for extubation    HTN: PRN hydralazine/labetalol    DM2 c/b hyperglycemia:   - SSI    Morbid obesity: NST counseling when able to take PO    Deconditioning: PT/OT    Multidisciplinary Rounds Completed:  Y    ABCDEF Bundle/Checklist  Pain Medications: Dilaudid PCA  Target RASS: 0 - Alert & Calm - Spontaneously pays attention to caregiver  Sedation Medications: None  CAM-ICU:  Negative  Mobility: Poor  PT/OT: PT consulted and on board and OT consulted and on board   Restraints: None needed at this time  Discussed Plan of Care (goals of care):  Yes  Addressed Code Status: Full Code    CARDIOVASCULAR  Cardiac Gtts: None  SBP Goal of: > 90 mmHg  MAP Goal of: > 65 mmHg  Transfusion Trigger (Hgb): <7 g/dL    RESPIRATORY  Vent Goals:   Chlorhexidine   DVT Prophylaxis (if no, list reason): SCD's or Sequential Compression Device   SPO2 Goal: > 92%  Pulmonary toilet: Duo-Nebs     GI/  Solitario Catheter Present: Yes  GI Prophylaxis: Pepcid (famotidine)   Nutrition: No NPO  IVFs: Y  Bowel Movement: Y  Bowel Regimen: N  Insulin: Y    ANTIBIOTICS  Antibiotics:  Zosyn/Fluc    T/L/D  Tubes: ETT  Lines: CVC  Drains: N    SPECIAL EQUIPMENT  Vent    DISPOSITION  ICU    CRITICAL CARE CONSULTANT NOTE  I had a face to face encounter with the patient, reviewed and interpreted patient data including clinical events, labs, images, vital signs, I/O's, and examined patient. I have discussed the case and the plan and management of the patient's care with the consulting services, the bedside nurses and the respiratory therapist.      NOTE OF PERSONAL INVOLVEMENT IN CARE   This patient has a high probability of imminent, clinically significant deterioration, which requires the highest level of preparedness to intervene urgently. I participated in the decision-making and personally managed or directed the management of the following life and organ supporting interventions that required my frequent assessment to treat or prevent imminent deterioration. I personally spent 60 minutes of critical care time. This is time spent at this critically ill patient's bedside actively involved in patient care as well as the coordination of care and discussions with the patient's family. This does not include any procedural time which has been billed separately.     Milton Spain NP  [facility], [place] [facility]  7/21/2022

## 2022-07-21 NOTE — PROGRESS NOTES
Problem: Pressure Injury - Risk of  Goal: *Prevention of pressure injury  Description: Document Eliecer Scale and appropriate interventions in the flowsheet. Outcome: Progressing Towards Goal  Note: Pressure Injury Interventions:  Sensory Interventions: Assess changes in LOC, Assess need for specialty bed, Avoid rigorous massage over bony prominences, Keep linens dry and wrinkle-free, Maintain/enhance activity level, Minimize linen layers, Turn and reposition approx. every two hours (pillows and wedges if needed)    Moisture Interventions: Absorbent underpads, Check for incontinence Q2 hours and as needed, Internal/External urinary devices, Minimize layers, Offer toileting Q_hr    Activity Interventions: Assess need for specialty bed, Pressure redistribution bed/mattress(bed type)    Mobility Interventions: Assess need for specialty bed, Pressure redistribution bed/mattress (bed type), Turn and reposition approx.  every two hours(pillow and wedges)    Nutrition Interventions: Discuss nutritional consult with provider    Friction and Shear Interventions: Transferring/repositioning devices, Minimize layers, Lift team/patient mobility team                Problem: Patient Education: Go to Patient Education Activity  Goal: Patient/Family Education  Outcome: Progressing Towards Goal     Problem: Ventilator Management  Goal: *Adequate oxygenation and ventilation  Outcome: Progressing Towards Goal  Goal: *Patient maintains clear airway/free of aspiration  Outcome: Progressing Towards Goal  Goal: *Absence of infection signs and symptoms  Outcome: Progressing Towards Goal  Goal: *Normal spontaneous ventilation  Outcome: Progressing Towards Goal     Problem: Patient Education: Go to Patient Education Activity  Goal: Patient/Family Education  Outcome: Progressing Towards Goal     Problem: Non-Violent Restraints  Goal: Removal from restraints as soon as assessed to be safe  Outcome: Progressing Towards Goal  Goal: No harm/injury to patient while restraints in use  Outcome: Progressing Towards Goal  Goal: Patient's dignity will be maintained  Outcome: Progressing Towards Goal  Goal: Patient Interventions  Outcome: Progressing Towards Goal

## 2022-07-21 NOTE — WOUND CARE
Wound Consult:  New Patient Visit. Chart reviewed. Consulted for gluteal cleft/sacral area POA. Spoke with patients nurse,  Cat Strange and we were in with care team to provide care/assess. Patient is resting on a total care bariatric plus bed with air support mattress. Heels off loaded with boots. Patient is alert, oriented x 4, cooperative; requires two to three person assist to move side to side in bed. Eliecer score 10; richter in use; post op gastric ulcer repair urgently on admission. Assessment:  Gluteal cleft - tight, deep cleft with tiny linear area at distal cleft that is pink, macerated ~ 0.3 x less than 0.1 cm. No surrounding discoloration. No injury to buttocks. Pannus with linear maceration, hyperpigmentation of skin and some redness noted, no odor or rash. Breasts - inear maceration, hyperpigmentation of skin noted, no odor or rash. Heels dry, no redness, rough skin. Has chronic appearing hyperpigmentation in gaiter areas of both legs from most likely some venous insufficiency. Treatment:  Zinc ointment to gluteal cleft, turned and repositioned, DriGo sheets to pannus/breast folds. Wound Recommendations:  Zinc ointment for gluteal cleft to protect routinely. Skin Care / PI Prevention Recommendations:  1. Minimize friction/shear: minimize layers of linen/pads under patient. 2. Off load pressure/reposition:  turn and reposition approximately every 2 hours; float heels with pillows or use off loading heel boots; on bariatric bed, wedges in use, glide sheet. 3. Manage Moisture - keep skin folds dry; incontinence skin care with incontinence wipes; antifungal powder and zinc barrier ointment; richter in use to help contain urine. 4. Continue to monitor nutrition, pain, and skin risk scale, and skin assessment. Plan:  Hand off to Dr. Medina Monroe regarding findings and proposed orders for treatment. We will continue to reassess routinely and as needed.   Please re-consult should concerns arise despite continued skin/PI prevention measures.     Summer Giraldo, MSN, RN, [address] [facility], Wound / [address] [facility], located within [facility] Office [phone]

## 2022-07-21 NOTE — PROGRESS NOTES
Spiritual Care Assessment/Progress Note  [address]      NAME: Lulú Delvalle      MRN: [id number]  AGE: 65 y.o. SEX: female  Mandaen Affiliation: Amish   Language: English     7/21/2022     Total Time (in minutes): 5     Spiritual Assessment begun in [facility] INTENSIVE CARE through conversation with:         []Patient        [] Family    [] Friend(s)        Reason for Consult: Initial/Spiritual assessment, critical care     Spiritual beliefs: (Please include comment if needed)     [] Identifies with a dilma tradition:    Amish on record      [] Supported by a dilma community:            [] Claims no spiritual orientation:           [] Seeking spiritual identity:                [] Adheres to an individual form of spirituality:           [x] Not able to assess:                           Identified resources for coping:      [] Prayer                               [] Music                  [] Guided Imagery     [] Family/friends                 [] Pet visits     [] Devotional reading                         [x] Unknown     [] Other:                                               Interventions offered during this visit: (See comments for more details)                Plan of Care:     [] Support spiritual and/or cultural needs    [] Support AMD and/or advance care planning process      [] Support grieving process   [] Coordinate Rites and/or Rituals    [] Coordination with community clergy   [] No spiritual needs identified at this time   [] Detailed Plan of Care below (See Comments)  [] Make referral to Music Therapy  [] Make referral to Pet Therapy     [] Make referral to Addiction services  [] Make referral to [facility]  [] Make referral to Spiritual Care Partner  [] No future visits requested        [x] Contact Spiritual Care for further referrals     Comments:   Initial spiritual care assessment attempted for Ms. Lulú Delvalle in ICU.   Pt is not awake or alert at this time, no visitors present. Chart reviewed. Advised nurse to contact [facility] for any further referrals.   Chaplain Jordan Fraire M.Div.  Maria Esther Delgado ([id number])

## 2022-07-21 NOTE — PROGRESS NOTES
Stopped in to visit pt as her oncologist.  Nothing to add, team is taking great care of her. Provided support to pt and daughter and they appreciated my visit. Will check-in with her periodically to follow her course and make sure she has follow up with me.   Not on active therapy, had completed chemotherapy

## 2022-07-21 NOTE — PROGRESS NOTES
ICU RN attempted to place ultrasound guided endurance catheter x 2 for the prevention of a CLABSI. Per Flori Sanchez, told to keep central line in place due to her needing two peripheral Ivs. Will continue to monitor. Problem: Non-Violent Restraints  Goal: Removal from restraints as soon as assessed to be safe  Outcome: Progressing Towards Goal     Problem: Non-Violent Restraints  Goal: No harm/injury to patient while restraints in use  Outcome: Progressing Towards Goal     Problem: Non-Violent Restraints  Goal: Patient's dignity will be maintained  Outcome: Progressing Towards Goal     Problem: Non-Violent Restraints  Goal: Patient Interventions  Outcome: Progressing Towards Goal     Primary Nurse Rex Shoemaker and Gaston Giles, RN performed a dual skin assessment on this patient Impairment noted- see wound doc flow sheet  Eliecer score is see flowsheet    Bedside and Verbal shift change report given to Inder Sands (oncoming nurse) by Gaston Giles (offgoing nurse). Report included the following information SBAR, Kardex, Cardiac Rhythm NSR ST, and Quality Measures.

## 2022-07-21 NOTE — CONSULTS
Consult History and Physical Exam    NAME:  Dina Pino   :   1960   MRN:  [id number]     PCP:  Sirena Martini NP   Referring: Nile Chaudhari MD     Date/Time:  2022         Subjective:   REASON FOR CONSULT: Medical management     CHIEF COMPLAINT: Pt intubated and sedated      HISTORY OF PRESENT ILLNESS:     Ms. Ju Garcia is a 65 y.o.  female with PMH of breast CA, HTN, DM, morbid obesity and asthma admitted by general surgery for pneumoperitoneum and is currently s/p ex lap and repair of gastric ulcer. She is intubated in the PACU and sedated on propofol.      Past Medical History:   Diagnosis Date    Arrhythmia     heart murmur    Arthritis     Right knee, DJD left knee    Asthma     Cancer ([facility] [address]     COVID-19 2021    Diabetes ([facility] [address]     Pre diabetes    Hypertension     Morbid obesity ([facility] [address]     Neuropathy     Patient reports numbness and tingling in her legs and feet from her back    Sleep apnea     was ordered a CPAP years ago, but never used        Past Surgical History:   Procedure Laterality Date    HX BREAST LUMPECTOMY Left 3/2/2022    LEFT BREAST LUMPECTOMY WITH LEFT BREAST SENTINEL NODE BIOPSY WITH BLUE DYE AND PORT A CATH INSERTION (URGENT) performed by Darryle Schlatter, MD at [facility]    HX CHOLECYSTECTOMY      HX GYN      c section    HX GYN      d&c    HX WISDOM TEETH EXTRACTION Bilateral        Social History     Tobacco Use    Smoking status: Never    Smokeless tobacco: Never   Substance Use Topics    Alcohol use: Yes     Comment: social once per month        Family History   Problem Relation Age of Onset    Hypertension Mother     Heart Disease Mother     Heart Disease Father     Hypertension Father     Breast Cancer Maternal Aunt     Breast Cancer Other     Breast Cancer Maternal Aunt         Allergies   Allergen Reactions    Nuts [Tree Nut] Rash and Itching     Throat itching        Prior to Admission medications    Medication Sig Start Date End Date Taking? Authorizing Provider   lisinopril-hydroCHLOROthiazide (PRINZIDE, ZESTORETIC) 20-12.5 mg per tablet Take 1 Tablet by mouth daily. Yes Provider, Historical   lidocaine-prilocaine (EMLA) topical cream Apply thin layer to port 30-60 minutes prior to infusion 3/29/22  Yes Milagro Olguin NP   albuterol-ipratropium (DUO-NEB) 2.5 mg-0.5 mg/3 ml nebu 3 mL by Nebulization route every six (6) hours as needed for Wheezing. Yes Provider, Historical   albuterol (PROVENTIL HFA, VENTOLIN HFA, PROAIR HFA) 90 mcg/actuation inhaler Take  by inhalation every six (6) hours as needed for Wheezing. Yes Provider, Historical   metFORMIN (GLUMETZA ER) 500 mg TG24 24 hour tablet Take 1 Tablet by mouth every morning. Yes Provider, Historical   fluticasone propion-salmeteroL (ADVAIR/WIXELA) 100-50 mcg/dose diskus inhaler Take 1 Puff by inhalation every twelve (12) hours. Yes Provider, Historical   amLODIPine (NORVASC) 10 mg tablet Take 10 mg by mouth every morning. Yes Provider, Historical   dexAMETHasone (DECADRON) 4 mg tablet Take 8mg (2 tablets) by mouth twice daily on the day before and day after chemotherapy. 5/4/22   Keshia Marcial MD   ondansetron hcl [facility]) 8 mg tablet Take 1 Tablet by mouth every eight (8) hours as needed for Nausea or Vomiting. Patient not taking: Reported on 7/20/2022 3/29/22   Komal DORSEY NP   prochlorperazine (Compazine) 10 mg tablet Take 1 Tablet by mouth every six (6) hours as needed for Nausea or Vomiting.   Patient not taking: Reported on 7/20/2022 3/29/22   Sree Valerio NP         Review of Systems:  Pt intubated and sedated        Objective:      VITALS:    Vital signs reviewed; most recent are:    Visit Vitals  BP (!) 155/69   Pulse 79   Temp 98.3 °F (36.8 °C)   Resp 18   Ht 5' 2\" (1.575 m)   Wt 155.6 kg (343 lb 0.6 oz)   SpO2 100%   BMI 62.74 kg/m²     SpO2 Readings from Last 6 Encounters:   07/20/22 100%   06/17/22 95%   06/15/22 95%   06/15/22 95%   05/25/22 96% 05/25/22 95%          Intake/Output Summary (Last 24 hours) at 7/20/2022 2005  Last data filed at 7/20/2022 1905  Gross per 24 hour   Intake --   Output 450 ml   Net -450 ml            Exam:     Physical Exam:    Gen: Alopecia. Morbidly obese. ET tube in place. Anasarca  HEENT:  Pink conjunctivae, PERRL, moist mucous membranes  Neck:  Supple, without masses, thyroid non-tender  Resp:  No accessory muscle use, clear breath sounds without wheezes rales or rhonchi  Card:  No murmurs, normal S1, S2 without thrills, bruits  Abd: Hypoactive. Midline dressing C/D/I  Lymph:  No cervical adenopathy  Musc:  No cyanosis or clubbing  Skin:  No rashes or ulcers, skin turgor is good  Neuro: Sedated   Psych: Sedated   L IJ in place   R art line in place     Labs:    Recent Labs     07/20/22  1145   WBC 19.9*   HGB 11.2*   HCT 37.5        Recent Labs     07/20/22  1145      K 4.6      CO2 27   *   BUN 28*   CREA 1.26*   CA 10.1   ALB 2.5*   *     No components found for: GLPOC    No results for input(s): INR, INREXT in the last 72 hours.     Assessment/Plan     1) Gastric ulcer perforation - s/p repair   -on zosyn and fluconazole as per surgery   -IV PPI BID   -currently intubated   -defer advancement of diet to primary team      2) Breast cancer, left ([facility] [address] (2/3/2022)  -outpt management   -high risk for DVT; on DVT prophylaxis as per gen surgery     3) HTN (hypertension) (7/20/2022)  -resume amlodipine   -PRN hydralazine   -hold ACE/HCTZ for now      4) DM (diabetes mellitus) ([facility] [address] (7/20/2022)  -ISS   -BG checks q6     5) Leukocytosis (7/20/2022) - likely 2/2 peritonitis   -on zosyn   -UA pending   -CXR without PNA      6) KIAN (acute kidney injury) ([facility] [address] (7/20/2022) - mild  -hold ACE/HCTZ  -monitor      7) Morbid obesity ([address] (7/20/2022)  -weight loss     8) Hypoalbuminemia (7/20/2022) - ?poor nutritional status   -check UA to ensure no nephrotic range proteinuria   -check prealbumin  -decrease IVF's as extravasating and now anasarcic    Total time spent with patient: 79 [address] discussed with: Patient and Nursing Staff    Discussed:  Care Plan    Prophylaxis:  Lovenox           ___________________________________________________    Attending Physician: Marcelo Victoria MD

## 2022-07-21 NOTE — PROGRESS NOTES
Pharmacy Dosing Services:     Zosyn dose increased from 3.375 gm IV Q8h to 4.5 gm IV Q8h per Protocol.  - CrCl > 20, BMI >40    Thank you,  Luna Reyes, PharmD  [id number]

## 2022-07-21 NOTE — OP NOTES
Abdifatah Guerin [facility] 79  OPERATIVE REPORT    Name:  Nish Baires  MR#:  [id number]  :  1960  ACCOUNT #:  [de-identified]  DATE OF SERVICE:  2022    PREOPERATIVE DIAGNOSIS:  Perforated hollow viscus. POSTOPERATIVE DIAGNOSIS:  Perforated gastric ulcer. PROCEDURES PERFORMED:  Exploratory laparotomy with repair of gastric ulcer. SURGEON:  Radha Mehta MD    ASSISTANT:  Duy Bautista SA-1    ANESTHESIA:  General endotracheal.    COMPLICATIONS:  None. SPECIMENS REMOVED:  Anterior stomach  wall biopsy. IMPLANTS:  None. ESTIMATED BLOOD LOSS:  50 mL. INDICATIONS:  This patient is a pleasant 58-year-old female who presented as a transfer from an outside facility with several-days history of abdominal pain. The pain was greatly severe and she had taken multiple doses of NSAIDs. This was Aleve and ibuprofen and she presented to the emergency department a few days ago and was discharged after treatment for some unspecified infection but then presented again yesterday and was found to have large volume pneumoperitoneum concerning for perforation. She was transferred here and started on broad-spectrum antibiotics and IV fluid resuscitation. We admitted that we proceed to take her to the operating room for laparotomy. We discussed risks of surgery including bleeding, infection, injury to bowel or other structures, and leak or anastomotic leak if bowel resection was required. We discussed that the most likely cause was a perforated gastric or duodenal ulcer. However, there was some concern for a possible colonic perforation. We discussed that if this was the case, she may need colostomy. The patient was morbidly obese and we discussed that the risks of surgery would likely be increased due to these another comorbidities. She agreed to proceed. FINDINGS:  There was a moderate amount of turbid ascites.   There was an approximately 3-mm perforation in the mid body of the stomach. The proximal colon primarily was dilated with decompression of the distal colon. However, there was no obvious transition point as for suturing on that area. There was no other perforation noted. PROCEDURE:  Informed consent was obtained. The patient was brought back to the operating room and placed in a supine position on the operating table. General anesthesia was induced. Radial arterial line and central venous catheter were placed by the Anesthesia team.  A Solitario catheter was placed. All pressure points were padded adequately. The patient's abdomen was prepped and draped in the usual sterile fashion. A pre-incision time-out was performed and pre-incision antibiotics were given. A skin incision was made in the upper midline. We dissected down through the subcutaneous tissues to the fascia which was incised. There was a rush of air and turbid ascites, which was suctioned out. Inspection revealed the findings above. A self-retaining retractor was placed. Incision was made to primarily repair the ulcer with a Mily Tom of omentum over. We began to mobilize the omentum. However, the patient had an umbilical hernia with incarcerated omentum and this had to be released in order to be able to mobilize the omentum up to the stomach. Once this was done, the LigaSure was used to create a tongue of omentum for the closure. The pathology specimen was taken  adjacent to the perforation itself and the perforation was then closed primarily with 3-0 silk sutures. The omentum was laid over the top of this and tacked down with the ends of the suture with care not to strangulate the blood supply of the omentum. The peritoneal cavity was thoroughly irrigated with several liters of warm saline. The fascia was closed with looped 0 PDS and the skin was closed with staples and dressed with island dressing.   The patient tolerated the procedure well with decision to keep the patient intubated due to prior postoperative respiratory issues. All sponge and instrument counts were correct.       MD KYA [id number]  D:  07/20/2022 16:33  T:  07/21/2022 4:12  JOB #:  [id number]

## 2022-07-22 LAB
ANION GAP SERPL CALC-SCNC: 6 MMOL/L (ref 5–15)
BASOPHILS # BLD: 0 K/UL (ref 0–0.1)
BASOPHILS NFR BLD: 0 % (ref 0–1)
BUN SERPL-MCNC: 17 MG/DL (ref 6–20)
BUN/CREAT SERPL: 22 (ref 12–20)
CALCIUM SERPL-MCNC: 9.7 MG/DL (ref 8.5–10.1)
CHLORIDE SERPL-SCNC: 110 MMOL/L (ref 97–108)
CO2 SERPL-SCNC: 28 MMOL/L (ref 21–32)
CREAT SERPL-MCNC: 0.79 MG/DL (ref 0.55–1.02)
DIFFERENTIAL METHOD BLD: ABNORMAL
EOSINOPHIL # BLD: 0.3 K/UL (ref 0–0.4)
EOSINOPHIL NFR BLD: 3 % (ref 0–7)
ERYTHROCYTE [DISTWIDTH] IN BLOOD BY AUTOMATED COUNT: 19.7 % (ref 11.5–14.5)
GLUCOSE BLD STRIP.AUTO-MCNC: 110 MG/DL (ref 65–117)
GLUCOSE BLD STRIP.AUTO-MCNC: 93 MG/DL (ref 65–117)
GLUCOSE BLD STRIP.AUTO-MCNC: 94 MG/DL (ref 65–117)
GLUCOSE BLD STRIP.AUTO-MCNC: 99 MG/DL (ref 65–117)
GLUCOSE SERPL-MCNC: 109 MG/DL (ref 65–100)
HCT VFR BLD AUTO: 30.3 % (ref 35–47)
HGB BLD-MCNC: 9.1 G/DL (ref 11.5–16)
IMM GRANULOCYTES # BLD AUTO: 0 K/UL
IMM GRANULOCYTES NFR BLD AUTO: 0 %
LYMPHOCYTES # BLD: 0.3 K/UL (ref 0.8–3.5)
LYMPHOCYTES NFR BLD: 3 % (ref 12–49)
MAGNESIUM SERPL-MCNC: 2.3 MG/DL (ref 1.6–2.4)
MCH RBC QN AUTO: 30.1 PG (ref 26–34)
MCHC RBC AUTO-ENTMCNC: 30 G/DL (ref 30–36.5)
MCV RBC AUTO: 100.3 FL (ref 80–99)
METAMYELOCYTES NFR BLD MANUAL: 1 %
MONOCYTES # BLD: 1 K/UL (ref 0–1)
MONOCYTES NFR BLD: 10 % (ref 5–13)
NEUTS BAND NFR BLD MANUAL: 12 % (ref 0–6)
NEUTS SEG # BLD: 8.2 K/UL (ref 1.8–8)
NEUTS SEG NFR BLD: 71 % (ref 32–75)
NRBC # BLD: 0 K/UL (ref 0–0.01)
NRBC BLD-RTO: 0 PER 100 WBC
PHOSPHATE SERPL-MCNC: 3.1 MG/DL (ref 2.6–4.7)
PLATELET # BLD AUTO: 205 K/UL (ref 150–400)
PMV BLD AUTO: 9 FL (ref 8.9–12.9)
POTASSIUM SERPL-SCNC: 4.3 MMOL/L (ref 3.5–5.1)
PREALB SERPL-MCNC: 9.6 MG/DL (ref 20–40)
RBC # BLD AUTO: 3.02 M/UL (ref 3.8–5.2)
RBC MORPH BLD: ABNORMAL
RBC MORPH BLD: ABNORMAL
SERVICE CMNT-IMP: NORMAL
SODIUM SERPL-SCNC: 144 MMOL/L (ref 136–145)
WBC # BLD AUTO: 9.9 K/UL (ref 3.6–11)

## 2022-07-22 PROCEDURE — 85025 COMPLETE CBC W/AUTO DIFF WBC: CPT

## 2022-07-22 PROCEDURE — 84134 ASSAY OF PREALBUMIN: CPT

## 2022-07-22 PROCEDURE — 97161 PT EVAL LOW COMPLEX 20 MIN: CPT

## 2022-07-22 PROCEDURE — C9113 INJ PANTOPRAZOLE SODIUM, VIA: HCPCS | Performed by: INTERNAL MEDICINE

## 2022-07-22 PROCEDURE — 74011000250 HC RX REV CODE- 250: Performed by: SURGERY

## 2022-07-22 PROCEDURE — 94640 AIRWAY INHALATION TREATMENT: CPT

## 2022-07-22 PROCEDURE — 97530 THERAPEUTIC ACTIVITIES: CPT | Performed by: OCCUPATIONAL THERAPIST

## 2022-07-22 PROCEDURE — 97165 OT EVAL LOW COMPLEX 30 MIN: CPT | Performed by: OCCUPATIONAL THERAPIST

## 2022-07-22 PROCEDURE — 97116 GAIT TRAINING THERAPY: CPT

## 2022-07-22 PROCEDURE — 74011250636 HC RX REV CODE- 250/636: Performed by: NURSE PRACTITIONER

## 2022-07-22 PROCEDURE — 2709999900 HC NON-CHARGEABLE SUPPLY

## 2022-07-22 PROCEDURE — 74011250636 HC RX REV CODE- 250/636: Performed by: SURGERY

## 2022-07-22 PROCEDURE — 82962 GLUCOSE BLOOD TEST: CPT

## 2022-07-22 PROCEDURE — 84100 ASSAY OF PHOSPHORUS: CPT

## 2022-07-22 PROCEDURE — 74011000258 HC RX REV CODE- 258: Performed by: SURGERY

## 2022-07-22 PROCEDURE — 74011000250 HC RX REV CODE- 250: Performed by: INTERNAL MEDICINE

## 2022-07-22 PROCEDURE — 83735 ASSAY OF MAGNESIUM: CPT

## 2022-07-22 PROCEDURE — 36415 COLL VENOUS BLD VENIPUNCTURE: CPT

## 2022-07-22 PROCEDURE — 74011250636 HC RX REV CODE- 250/636: Performed by: INTERNAL MEDICINE

## 2022-07-22 PROCEDURE — 97535 SELF CARE MNGMENT TRAINING: CPT | Performed by: OCCUPATIONAL THERAPIST

## 2022-07-22 PROCEDURE — 65270000046 HC RM TELEMETRY

## 2022-07-22 PROCEDURE — 80048 BASIC METABOLIC PNL TOTAL CA: CPT

## 2022-07-22 PROCEDURE — 77010033678 HC OXYGEN DAILY

## 2022-07-22 RX ORDER — POTASSIUM CHLORIDE 29.8 MG/ML
20 INJECTION INTRAVENOUS
Status: DISCONTINUED | OUTPATIENT
Start: 2022-07-22 | End: 2022-07-22

## 2022-07-22 RX ORDER — FUROSEMIDE 10 MG/ML
20 INJECTION INTRAMUSCULAR; INTRAVENOUS DAILY
Status: DISCONTINUED | OUTPATIENT
Start: 2022-07-22 | End: 2022-07-23

## 2022-07-22 RX ADMIN — MICONAZOLE NITRATE 2 % TOPICAL POWDER: at 18:55

## 2022-07-22 RX ADMIN — ARFORMOTEROL TARTRATE 15 MCG: 15 SOLUTION RESPIRATORY (INHALATION) at 07:55

## 2022-07-22 RX ADMIN — SODIUM CHLORIDE, POTASSIUM CHLORIDE, SODIUM LACTATE AND CALCIUM CHLORIDE 50 ML/HR: 600; 310; 30; 20 INJECTION, SOLUTION INTRAVENOUS at 16:35

## 2022-07-22 RX ADMIN — Medication 10 ML: at 06:16

## 2022-07-22 RX ADMIN — ENOXAPARIN SODIUM 40 MG: 100 INJECTION SUBCUTANEOUS at 20:38

## 2022-07-22 RX ADMIN — Medication 10 ML: at 11:31

## 2022-07-22 RX ADMIN — BUDESONIDE 250 MCG: 0.5 SUSPENSION RESPIRATORY (INHALATION) at 07:55

## 2022-07-22 RX ADMIN — SODIUM CHLORIDE, PRESERVATIVE FREE 40 MG: 5 INJECTION INTRAVENOUS at 09:19

## 2022-07-22 RX ADMIN — SODIUM CHLORIDE, PRESERVATIVE FREE 40 MG: 5 INJECTION INTRAVENOUS at 20:38

## 2022-07-22 RX ADMIN — MICONAZOLE NITRATE 2 % TOPICAL POWDER: at 09:20

## 2022-07-22 RX ADMIN — ENOXAPARIN SODIUM 40 MG: 100 INJECTION SUBCUTANEOUS at 09:19

## 2022-07-22 RX ADMIN — PIPERACILLIN AND TAZOBACTAM 4.5 G: 4; .5 INJECTION, POWDER, FOR SOLUTION INTRAVENOUS at 11:29

## 2022-07-22 RX ADMIN — ARFORMOTEROL TARTRATE 15 MCG: 15 SOLUTION RESPIRATORY (INHALATION) at 19:46

## 2022-07-22 RX ADMIN — PIPERACILLIN AND TAZOBACTAM 4.5 G: 4; .5 INJECTION, POWDER, FOR SOLUTION INTRAVENOUS at 20:37

## 2022-07-22 RX ADMIN — FLUCONAZOLE 400 MG: 2 INJECTION, SOLUTION INTRAVENOUS at 20:37

## 2022-07-22 RX ADMIN — Medication 10 ML: at 21:00

## 2022-07-22 RX ADMIN — FUROSEMIDE 20 MG: 10 INJECTION, SOLUTION INTRAMUSCULAR; INTRAVENOUS at 14:26

## 2022-07-22 RX ADMIN — PIPERACILLIN AND TAZOBACTAM 4.5 G: 4; .5 INJECTION, POWDER, FOR SOLUTION INTRAVENOUS at 04:32

## 2022-07-22 RX ADMIN — BUDESONIDE 250 MCG: 0.5 SUSPENSION RESPIRATORY (INHALATION) at 19:46

## 2022-07-22 NOTE — PROGRESS NOTES
1900: Bedside shift change report given to Alejandro Betancur RN (oncoming nurse) by Nora Miller RN (offgoing nurse). Report included the following information SBAR, Kardex, Procedure Summary, Intake/Output, MAR, Recent Results, and Cardiac Rhythm NSR . Primary Nurse Terrell Coelho RN and Wilmer Palacios RN performed a dual skin assessment on this patient Impairment noted- see wound doc flow sheet  Eliecer score is 13    2000: Shift  Assessment completed, CHG bath completed    0000: Reassessment completed. No changes from previous assessment    0400: Reassessment completed. No changes from previous assessment    0700: Bedside shift change report given to Miesha Sandoval RN (oncoming nurse) by Alejandro Betancur RN (offgoing nurse). Report included the following information SBAR, Kardex, Procedure Summary, Intake/Output, MAR, Recent Results, and Cardiac Rhythm NSR .

## 2022-07-22 NOTE — PROGRESS NOTES
Problem: Mobility Impaired (Adult and Pediatric)  Goal: *Acute Goals and Plan of Care (Insert Text)  Description: FUNCTIONAL STATUS PRIOR TO ADMISSION: Patient was independent and active without use of DME.    HOME SUPPORT PRIOR TO ADMISSION: The patient lived with daughter but did not require assist.    Physical Therapy Goals  Initiated 7/22/2022  1. Patient will move from supine to sit and sit to supine , scoot up and down, and roll side to side in bed with modified independence within 7 day(s). 2.  Patient will transfer from bed to chair and chair to bed with modified independence using the least restrictive device within 7 day(s). 3.  Patient will perform sit to stand with modified independence within 7 day(s). 4.  Patient will ambulate with modified independence for 200 feet with the least restrictive device within 7 day(s). 5.  Patient will ascend/descend 4 stairs with  handrail(s) with supervision/set-up within 7 day(s). Outcome: Progressing Towards Goal   PHYSICAL THERAPY EVALUATION  Patient: Tiburcio Casey (1 y.o. female)  Date: 7/22/2022  Primary Diagnosis: Bowel perforation (HCC) [K63.1]  Procedure(s) (LRB):  LAPAROTOMY EXPLORATORY, repair of gastric ulcer (N/A) 2 Days Post-Op   Precautions:   Fall, Skin (Pt is >340 lbs)    ASSESSMENT  Based on the objective data described below, the patient presents with generalized weakness and debility. Communicated with nurse cleared for therapy, patient extubated yesterday after ex lap POD #2. Rolled on the edge of bed max assist x 2, supine to sit max assist 2, sit to stand min assist x 2, ambulate with rolling walker mod assist x 2 towards the recliner. OOB to chair as tolerated performed some active range of motion exercise on both LE all planes. Educate and instructed patient to continue active range of motion exercise on both legs while up on chair or on bed multiple times.  Recommend patient to be up on the chair at least 3 times a day every meal times as tolerated. Communicated with nurse who agreed to monitor patient. Current Level of Function Impacting Discharge (mobility/balance): mod assist x 2 with rolling walker for transfers and ambulation    Functional Outcome Measure: The patient scored 35/100 on the barthel outcome measure. Other factors to consider for discharge: pain     Patient will benefit from skilled therapy intervention to address the above noted impairments. PLAN :  Recommendations and Planned Interventions: bed mobility training, transfer training, gait training, therapeutic exercises, neuromuscular re-education, orthotic/prosthetic training, patient and family training/education, and therapeutic activities      Frequency/Duration: Patient will be followed by physical therapy:  5 times a week to address goals. Recommendation for discharge: (in order for the patient to meet his/her long term goals)  Therapy 3 hours per day 5-7 days per week    This discharge recommendation:  Has been made in collaboration with the attending provider and/or case management    IF patient discharges home will need the following DME: to be determined (TBD)         SUBJECTIVE:   Patient stated We can try.     OBJECTIVE DATA SUMMARY:   HISTORY:    Past Medical History:   Diagnosis Date    Arrhythmia     heart murmur    Arthritis     Right knee, DJD left knee    Asthma     [facility]     COVID-19 01/29/2021    Diabetes ([facility]     Pre diabetes    Hypertension     Morbid obesity ([facility]     Neuropathy     Patient reports numbness and tingling in her legs and feet from her back    Sleep apnea     was ordered a CPAP years ago, but never used     Past Surgical History:   Procedure Laterality Date    HX BREAST LUMPECTOMY Left 3/2/2022    LEFT BREAST LUMPECTOMY WITH LEFT BREAST SENTINEL NODE BIOPSY WITH BLUE DYE AND PORT A CATH INSERTION (URGENT) performed by Renny Limon MD at [address] HX CHOLECYSTECTOMY      HX GYN      c section    HX GYN      d&c    HX WISDOM TEETH EXTRACTION Bilateral        Personal factors and/or comorbidities impacting plan of care:     Home Situation  Home Environment: Private residence  # Steps to Enter: 5  Rails to Enter: Yes  Hand Rails : Bilateral  One/Two Story Residence: One story  Living Alone: No  Support Systems: Child(shaun) (daughter)  Patient Expects to be Discharged to[de-identified] Home with home health  Current DME Used/Available at Home: None  Tub or Shower Type: Tub/Shower combination    EXAMINATION/PRESENTATION/DECISION MAKING:   Critical Behavior:  Neurologic State: Alert, Appropriate for age  Orientation Level: Oriented X4  Cognition: Appropriate decision making, Appropriate for age attention/concentration, Follows commands  Safety/Judgement: Awareness of environment, Fall prevention, Insight into deficits  Hearing: Auditory  Auditory Impairment: None    Range Of Motion:  AROM: Generally decreased, functional           PROM: Generally decreased, functional           Strength:    Strength: Generally decreased, functional                    Tone & Sensation:   Tone: Normal              Sensation: Impaired (L thigh numbness)               Coordination:  Coordination: Generally decreased, functional  Vision:   Acuity: Impaired near vision; Impaired far vision  Corrective Lenses: Glasses (at home)  Functional Mobility:  Bed Mobility:  Rolling: Maximum assistance;Assist x2  Supine to Sit: Maximum assistance; Additional time;Assist x2  Sit to Supine: Maximum assistance;Assist x2  Scooting: Maximum assistance; Additional time;Assist x2  Transfers:  Sit to Stand: Moderate assistance; Additional time;Assist x1  Stand to Sit: Minimum assistance; Additional time;Assist x1  Stand Pivot Transfers: Moderate assistance;Assist x2     Bed to Chair: Moderate assistance; Additional time;Assist x1              Balance:   Sitting: Intact  Standing: Intact; With support (RW)  Ambulation/Gait Training:  Distance (ft): 2 Feet (ft)  Assistive Device: Walker, rolling;Gait belt        Gait Description (WDL): Exceptions to WDL  Gait Abnormalities: Antalgic; Path deviations; Step to gait        Base of Support: Widened     Speed/Tamica: Fluctuations; Slow  Step Length: Right shortened;Left shortened                   Therapeutic Exercises:   Educate and instructed patient to continue active range of motion exercise on both legs while up on chair or on bed multiple times. Recommend patient to be up on the chair at least 3 times a day every meal times as tolerated. Functional Measure:  Barthel Index:    Bathin  Bladder: 5  Bowels: 5  Groomin  Dressin  Feeding: 10  Mobility: 0  Stairs: 0  Toilet Use: 0  Transfer (Bed to Chair and Back): 5  Total: 35/100       The Barthel ADL Index: Guidelines  1. The index should be used as a record of what a patient does, not as a record of what a patient could do. 2. The main aim is to establish degree of independence from any help, physical or verbal, however minor and for whatever reason. 3. The need for supervision renders the patient not independent. 4. A patient's performance should be established using the best available evidence. Asking the patient, friends/relatives and nurses are the usual sources, but direct observation and common sense are also important. However direct testing is not needed. 5. Usually the patient's performance over the preceding 24-48 hours is important, but occasionally longer periods will be relevant. 6. Middle categories imply that the patient supplies over 50 per cent of the effort. 7. Use of aids to be independent is allowed. Score Interpretation (from [address] [address])    Independent   60-79 Minimally independent   40-59 Partially dependent   20-39 Very dependent   <20 Totally dependent     -Fransisco Unger., Barthel, D.W. (1965). Functional evaluation: the Barthel Index. [facility] ([address] (1997).  The Barthel activities of daily living index: self-reporting versus actual performance in the old (> or = 75 years). Journal of [address] [place] [address] 45(6), 14 [facility], .JBLANCA.F, Nora Salazar., Hasmukh Montalvo. (1999). Measuring the change in disability after inpatient rehabilitation; comparison of the responsiveness of the Barthel Index and Functional [place] Measure. Journal of Neurology, Neurosurgery, and Psychiatry, 66(4), 811-255. LARRY Nieto, CONCEPCION Stuart, & Lavinia Jaffe M.A. (2004) Assessment of post-stroke quality of life in cost-effectiveness studies: The usefulness of the Barthel Index and the EuroQoL-5D. Quality of Life Research, 15, 355-78           Physical Therapy Evaluation Charge Determination   History Examination Presentation Decision-Making   LOW Complexity : Zero comorbidities / personal factors that will impact the outcome / POC LOW Complexity : 1-2 Standardized tests and measures addressing body structure, function, activity limitation and / or participation in recreation  LOW Complexity : Stable, uncomplicated  Other outcome measures barthel  LOW       Based on the above components, the patient evaluation is determined to be of the following complexity level: LOW     Pain Ratin/10    Activity Tolerance:   Good    After treatment patient left in no apparent distress:   Sitting in chair, Heels elevated for pressure relief, Call bell within reach, and Bed / chair alarm activated    COMMUNICATION/EDUCATION:   The patients plan of care was discussed with: Occupational therapist and Registered nurse. Fall prevention education was provided and the patient/caregiver indicated understanding. Thank you for this referral.  Shruthi Yanez PT,WCC.    Time Calculation: 28 mins

## 2022-07-22 NOTE — PROGRESS NOTES
Abdifatah Guerin [facility]  [address] [facility], [address] [place] [address]  [phone]      Medical Progress Note      NAME: Peyman Rodriguez   :  1960  MRM:  [id number]    Date/Time: 2022  12:03 PM         Subjective:     Chief Complaint: \"I'm okay\"     Pt seen and examined. Extubated on . Currently on PCA and pain better controlled. NG in place        Objective:       Vitals:          Last 24hrs VS reviewed since prior progress note. Most recent are:    Visit Vitals  BP (!) 145/62 (BP 1 Location: Right lower arm, BP Patient Position: At rest)   Pulse (!) 102   Temp 98.5 °F (36.9 °C)   Resp 15   Ht 5' 2\" (1.575 m)   Wt 155.6 kg (343 lb 0.6 oz)   SpO2 98%   BMI 62.74 kg/m²     SpO2 Readings from Last 6 Encounters:   22 98%   22 95%   06/15/22 95%   06/15/22 95%   22 96%   22 95%    O2 Flow Rate (L/min): 2 l/min     Intake/Output Summary (Last 24 hours) at 2022 1356  Last data filed at 2022 1129  Gross per 24 hour   Intake 1549 ml   Output 1350 ml   Net 199 ml          Exam:     Physical Exam:    Gen: Morbidly obese. Anasarcic. Alopecia  HEENT:  Pink conjunctivae, PERRL, hearing intact to voice, moist mucous membranes  Neck:  Supple, without masses, thyroid non-tender  Resp:  No accessory muscle use, clear breath sounds without wheezes rales or rhonchi  Card: tachycardic.  No murmurs, normal S1, S2 without thrills, bruits or peripheral edema  Abd: Midline dressing C/D/I   Musc:  No cyanosis or clubbing  Skin:  No rashes or ulcers, skin turgor is good  Neuro:  Cranial nerves 3-12 are grossly intact   Psych: AAOx3    Medications Reviewed: (see below)    Lab Data Reviewed: (see below)    ______________________________________________________________________    Medications:     Current Facility-Administered Medications   Medication Dose Route Frequency    furosemide (LASIX) injection 20 mg  20 mg IntraVENous DAILY    HYDROmorphone (DILAUDID) injection 1 mg  1 mg IntraVENous Q4H PRN    labetaloL (NORMODYNE;TRANDATE) 20 mg/4 mL (5 mg/mL) injection 20 mg  20 mg IntraVENous Q6H PRN    HYDROmorphone 30 mg/30 mL (DILAUDID) PCA   IntraVENous CONTINUOUS    piperacillin-tazobactam (ZOSYN) 4.5 g in 0.9% sodium chloride (MBP/ADV) 100 mL MBP  4.5 g IntraVENous Q8H    miconazole (MICOTIN) 2 % powder   Topical BID    sodium chloride (NS) flush 5-40 mL  5-40 mL IntraVENous Q8H    sodium chloride (NS) flush 5-40 mL  5-40 mL IntraVENous PRN    lactated Ringers infusion  50 mL/hr IntraVENous CONTINUOUS    naloxone (NARCAN) injection 0.4 mg  0.4 mg IntraVENous PRN    ondansetron (ZOFRAN) injection 4 mg  4 mg IntraVENous Q4H PRN    enoxaparin (LOVENOX) injection 40 mg  40 mg SubCUTAneous Q12H    arformoteroL (BROVANA) neb solution 15 mcg  15 mcg Nebulization BID RT    And    budesonide (PULMICORT) 500 mcg/2 ml nebulizer suspension  250 mcg Nebulization BID RT    albuterol-ipratropium (DUO-NEB) 2.5 MG-0.5 MG/3 ML  3 mL Nebulization Q6H PRN    glucose chewable tablet 16 g  4 Tablet Oral PRN    glucagon (GLUCAGEN) injection 1 mg  1 mg IntraMUSCular PRN    dextrose 10% infusion 0-250 mL  0-250 mL IntraVENous PRN    insulin lispro (HUMALOG) injection   SubCUTAneous Q6H    fluconazole (DIFLUCAN) 400mg/200 mL IVPB (premix)  400 mg IntraVENous Q24H    hydrALAZINE (APRESOLINE) 20 mg/mL injection 20 mg  20 mg IntraVENous Q6H PRN    pantoprazole (PROTONIX) 40 mg in 0.9% sodium chloride 10 mL injection  40 mg IntraVENous Q12H    [Held by provider] amLODIPine (NORVASC) tablet 10 mg  10 mg Per NG tube DAILY            Lab Review:     Recent Labs     07/22/22  0001 07/21/22  0141 07/20/22  1145   WBC 9.9 10.4 19.9*   HGB 9.1* 9.1* 11.2*   HCT 30.3* 30.0* 37.5    173 236     Recent Labs     07/22/22  0001 07/21/22  0141 07/20/22  1145    143 142   K 4.3 4.3 4.6   * 110* 107   CO2 28 27 27   * 141* 131*   BUN 17 21* 28*   CREA 0.79 0.93 1.26*   CA 9.7 9.4 10.1   MG 2.3 2.2  -- PHOS 3.1  --   --    ALB  --   --  2.5*   ALT  --   --  112*     No components found for: GLPOC         Assessment / Plan:   1) Gastric ulcer perforation - s/p repair  -on zosyn and fluconazole as per surgery  -IV PPI BID  -defer need for imaging to gen surg  -start PCA due to pain       2) Breast cancer, left ([facility] [address] [facility] (2/3/2022)  -outpt management  -high risk for DVT; on DVT prophylaxis as per gen surgery      3) HTN (hypertension) (7/20/2022)  -optimize pain control   -PRN hydralazine, labetolol   -hold ACE/HCTZ for now      4) DM (diabetes mellitus) ([facility] [address] (7/20/2022)  -ISS  -BG checks q6      5) Leukocytosis (7/20/2022) - likely 2/2 peritonitis  -on zosyn  -UA not c/w infection   -CXR without PNA      6) KIAN (acute kidney injury) ([facility] [address] [facility] (7/20/2022) - resolved  -monitor with diuresis       7) Morbid obesity ([facility] [address] (7/20/2022)  -weight loss      8) Hypoalbuminemia (7/20/2022) - ?poor nutritional status  -UA without proteinuria  -check prealbumin  -IV diuresis  -would benefit from nutrition eval once able to tolerate PO     Total time spent with patient: 84 8[address] [facility] discussed with: Patient, Nursing Staff, Consultant/Specialist, and >50% of time spent in counseling and coordination of care    Discussed:  Care Plan    Prophylaxis:  Lovenox    Disposition:  Home w/Family           ___________________________________________________    Attending Physician: Matt Bran MD

## 2022-07-22 NOTE — PROGRESS NOTES
Problem: Self Care Deficits Care Plan (Adult)  Goal: *Acute Goals and Plan of Care (Insert Text)  Description: FUNCTIONAL STATUS PRIOR TO ADMISSION: Patient was independent and active without use of DME.     HOME SUPPORT: The patient lived with daughter but did not require assist.    Occupational Therapy Goals  Initiated 7/22/2022  1. Patient will perform grooming standing at sink with supervision/set-up within 7 day(s). 2.  Patient will perform lower body dressing using adaptive equipment with minimal assistance/contact guard assist within 7 day(s). 3.  Patient will perform toilet transfers with supervision/set-up within 7 day(s). 4.  Patient will perform all aspects of toileting with minimal assistance/contact guard assist within 7 day(s). 5.  Patient will participate in upper extremity therapeutic exercise/activities with independence for 10 minutes within 7 day(s). 6.  Patient will utilize energy conservation techniques during functional activities with verbal and visual cues within 7 day(s). Outcome: Progressing Towards Goal     OCCUPATIONAL THERAPY EVALUATION  Patient: Theo Lennox (22 y.o. female)  Date: 7/22/2022  Primary Diagnosis: Bowel perforation (HCC) [K63.1]  Procedure(s) (LRB):  LAPAROTOMY EXPLORATORY, repair of gastric ulcer (N/A) 2 Days Post-Op   Precautions:   Fall, Skin (Pt is >340 lbs)    ASSESSMENT  Based on the objective data described below, the patient presents with decreased strength, endurance, mobility, balance, safety and morbid obesity POD 2 ex lap with repair of gastric ulcer. Pt extubated 7/21/22. She requires significant assist for bed mobility, LE ADLs and toileting. Pt was mod I prior to admission and far from her baseline. She is very motivated for increased independence and would benefit from IP rehab at discharge.     Current Level of Function Impacting Discharge (ADLs/self-care): total A LE ADLs and toileting, max A x2 bed mobility and mod A OOB mobility    Functional Outcome Measure: The patient scored Total: 35/100 on the Barthel Index outcome measure. Other factors to consider for discharge: see above     Patient will benefit from skilled therapy intervention to address the above noted impairments. PLAN :  Recommendations and Planned Interventions: self care training, functional mobility training, therapeutic exercise, balance training, therapeutic activities, endurance activities, patient education, home safety training, and family training/education    Frequency/Duration: Patient will be followed by occupational therapy 5 times a week to address goals. Recommendation for discharge: (in order for the patient to meet his/her long term goals)  Therapy 3 hours per day 5-7 days per week    This discharge recommendation:  Has not yet been discussed the attending provider and/or case management    IF patient discharges home will need the following DME: TBD       SUBJECTIVE:   Patient stated I want to work hard and get home.     OBJECTIVE DATA SUMMARY:   HISTORY:   Past Medical History:   Diagnosis Date    Arrhythmia     heart murmur    Arthritis     Right knee, DJD left knee    Asthma     [facility]     COVID-19 01/29/2021    Diabetes [facility]     Pre diabetes    Hypertension     Morbid obesity [facility]     Neuropathy     Patient reports numbness and tingling in her legs and feet from her back    Sleep apnea     was ordered a CPAP years ago, but never used     Past Surgical History:   Procedure Laterality Date    HX BREAST LUMPECTOMY Left 3/2/2022    LEFT BREAST LUMPECTOMY WITH LEFT BREAST SENTINEL NODE BIOPSY WITH BLUE DYE AND PORT A CATH INSERTION (URGENT) performed by Ashli Daly MD at [address] [place] [address]    HX CHOLECYSTECTOMY      HX GYN      c section    HX GYN      d&c    HX WISDOM TEETH EXTRACTION Bilateral        Expanded or extensive additional review of patient history:     Home Situation  Home Environment: Private residence  # Steps to Enter: 5  Rails to Enter: Yes  Hand Rails : Bilateral  One/Two Story Residence: One story  Living Alone: No  Support Systems: Child(shaun) (daughter)  Patient Expects to be Discharged to[de-identified] Home with home health  Current DME Used/Available at Home: None  Tub or Shower Type: Tub/Shower combination    Hand dominance: Right    EXAMINATION OF PERFORMANCE DEFICITS:  Cognitive/Behavioral Status:  Neurologic State: Alert; Appropriate for age  Orientation Level: Oriented X4  Cognition: Appropriate decision making; Appropriate for age attention/concentration; Follows commands  Perception: Appears intact  Perseveration: No perseveration noted  Safety/Judgement: Awareness of environment; Fall prevention; Insight into deficits    Hearing: Auditory  Auditory Impairment: None    Vision/Perceptual:    Acuity: Impaired near vision; Impaired far vision    Corrective Lenses: Glasses (at home)    Range of Motion:  AROM: Generally decreased, functional  PROM: Generally decreased, functional                      Strength:  Strength: Generally decreased, functional                Coordination:  Coordination: Generally decreased, functional  Fine Motor Skills-Upper: Left Impaired;Right Impaired (B hands edema)    Gross Motor Skills-Upper: Left Intact; Right Intact    Tone & Sensation:  Tone: Normal  Sensation: Impaired (L thigh numbness)                      Balance:  Sitting: Intact  Standing: Intact; With support (RW)    Functional Mobility and Transfers for ADLs:  Bed Mobility:  Supine to Sit: Maximum assistance; Additional time;Assist x2  Scooting: Maximum assistance; Additional time;Assist x2    Transfers:  Sit to Stand: Moderate assistance; Additional time;Assist x1  Stand to Sit: Minimum assistance; Additional time;Assist x1  Bed to Chair: Moderate assistance; Additional time;Assist x1  Toilet Transfer : Moderate assistance; Additional time;Assist x1 (BSC)  Assistive Device : Walker, rolling    ADL Assessment:  Feeding: Setup    Oral Facial Hygiene/Grooming: Supervision;Setup    Bathing: Moderate assistance; Additional time;Assist x1         Upper Body Dressing: Supervision;Setup; Additional time    Lower Body Dressing: Total assistance    Toileting: Total assistance                ADL Intervention and task modifications:  Patient instructed and indicated understanding energy conservation techniques to increase independence and safety during ADLs. Cognitive Retraining  Safety/Judgement: Awareness of environment; Fall prevention; Insight into deficits    Therapeutic Exercise:  Provide pt with BUE AROM HEP with written handout. Pt demonstrated good understanding and agreeable to perform daily. Functional Measure:    Barthel Index:  Bathin  Bladder: 5  Bowels: 5  Groomin  Dressin  Feeding: 10  Mobility: 0  Stairs: 0  Toilet Use: 0  Transfer (Bed to Chair and Back): 5  Total: 35/100      The Barthel ADL Index: Guidelines  1. The index should be used as a record of what a patient does, not as a record of what a patient could do. 2. The main aim is to establish degree of independence from any help, physical or verbal, however minor and for whatever reason. 3. The need for supervision renders the patient not independent. 4. A patient's performance should be established using the best available evidence. Asking the patient, friends/relatives and nurses are the usual sources, but direct observation and common sense are also important. However direct testing is not needed. 5. Usually the patient's performance over the preceding 24-48 hours is important, but occasionally longer periods will be relevant. 6. Middle categories imply that the patient supplies over 50 per cent of the effort. 7. Use of aids to be independent is allowed.     Score Interpretation (from [address] [address])    Independent   60-79 Minimally independent   40-59 Partially dependent   20-39 Very dependent   <20 Totally dependent     -Adonay Zepeda., BarthelMADALYN. (1965). Functional evaluation: the Barthel Index. [address] ([address] (1997). The Barthel activities of daily living index: self-reporting versus actual performance in the old (> or = 75 years). Journal of [employer]4 [facility] 45(7), 14 [facility], GINI, Lalo Dalton., Roger Campa. (1999). Measuring the change in disability after inpatient rehabilitation; comparison of the responsiveness of the Barthel Index and Functional [place] Measure. Journal of Neurology, Neurosurgery, and Psychiatry, 66(4), 331-093. Tommie Lira, [place]SUNITA, CONCEPCION Stuart, & Patti Wilde M.A. (2004) Assessment of post-stroke quality of life in cost-effectiveness studies: The usefulness of the Barthel Index and the EuroQoL-5D. Quality of Life Research, 15, 131-15     Occupational Therapy Evaluation Charge Determination   History Examination Decision-Making   LOW Complexity : Brief history review  HIGH Complexity : 5 or more performance deficits relating to physical, cognitive , or psychosocial skils that result in activity limitations and / or participation restrictions MEDIUM Complexity : Patient may present with comorbidities that affect occupational performnce. Miniml to moderate modification of tasks or assistance (eg, physical or verbal ) with assesment(s) is necessary to enable patient to complete evaluation       Based on the above components, the patient evaluation is determined to be of the following complexity level: LOW   Pain Ratin/10    Activity Tolerance:   Fair and requires rest breaks    After treatment patient left in no apparent distress:    Sitting in chair and Call bell within reach    COMMUNICATION/EDUCATION:   The patients plan of care was discussed with: Physical therapist and Registered nurse.      Home safety education was provided and the patient/caregiver indicated understanding., Patient/family have participated as able in goal setting and plan of care. , and Patient/family agree to work toward stated goals and plan of care. This patients plan of care is appropriate for delegation to BOGDAN.     Thank you for this referral.  Harry Abbott OT  Time Calculation: 31 mins

## 2022-07-22 NOTE — PROGRESS NOTES
[facility]    ICU TEAM Progress Note    Name: Catalina Saavedra   : 1960   MRN: [id number]   Date: 2022      Subjective:   Progress Note: 2022      Ms Catalina Saavedra is a 66 yo female with a hx of breast cancer and morbid obesity who presented to the ED with pneumoperitoneum. She received zosyn and was taken to the OR for ex-lap. In the OR, she was found to have a perorated gastric ulcer. She was transferred to the ICU intubated and sedated. She was extubated  without complication. She reports improved pain control with dilaudid PCA. Active Problem List:     Problem List  Date Reviewed: 2022            Codes Class    * (Principal) Bowel perforation (HCC) ICD-10-CM: K63.1  ICD-9-CM: 569.83         HTN (hypertension) ICD-10-CM: I10  ICD-9-CM: 401.9         DM (diabetes mellitus) ([facility] ICD-10-CM: E11.9  ICD-9-CM: 250.00         Leukocytosis ICD-10-CM: D72.829  ICD-9-CM: 288.60         KIAN (acute kidney injury) ([facility] ICD-10-CM: N17.9  ICD-9-CM: 584.9         Morbid obesity ([facility] ICD-10-CM: E66.01  ICD-9-CM: 278.01         Hypoalbuminemia ICD-10-CM: E88.09  ICD-9-CM: 273.8         Encounter for antineoplastic chemotherapy ICD-10-CM: Z51.11  ICD-9-CM: V58.11         Prevention of chemotherapy-induced neutropenia ICD-10-CM: Z76.89  ICD-9-CM: V72.85         Breast cancer, left ([facility] ICD-10-CM: F15.233  ICD-9-CM: 174.9     Overview Addendum 2022 10:34 PM by Enma Cool MD     2022 LEFT lumpectomy 19mm invasive ductal carcinoma, grade 3, 0/3 nodes, triple neg, Ki 40%  TC  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     Past Surgical History:      has a past surgical history that includes hx cholecystectomy; hx gyn; hx gyn; hx wisdom teeth extraction (Bilateral); and hx breast lumpectomy (Left, 3/2/2022). Home Medications:     Prior to Admission medications    Medication Sig Start Date End Date Taking? Authorizing Provider   lisinopril-hydroCHLOROthiazide (PRINZIDE, ZESTORETIC) 20-12.5 mg per tablet Take 1 Tablet by mouth daily. Yes Provider, Historical   lidocaine-prilocaine (EMLA) topical cream Apply thin layer to port 30-60 minutes prior to infusion 3/29/22  Yes Milargo Olguin NP   albuterol-ipratropium (DUO-NEB) 2.5 mg-0.5 mg/3 ml nebu 3 mL by Nebulization route every six (6) hours as needed for Wheezing. Yes Provider, Historical   albuterol (PROVENTIL HFA, VENTOLIN HFA, PROAIR HFA) 90 mcg/actuation inhaler Take  by inhalation every six (6) hours as needed for Wheezing. Yes Provider, Historical   metFORMIN (GLUMETZA ER) 500 mg TG24 24 hour tablet Take 1 Tablet by mouth every morning. Yes Provider, Historical   fluticasone propion-salmeteroL (ADVAIR/WIXELA) 100-50 mcg/dose diskus inhaler Take 1 Puff by inhalation every twelve (12) hours. Yes Provider, Historical   amLODIPine (NORVASC) 10 mg tablet Take 10 mg by mouth every morning. Yes Provider, Historical   dexAMETHasone (DECADRON) 4 mg tablet Take 8mg (2 tablets) by mouth twice daily on the day before and day after chemotherapy. 5/4/22   Jackson Hein MD   ondansetron hcl [facility]) 8 mg tablet Take 1 Tablet by mouth every eight (8) hours as needed for Nausea or Vomiting. Patient not taking: Reported on 7/20/2022 3/29/22   Tiffany DORSEY NP   prochlorperazine (Compazine) 10 mg tablet Take 1 Tablet by mouth every six (6) hours as needed for Nausea or Vomiting. Patient not taking: Reported on 7/20/2022 3/29/22   Bo Martins NP       Allergies/Social/Family History:      Allergies   Allergen Reactions    Nuts [Tree Nut] Rash and Itching     Throat itching      Social History     Tobacco Use    Smoking status: Never Smokeless tobacco: Never   Substance Use Topics    Alcohol use: Yes     Comment: social once per month      Family History   Problem Relation Age of Onset    Hypertension Mother     Heart Disease Mother     Heart Disease Father     Hypertension Father     Breast Cancer Maternal Aunt     Breast Cancer Other     Breast Cancer Maternal Aunt        Review of Systems:     As per HPI    Objective:   Vital Signs:  Visit Vitals  BP (!) 140/64   Pulse 90   Temp 98.8 °F (37.1 °C)   Resp 11   Ht 5' 2\" (1.575 m)   Wt 155.6 kg (343 lb 0.6 oz)   LMP 2021 (Approximate)   SpO2 100%   BMI 62.74 kg/m²    O2 Flow Rate (L/min): 2 l/min O2 Device: Nasal cannula Temp (24hrs), Av.6 °F (37 °C), Min:98.3 °F (36.8 °C), Max:99.1 °F (37.3 °C)           Intake/Output:     Intake/Output Summary (Last 24 hours) at 2022 0941  Last data filed at 2022 0840  Gross per 24 hour   Intake 1520 ml   Output 2130 ml   Net -610 ml       Physical Exam:    General:  Awake, alert, oriented  Eye:  PERRLA  Neurologic:  Awake, alert, follows commands    Neck:  Supple, no JVD  Lungs:  CTAB  Heart:  RRR, 2+ pulses, 2+ edema of extremities  Abdomen: Morbid obesity, ex-lap site covered with dressing; no drainage noted on dressing  Skin:  c/d/I w exception of above  Validated     LABS AND  DATA: Personally reviewed  Recent Labs     22  0001 22  0141   WBC 9.9 10.4   HGB 9.1* 9.1*   HCT 30.3* 30.0*    173     Recent Labs     22  0001 22  0141    143   K 4.3 4.3   * 110*   CO2 28 27   BUN 17 21*   CREA 0.79 0.93   * 141*   CA 9.7 9.4   MG 2.3 2.2   PHOS 3.1  --      Recent Labs     22  1145   AP 79   TP 6.5   ALB 2.5*   GLOB 4.0     No results for input(s): INR, PTP, APTT, INREXT, INREXT in the last 72 hours. No results for input(s): PHI, PCO2I, PO2I, FIO2I in the last 72 hours. No results for input(s): CPK, CKMB, TROIQ, BNPP in the last 72 hours.       RA      MEDS: Reviewed    Chest X-Ray: personally reviewed and report checked    Assessment and Plan     Gastric Ulcer Perforation: S/P surgical repair 7/20.  - Continue zosyn/fluc  - Surgery following, appreciate recs  - OGT to low intermittent suction  - Continue NPO status  - Dilaudid PCA for post-op pain  - BID PPI    HTN: PRN hydralazine/labetalol    DM2 c/b hyperglycemia:   - SSI    Morbid obesity: NST counseling when able to take PO    Deconditioning: PT/OT    Multidisciplinary Rounds Completed:  Y    ABCDEF Bundle/Checklist  Pain Medications: Dilaudid PCA  Target RASS: 0 - Alert & Calm - Spontaneously pays attention to caregiver  Sedation Medications: None  CAM-ICU:  Negative  Mobility: Poor  PT/OT: PT consulted and on board and OT consulted and on board   Restraints: None needed at this time  Discussed Plan of Care (goals of care): Yes  Addressed Code Status: Full Code    CARDIOVASCULAR  Cardiac Gtts: None  SBP Goal of: > 90 mmHg  MAP Goal of: > 65 mmHg  Transfusion Trigger (Hgb): <7 g/dL    RESPIRATORY  Vent Goals:   NI  DVT Prophylaxis (if no, list reason): SCD's or Sequential Compression Device   SPO2 Goal: > 92%  Pulmonary toilet: Duo-Nebs     GI/  Solitario Catheter Present: Yes  GI Prophylaxis: PPI  Nutrition: No NPO  IVFs: Y  Bowel Movement: Y  Bowel Regimen: N  Insulin: Y    ANTIBIOTICS  Antibiotics:  Zosyn/Fluc    T/L/D  Tubes: ETT  Lines: CVC  Drains: N    SPECIAL EQUIPMENT  Vent    DISPOSITION  ICU    CRITICAL CARE CONSULTANT NOTE  I had a face to face encounter with the patient, reviewed and interpreted patient data including clinical events, labs, images, vital signs, I/O's, and examined patient.   I have discussed the case and the plan and management of the patient's care with the consulting services, the bedside nurses and the respiratory therapist.      Level 3    CCM signing off    GURINDER [facility]  7/22/2022

## 2022-07-22 NOTE — PROGRESS NOTES
0700 Bedside and Verbal shift change report given to Ky Tan RN (oncoming nurse) by Nieves Ruffin RN (offgoing nurse). Report included the following information SBAR, Kardex, ED Summary, OR Summary, Intake/Output, MAR, Recent Results, and Cardiac Rhythm sr . Primary Nurse Jeny Manzo RN and CHRISTIAN Solares performed a dual skin assessment on this patient Impairment noted- see wound doc flow sheet  Eliecer score is 13     0800 Pt assessed. A&O x4. Follows commands. Generalized weakness. Breath sound diminished throughout Ant. 02 2L/NC. No cough. Sat 100%, RR 17. SR. BP stable. Pitting edema in all extremities. No CP, SOB. BS hypo x4. Abd soft, round. No flatus. Midline abd incision. Dsg D/I. Rt NGT connected to LIS, draining green. Richter patent, draining kiko urine with sediment. Pt c/o irritation at richter. Richter draining. No redness. Continue to monitor. BPPP. Turned, repositioned. See assessment. 0840 Pt yelling out that her richter is very uncomfortable. Burning with pressure. Discontinued without difficulty. Applied purewick. 0930 Pt voided via purewick kiko with sediment. 1000 Turned, repositioned. 56 Dr. Kareem Zelaya at bedside, assessed pt. Updated regarding pt's condition and plan of care. May transfer to [address] [place] [address].   1200 Pt reassessed. Assessment unchanged. PT at bedside for therapy. OOB to recliner with 1 assist. Tolerated well. IS given to pt. Educated usage, demonstrated understanding. 10xhr while awake. 1400 Pt remains in recliner. No complaints. 1600 Pt reassessed. No complaints. Remains in recliner. Family visiting. 1800 Remains in chair. No complaints. 1900 Bedside and Verbal shift change report given to Arturo Fleischer, RN (oncoming nurse) by Ky Tan RN (offgoing nurse). Report included the following information SBAR, Kardex, ED Summary, OR Summary, Procedure Summary, Intake/Output, MAR, Recent Results, Med Rec Status, Cardiac Rhythm NSR, Alarm Parameters , Quality Measures, and Dual Neuro Assessment.

## 2022-07-22 NOTE — PROGRESS NOTES
[facility]    POD #2    Subjective     Extubated to NC yesterday  Pain less today  Not passing gas      Objective     Patient Vitals for the past 24 hrs:   Temp Pulse Resp BP SpO2   07/22/22 0830 -- 90 11 (!) 140/64 100 %   07/22/22 0815 -- 96 14 -- 95 %   07/22/22 0800 98.8 °F (37.1 °C) 95 17 (!) 152/51 92 %   07/22/22 0755 -- -- -- -- 95 %   07/22/22 0745 -- 98 21 -- 99 %   07/22/22 0730 -- (!) 104 23 (!) 148/59 100 %   07/22/22 0715 -- 93 15 -- 98 %   07/22/22 0700 -- 91 17 -- 99 %   07/22/22 0645 -- 92 11 -- 100 %   07/22/22 0630 -- 94 12 -- 99 %   07/22/22 0615 -- 94 26 -- 99 %   07/22/22 0600 -- 91 12 (!) 127/56 98 %   07/22/22 0545 -- 94 21 -- 97 %   07/22/22 0530 -- 93 17 (!) 128/41 98 %   07/22/22 0515 -- 96 15 -- 98 %   07/22/22 0500 -- 93 13 (!) 123/51 97 %   07/22/22 0445 -- 95 15 -- 100 %   07/22/22 0430 -- 91 12 (!) 134/48 100 %   07/22/22 0415 -- 95 24 -- 100 %   07/22/22 0400 98.3 °F (36.8 °C) 91 13 (!) 131/56 100 %   07/22/22 0345 -- 93 17 -- 100 %   07/22/22 0330 -- 90 16 126/75 100 %   07/22/22 0315 -- 90 15 -- 100 %   07/22/22 0300 -- 89 19 (!) 121/58 100 %   07/22/22 0245 -- 89 21 -- 100 %   07/22/22 0230 -- 91 13 122/61 100 %   07/22/22 0215 -- 89 15 -- 98 %   07/22/22 0200 -- 92 21 123/65 99 %   07/22/22 0145 -- 93 20 -- 100 %   07/22/22 0130 -- 93 11 (!) 125/55 97 %   07/22/22 0115 -- 94 21 -- 98 %   07/22/22 0100 -- 91 13 118/62 98 %   07/22/22 0045 -- 93 12 -- 98 %   07/22/22 0030 -- 91 13 122/65 99 %   07/22/22 0015 -- 90 12 -- 100 %   07/22/22 0000 98.5 °F (36.9 °C) 94 13 115/63 99 %   07/21/22 2345 -- 90 16 -- 98 %   07/21/22 2330 -- 89 14 107/63 98 %   07/21/22 2315 -- 87 13 -- 98 %   07/21/22 2300 -- 92 11 114/70 96 %   07/21/22 2245 -- 91 11 -- 96 %   07/21/22 2230 -- 96 17 (!) 124/52 96 %   07/21/22 2215 -- 94 17 -- 97 %   07/21/22 2200 -- 93 14 (!) 119/48 97 %   07/21/22 2145 -- (!) 103 17 -- 97 %   07/21/22 2130 -- 95 19 122/69 99 %   07/21/22 2115 -- 98 22 -- 99 %   07/21/22 2100 -- 98 28 (!) 120/51 99 %   07/21/22 2045 -- 100 15 -- 97 %   07/21/22 2030 -- (!) 106 22 (!) 114/49 97 %   07/21/22 2015 -- (!) 106 19 -- 99 %   07/21/22 2001 -- (!) 102 11 -- 98 %   07/21/22 2000 98.3 °F (36.8 °C) (!) 106 19 136/68 99 %   07/21/22 1946 -- -- -- -- 100 %   07/21/22 1945 -- -- -- -- 100 %   07/21/22 1931 -- (!) 102 (!) 7 -- 100 %   07/21/22 1930 -- 100 16 130/61 99 %   07/21/22 1915 -- 98 17 -- 100 %   07/21/22 1900 -- 100 19 (!) 132/50 100 %   07/21/22 1800 -- 99 14 129/63 99 %   07/21/22 1700 -- (!) 112 20 (!) 137/58 92 %   07/21/22 1600 98.5 °F (36.9 °C) (!) 101 20 (!) 123/57 (!) 88 %   07/21/22 1500 -- (!) 103 21 123/66 98 %   07/21/22 1430 -- (!) 106 20 136/82 96 %   07/21/22 1400 -- 98 9 (!) 119/58 95 %   07/21/22 1300 -- 100 8 (!) 129/55 95 %   07/21/22 1230 -- 97 17 -- 95 %   07/21/22 1200 99.1 °F (37.3 °C) 99 (!) 5 138/75 96 %   07/21/22 1130 -- 99 15 -- 91 %   07/21/22 1100 -- (!) 120 27 (!) 144/45 95 %   07/21/22 1055 -- (!) 119 -- (!) 178/63 --       Date 07/21/22 0700 - 07/22/22 0659 07/22/22 0700 - 07/23/22 0659   Shift 7928-9129 5626-6765 24 Hour Total 4770-7447 4696-3099 24 Hour Total   INTAKE   I.V.(mL/kg/hr) 1539.3(0.8) 1000(0.5) 2539.3(0.7)        Diprivan Volume 139.3  139.3        Volume (lactated Ringers infusion)         Volume (piperacillin-tazobactam (ZOSYN) 3.375 g in 0.9% sodium chloride (MBP/ADV) 100 mL MBP) 200  200        Volume (fluconazole (DIFLUCAN) 400mg/200 mL IVPB (premix)) 200 200 400        Volume (piperacillin-tazobactam (ZOSYN) 4.5 g in 0.9% sodium chloride (MBP/ADV) 100 mL MBP) 100 200 300      Other 20  20        Irrigation Volume Input (mL) ([REMOVED] Urinary Catheter 07/20/22 [facility] - Temperature;2- way) 20  20      Shift Total(mL/kg) 1559. 3(10) 1000(6.4) 2559. 3(16.4)      OUTPUT   Urine(mL/kg/hr) 1055(0.6) 550(0.3) 1605(0.4) 225  225     Urine Output (mL) ([REMOVED] Urinary Catheter 07/20/22 [facility] - Temperature;2- way) 7379 836 5012 225  225   Emesis/NG output  300 300        Output (ml) (Nasogastric Tube 07/20/22)  300 300      Shift Total(mL/kg) 1055(6.8) 850(5.5) 1905(12.2) 225(1.4)  225(1.4)   .3 150 654.3 -225  -225   Weight (kg) 155. 6 155.6 155.6 155.6 155.6 155.6       PE  GEN - Awake, alert, communicating appropriately.   NAD  Pulm - CTAB  CV - RRR  Abd - soft, distention, TTP epigastric and near incision, no guarding,  incision intact    Labs  Recent Results (from the past 24 hour(s))   BLOOD GAS, ARTERIAL    Collection Time: 07/21/22 10:42 AM   Result Value Ref Range    pH 7.47 (H) 7.35 - 7.45      PCO2 35 35 - 45 mmHg    PO2 75 (L) 80 - 100 mmHg    O2 SAT 96 92 - 97 %    BICARBONATE 25 22 - 26 mmol/L    BASE EXCESS 1.9 mmol/L    O2 METHOD VENT      FIO2 40 %    MODE CPAP      PRESSURE SUPPORT 5      PEEP/CPAP 5.0      Sample source ARTERIAL      SITE DRAWN FROM ARTERIAL LINE      MARY'S TEST NOT APPLICABLE     GLUCOSE, POC    Collection Time: 07/21/22 12:53 PM   Result Value Ref Range    Glucose (POC) 114 65 - 117 mg/dL    Performed by MABEL/ Danae [address], POC    Collection Time: 07/21/22  5:20 PM   Result Value Ref Range    Glucose (POC) 112 65 - 117 mg/dL    Performed by Chato Dobbs    GLUCOSE, POC    Collection Time: 07/21/22 11:59 PM   Result Value Ref Range    Glucose (POC) 110 65 - 117 mg/dL    Performed by Sandee Saldana    CBC WITH AUTOMATED DIFF    Collection Time: 07/22/22 12:01 AM   Result Value Ref Range    WBC 9.9 3.6 - 11.0 K/uL    RBC 3.02 (L) 3.80 - 5.20 M/uL    HGB 9.1 (L) 11.5 - 16.0 g/dL    HCT 30.3 (L) 35.0 - 47.0 %    .3 (H) 80.0 - 99.0 FL    MCH 30.1 26.0 - 34.0 PG    MCHC 30.0 30.0 - 36.5 g/dL    RDW 19.7 (H) 11.5 - 14.5 %    PLATELET 899 976 - 671 K/uL    MPV 9.0 8.9 - 12.9 FL    NRBC 0.0 0  WBC    ABSOLUTE NRBC 0.00 0.00 - 0.01 K/uL    NEUTROPHILS 71 32 - 75 %    BAND NEUTROPHILS 12 (H) 0 - 6 % LYMPHOCYTES 3 (L) 12 - 49 %    MONOCYTES 10 5 - 13 %    EOSINOPHILS 3 0 - 7 %    BASOPHILS 0 0 - 1 %    METAMYELOCYTES 1 (H) 0 %    IMMATURE GRANULOCYTES 0 %    ABS. NEUTROPHILS 8.2 (H) 1.8 - 8.0 K/UL    ABS. LYMPHOCYTES 0.3 (L) 0.8 - 3.5 K/UL    ABS. MONOCYTES 1.0 0.0 - 1.0 K/UL    ABS. EOSINOPHILS 0.3 0.0 - 0.4 K/UL    ABS. BASOPHILS 0.0 0.0 - 0.1 K/UL    ABS. IMM. GRANS. 0.0 K/UL    DF MANUAL      RBC COMMENTS ANISOCYTOSIS  1+        RBC COMMENTS MACROCYTOSIS  1+       METABOLIC PANEL, BASIC    Collection Time: 07/22/22 12:01 AM   Result Value Ref Range    Sodium 144 136 - 145 mmol/L    Potassium 4.3 3.5 - 5.1 mmol/L    Chloride 110 (H) 97 - 108 mmol/L    CO2 28 21 - 32 mmol/L    Anion gap 6 5 - 15 mmol/L    Glucose 109 (H) 65 - 100 mg/dL    BUN 17 6 - 20 MG/DL    Creatinine 0.79 0.55 - 1.02 MG/DL    BUN/Creatinine ratio 22 (H) 12 - 20      GFR est AA >60 >60 ml/min/1.73m2    GFR est non-AA >60 >60 ml/min/1.73m2    Calcium 9.7 8.5 - 10.1 MG/DL   MAGNESIUM    Collection Time: 07/22/22 12:01 AM   Result Value Ref Range    Magnesium 2.3 1.6 - 2.4 mg/dL   PREALBUMIN    Collection Time: 07/22/22 12:01 AM   Result Value Ref Range    Prealbumin 9.6 (L) 20.0 - 40.0 mg/dL   PHOSPHORUS    Collection Time: 07/22/22 12:01 AM   Result Value Ref Range    Phosphorus 3.1 2.6 - 4.7 MG/DL   GLUCOSE, POC    Collection Time: 07/22/22  6:16 AM   Result Value Ref Range    Glucose (POC) 99 65 - 117 mg/dL    Performed by [employer]          Assessment     Kendall Ortega is a 65 y. o.yr old female s/p ex lap, gastric ulcer repair  Doing well after extubation  Pain/exam improved  NGT output low, clearer    Plan     Ok to transfer to stepdown  Continue NGT for now, would like to do UGI to test for leak before removal, but not ready for that yet  OOB if able      25 mins of time was spent with the patient of which > 50% of the time involved face-to-face counseling of the patient regarding the proposed treatment plan.     Arlene Lazo Brielle Daniels MD

## 2022-07-23 LAB
ANION GAP SERPL CALC-SCNC: 4 MMOL/L (ref 5–15)
BASOPHILS # BLD: 0 K/UL (ref 0–0.1)
BASOPHILS NFR BLD: 0 % (ref 0–1)
BNP SERPL-MCNC: 256 PG/ML
BUN SERPL-MCNC: 18 MG/DL (ref 6–20)
BUN/CREAT SERPL: 30 (ref 12–20)
CALCIUM SERPL-MCNC: 10 MG/DL (ref 8.5–10.1)
CHLORIDE SERPL-SCNC: 110 MMOL/L (ref 97–108)
CO2 SERPL-SCNC: 29 MMOL/L (ref 21–32)
CREAT SERPL-MCNC: 0.6 MG/DL (ref 0.55–1.02)
DIFFERENTIAL METHOD BLD: ABNORMAL
EOSINOPHIL # BLD: 0.1 K/UL (ref 0–0.4)
EOSINOPHIL NFR BLD: 1 % (ref 0–7)
ERYTHROCYTE [DISTWIDTH] IN BLOOD BY AUTOMATED COUNT: 19.5 % (ref 11.5–14.5)
GLUCOSE BLD STRIP.AUTO-MCNC: 77 MG/DL (ref 65–117)
GLUCOSE BLD STRIP.AUTO-MCNC: 82 MG/DL (ref 65–117)
GLUCOSE BLD STRIP.AUTO-MCNC: 90 MG/DL (ref 65–117)
GLUCOSE BLD STRIP.AUTO-MCNC: 94 MG/DL (ref 65–117)
GLUCOSE BLD STRIP.AUTO-MCNC: 96 MG/DL (ref 65–117)
GLUCOSE SERPL-MCNC: 89 MG/DL (ref 65–100)
HCT VFR BLD AUTO: 29.2 % (ref 35–47)
HGB BLD-MCNC: 8.3 G/DL (ref 11.5–16)
IMM GRANULOCYTES # BLD AUTO: 0 K/UL
IMM GRANULOCYTES NFR BLD AUTO: 0 %
LYMPHOCYTES # BLD: 0.6 K/UL (ref 0.8–3.5)
LYMPHOCYTES NFR BLD: 6 % (ref 12–49)
MAGNESIUM SERPL-MCNC: 2.3 MG/DL (ref 1.6–2.4)
MCH RBC QN AUTO: 29.6 PG (ref 26–34)
MCHC RBC AUTO-ENTMCNC: 28.4 G/DL (ref 30–36.5)
MCV RBC AUTO: 104.3 FL (ref 80–99)
MONOCYTES # BLD: 1.1 K/UL (ref 0–1)
MONOCYTES NFR BLD: 10 % (ref 5–13)
NEUTS BAND NFR BLD MANUAL: 8 % (ref 0–6)
NEUTS SEG # BLD: 9 K/UL (ref 1.8–8)
NEUTS SEG NFR BLD: 75 % (ref 32–75)
NRBC # BLD: 0 K/UL (ref 0–0.01)
NRBC BLD-RTO: 0 PER 100 WBC
PHOSPHATE SERPL-MCNC: 2.9 MG/DL (ref 2.6–4.7)
PLATELET # BLD AUTO: 208 K/UL (ref 150–400)
PMV BLD AUTO: 9.2 FL (ref 8.9–12.9)
POTASSIUM SERPL-SCNC: 4.2 MMOL/L (ref 3.5–5.1)
RBC # BLD AUTO: 2.8 M/UL (ref 3.8–5.2)
RBC MORPH BLD: ABNORMAL
RBC MORPH BLD: ABNORMAL
SERVICE CMNT-IMP: NORMAL
SODIUM SERPL-SCNC: 143 MMOL/L (ref 136–145)
WBC # BLD AUTO: 10.8 K/UL (ref 3.6–11)

## 2022-07-23 PROCEDURE — 82962 GLUCOSE BLOOD TEST: CPT

## 2022-07-23 PROCEDURE — 83735 ASSAY OF MAGNESIUM: CPT

## 2022-07-23 PROCEDURE — 94640 AIRWAY INHALATION TREATMENT: CPT

## 2022-07-23 PROCEDURE — 36415 COLL VENOUS BLD VENIPUNCTURE: CPT

## 2022-07-23 PROCEDURE — 74011250636 HC RX REV CODE- 250/636: Performed by: SURGERY

## 2022-07-23 PROCEDURE — 74011000258 HC RX REV CODE- 258: Performed by: SURGERY

## 2022-07-23 PROCEDURE — 80048 BASIC METABOLIC PNL TOTAL CA: CPT

## 2022-07-23 PROCEDURE — 99024 POSTOP FOLLOW-UP VISIT: CPT | Performed by: SURGERY

## 2022-07-23 PROCEDURE — 85025 COMPLETE CBC W/AUTO DIFF WBC: CPT

## 2022-07-23 PROCEDURE — 74011000250 HC RX REV CODE- 250: Performed by: INTERNAL MEDICINE

## 2022-07-23 PROCEDURE — C9113 INJ PANTOPRAZOLE SODIUM, VIA: HCPCS | Performed by: INTERNAL MEDICINE

## 2022-07-23 PROCEDURE — 83880 ASSAY OF NATRIURETIC PEPTIDE: CPT

## 2022-07-23 PROCEDURE — 84100 ASSAY OF PHOSPHORUS: CPT

## 2022-07-23 PROCEDURE — 65270000046 HC RM TELEMETRY

## 2022-07-23 PROCEDURE — 77010033678 HC OXYGEN DAILY

## 2022-07-23 PROCEDURE — 77030038269 HC DRN EXT URIN PURWCK BARD -A

## 2022-07-23 PROCEDURE — 77030040361 HC SLV COMPR DVT MDII -B

## 2022-07-23 PROCEDURE — 74011000250 HC RX REV CODE- 250: Performed by: SURGERY

## 2022-07-23 PROCEDURE — 74011250636 HC RX REV CODE- 250/636: Performed by: INTERNAL MEDICINE

## 2022-07-23 RX ORDER — HYDROMORPHONE HYDROCHLORIDE 1 MG/ML
1 INJECTION, SOLUTION INTRAMUSCULAR; INTRAVENOUS; SUBCUTANEOUS
Status: DISCONTINUED | OUTPATIENT
Start: 2022-07-23 | End: 2022-07-29 | Stop reason: HOSPADM

## 2022-07-23 RX ORDER — SODIUM CHLORIDE, SODIUM LACTATE, POTASSIUM CHLORIDE, CALCIUM CHLORIDE 600; 310; 30; 20 MG/100ML; MG/100ML; MG/100ML; MG/100ML
50 INJECTION, SOLUTION INTRAVENOUS CONTINUOUS
Status: DISCONTINUED | OUTPATIENT
Start: 2022-07-23 | End: 2022-07-24

## 2022-07-23 RX ADMIN — PIPERACILLIN AND TAZOBACTAM 4.5 G: 4; .5 INJECTION, POWDER, FOR SOLUTION INTRAVENOUS at 12:25

## 2022-07-23 RX ADMIN — SODIUM CHLORIDE, PRESERVATIVE FREE 40 MG: 5 INJECTION INTRAVENOUS at 09:03

## 2022-07-23 RX ADMIN — FLUCONAZOLE 400 MG: 2 INJECTION, SOLUTION INTRAVENOUS at 20:38

## 2022-07-23 RX ADMIN — BUDESONIDE 250 MCG: 0.5 SUSPENSION RESPIRATORY (INHALATION) at 20:06

## 2022-07-23 RX ADMIN — FUROSEMIDE 20 MG: 10 INJECTION, SOLUTION INTRAMUSCULAR; INTRAVENOUS at 09:03

## 2022-07-23 RX ADMIN — SODIUM CHLORIDE, POTASSIUM CHLORIDE, SODIUM LACTATE AND CALCIUM CHLORIDE 50 ML/HR: 600; 310; 30; 20 INJECTION, SOLUTION INTRAVENOUS at 15:10

## 2022-07-23 RX ADMIN — MICONAZOLE NITRATE 2 % TOPICAL POWDER: at 17:47

## 2022-07-23 RX ADMIN — SODIUM CHLORIDE, PRESERVATIVE FREE 40 MG: 5 INJECTION INTRAVENOUS at 20:40

## 2022-07-23 RX ADMIN — ARFORMOTEROL TARTRATE 15 MCG: 15 SOLUTION RESPIRATORY (INHALATION) at 20:06

## 2022-07-23 RX ADMIN — SODIUM CHLORIDE, POTASSIUM CHLORIDE, SODIUM LACTATE AND CALCIUM CHLORIDE 50 ML/HR: 600; 310; 30; 20 INJECTION, SOLUTION INTRAVENOUS at 12:42

## 2022-07-23 RX ADMIN — ARFORMOTEROL TARTRATE 15 MCG: 15 SOLUTION RESPIRATORY (INHALATION) at 08:42

## 2022-07-23 RX ADMIN — HYDROMORPHONE HYDROCHLORIDE 1 MG: 1 INJECTION, SOLUTION INTRAMUSCULAR; INTRAVENOUS; SUBCUTANEOUS at 20:44

## 2022-07-23 RX ADMIN — PIPERACILLIN AND TAZOBACTAM 4.5 G: 4; .5 INJECTION, POWDER, FOR SOLUTION INTRAVENOUS at 04:12

## 2022-07-23 RX ADMIN — MICONAZOLE NITRATE 2 % TOPICAL POWDER: at 09:04

## 2022-07-23 RX ADMIN — HYDROMORPHONE HYDROCHLORIDE 1 MG: 1 INJECTION, SOLUTION INTRAMUSCULAR; INTRAVENOUS; SUBCUTANEOUS at 16:24

## 2022-07-23 RX ADMIN — ENOXAPARIN SODIUM 40 MG: 100 INJECTION SUBCUTANEOUS at 20:37

## 2022-07-23 RX ADMIN — HYDROMORPHONE HYDROCHLORIDE 1 MG: 1 INJECTION, SOLUTION INTRAMUSCULAR; INTRAVENOUS; SUBCUTANEOUS at 23:24

## 2022-07-23 RX ADMIN — Medication 10 ML: at 23:24

## 2022-07-23 RX ADMIN — Medication 10 ML: at 06:00

## 2022-07-23 RX ADMIN — ENOXAPARIN SODIUM 40 MG: 100 INJECTION SUBCUTANEOUS at 09:04

## 2022-07-23 RX ADMIN — PIPERACILLIN AND TAZOBACTAM 4.5 G: 4; .5 INJECTION, POWDER, FOR SOLUTION INTRAVENOUS at 20:43

## 2022-07-23 RX ADMIN — HYDROMORPHONE HYDROCHLORIDE 1 MG: 1 INJECTION, SOLUTION INTRAMUSCULAR; INTRAVENOUS; SUBCUTANEOUS at 09:03

## 2022-07-23 RX ADMIN — BUDESONIDE 250 MCG: 0.5 SUSPENSION RESPIRATORY (INHALATION) at 08:42

## 2022-07-23 NOTE — PROGRESS NOTES
Verbal shift change report given to Ele Delgado rn (oncoming nurse) by Chris Mays (offgoing nurse). Report included the following information SBAR, ED Summary, MAR, and Cardiac Rhythm nsr .     1400 - pca discontinued    Verbal shift change report given to kristie verdin (oncoming nurse) by Harvey Vargas (offgoing nurse). Report included the following information SBAR, Intake/Output, MAR, and Cardiac Rhythm nsr .      Patient turned and repositioned q2h and prn, incontinence check and care q2h and prn

## 2022-07-23 NOTE — PROGRESS NOTES
Progress Note    Patient: Elyse Vera MRN: [id number]  SSN: [id number]    YOB: 1960  Age: 65 y.o. Sex: female      Admit Date: 2022    3 Days Post-Op    Procedure:  Procedure(s):  LAPAROTOMY EXPLORATORY, repair of gastric ulcer    Subjective:     Mrs. Mango Moore is doing fine. H:er pain is controlled. She is passing flatus. Objective:     Visit Vitals  BP (!) 134/48   Pulse 85   Temp 98.7 °F (37.1 °C)   Resp 12   Ht 5' 2\" (1.575 m)   Wt 343 lb 0.6 oz (155.6 kg)   SpO2 96%   BMI 62.74 kg/m²       Temp (24hrs), Av.5 °F (36.9 °C), Min:98.3 °F (36.8 °C), Max:98.7 °F (37.1 °C)      Physical Exam:    GENERAL: alert, cooperative, no distress, ABDOMEN: Soft, obese, hypoactive BS, ND, appropriately tender. The incision is clean, dry, and intact with no erythema.     Lab Review:   BMP:   Lab Results   Component Value Date/Time     2022 04:11 AM    K 4.2 2022 04:11 AM     (H) 2022 04:11 AM    CO2 29 2022 04:11 AM    AGAP 4 (L) 2022 04:11 AM    GLU 89 2022 04:11 AM    BUN 18 2022 04:11 AM    CREA 0.60 2022 04:11 AM    GFRAA >60 2022 04:11 AM    GFRNA >60 2022 04:11 AM     CBC:   Lab Results   Component Value Date/Time    WBC 10.8 2022 04:11 AM    HGB 8.3 (L) 2022 04:11 AM    HCT 29.2 (L) 2022 04:11 AM     2022 04:11 AM       Assessment:     Hospital Problems  Date Reviewed: 2022            Codes Class Noted POA    * (Principal) Bowel perforation ([facility] [address] ICD-10-CM: K63.1  ICD-9-CM: 569.83  2022 Yes        HTN (hypertension) ICD-10-CM: I10  ICD-9-CM: 401.9  2022 Yes        DM (diabetes mellitus) ([place] [address] ICD-10-CM: E11.9  ICD-9-CM: 250.00  2022 Yes        Leukocytosis ICD-10-CM: J93.102  ICD-9-CM: 288.60  2022 Yes        KIAN (acute kidney injury) ([place] [address] ICD-10-CM: N17.9  ICD-9-CM: 584.9  2022 Yes        Morbid obesity ([place] [address] ICD-10-CM: E66.01  ICD-9-CM: 278.01  2022 Yes        Hypoalbuminemia ICD-10-CM: E88.09  ICD-9-CM: 273.8  7/20/2022 Unknown        Breast cancer, left ([facility] [address] ICD-10-CM: C50.912  ICD-9-CM: 174.9  2/3/2022 Yes    Overview Addendum 5/7/2022 10:34 PM by Wesley Vazquez MD     2/2022 LEFT lumpectomy 19mm invasive ductal carcinoma, grade 3, 0/3 nodes, triple neg, Ki 40%  TC  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 Continue NGT, bowel rest, and PPI's. UGI on Monday. IS.  D/C PCA. OOB with PT.  DVT prophylaxis.

## 2022-07-23 NOTE — PROGRESS NOTES
Abdifatah Guerin [facility]  [address] [employer], [address] [place] [address]  [phone]      Medical Progress Note      NAME: Mars Lanier   :  1960  MRM:  [id number]    Date/Time: 2022  12:03 PM         Subjective:     Chief Complaint: \"I'm okay\"     Pt seen and examined. No complaints. Passing flatus. PCA stopped. Pain currently a 5       Objective:       Vitals:          Last 24hrs VS reviewed since prior progress note. Most recent are:    Visit Vitals  BP (!) 134/48   Pulse 85   Temp 98.7 °F (37.1 °C)   Resp 12   Ht 5' 2\" (1.575 m)   Wt 155.6 kg (343 lb 0.6 oz)   SpO2 96%   BMI 62.74 kg/m²     SpO2 Readings from Last 6 Encounters:   22 96%   22 95%   06/15/22 95%   06/15/22 95%   22 96%   22 95%    O2 Flow Rate (L/min): 3 l/min     Intake/Output Summary (Last 24 hours) at 2022 1552  Last data filed at 2022 1212  Gross per 24 hour   Intake 1000 ml   Output 2325 ml   Net -1325 ml          Exam:     Physical Exam:    Gen: Morbidly obese. Anasarcic. Alopecia.  NG tube in place   HEENT:  Pink conjunctivae, PERRL, hearing intact to voice, moist mucous membranes  Neck:  Supple, without masses, thyroid non-tender  Resp:  No accessory muscle use, clear breath sounds without wheezes rales or rhonchi  Card: No murmurs, normal S1, S2 without thrills, bruits or peripheral edema  Abd: Midline dressing C/D/I   Musc:  No cyanosis or clubbing  Skin:  No rashes or ulcers, skin turgor is good  Neuro:  Cranial nerves 3-12 are grossly intact   Psych: AAOx3    Medications Reviewed: (see below)    Lab Data Reviewed: (see below)    ______________________________________________________________________    Medications:     Current Facility-Administered Medications   Medication Dose Route Frequency    HYDROmorphone (DILAUDID) injection 1 mg  1 mg IntraVENous Q3H PRN    lactated Ringers infusion  50 mL/hr IntraVENous CONTINUOUS    labetaloL (NORMODYNE;TRANDATE) 20 mg/4 mL (5 mg/mL) injection 20 mg  20 mg IntraVENous Q6H PRN    piperacillin-tazobactam (ZOSYN) 4.5 g in 0.9% sodium chloride (MBP/ADV) 100 mL MBP  4.5 g IntraVENous Q8H    miconazole (MICOTIN) 2 % powder   Topical BID    sodium chloride (NS) flush 5-40 mL  5-40 mL IntraVENous Q8H    sodium chloride (NS) flush 5-40 mL  5-40 mL IntraVENous PRN    naloxone (NARCAN) injection 0.4 mg  0.4 mg IntraVENous PRN    ondansetron (ZOFRAN) injection 4 mg  4 mg IntraVENous Q4H PRN    enoxaparin (LOVENOX) injection 40 mg  40 mg SubCUTAneous Q12H    arformoteroL (BROVANA) neb solution 15 mcg  15 mcg Nebulization BID RT    And    budesonide (PULMICORT) 500 mcg/2 ml nebulizer suspension  250 mcg Nebulization BID RT    albuterol-ipratropium (DUO-NEB) 2.5 MG-0.5 MG/3 ML  3 mL Nebulization Q6H PRN    glucose chewable tablet 16 g  4 Tablet Oral PRN    glucagon (GLUCAGEN) injection 1 mg  1 mg IntraMUSCular PRN    dextrose 10% infusion 0-250 mL  0-250 mL IntraVENous PRN    insulin lispro (HUMALOG) injection   SubCUTAneous Q6H    fluconazole (DIFLUCAN) 400mg/200 mL IVPB (premix)  400 mg IntraVENous Q24H    hydrALAZINE (APRESOLINE) 20 mg/mL injection 20 mg  20 mg IntraVENous Q6H PRN    pantoprazole (PROTONIX) 40 mg in 0.9% sodium chloride 10 mL injection  40 mg IntraVENous Q12H    [Held by provider] amLODIPine (NORVASC) tablet 10 mg  10 mg Per NG tube DAILY            Lab Review:     Recent Labs     07/23/22  0411 07/22/22  0001 07/21/22  0141   WBC 10.8 9.9 10.4   HGB 8.3* 9.1* 9.1*   HCT 29.2* 30.3* 30.0*    205 173     Recent Labs     07/23/22  0411 07/22/22  0001 07/21/22  0141    144 143   K 4.2 4.3 4.3   * 110* 110*   CO2 29 28 27   GLU 89 109* 141*   BUN 18 17 21*   CREA 0.60 0.79 0.93   CA 10.0 9.7 9.4   MG 2.3 2.3 2.2   PHOS 2.9 3.1  --      No components found for: GLPOC         Assessment / Plan:   1) Gastric ulcer perforation - s/p repair  -on zosyn and fluconazole as per surgery  -IV PPI BID  -defer need for imaging to gen surg  -since will not have UGI until Monday will hold on IV diuresis due to concerns for no PO since admission and possibility of IVVD if diuresis continued.  Will need to start diuretics once able to tolerate some PO       2) Breast cancer, left (HCC) (2/3/2022)  -outpt management  -high risk for DVT; on DVT prophylaxis as per gen surgery      3) HTN (hypertension) (7/20/2022)  -optimize pain control   -PRN hydralazine, labetolol   -hold ACE/HCTZ for now      4) DM (diabetes mellitus) ([facility] [address] (7/20/2022)  -ISS  -BG checks q6      5) Leukocytosis (7/20/2022) - likely 2/2 peritonitis  -on zosyn  -UA not c/w infection   -CXR without PNA      6) KIAN (acute kidney injury) ([facility] [address] (7/20/2022) - resolved      7) Morbid obesity ([facility] [address] (7/20/2022)  -weight loss      8) Hypoalbuminemia (7/20/2022) - ?poor nutritional status  -UA without proteinuria  -holding IV diuretics due to PO status which will remain NPO through the weekend   -would benefit from nutrition eval once able to tolerate PO     Total time spent with patient: [address] discussed with: Patient, Nursing Staff, Consultant/Specialist, and >50% of time spent in counseling and coordination of care    Discussed:  Care Plan    Prophylaxis:  Lovenox    Disposition:  Home w/Family           ___________________________________________________    Attending Physician: Dario Kelsey MD

## 2022-07-24 LAB
ALBUMIN SERPL-MCNC: 1.4 G/DL (ref 3.5–5)
ALBUMIN/GLOB SERPL: 0.3 {RATIO} (ref 1.1–2.2)
ALP SERPL-CCNC: 151 U/L (ref 45–117)
ALT SERPL-CCNC: 42 U/L (ref 12–78)
ANION GAP SERPL CALC-SCNC: 4 MMOL/L (ref 5–15)
AST SERPL-CCNC: 22 U/L (ref 15–37)
BASOPHILS # BLD: 0 K/UL (ref 0–0.1)
BASOPHILS NFR BLD: 0 % (ref 0–1)
BILIRUB SERPL-MCNC: 0.5 MG/DL (ref 0.2–1)
BUN SERPL-MCNC: 16 MG/DL (ref 6–20)
BUN/CREAT SERPL: 33 (ref 12–20)
CALCIUM SERPL-MCNC: 10 MG/DL (ref 8.5–10.1)
CHLORIDE SERPL-SCNC: 111 MMOL/L (ref 97–108)
CO2 SERPL-SCNC: 31 MMOL/L (ref 21–32)
CREAT SERPL-MCNC: 0.48 MG/DL (ref 0.55–1.02)
DIFFERENTIAL METHOD BLD: ABNORMAL
EOSINOPHIL # BLD: 0.2 K/UL (ref 0–0.4)
EOSINOPHIL NFR BLD: 1 % (ref 0–7)
ERYTHROCYTE [DISTWIDTH] IN BLOOD BY AUTOMATED COUNT: 19.2 % (ref 11.5–14.5)
FERRITIN SERPL-MCNC: 621 NG/ML (ref 26–388)
FOLATE SERPL-MCNC: 12.8 NG/ML (ref 5–21)
GLOBULIN SER CALC-MCNC: 4.6 G/DL (ref 2–4)
GLUCOSE BLD STRIP.AUTO-MCNC: 132 MG/DL (ref 65–117)
GLUCOSE BLD STRIP.AUTO-MCNC: 76 MG/DL (ref 65–117)
GLUCOSE BLD STRIP.AUTO-MCNC: 91 MG/DL (ref 65–117)
GLUCOSE BLD STRIP.AUTO-MCNC: 94 MG/DL (ref 65–117)
GLUCOSE SERPL-MCNC: 92 MG/DL (ref 65–100)
HCT VFR BLD AUTO: 27.5 % (ref 35–47)
HGB BLD-MCNC: 7.9 G/DL (ref 11.5–16)
IMM GRANULOCYTES # BLD AUTO: 0.1 K/UL (ref 0–0.04)
IMM GRANULOCYTES NFR BLD AUTO: 1 % (ref 0–0.5)
IRON SATN MFR SERPL: 19 % (ref 20–50)
IRON SERPL-MCNC: 33 UG/DL (ref 35–150)
LACTATE SERPL-SCNC: 0.8 MMOL/L (ref 0.4–2)
LYMPHOCYTES # BLD: 0.6 K/UL (ref 0.8–3.5)
LYMPHOCYTES NFR BLD: 5 % (ref 12–49)
MAGNESIUM SERPL-MCNC: 2 MG/DL (ref 1.6–2.4)
MCH RBC QN AUTO: 29.7 PG (ref 26–34)
MCHC RBC AUTO-ENTMCNC: 28.7 G/DL (ref 30–36.5)
MCV RBC AUTO: 103.4 FL (ref 80–99)
MONOCYTES # BLD: 1.4 K/UL (ref 0–1)
MONOCYTES NFR BLD: 11 % (ref 5–13)
NEUTS SEG # BLD: 9.7 K/UL (ref 1.8–8)
NEUTS SEG NFR BLD: 82 % (ref 32–75)
NRBC # BLD: 0 K/UL (ref 0–0.01)
NRBC BLD-RTO: 0 PER 100 WBC
PHOSPHATE SERPL-MCNC: 2.1 MG/DL (ref 2.6–4.7)
PLATELET # BLD AUTO: 192 K/UL (ref 150–400)
PMV BLD AUTO: 8.7 FL (ref 8.9–12.9)
POTASSIUM SERPL-SCNC: 4 MMOL/L (ref 3.5–5.1)
PROT SERPL-MCNC: 6 G/DL (ref 6.4–8.2)
RBC # BLD AUTO: 2.66 M/UL (ref 3.8–5.2)
SERVICE CMNT-IMP: ABNORMAL
SERVICE CMNT-IMP: NORMAL
SODIUM SERPL-SCNC: 146 MMOL/L (ref 136–145)
TIBC SERPL-MCNC: 174 UG/DL (ref 250–450)
VIT B12 SERPL-MCNC: 965 PG/ML (ref 193–986)
WBC # BLD AUTO: 11.9 K/UL (ref 3.6–11)

## 2022-07-24 PROCEDURE — 74011250636 HC RX REV CODE- 250/636: Performed by: INTERNAL MEDICINE

## 2022-07-24 PROCEDURE — 74011000250 HC RX REV CODE- 250: Performed by: SURGERY

## 2022-07-24 PROCEDURE — 74011250636 HC RX REV CODE- 250/636: Performed by: SURGERY

## 2022-07-24 PROCEDURE — 85025 COMPLETE CBC W/AUTO DIFF WBC: CPT

## 2022-07-24 PROCEDURE — 83605 ASSAY OF LACTIC ACID: CPT

## 2022-07-24 PROCEDURE — 80053 COMPREHEN METABOLIC PANEL: CPT

## 2022-07-24 PROCEDURE — C9113 INJ PANTOPRAZOLE SODIUM, VIA: HCPCS | Performed by: INTERNAL MEDICINE

## 2022-07-24 PROCEDURE — 74011000250 HC RX REV CODE- 250: Performed by: INTERNAL MEDICINE

## 2022-07-24 PROCEDURE — 74011000258 HC RX REV CODE- 258: Performed by: SURGERY

## 2022-07-24 PROCEDURE — 99024 POSTOP FOLLOW-UP VISIT: CPT | Performed by: SURGERY

## 2022-07-24 PROCEDURE — 82607 VITAMIN B-12: CPT

## 2022-07-24 PROCEDURE — 82746 ASSAY OF FOLIC ACID SERUM: CPT

## 2022-07-24 PROCEDURE — 94640 AIRWAY INHALATION TREATMENT: CPT

## 2022-07-24 PROCEDURE — 82728 ASSAY OF FERRITIN: CPT

## 2022-07-24 PROCEDURE — 2709999900 HC NON-CHARGEABLE SUPPLY

## 2022-07-24 PROCEDURE — 65270000046 HC RM TELEMETRY

## 2022-07-24 PROCEDURE — 36415 COLL VENOUS BLD VENIPUNCTURE: CPT

## 2022-07-24 PROCEDURE — 77030038269 HC DRN EXT URIN PURWCK BARD -A

## 2022-07-24 PROCEDURE — 83540 ASSAY OF IRON: CPT

## 2022-07-24 PROCEDURE — 84100 ASSAY OF PHOSPHORUS: CPT

## 2022-07-24 PROCEDURE — 83735 ASSAY OF MAGNESIUM: CPT

## 2022-07-24 PROCEDURE — 77010033678 HC OXYGEN DAILY

## 2022-07-24 PROCEDURE — 82962 GLUCOSE BLOOD TEST: CPT

## 2022-07-24 PROCEDURE — 74011250637 HC RX REV CODE- 250/637: Performed by: INTERNAL MEDICINE

## 2022-07-24 RX ORDER — DEXTROSE MONOHYDRATE AND SODIUM CHLORIDE 5; .45 G/100ML; G/100ML
75 INJECTION, SOLUTION INTRAVENOUS CONTINUOUS
Status: DISCONTINUED | OUTPATIENT
Start: 2022-07-24 | End: 2022-07-26

## 2022-07-24 RX ADMIN — DEXTROSE AND SODIUM CHLORIDE 75 ML/HR: 5; 450 INJECTION, SOLUTION INTRAVENOUS at 19:52

## 2022-07-24 RX ADMIN — Medication 10 ML: at 13:40

## 2022-07-24 RX ADMIN — MICONAZOLE NITRATE 2 % TOPICAL POWDER: at 08:31

## 2022-07-24 RX ADMIN — Medication 1 SPRAY: at 17:18

## 2022-07-24 RX ADMIN — BUDESONIDE 250 MCG: 0.5 SUSPENSION RESPIRATORY (INHALATION) at 08:24

## 2022-07-24 RX ADMIN — PIPERACILLIN AND TAZOBACTAM 4.5 G: 4; .5 INJECTION, POWDER, FOR SOLUTION INTRAVENOUS at 20:07

## 2022-07-24 RX ADMIN — FLUCONAZOLE 400 MG: 2 INJECTION, SOLUTION INTRAVENOUS at 20:08

## 2022-07-24 RX ADMIN — ARFORMOTEROL TARTRATE 15 MCG: 15 SOLUTION RESPIRATORY (INHALATION) at 20:25

## 2022-07-24 RX ADMIN — ENOXAPARIN SODIUM 40 MG: 100 INJECTION SUBCUTANEOUS at 08:31

## 2022-07-24 RX ADMIN — PIPERACILLIN AND TAZOBACTAM 4.5 G: 4; .5 INJECTION, POWDER, FOR SOLUTION INTRAVENOUS at 04:07

## 2022-07-24 RX ADMIN — BUDESONIDE 250 MCG: 0.5 SUSPENSION RESPIRATORY (INHALATION) at 20:25

## 2022-07-24 RX ADMIN — MICONAZOLE NITRATE 2 % TOPICAL POWDER: at 17:18

## 2022-07-24 RX ADMIN — POTASSIUM PHOSPHATE, MONOBASIC POTASSIUM PHOSPHATE, DIBASIC: 224; 236 INJECTION, SOLUTION, CONCENTRATE INTRAVENOUS at 09:57

## 2022-07-24 RX ADMIN — Medication 1 SPRAY: at 12:16

## 2022-07-24 RX ADMIN — ENOXAPARIN SODIUM 40 MG: 100 INJECTION SUBCUTANEOUS at 20:11

## 2022-07-24 RX ADMIN — HYDROMORPHONE HYDROCHLORIDE 1 MG: 1 INJECTION, SOLUTION INTRAMUSCULAR; INTRAVENOUS; SUBCUTANEOUS at 17:15

## 2022-07-24 RX ADMIN — HYDROMORPHONE HYDROCHLORIDE 1 MG: 1 INJECTION, SOLUTION INTRAMUSCULAR; INTRAVENOUS; SUBCUTANEOUS at 10:09

## 2022-07-24 RX ADMIN — Medication 10 ML: at 07:14

## 2022-07-24 RX ADMIN — SODIUM CHLORIDE, PRESERVATIVE FREE 40 MG: 5 INJECTION INTRAVENOUS at 08:31

## 2022-07-24 RX ADMIN — ARFORMOTEROL TARTRATE 15 MCG: 15 SOLUTION RESPIRATORY (INHALATION) at 08:24

## 2022-07-24 RX ADMIN — HYDROMORPHONE HYDROCHLORIDE 1 MG: 1 INJECTION, SOLUTION INTRAMUSCULAR; INTRAVENOUS; SUBCUTANEOUS at 13:35

## 2022-07-24 RX ADMIN — Medication 10 ML: at 20:12

## 2022-07-24 RX ADMIN — HYDROMORPHONE HYDROCHLORIDE 1 MG: 1 INJECTION, SOLUTION INTRAMUSCULAR; INTRAVENOUS; SUBCUTANEOUS at 03:52

## 2022-07-24 RX ADMIN — HYDROMORPHONE HYDROCHLORIDE 1 MG: 1 INJECTION, SOLUTION INTRAMUSCULAR; INTRAVENOUS; SUBCUTANEOUS at 21:34

## 2022-07-24 RX ADMIN — SODIUM CHLORIDE, PRESERVATIVE FREE 40 MG: 5 INJECTION INTRAVENOUS at 20:11

## 2022-07-24 RX ADMIN — HYDROMORPHONE HYDROCHLORIDE 1 MG: 1 INJECTION, SOLUTION INTRAMUSCULAR; INTRAVENOUS; SUBCUTANEOUS at 07:13

## 2022-07-24 RX ADMIN — PIPERACILLIN AND TAZOBACTAM 4.5 G: 4; .5 INJECTION, POWDER, FOR SOLUTION INTRAVENOUS at 11:28

## 2022-07-24 RX ADMIN — DEXTROSE AND SODIUM CHLORIDE 75 ML/HR: 5; 450 INJECTION, SOLUTION INTRAVENOUS at 08:29

## 2022-07-24 NOTE — PROGRESS NOTES
0700 Bedside and Verbal shift change report given to [facility] Rosemary Puri RN (oncoming nurse) by Earna Cushing, RN (offgoing nurse). Report included the following information SBAR, Kardex, ED Summary, Procedure Summary, Intake/Output, MAR, Recent Results, Med Rec Status, Cardiac Rhythm NSR, Alarm Parameters , Quality Measures, and Dual Neuro Assessment. Primary Nurse Sejal Thompson RN and Earna Cushing, RN performed a dual skin assessment on this patient Impairment noted- see wound doc flow sheet  Eliecer score, see flowsheet. 1900 Bedside and Verbal shift change report given to Sagar Mccabe RN (oncoming nurse) by [address] [place] [address] Drive, RN (offgoing nurse). Report included the following information SBAR, Kardex, ED Summary, OR Summary, Procedure Summary, Intake/Output, MAR, Recent Results, Med Rec Status, Cardiac Rhythm NSR, Alarm Parameters , Quality Measures, and Dual Neuro Assessment.

## 2022-07-24 NOTE — PROGRESS NOTES
1900: Bedside shift change report given to Mic Null RN (oncoming nurse) by Rupert Bridges RN (offgoing nurse). Report included the following information SBAR, Kardex, Intake/Output, MAR, and Recent Results. Primary Nurse Pat Aburto RN and Rupert Bridges RN performed a dual skin assessment on this patient Impairment noted- see wound doc flow sheet  Eliecer score is 12    2000: Shift  Assessment completed    0000: Reassessment completed. No changes from previous assessment    0400: Reassessment completed. No changes from previous assessment. Abdominal incision dressing with leaking, new dressing applied. 0700: Bedside shift change report given to [address] [place] [address] Jaxson RN (oncoming nurse) by Mic Null RN (offgoing nurse). Report included the following information SBAR, Kardex, Procedure Summary, Intake/Output, Recent Results, and Cardiac Rhythm NSR .

## 2022-07-24 NOTE — PROGRESS NOTES
Progress Note    Patient: Anoop Rdz MRN: [id number]  SSN: [id number]    YOB: 1960  Age: 65 y.o. Sex: female      Admit Date: 2022    4 Days Post-Op    Procedure:  Procedure(s):  LAPAROTOMY EXPLORATORY, repair of gastric ulcer    Subjective:     Mrs. Devendra Levin is doing fine. Her pain is controlled. Objective:     Visit Vitals  BP (!) 149/62   Pulse 91   Temp 98.7 °F (37.1 °C)   Resp 21   Ht 5' 2\" (1.575 m)   Wt 343 lb 0.6 oz (155.6 kg)   SpO2 98%   BMI 62.74 kg/m²       Temp (24hrs), Av.4 °F (36.9 °C), Min:98.1 °F (36.7 °C), Max:98.7 °F (37.1 °C)      Physical Exam:    GENERAL: alert, cooperative, no distress, ABDOMEN: Soft, obese, ND, NT. The dressing is intact and dry.     Lab Review:   BMP:   Lab Results   Component Value Date/Time     (H) 2022 04:40 AM    K 4.0 2022 04:40 AM     (H) 2022 04:40 AM    CO2 31 2022 04:40 AM    AGAP 4 (L) 2022 04:40 AM    GLU 92 2022 04:40 AM    BUN 16 2022 04:40 AM    CREA 0.48 (L) 2022 04:40 AM    GFRAA >60 2022 04:40 AM    GFRNA >60 2022 04:40 AM     CBC:   Lab Results   Component Value Date/Time    WBC 11.9 (H) 2022 04:40 AM    HGB 7.9 (L) 2022 04:40 AM    HCT 27.5 (L) 2022 04:40 AM     2022 04:40 AM       Assessment:     Hospital Problems  Date Reviewed: 2022            Codes Class Noted POA    * (Principal) Bowel perforation ([facility] [address] ICD-10-CM: K63.1  ICD-9-CM: 569.83  2022 Yes        HTN (hypertension) ICD-10-CM: I10  ICD-9-CM: 401.9  2022 Yes        DM (diabetes mellitus) ([facility] ICD-10-CM: E11.9  ICD-9-CM: 250.00  2022 Yes        Leukocytosis ICD-10-CM: F98.471  ICD-9-CM: 288.60  2022 Yes        KIAN (acute kidney injury) ([facility] ICD-10-CM: N17.9  ICD-9-CM: 584.9  2022 Yes        Morbid obesity ([facility] ICD-10-CM: E66.01  ICD-9-CM: 278.01  2022 Yes        Hypoalbuminemia ICD-10-CM: E88.09  ICD-9-CM: 273.8  2022 Unknown        Bre[facility] cancer[facility] ([facility] ICD-10-CM: C50.912  ICD-9-CM: 174.9  2/3/2022 Yes    Overview Addendum 5/7/2022 10:34 PM by Ana Maria Gates MD     2/2022 LEFT lumpectomy 19mm invasive ductal carcinoma, grade 3, 0/3 nodes, triple neg, Ki 40%  TC  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 Continue bowel rest, NGT. UGI per Dr. Tina Tejada. Continue PPI's. Continue antibiotics. OOB to chair and ambulating with assistance.

## 2022-07-24 NOTE — PROGRESS NOTES
Abdifatah Guerin [facility] 79  [address] [facility], [address] [place] [address]  [phone]      Medical Progress Note      NAME: Luis Lorenzo   :  1960  MRM:  [id number]    Date/Time of service: 2022  9:28 AM       Subjective:     Chief Complaint:  Patient was personally seen and examined by me during this time period. Chart reviewed. Still with some abd pain . No fevers       Objective:       Vitals:       Last 24hrs VS reviewed since prior progress note.  Most recent are:    Visit Vitals  BP (!) 141/63   Pulse 90   Temp 98.1 °F (36.7 °C)   Resp 18   Ht 5' 2\" (1.575 m)   Wt 155.6 kg (343 lb 0.6 oz)   SpO2 98%   BMI 62.74 kg/m²     SpO2 Readings from Last 6 Encounters:   22 98%   22 95%   06/15/22 95%   06/15/22 95%   22 96%   22 95%    O2 Flow Rate (L/min): 2 l/min     Intake/Output Summary (Last 24 hours) at 2022 7636  Last data filed at 2022 0400  Gross per 24 hour   Intake 1265.84 ml   Output 1525 ml   Net -259.16 ml        Exam:     Physical Exam:    Gen:  Well-developed, well-nourished, morbidly obese, in no acute distress  HEENT:  Pink conjunctivae, PERRL, hearing intact to voice, has NGT  Neck:  Supple, without masses, thyroid non-tender  Resp:  No accessory muscle use, clear breath sounds without wheezes rales or rhonchi  Card:  No murmurs, normal S1, S2 without thrills, +edema  Abd:  Soft, non-tender, non-distended, normoactive bowel sounds are present, surgical dressing c/d/i  Musc:  No cyanosis or clubbing  Skin:  No rashes   Neuro:  Cranial nerves 3-12 are grossly intact, follows commands appropriately  Psych:  Good insight, oriented to person, place and time, alert    Medications Reviewed: (see below)    Lab Data Reviewed: (see below)    ______________________________________________________________________    Medications:     Current Facility-Administered Medications   Medication Dose Route Frequency    dextrose 5 % - 0.45% NaCl infusion  75 mL/hr IntraVENous CONTINUOUS    potassium phosphate 30 mmol in 0.9% sodium chloride 250 mL infusion   IntraVENous ONCE    HYDROmorphone (DILAUDID) injection 1 mg  1 mg IntraVENous Q3H PRN    labetaloL (NORMODYNE;TRANDATE) 20 mg/4 mL (5 mg/mL) injection 20 mg  20 mg IntraVENous Q6H PRN    piperacillin-tazobactam (ZOSYN) 4.5 g in 0.9% sodium chloride (MBP/ADV) 100 mL MBP  4.5 g IntraVENous Q8H    miconazole (MICOTIN) 2 % powder   Topical BID    sodium chloride (NS) flush 5-40 mL  5-40 mL IntraVENous Q8H    sodium chloride (NS) flush 5-40 mL  5-40 mL IntraVENous PRN    naloxone (NARCAN) injection 0.4 mg  0.4 mg IntraVENous PRN    ondansetron (ZOFRAN) injection 4 mg  4 mg IntraVENous Q4H PRN    enoxaparin (LOVENOX) injection 40 mg  40 mg SubCUTAneous Q12H    arformoteroL (BROVANA) neb solution 15 mcg  15 mcg Nebulization BID RT    And    budesonide (PULMICORT) 500 mcg/2 ml nebulizer suspension  250 mcg Nebulization BID RT    albuterol-ipratropium (DUO-NEB) 2.5 MG-0.5 MG/3 ML  3 mL Nebulization Q6H PRN    glucose chewable tablet 16 g  4 Tablet Oral PRN    glucagon (GLUCAGEN) injection 1 mg  1 mg IntraMUSCular PRN    dextrose 10% infusion 0-250 mL  0-250 mL IntraVENous PRN    insulin lispro (HUMALOG) injection   SubCUTAneous Q6H    fluconazole (DIFLUCAN) 400mg/200 mL IVPB (premix)  400 mg IntraVENous Q24H    hydrALAZINE (APRESOLINE) 20 mg/mL injection 20 mg  20 mg IntraVENous Q6H PRN    pantoprazole (PROTONIX) 40 mg in 0.9% sodium chloride 10 mL injection  40 mg IntraVENous Q12H    [Held by provider] amLODIPine (NORVASC) tablet 10 mg  10 mg Per NG tube DAILY          Lab Review:     Recent Labs     07/24/22  0440 07/23/22  0411 07/22/22  0001   WBC 11.9* 10.8 9.9   HGB 7.9* 8.3* 9.1*   HCT 27.5* 29.2* 30.3*    208 205     Recent Labs     07/24/22  0440 07/23/22  0411 07/22/22  0001   * 143 144   K 4.0 4.2 4.3   * 110* 110*   CO2 31 29 28   GLU 92 89 109*   BUN 16 18 17   CREA 0.48* 0.60 0.79   CA 10.0 10.0 9.7   MG 2.0 2.3 2.3   PHOS 2.1* 2.9 3.1   ALB 1.4*  --   --    TBILI 0.5  --   --    ALT 42  --   --      Lab Results   Component Value Date/Time    Glucose (POC) 91 07/24/2022 07:16 AM    Glucose (POC) 90 07/23/2022 11:20 PM    Glucose (POC) 82 07/23/2022 05:45 PM    Glucose (POC) 77 07/23/2022 12:10 PM    Glucose (POC) 94 07/23/2022 06:53 AM          Assessment / Plan:     64 yo hx of HTN, DM, breast CA, asthma, presented w/ a perforated gastric ulcer s/p repair 07/20. Medicine is following as a consultant    1) Gastric ulcer perforation: s/p surgical repair 07/20. Cont IV Zosyn, IV PPI. Use IV dilaudid prn severe pain. Rest of  management per Gen surg    2) HTN: BP stable. Hold oral meds. Use IV hydralazine, IV labetalol prn    3) DM type 2: A1C pending. Hold metformin. Cont SSI    4) Acute blood loss anemia: from surgery. Will check iron studies, B12/folate. Monitor Hgb closely, transfuse prn    5) Asthma: cont nebs prn    6) Mod pro-juan malnutrition: due to surgical issues.   Will defer to surg regarding TPN    7) Morbid obesity: counseled on diet, exercise, wt loss    8) Hypophos: replete IV    Total time spent with patient care: 45 Minutes **I personally saw and examined the patient during this time period**                 Care Plan discussed with: Patient, nursing     Discussed:  Care Plan    Prophylaxis:  Lovenox    Disposition:   per surg           ___________________________________________________    Attending Physician: Filipe Ga MD

## 2022-07-25 ENCOUNTER — APPOINTMENT (OUTPATIENT)
Dept: GENERAL RADIOLOGY | Age: 62
DRG: 220 | End: 2022-07-25
Attending: SURGERY
Payer: MEDICAID

## 2022-07-25 LAB
ANION GAP SERPL CALC-SCNC: 2 MMOL/L (ref 5–15)
BUN SERPL-MCNC: 13 MG/DL (ref 6–20)
BUN/CREAT SERPL: 25 (ref 12–20)
CALCIUM SERPL-MCNC: 9.8 MG/DL (ref 8.5–10.1)
CHLORIDE SERPL-SCNC: 111 MMOL/L (ref 97–108)
CO2 SERPL-SCNC: 32 MMOL/L (ref 21–32)
CREAT SERPL-MCNC: 0.51 MG/DL (ref 0.55–1.02)
ERYTHROCYTE [DISTWIDTH] IN BLOOD BY AUTOMATED COUNT: 18.8 % (ref 11.5–14.5)
GLUCOSE BLD STRIP.AUTO-MCNC: 118 MG/DL (ref 65–117)
GLUCOSE BLD STRIP.AUTO-MCNC: 124 MG/DL (ref 65–117)
GLUCOSE BLD STRIP.AUTO-MCNC: 136 MG/DL (ref 65–117)
GLUCOSE BLD STRIP.AUTO-MCNC: 93 MG/DL (ref 65–117)
GLUCOSE SERPL-MCNC: 141 MG/DL (ref 65–100)
HCT VFR BLD AUTO: 28.1 % (ref 35–47)
HGB BLD-MCNC: 8 G/DL (ref 11.5–16)
MAGNESIUM SERPL-MCNC: 2 MG/DL (ref 1.6–2.4)
MCH RBC QN AUTO: 29.2 PG (ref 26–34)
MCHC RBC AUTO-ENTMCNC: 28.5 G/DL (ref 30–36.5)
MCV RBC AUTO: 102.6 FL (ref 80–99)
NRBC # BLD: 0 K/UL (ref 0–0.01)
NRBC BLD-RTO: 0 PER 100 WBC
PHOSPHATE SERPL-MCNC: 2.6 MG/DL (ref 2.6–4.7)
PLATELET # BLD AUTO: 195 K/UL (ref 150–400)
PMV BLD AUTO: 8.9 FL (ref 8.9–12.9)
POTASSIUM SERPL-SCNC: 4 MMOL/L (ref 3.5–5.1)
RBC # BLD AUTO: 2.74 M/UL (ref 3.8–5.2)
SERVICE CMNT-IMP: ABNORMAL
SERVICE CMNT-IMP: NORMAL
SODIUM SERPL-SCNC: 145 MMOL/L (ref 136–145)
WBC # BLD AUTO: 11.6 K/UL (ref 3.6–11)

## 2022-07-25 PROCEDURE — 82962 GLUCOSE BLOOD TEST: CPT

## 2022-07-25 PROCEDURE — 97530 THERAPEUTIC ACTIVITIES: CPT

## 2022-07-25 PROCEDURE — 97535 SELF CARE MNGMENT TRAINING: CPT

## 2022-07-25 PROCEDURE — C9113 INJ PANTOPRAZOLE SODIUM, VIA: HCPCS | Performed by: INTERNAL MEDICINE

## 2022-07-25 PROCEDURE — 74011000258 HC RX REV CODE- 258: Performed by: SURGERY

## 2022-07-25 PROCEDURE — 80048 BASIC METABOLIC PNL TOTAL CA: CPT

## 2022-07-25 PROCEDURE — 74011000250 HC RX REV CODE- 250: Performed by: INTERNAL MEDICINE

## 2022-07-25 PROCEDURE — 74011000636 HC RX REV CODE- 636: Performed by: SURGERY

## 2022-07-25 PROCEDURE — 94664 DEMO&/EVAL PT USE INHALER: CPT

## 2022-07-25 PROCEDURE — 94640 AIRWAY INHALATION TREATMENT: CPT

## 2022-07-25 PROCEDURE — 84100 ASSAY OF PHOSPHORUS: CPT

## 2022-07-25 PROCEDURE — 74240 X-RAY XM UPR GI TRC 1CNTRST: CPT

## 2022-07-25 PROCEDURE — 97110 THERAPEUTIC EXERCISES: CPT

## 2022-07-25 PROCEDURE — 2709999900 HC NON-CHARGEABLE SUPPLY

## 2022-07-25 PROCEDURE — 94761 N-INVAS EAR/PLS OXIMETRY MLT: CPT

## 2022-07-25 PROCEDURE — 36415 COLL VENOUS BLD VENIPUNCTURE: CPT

## 2022-07-25 PROCEDURE — 85027 COMPLETE CBC AUTOMATED: CPT

## 2022-07-25 PROCEDURE — 77010033678 HC OXYGEN DAILY

## 2022-07-25 PROCEDURE — 97116 GAIT TRAINING THERAPY: CPT

## 2022-07-25 PROCEDURE — 74011250636 HC RX REV CODE- 250/636: Performed by: SURGERY

## 2022-07-25 PROCEDURE — 65270000029 HC RM PRIVATE

## 2022-07-25 PROCEDURE — 77030038269 HC DRN EXT URIN PURWCK BARD -A

## 2022-07-25 PROCEDURE — 74011250636 HC RX REV CODE- 250/636: Performed by: INTERNAL MEDICINE

## 2022-07-25 PROCEDURE — 74011000250 HC RX REV CODE- 250: Performed by: SURGERY

## 2022-07-25 PROCEDURE — 83735 ASSAY OF MAGNESIUM: CPT

## 2022-07-25 RX ORDER — INSULIN LISPRO 100 [IU]/ML
INJECTION, SOLUTION INTRAVENOUS; SUBCUTANEOUS
Status: DISCONTINUED | OUTPATIENT
Start: 2022-07-25 | End: 2022-07-29 | Stop reason: HOSPADM

## 2022-07-25 RX ORDER — AMLODIPINE BESYLATE 5 MG/1
10 TABLET ORAL DAILY
Status: DISCONTINUED | OUTPATIENT
Start: 2022-07-26 | End: 2022-07-29 | Stop reason: HOSPADM

## 2022-07-25 RX ADMIN — BUDESONIDE 250 MCG: 0.5 SUSPENSION RESPIRATORY (INHALATION) at 07:31

## 2022-07-25 RX ADMIN — PIPERACILLIN AND TAZOBACTAM 4.5 G: 4; .5 INJECTION, POWDER, FOR SOLUTION INTRAVENOUS at 12:32

## 2022-07-25 RX ADMIN — ARFORMOTEROL TARTRATE 15 MCG: 15 SOLUTION RESPIRATORY (INHALATION) at 07:31

## 2022-07-25 RX ADMIN — BUDESONIDE 250 MCG: 0.5 SUSPENSION RESPIRATORY (INHALATION) at 19:55

## 2022-07-25 RX ADMIN — DIATRIZOATE MEGLUMINE AND DIATRIZOATE SODIUM 100 ML: 660; 100 LIQUID ORAL; RECTAL at 11:28

## 2022-07-25 RX ADMIN — MICONAZOLE NITRATE 2 % TOPICAL POWDER: at 09:47

## 2022-07-25 RX ADMIN — PIPERACILLIN AND TAZOBACTAM 4.5 G: 4; .5 INJECTION, POWDER, FOR SOLUTION INTRAVENOUS at 04:06

## 2022-07-25 RX ADMIN — Medication 1 SPRAY: at 05:35

## 2022-07-25 RX ADMIN — Medication 10 ML: at 22:00

## 2022-07-25 RX ADMIN — HYDROMORPHONE HYDROCHLORIDE 1 MG: 1 INJECTION, SOLUTION INTRAMUSCULAR; INTRAVENOUS; SUBCUTANEOUS at 00:55

## 2022-07-25 RX ADMIN — SODIUM CHLORIDE, PRESERVATIVE FREE 40 MG: 5 INJECTION INTRAVENOUS at 21:57

## 2022-07-25 RX ADMIN — ARFORMOTEROL TARTRATE 15 MCG: 15 SOLUTION RESPIRATORY (INHALATION) at 19:55

## 2022-07-25 RX ADMIN — MICONAZOLE NITRATE 2 % TOPICAL POWDER: at 17:16

## 2022-07-25 RX ADMIN — DEXTROSE AND SODIUM CHLORIDE 75 ML/HR: 5; 450 INJECTION, SOLUTION INTRAVENOUS at 12:51

## 2022-07-25 RX ADMIN — Medication 1 SPRAY: at 00:56

## 2022-07-25 RX ADMIN — ENOXAPARIN SODIUM 40 MG: 100 INJECTION SUBCUTANEOUS at 08:15

## 2022-07-25 RX ADMIN — Medication 10 ML: at 15:17

## 2022-07-25 RX ADMIN — HYDROMORPHONE HYDROCHLORIDE 1 MG: 1 INJECTION, SOLUTION INTRAMUSCULAR; INTRAVENOUS; SUBCUTANEOUS at 12:32

## 2022-07-25 RX ADMIN — HYDROMORPHONE HYDROCHLORIDE 1 MG: 1 INJECTION, SOLUTION INTRAMUSCULAR; INTRAVENOUS; SUBCUTANEOUS at 05:30

## 2022-07-25 RX ADMIN — ENOXAPARIN SODIUM 40 MG: 100 INJECTION SUBCUTANEOUS at 21:56

## 2022-07-25 RX ADMIN — Medication 10 ML: at 05:28

## 2022-07-25 RX ADMIN — FLUCONAZOLE 400 MG: 2 INJECTION, SOLUTION INTRAVENOUS at 21:56

## 2022-07-25 RX ADMIN — SODIUM CHLORIDE, PRESERVATIVE FREE 40 MG: 5 INJECTION INTRAVENOUS at 08:16

## 2022-07-25 RX ADMIN — HYDROMORPHONE HYDROCHLORIDE 1 MG: 1 INJECTION, SOLUTION INTRAMUSCULAR; INTRAVENOUS; SUBCUTANEOUS at 08:16

## 2022-07-25 RX ADMIN — HYDROMORPHONE HYDROCHLORIDE 1 MG: 1 INJECTION, SOLUTION INTRAMUSCULAR; INTRAVENOUS; SUBCUTANEOUS at 22:20

## 2022-07-25 RX ADMIN — PIPERACILLIN AND TAZOBACTAM 4.5 G: 4; .5 INJECTION, POWDER, FOR SOLUTION INTRAVENOUS at 21:59

## 2022-07-25 NOTE — PROGRESS NOTES
Physical Therapy  7/25/2022    Patient off the floor for procedure. Will follow up later this afternoon as able and appropriate.     Thank Jimmy Buchanan, PT, DPT

## 2022-07-25 NOTE — PROGRESS NOTES
Right arm eval for USIV - only arm avail d/t recent surgery on the left and edema. No USIV options avail. Will review for PICC line and/or options on 7.26. Primary RN aware.

## 2022-07-25 NOTE — PROGRESS NOTES
Problem: Mobility Impaired (Adult and Pediatric)  Goal: *Acute Goals and Plan of Care (Insert Text)  Description: FUNCTIONAL STATUS PRIOR TO ADMISSION: Patient was independent and active without use of DME.    HOME SUPPORT PRIOR TO ADMISSION: The patient lived with daughter but did not require assist.    Physical Therapy Goals  Initiated 7/22/2022  1. Patient will move from supine to sit and sit to supine , scoot up and down, and roll side to side in bed with modified independence within 7 day(s). 2.  Patient will transfer from bed to chair and chair to bed with modified independence using the least restrictive device within 7 day(s). 3.  Patient will perform sit to stand with modified independence within 7 day(s). 4.  Patient will ambulate with modified independence for 200 feet with the least restrictive device within 7 day(s). 5.  Patient will ascend/descend 4 stairs with  handrail(s) with supervision/set-up within 7 day(s). Outcome: Progressing Towards Goal   PHYSICAL THERAPY TREATMENT  Patient: Love Short (87 y.o. female)  Date: 7/25/2022  Diagnosis: Bowel perforation (HCC) [K63.1] Bowel perforation (HCC)  Procedure(s) (LRB):  LAPAROTOMY EXPLORATORY, repair of gastric ulcer (N/A) 5 Days Post-Op  Precautions: Fall, Skin (Pt is >340 lbs)  Chart, physical therapy assessment, plan of care and goals were reviewed. ASSESSMENT  Patient continues with skilled PT services and is progressing towards goals. Pt received supine in bed and eager to work with therapy. Mobilizes to EOB with Mod A, intact sitting balance and Min A x 2 to stand with RW. Pt takes several steps from EOB and  becomes incontinent of bowels. Assisted to [facility] and then transfers to recliner chair again with Min A x 2. Pt engaged in supine and seated UE/LE therex with 50% recall at end of session. Able to tolerate majority of activity on RA though replaced NC at end of session. HR up to 131BPM at most. Continue to recommend IPR. Current Level of Function Impacting Discharge (mobility/balance): A x 2 for bed mobility and transfers    Other factors to consider for discharge: decreased endurance, awaiting medical clearance         PLAN :  Patient continues to benefit from skilled intervention to address the above impairments. Continue treatment per established plan of care. to address goals. Recommendation for discharge: (in order for the patient to meet his/her long term goals)  Therapy 3 hours per day 5-7 days per week    This discharge recommendation:  Has been made in collaboration with the attending provider and/or case management    IF patient discharges home will need the following DME: to be determined (TBD)       SUBJECTIVE:   Patient stated I want to get up and walk.     OBJECTIVE DATA SUMMARY:   Critical Behavior:  Neurologic State: Alert  Orientation Level: Oriented X4  Cognition: Follows commands  Safety/Judgement: Awareness of environment, Fall prevention, Insight into deficits  Functional Mobility Training:  Bed Mobility:  Rolling: Moderate assistance  Supine to Sit: Moderate assistance     Scooting: Minimum assistance        Transfers:  Sit to Stand: Minimum assistance;Assist x2  Stand to Sit: Minimum assistance        Bed to Chair: Minimum assistance;Assist x2                    Balance:  Sitting: Intact  Standing: Intact; With support  Ambulation/Gait Training:  Distance (ft): 5 Feet (ft)  Assistive Device: Walker, rolling;Gait belt           Gait Abnormalities: Antalgic;Decreased step clearance        Base of Support: Widened     Speed/[place]: Slow  Step Length: Right shortened;Left shortened                Activity Tolerance:   Fair and requires frequent rest breaks    After treatment patient left in no apparent distress:   Sitting in chair, Heels elevated for pressure relief, and Call bell within reach    COMMUNICATION/COLLABORATION:   The patients plan of care was discussed with: Occupational therapist and Registered nurse.      Martha Sam, PT   Time Calculation: 40 mins

## 2022-07-25 NOTE — PROGRESS NOTES
Physician Progress Note      Salas Olivier  CSN #:                  [id number]  :                       1960  ADMIT DATE:       2022 11:05 AM  DISCH DATE:  RESPONDING  PROVIDER #:        Jessica Pro MD          QUERY [place] Afternoon    This patient admitted for Perforated Gastric ulcer and is s/p Exp. Lap. and repair of the gastric ulcer. The patient was intubated prior to going to OR, and was extubated on -ICU Intensivist Dr. Sánchez French notes \"acute hypoxic resp. failure  - ICU NP, Zachary Gutierrez, \"Intubated for airway protection\"    Since this documentation is conflicting regarding the respiratory status, if possible Please indicate one of the following and document in the medical record: The medical record reflects the following:  Risk Factors: Morbid obesity, Sleep Apnea, Asthma, Required urgent Exp Lap for gastric ulcer repair  Clinical Indicators: Presented to Er with severe abd. pain, prior to going to OR, pt resp rate 18-, o2 sats %, urgent surgery for Exp. Lap and Repair of the gastric ulcer. Treatment: From PACU on Vent to ICU, Mechanical vent until  SAT/SBT on , and extubated \"without complication\". Thank you  Chidi Crooks, [address] [facility], [address] [place] [address], [address] [place] [address]  Options provided:  -- Intubated for airway protection only, Acute Respiratory Failure ruled out after study  -- Intubated for Acute Respiratory Failure as evidenced by, Please document evidence. -- Other - I will add my own diagnosis  -- Disagree - Not applicable / Not valid  -- Disagree - Clinically unable to determine / Unknown  -- Refer to Clinical Documentation Reviewer    PROVIDER RESPONSE TEXT:    Provider is clinically unable to determine a response to this query.   anesthesiology decision, please refer query to them    Query created by: Lan Rushing on 2022 1:14 PM      QUERY TEXT:    Good Afternoon    This patient admitted for Perforated Gastric Ulcer and is s/p Ulcer repair. On admission the patient noted with WBC 17.2, and HR  , 2/4 SIRS. No fevers  MD notes WBC are likely to do the peritonitis. UA is not consistent with infection. CXR without Pna. If possible, please document in progress notes and discharge summary if you are evaluating and/or treating any of the following: The medical record reflects the following:  Risk Factors: Perforated Gastric ulcer, Morbid obesity, known left breast cancer  Clinical Indicators: 2/4 SIRS, with elevated WBC 17.2, and HR consistently 90-100s---no sings of specific infection. UA negative, CXR negative. NO fevers,  Treatment: Tx with IV Zosyn, Urgent Exp. Lap, Post op pain control Bowel rest, NGT. PPI, IVF, close monitoring of VS, and Daily labs. Thank you  Shalom Swanson, DAMIN, RN, [address] [facility], [address] [place] [address], [facility]  [phone]  Options provided:  -- SIRS of non-infectious origin due to Perforation gastric ulcer without acute organ dysfunction  -- SIRS of non-infectious origin due to Perforation gastric ulcer with acute organ dysfunction of KIAN  -- Other - I will add my own diagnosis  -- Disagree - Not applicable / Not valid  -- Disagree - Clinically unable to determine / Unknown  -- Refer to Clinical Documentation Reviewer    PROVIDER RESPONSE TEXT:    Provider disagreed with this query.   no option for infection    Query created by: Dave Roper on 7/25/2022 1:21 PM      Electronically signed by:  Elizabeth Moreno MD 7/25/2022 2:55 PM

## 2022-07-25 NOTE — PROGRESS NOTES
1910 - Verbal shift change report given to [address] [place] [address] (oncoming nurse) by Patricia Santo RN (offgoing nurse). Report included the following information SBAR, Procedure Summary, Intake/Output, MAR, Recent Results, and Cardiac Rhythm Sinus Rhythm, Sinus Tach . 0710 - Bedside and Verbal shift change report given to [address] [place] [address] Celina (oncoming nurse) by [address] [place] [address] (offgoing nurse). Report included the following information SBAR, Procedure Summary, Intake/Output, MAR, Recent Results, and Cardiac Rhythm Sinus Rhythm, Sinus Tach .

## 2022-07-25 NOTE — PROGRESS NOTES
Problem: Self Care Deficits Care Plan (Adult)  Goal: *Acute Goals and Plan of Care (Insert Text)  Description: FUNCTIONAL STATUS PRIOR TO ADMISSION: Patient was independent and active without use of DME.     HOME SUPPORT: The patient lived with daughter but did not require assist.    Occupational Therapy Goals  Initiated 7/22/2022  1. Patient will perform grooming standing at sink with supervision/set-up within 7 day(s). 2.  Patient will perform lower body dressing using adaptive equipment with minimal assistance/contact guard assist within 7 day(s). 3.  Patient will perform toilet transfers with supervision/set-up within 7 day(s). 4.  Patient will perform all aspects of toileting with minimal assistance/contact guard assist within 7 day(s). 5.  Patient will participate in upper extremity therapeutic exercise/activities with independence for 10 minutes within 7 day(s). 6.  Patient will utilize energy conservation techniques during functional activities with verbal and visual cues within 7 day(s). Outcome: Progressing Towards Goal   OCCUPATIONAL THERAPY TREATMENT  Patient: Marquis Curry (27 y.o. female)  Date: 7/25/2022  Diagnosis: Bowel perforation ([facility]) [K63.1] Bowel perforation ([facility] [address]  Procedure(s) (LRB):  LAPAROTOMY EXPLORATORY, repair of gastric ulcer (N/A) 5 Days Post-Op  Precautions: Fall, Skin (Pt is >340 lbs)  Chart, occupational therapy assessment, plan of care, and goals were reviewed. ASSESSMENT  Patient continues with skilled OT services and is progressing towards goals. Ms. Faustina Colunga was received in bed, agreeable and highly motivated for activity. Patient performed bed mobility with up to mod A and cues for improved technique. Patient with good overall sitting balance. Attempted to wean from OT however required reapplication of 2L O2 to maintain SpO2 >90% with movement near end of tx. Patient initiated transfer from bed to chair and became incontinent of bowel.   She was able to transfe onto [facility] to complete toileting where up to max A is needed for hygiene and clothing management. Patient agreeable to remain up to the chair at end of tx and performed stand-pivot transfer to recliner from [facility]. She engaged in supine + seated UE/LE exercises with good tolerance. Education provided for benefits of exercise, proper technique, and coordination of breathing with each ex. Patient would benefit from continued skilled OT to progress towards goals and improve overall independence. Current Level of Function Impacting Discharge (ADLs): Patient required min to mod A for bed mobility and min A x2 for OOB transfers. Patient required total A for LB dressing and max A for toileting. PLAN :  Patient continues to benefit from skilled intervention to address the above impairments. Continue treatment per established plan of care to address goals. Recommend with staff:   Recommend patient be OOB to chair as frequently as tolerated; Goal of 3x/day for all meals for 60 minutes at a time. For toileting needs, recommend transfers to/from [facility]. Encourage patient involvement in personal care as able. Encourage exercises frequently throughout the day. Recommend next OT session: Continue towards set OT goals. Recommendation for discharge: (in order for the patient to meet his/her long term goals)  Therapy 3 hours per day 5-7 days per week    This discharge recommendation:  Has been made in collaboration with the attending provider and/or case management    IF patient discharges home will need the following DME: none       SUBJECTIVE:   Patient agreeable to OT tx. OBJECTIVE DATA SUMMARY:   Cognitive/Behavioral Status:  Neurologic State: Alert  Orientation Level: Oriented X4  Cognition: Follows commands; Appropriate decision making; Appropriate for age attention/concentration; Appropriate safety awareness  Perception: Appears intact  Perseveration: No perseveration noted  Safety/Judgement: Awareness of environment    Functional Mobility and Transfers for ADLs:  Bed Mobility:  Rolling: Moderate assistance  Supine to Sit: Moderate assistance  Scooting: Minimum assistance    Transfers:  Sit to Stand: Minimum assistance;Assist x2  Functional Transfers  Toilet Transfer : Minimum assistance;Assist x2; Additional time  Bed to Chair: Minimum assistance;Assist x2    Balance:  Sitting: Intact  Standing: Intact; With support    ADL Intervention:    Lower Body Dressing Assistance  Dressing Assistance: Total assistance(dependent)    Toileting  Toileting Assistance: Maximum assistance  Bladder Hygiene: Minimum assistance  Bowel Hygiene: Maximum assistance  Clothing Management: Maximum assistance  Cues: Verbal cues provided  Adaptive Equipment:  [facility])    Cognitive Retraining  Safety/Judgement: Awareness of environment    Therapeutic Exercises:   Patient educated on the benefits of exercise and encouraged to complete frequently throughout the day as tolerated. Instruction provided for the following exercises: shoulder flexion/extension, chest press/back row, elbow flexion/extension, and ankle pumps  Patient tolerated 3 sets x 10 reps without complaint and agreeable to complete at minimum 3x/day. Activity Tolerance:   Good    After treatment patient left in no apparent distress:   Sitting in chair and Call bell within reach    COMMUNICATION/COLLABORATION:   The patients plan of care was discussed with: Physical therapist, Registered nurse, and patient .      [place] Saver, OTR/L  Time Calculation: 40 mins

## 2022-07-25 NOTE — CONSULTS
Comprehensive Nutrition Assessment    Type and Reason for Visit: Initial, Consult, RD nutrition re-screen/LOS    Nutrition Recommendations/Plan:   Advance diet per surgery - recommend regular, GI bland when able  Addendum: Noted diet advanced to clears - RD placed order for Gelatein BID to increase kcal/protein intake (160 kcal, <2 g carbs, 40 g protein)     If unable to remove NGT today/advance diet, recommend TPN per below:         Day 1: D15, AA6% @ 42 mL/hour       Day 2: D15, AA6% @ 63 mL/hour        Day 3: D15, AA6% @75 mL/hour (goal)                - Monitor K, Phos, Mg until stable x 3 days with nutrition, replace PRN        - Hold TPN at current rate if electrolytes are not stable    3. Lipids: Check triglycerides and if <400, add lipids as follows         Lipids 2x/week per pharmacy     Malnutrition Assessment:  Malnutrition Status:  Severe malnutrition (07/25/22 1023)    Context:  Acute illness     Findings of the 6 clinical characteristics of malnutrition:   Energy Intake:  50% or less of est energy requirements for 5 or more days  Weight Loss:  No significant weight loss     Body Fat Loss:  Unable to assess,     Muscle Mass Loss:  Unable to assess,    Fluid Accumulation:  Moderate to severe, Extremities   Strength:  Not performed     Nutrition Assessment:     Pt is a 65year old female admitted with Bowel perforation ([facility] [K63.1]. She  has a past medical history of Arrhythmia, Arthritis, Asthma, Cancer ([facility] COVID-19 (01/29/2021), Diabetes ([facility] Hypertension, Morbid obesity ([facility] Neuropathy, and Sleep apnea. RD screen for LOS, and consulted for poor intake/appetite. ICU day 4, NPO day 5. Patient with NGT set to suction. States # with no change in appetite, but with recent weight gain/edema. Denies N/V/D. Denies chewing/swallowing problems. Endorses 'okay' appetite but wanting beverages more than anything. Tree nut allergy.      TPN at goal rate 75 mL/hour provides 1350 kcal (100% needs); 108 g protein (100% needs). Wt Readings from Last 10 Encounters:   07/20/22 155.6 kg (343 lb 0.6 oz)   06/15/22 147.1 kg (324 lb 6.4 oz)   06/15/22 147 kg (324 lb)   05/25/22 146.8 kg (323 lb 9.6 oz)   05/25/22 146.5 kg (323 lb)   05/04/22 146.3 kg (322 lb 8 oz)   05/04/22 146.1 kg (322 lb)   04/13/22 142.4 kg (313 lb 14.4 oz)   04/13/22 142 kg (313 lb)   03/22/22 142.9 kg (315 lb)       Nutrition Related Findings:      Wound Type: Multiple, Surgical incision    Last Bowel Movement Date: 07/17/22  Abdominal Assessment: Obese, Passing flatus, Pannus, Tender  Appetite: NPO  Bowel Sounds: Active   Edema:LLE: 4+; Pitting (7/25/2022  8:00 AM)  LUE: 3+ (7/25/2022  8:00 AM)  RLE: 4+; Pitting (7/25/2022  8:00 AM)  RUE: 3+ (7/25/2022  8:00 AM)      Nutr. Labs:  Lab Results   Component Value Date/Time    GFR est AA >60 07/25/2022 02:17 AM    GFR est non-AA >60 07/25/2022 02:17 AM    Creatinine 0.51 (L) 07/25/2022 02:17 AM    BUN 13 07/25/2022 02:17 AM    Sodium 145 07/25/2022 02:17 AM    Potassium 4.0 07/25/2022 02:17 AM    Chloride 111 (H) 07/25/2022 02:17 AM    CO2 32 07/25/2022 02:17 AM       Lab Results   Component Value Date/Time    Glucose 141 (H) 07/25/2022 02:17 AM    Glucose (POC) 136 (H) 07/25/2022 05:27 AM       Lab Results   Component Value Date/Time    Hemoglobin A1c 5.7 (H) 07/21/2022 01:41 AM       Nutr. Meds:  [email or web address], Lovenox, humalog, labetalol, zofran PRN, Protonix, zosyn      Current Nutrition Intake & Therapies:  Average Meal Intake: NPO  Average Supplement Intake: NPO  DIET NPO    Anthropometric Measures:  Height: 5' 2\" (157.5 cm)  Ideal Body Weight (IBW): 110 lbs (50 kg)     Current Body Wt:  155.6 kg (343 lb 0.6 oz), 311.9 % IBW.  Not specified  Current BMI (kg/m2): 62.7  Usual Body Weight: 137 kg (302 lb)  % Weight Change (Calculated): 13.6  Weight Adjustment: No adjustment                 BMI Category: Obese class 3 (BMI 40.0 or greater)    Estimated Daily Nutrient Needs:  Energy Requirements Based On: Kcal/kg  Weight Used for Energy Requirements: Current  Energy (kcal/day): 1376-5539 (22-25 kcal/kg IBW)  Weight Used for Protein Requirements: Ideal  Protein (g/day): 100 (2 g/kg IBW)  Method Used for Fluid Requirements: 1 ml/kcal  Fluid (ml/day): 7314-5666    Nutrition Diagnosis:   Severe malnutrition related to increased demand for energy/nutrients, inadequate protein-energy intake as evidenced by NPO or clear liquid status due to medical condition, localized or generalized fluid accumulation      Nutrition Interventions:   Food and/or Nutrient Delivery: Modify current diet, Start parenteral nutrition  Nutrition Education/Counseling: No recommendations at this time  Coordination of Nutrition Care: Continue to monitor while inpatient, Interdisciplinary rounds  Plan of Care discussed with: pharmacy, nursing    Goals:     Goals: other (specify)  Specify Other Goals: Provide and tolerate 50% of estimated kcal needs within 2 - 3 days    Nutrition Monitoring and Evaluation:   Behavioral-Environmental Outcomes: None identified  Food/Nutrient Intake Outcomes: Diet advancement/tolerance (vs PN)  Physical Signs/Symptoms Outcomes: Biochemical data, Hemodynamic status, Fluid status or edema, Weight, GI status    Discharge Planning:     Too soon to determine    Hilliards MS Raisa, RD  Contact: Ext: [phone], or via [employer]

## 2022-07-25 NOTE — PROGRESS NOTES
[facility]  [address] [place] [address]  [phone]      Medical Progress Note      NAME: Dina Pino   :  1960  MRM:  [id number]    Date/Time of service: 2022  9:06 AM       Subjective:     Chief Complaint:  Patient was personally seen and examined by me during this time period. Chart reviewed. Having flatus. No fevers/chills        Objective:       Vitals:       Last 24hrs VS reviewed since prior progress note.  Most recent are:    Visit Vitals  BP (!) 153/45 (BP 1 Location: Right lower arm, BP Patient Position: At rest)   Pulse 85   Temp 98.2 °F (36.8 °C)   Resp 16   Ht 5' 2\" (1.575 m)   Wt 155.6 kg (343 lb 0.6 oz)   SpO2 96%   BMI 62.74 kg/m²     SpO2 Readings from Last 6 Encounters:   22 96%   22 95%   06/15/22 95%   06/15/22 95%   22 96%   22 95%    O2 Flow Rate (L/min): 0 l/min     Intake/Output Summary (Last 24 hours) at 2022 1176  Last data filed at 2022 0400  Gross per 24 hour   Intake 1597 ml   Output 1100 ml   Net 497 ml          Exam:     Physical Exam:    Gen:  Well-developed, well-nourished, morbidly obese, in no acute distress  HEENT:  Pink conjunctivae, PERRL, hearing intact to voice, has NGT  Neck:  Supple, without masses, thyroid non-tender  Resp:  No accessory muscle use, clear breath sounds without wheezes rales or rhonchi  Card:  No murmurs, normal S1, S2 without thrills, +edema  Abd:  Soft, non-tender, non-distended, normoactive bowel sounds are present, surgical dressing c/d/i  Musc:  No cyanosis or clubbing  Skin:  No rashes   Neuro:  Cranial nerves 3-12 are grossly intact, follows commands appropriately  Psych:  Good insight, oriented to person, place and time, alert    Medications Reviewed: (see below)    Lab Data Reviewed: (see below)    ______________________________________________________________________    Medications:     Current Facility-Administered Medications   Medication Dose Route Frequency    dextrose 5 % - 0.45% NaCl infusion  75 mL/hr IntraVENous CONTINUOUS    phenol throat spray (CHLORASEPTIC) 1 Spray  1 Spray Oral PRN    HYDROmorphone (DILAUDID) injection 1 mg  1 mg IntraVENous Q3H PRN    labetaloL (NORMODYNE;TRANDATE) 20 mg/4 mL (5 mg/mL) injection 20 mg  20 mg IntraVENous Q6H PRN    piperacillin-tazobactam (ZOSYN) 4.5 g in 0.9% sodium chloride (MBP/ADV) 100 mL MBP  4.5 g IntraVENous Q8H    miconazole (MICOTIN) 2 % powder   Topical BID    sodium chloride (NS) flush 5-40 mL  5-40 mL IntraVENous Q8H    sodium chloride (NS) flush 5-40 mL  5-40 mL IntraVENous PRN    naloxone (NARCAN) injection 0.4 mg  0.4 mg IntraVENous PRN    ondansetron (ZOFRAN) injection 4 mg  4 mg IntraVENous Q4H PRN    enoxaparin (LOVENOX) injection 40 mg  40 mg SubCUTAneous Q12H    arformoteroL (BROVANA) neb solution 15 mcg  15 mcg Nebulization BID RT    And    budesonide (PULMICORT) 500 mcg/2 ml nebulizer suspension  250 mcg Nebulization BID RT    albuterol-ipratropium (DUO-NEB) 2.5 MG-0.5 MG/3 ML  3 mL Nebulization Q6H PRN    glucose chewable tablet 16 g  4 Tablet Oral PRN    glucagon (GLUCAGEN) injection 1 mg  1 mg IntraMUSCular PRN    dextrose 10% infusion 0-250 mL  0-250 mL IntraVENous PRN    insulin lispro (HUMALOG) injection   SubCUTAneous Q6H    fluconazole (DIFLUCAN) 400mg/200 mL IVPB (premix)  400 mg IntraVENous Q24H    hydrALAZINE (APRESOLINE) 20 mg/mL injection 20 mg  20 mg IntraVENous Q6H PRN    pantoprazole (PROTONIX) 40 mg in 0.9% sodium chloride 10 mL injection  40 mg IntraVENous Q12H    [Held by provider] amLODIPine (NORVASC) tablet 10 mg  10 mg Per NG tube DAILY          Lab Review:     Recent Labs     07/25/22  0217 07/24/22  0440 07/23/22  0411   WBC 11.6* 11.9* 10.8   HGB 8.0* 7.9* 8.3*   HCT 28.1* 27.5* 29.2*    192 208       Recent Labs     07/25/22  0217 07/24/22  0440 07/23/22  0411    146* 143   K 4.0 4.0 4.2   * 111* 110*   CO2 32 31 29   * 92 89   BUN 13 16 18   CREA 0.51* 0.48* 0.60   CA 9.8 10.0 10.0   MG 2.0 2.0 2.3   PHOS 2.6 2.1* 2.9   ALB  --  1.4*  --    TBILI  --  0.5  --    ALT  --  42  --        Lab Results   Component Value Date/Time    Glucose (POC) 136 (H) 07/25/2022 05:27 AM    Glucose (POC) 132 (H) 07/24/2022 11:55 PM    Glucose (POC) 94 07/24/2022 05:17 PM    Glucose (POC) 76 07/24/2022 11:25 AM    Glucose (POC) 91 07/24/2022 07:16 AM          Assessment / Plan:     66 yo hx of HTN, DM, breast CA, asthma, presented w/ a perforated gastric ulcer s/p repair 07/20. Medicine is following as a consultant    1) Gastric ulcer perforation: s/p surgical repair 07/20. Cont IV Zosyn, IV PPI. Use IV dilaudid prn severe pain. Rest of  management per Gen surg    2) HTN: BP stable. Hold oral meds. Use IV hydralazine, IV labetalol prn    3) DM type 2: A1C 5.7%. Hold metformin. Cont SSI    4) Acute blood loss anemia: from surgery. Iron studies, B12/folate wnl. Monitor Hgb closely, transfuse prn    5) Asthma: cont nebs prn    6) Mod pro-juan malnutrition: due to surgical issues. Will defer to surg regarding TPN.   Consult nutrition     7) Morbid obesity: counseled on diet, exercise, wt loss    8) Hypophos: replete IV, monitor BMP    Total time spent with patient care: 35 Minutes **I personally saw and examined the patient during this time period**                 Care Plan discussed with: Patient, nursing     Discussed:  Care Plan    Prophylaxis:  Lovenox    Disposition:   per surg           ___________________________________________________    Attending Physician: Katharine Santiago MD

## 2022-07-25 NOTE — PROGRESS NOTES
Physician Progress Note      Javy Bloodgood  CSN #:                  [id number]  :                       1960  ADMIT DATE:       2022 11:05 AM  DISCH DATE:  RESPONDING  PROVIDER #:        Washington Eid DO, MD          QUERY TEXT:    Meggan Lirattchacorta Afternoon    This patient admitted for Perforated Gastric ulcer and is s/p Exp. Lap. and repair of the gastric ulcer. The patient was intubated prior to going to OR, and was extubated on -ICU Intensivist Dr. Erna Perdomo notes \"acute hypoxic resp. failure  - ICU NP, Carmen Bloom, \"Intubated for airway protection\"    Since this documentation is conflicting regarding the respiratory status, if possible Please indicate one of the following and document in the medical record: The medical record reflects the following:  Risk Factors: Morbid obesity, Sleep Apnea, Asthma, Required urgent Exp Lap for gastric ulcer repair  Clinical Indicators: Presented to Er with severe abd. pain, prior to going to OR, pt resp rate 18-, o2 sats %, urgent surgery for Exp. Lap and Repair of the gastric ulcer. Treatment: From PACU on Vent to ICU, Mechanical vent until  SAT/SBT on , and extubated \"without complication\". Thank you  John[facility], [facility], [address], [address] [place] [address]  Options provided:  -- Intubated for airway protection only, Acute Respiratory Failure ruled out after study  -- Intubated for Acute Respiratory Failure as evidenced by, Please document evidence. -- Other - I will add my own diagnosis  -- Disagree - Not applicable / Not valid  -- Disagree - Clinically unable to determine / Unknown  -- Refer to Clinical Documentation Reviewer    PROVIDER RESPONSE TEXT:    This patient was intubated for airway protection only, Acute Respiratory Failure has been ruled out after study.     Query created by: Alexys Sampson on 2022 3:00 PM      Electronically signed by:  Damaris Rogers MD 2022 3:09 PM

## 2022-07-25 NOTE — PROGRESS NOTES
7/25/2022  10:10 AM  Update via chart review, pt is continuing to require medical management for Gastric ulcer perforation: s/p surgical repair 07/20, HTN, T2DM, Acute blood loss anemia/from surgery, asthma, Mod pro-juan malnutrition, Morbid Obesity, hypophs  Transitions of Care Plan:  RUR 18 % Moderate Risk of Readmission/Yellow  LOS  5 Days  medical management continues   Surgery consult, POD # 5 Ex-Lap w/ gastric perforation repair  Wound Care following   Nutrition consult  PT, OT chandan completed recommending IPR at [facility], insurance requires pre-auth, discussed w/ pt's Dtr Malena Hughes, she is  in agreement and gave verbal consent for referral to [facility] CM faxed referral to [facility], no COVID testing for admission if pt is symptom free.   CM to follow through for treatment/response  DC when stable to  IPR  Outpatient follow up Surgery, PCP  Pt will require Medicaid stretcher at DC   CM will follow and assist w/ DC needs  DAMI Cherry

## 2022-07-25 NOTE — PROGRESS NOTES
Problem: Pressure Injury - Risk of  Goal: *Prevention of pressure injury  Description: Document Eliecer Scale and appropriate interventions in the flowsheet. Outcome: Progressing Towards Goal  Note: Pressure Injury Interventions:  Sensory Interventions: Assess changes in LOC, Avoid rigorous massage over bony prominences, Check visual cues for pain, Discuss PT/OT consult with provider, Float heels, Keep linens dry and wrinkle-free, Maintain/enhance activity level, Minimize linen layers, Monitor skin under medical devices, Pad between skin to skin, Pressure redistribution bed/mattress (bed type), Turn and reposition approx. every two hours (pillows and wedges if needed)    Moisture Interventions: Absorbent underpads, Apply protective barrier, creams and emollients, Check for incontinence Q2 hours and as needed, Internal/External urinary devices, Limit adult briefs, Maintain skin hydration (lotion/cream), Minimize layers, Moisture barrier, Offer toileting Q_hr    Activity Interventions: Pressure redistribution bed/mattress(bed type), PT/OT evaluation, Increase time out of bed    Mobility Interventions: Float heels, HOB 30 degrees or less, Pressure redistribution bed/mattress (bed type), PT/OT evaluation, Turn and reposition approx. every two hours(pillow and wedges)    Nutrition Interventions:  (NPO)    Friction and Shear Interventions: Foam dressings/transparent film/skin sealants, HOB 30 degrees or less, Lift team/patient mobility team, Apply protective barrier, creams and emollients, Feet elevated on foot rest, Minimize layers, Transferring/repositioning devices                Problem: Patient Education: Go to Patient Education Activity  Goal: Patient/Family Education  Outcome: Progressing Towards Goal     Problem: Falls - Risk of  Goal: *Absence of Falls  Description: Document Sofia Fall Risk and appropriate interventions in the flowsheet.   Outcome: Progressing Towards Goal  Note: Fall Risk Interventions:  Mobility Interventions: Bed/chair exit alarm, Communicate number of staff needed for ambulation/transfer, Patient to call before getting OOB, PT Consult for mobility concerns, Utilize walker, cane, or other assistive device, Utilize gait belt for transfers/ambulation    Mentation Interventions: Adequate sleep, hydration, pain control, Bed/chair exit alarm, Evaluate medications/consider consulting pharmacy, Increase mobility, More frequent rounding, Toileting rounds, Update white board    Medication Interventions: Bed/chair exit alarm, Patient to call before getting OOB, Teach patient to arise slowly, Utilize gait belt for transfers/ambulation    Elimination Interventions: Bed/chair exit alarm, Call light in reach, Patient to call for help with toileting needs, Stay With Me (per policy), Toilet paper/wipes in reach, Toileting schedule/hourly rounds              Problem: Patient Education: Go to Patient Education Activity  Goal: Patient/Family Education  Outcome: Progressing Towards Goal     Problem: Patient Education: Go to Patient Education Activity  Goal: Patient/Family Education  Outcome: Progressing Towards Goal     Problem: Patient Education: Go to Patient Education Activity  Goal: Patient/Family Education  Outcome: Progressing Towards Goal

## 2022-07-25 NOTE — PROGRESS NOTES
[facility]        Subjective     No acute events  Some slight increased pain  Passing gas    Objective     Patient Vitals for the past 24 hrs:   Temp Pulse Resp BP SpO2   07/25/22 0900 -- 72 20 -- 91 %   07/25/22 0800 98.2 °F (36.8 °C) 85 16 (!) 153/45 96 %   07/25/22 0743 -- -- -- -- 96 %   07/25/22 0732 -- -- -- -- 97 %   07/25/22 0700 -- 80 18 -- (!) 88 %   07/25/22 0400 98.5 °F (36.9 °C) 92 18 (!) 157/72 100 %   07/25/22 0000 98.5 °F (36.9 °C) 88 18 (!) 168/67 97 %   07/24/22 2359 98.5 °F (36.9 °C) -- -- -- --   07/24/22 2200 98.8 °F (37.1 °C) 84 16 (!) 164/62 93 %   07/24/22 2100 -- -- -- (!) 149/70 --   07/24/22 2000 -- 85 12 (!) 140/67 93 %   07/24/22 1953 98.8 °F (37.1 °C) -- -- -- --   07/24/22 1600 98.6 °F (37 °C) 82 19 (!) 156/68 98 %   07/24/22 1500 -- 87 21 (!) 153/72 97 %   07/24/22 1300 -- 90 17 (!) 153/65 99 %   07/24/22 1200 98.7 °F (37.1 °C) 83 20 (!) 148/63 92 %   07/24/22 1100 -- 79 13 (!) 158/56 --   07/24/22 1043 -- -- -- -- (!) 88 %   07/24/22 1000 -- 87 24 (!) 157/66 97 %       Date 07/24/22 0700 - 07/25/22 0659 07/25/22 0700 - 07/26/22 0659   Shift 3356-9153 7563-9403 24 Hour Total 2524-8646 5477-0813 24 Hour Total   INTAKE   I.V.(mL/kg/hr) 1122(0.6) 900(0.5) 2022(0.5) 300  300     Volume (lactated Ringers infusion) 425  425        Volume (dextrose 5 % - 0.45% NaCl infusion) 563.8 900 1463.8 300  300     Volume (potassium phosphate 30 mmol in 0.9% sodium chloride 250 mL infusion) 133.3  133.3      Shift Total(mL/kg) 1122(7.2) 900(5.8) 2022(13) 300(1.9)  300(1.9)   OUTPUT   Urine(mL/kg/hr) 300(0.2) 900(0.5) 1200(0.3)        Urine Occurrence(s) 1 x  1 x        Urine Output (mL) (External Urinary Catheter 07/22/22)       Emesis/NG output 50 0 50        Output (ml) (Nasogastric Tube 07/20/22) 50 0 50      Shift Total(mL/kg) 350(2.2) 900(5.8) 1250(8)       0 772 300  300   Weight (kg) 155. 6 155.6 155.6 155.6 155.6 155.6       PE  GEN - Awake, alert, communicating appropriately.   NAD  Pulm - CTAB  CV - RRR  Abd - soft, ND, mild TTP near incision, dressing c/d/i    Labs  Recent Results (from the past 24 hour(s))   GLUCOSE, POC    Collection Time: 07/24/22 11:25 AM   Result Value Ref Range    Glucose (POC) 76 65 - 117 mg/dL    Performed by Kalli Arciniega    GLUCOSE, POC    Collection Time: 07/24/22  5:17 PM   Result Value Ref Range    Glucose (POC) 94 65 - 117 mg/dL    Performed by [address], POC    Collection Time: 07/24/22 11:55 PM   Result Value Ref Range    Glucose (POC) 132 (H) 65 - 117 mg/dL    Performed by Nick Stringer    PHOSPHORUS    Collection Time: 07/25/22  2:17 AM   Result Value Ref Range    Phosphorus 2.6 2.6 - 4.7 MG/DL   CBC W/O DIFF    Collection Time: 07/25/22  2:17 AM   Result Value Ref Range    WBC 11.6 (H) 3.6 - 11.0 K/uL    RBC 2.74 (L) 3.80 - 5.20 M/uL    HGB 8.0 (L) 11.5 - 16.0 g/dL    HCT 28.1 (L) 35.0 - 47.0 %    .6 (H) 80.0 - 99.0 FL    MCH 29.2 26.0 - 34.0 PG    MCHC 28.5 (L) 30.0 - 36.5 g/dL    RDW 18.8 (H) 11.5 - 14.5 %    PLATELET 083 589 - 769 K/uL    MPV 8.9 8.9 - 12.9 FL    NRBC 0.0 0  WBC    ABSOLUTE NRBC 0.00 0.00 - 0.13 K/uL   METABOLIC PANEL, BASIC    Collection Time: 07/25/22  2:17 AM   Result Value Ref Range    Sodium 145 136 - 145 mmol/L    Potassium 4.0 3.5 - 5.1 mmol/L    Chloride 111 (H) 97 - 108 mmol/L    CO2 32 21 - 32 mmol/L    Anion gap 2 (L) 5 - 15 mmol/L    Glucose 141 (H) 65 - 100 mg/dL    BUN 13 6 - 20 MG/DL    Creatinine 0.51 (L) 0.55 - 1.02 MG/DL    BUN/Creatinine ratio 25 (H) 12 - 20      GFR est AA >60 >60 ml/min/1.73m2    GFR est non-AA >60 >60 ml/min/1.73m2    Calcium 9.8 8.5 - 10.1 MG/DL   MAGNESIUM    Collection Time: 07/25/22  2:17 AM   Result Value Ref Range    Magnesium 2.0 1.6 - 2.4 mg/dL   GLUCOSE, POC    Collection Time: 07/25/22  5:27 AM   Result Value Ref Range    Glucose (POC) 136 (H) 65 - 117 mg/dL Performed by Jasper Manning is a 65 y. o.yr old female s/p repair gastric perforation  Doing well overall  Minimal, clear NGT output  Starting to have bowel function    Plan     UGI to make sure no persistent leak  If study looks good, dc NGT and start liquids  Ok to transfer to floor    20 mins of time was spent with the patient of which > 50% of the time involved face-to-face counseling of the patient regarding the proposed treatment plan.     Jose Bhatia MD

## 2022-07-25 NOTE — PROGRESS NOTES
Primary Nurse Yeimi Barakat and Bertrand Jacinto, RN performed a dual skin assessment on this patient Impairment noted- see wound doc flow sheet  Eliecer score is see flowsheet    Bedside and Verbal shift change report given to Russell (oncoming nurse) by Bertrand Jacinto (offgoing nurse). Report included the following information SBAR, Kardex, ED Summary, Cardiac Rhythm NSR, and Quality Measures.

## 2022-07-26 ENCOUNTER — APPOINTMENT (OUTPATIENT)
Dept: GENERAL RADIOLOGY | Age: 62
DRG: 220 | End: 2022-07-26
Attending: SURGERY
Payer: MEDICAID

## 2022-07-26 LAB
ALBUMIN SERPL-MCNC: 1.5 G/DL (ref 3.5–5)
ALBUMIN/GLOB SERPL: 0.4 {RATIO} (ref 1.1–2.2)
ALP SERPL-CCNC: 140 U/L (ref 45–117)
ALT SERPL-CCNC: 36 U/L (ref 12–78)
ANION GAP SERPL CALC-SCNC: 1 MMOL/L (ref 5–15)
ANION GAP SERPL CALC-SCNC: 3 MMOL/L (ref 5–15)
APTT PPP: 24.5 SEC (ref 22.1–31)
AST SERPL-CCNC: 26 U/L (ref 15–37)
BILIRUB SERPL-MCNC: 0.5 MG/DL (ref 0.2–1)
BUN SERPL-MCNC: 9 MG/DL (ref 6–20)
BUN SERPL-MCNC: 9 MG/DL (ref 6–20)
BUN/CREAT SERPL: 16 (ref 12–20)
BUN/CREAT SERPL: 16 (ref 12–20)
CALCIUM SERPL-MCNC: 9.4 MG/DL (ref 8.5–10.1)
CALCIUM SERPL-MCNC: 9.4 MG/DL (ref 8.5–10.1)
CHLORIDE SERPL-SCNC: 112 MMOL/L (ref 97–108)
CHLORIDE SERPL-SCNC: 112 MMOL/L (ref 97–108)
CO2 SERPL-SCNC: 33 MMOL/L (ref 21–32)
CO2 SERPL-SCNC: 33 MMOL/L (ref 21–32)
CREAT SERPL-MCNC: 0.56 MG/DL (ref 0.55–1.02)
CREAT SERPL-MCNC: 0.58 MG/DL (ref 0.55–1.02)
ERYTHROCYTE [DISTWIDTH] IN BLOOD BY AUTOMATED COUNT: 18.8 % (ref 11.5–14.5)
GLOBULIN SER CALC-MCNC: 4.2 G/DL (ref 2–4)
GLUCOSE BLD STRIP.AUTO-MCNC: 122 MG/DL (ref 65–117)
GLUCOSE BLD STRIP.AUTO-MCNC: 132 MG/DL (ref 65–117)
GLUCOSE BLD STRIP.AUTO-MCNC: 139 MG/DL (ref 65–117)
GLUCOSE BLD STRIP.AUTO-MCNC: 148 MG/DL (ref 65–117)
GLUCOSE SERPL-MCNC: 113 MG/DL (ref 65–100)
GLUCOSE SERPL-MCNC: 113 MG/DL (ref 65–100)
HCT VFR BLD AUTO: 23.7 % (ref 35–47)
HGB BLD-MCNC: 6.8 G/DL (ref 11.5–16)
HGB BLD-MCNC: 9 G/DL (ref 11.5–16)
HISTORY CHECKED?,CKHIST: NORMAL
INR PPP: 1.1 (ref 0.9–1.1)
MAGNESIUM SERPL-MCNC: 1.9 MG/DL (ref 1.6–2.4)
MCH RBC QN AUTO: 29.6 PG (ref 26–34)
MCHC RBC AUTO-ENTMCNC: 28.7 G/DL (ref 30–36.5)
MCV RBC AUTO: 103 FL (ref 80–99)
NRBC # BLD: 0 K/UL (ref 0–0.01)
NRBC BLD-RTO: 0 PER 100 WBC
PLATELET # BLD AUTO: 215 K/UL (ref 150–400)
PMV BLD AUTO: 9.1 FL (ref 8.9–12.9)
POTASSIUM SERPL-SCNC: 3.5 MMOL/L (ref 3.5–5.1)
POTASSIUM SERPL-SCNC: 3.5 MMOL/L (ref 3.5–5.1)
PROT SERPL-MCNC: 5.7 G/DL (ref 6.4–8.2)
PROTHROMBIN TIME: 11.3 SEC (ref 9–11.1)
RBC # BLD AUTO: 2.3 M/UL (ref 3.8–5.2)
SERVICE CMNT-IMP: ABNORMAL
SODIUM SERPL-SCNC: 146 MMOL/L (ref 136–145)
SODIUM SERPL-SCNC: 148 MMOL/L (ref 136–145)
THERAPEUTIC RANGE,PTTT: NORMAL SECS (ref 58–77)
WBC # BLD AUTO: 10 K/UL (ref 3.6–11)

## 2022-07-26 PROCEDURE — 85018 HEMOGLOBIN: CPT

## 2022-07-26 PROCEDURE — 74011000250 HC RX REV CODE- 250: Performed by: SURGERY

## 2022-07-26 PROCEDURE — 74018 RADEX ABDOMEN 1 VIEW: CPT

## 2022-07-26 PROCEDURE — 94761 N-INVAS EAR/PLS OXIMETRY MLT: CPT

## 2022-07-26 PROCEDURE — P9016 RBC LEUKOCYTES REDUCED: HCPCS

## 2022-07-26 PROCEDURE — 94640 AIRWAY INHALATION TREATMENT: CPT

## 2022-07-26 PROCEDURE — 74011250637 HC RX REV CODE- 250/637: Performed by: INTERNAL MEDICINE

## 2022-07-26 PROCEDURE — 74011250636 HC RX REV CODE- 250/636: Performed by: INTERNAL MEDICINE

## 2022-07-26 PROCEDURE — 85730 THROMBOPLASTIN TIME PARTIAL: CPT

## 2022-07-26 PROCEDURE — 86900 BLOOD TYPING SEROLOGIC ABO: CPT

## 2022-07-26 PROCEDURE — C9113 INJ PANTOPRAZOLE SODIUM, VIA: HCPCS | Performed by: INTERNAL MEDICINE

## 2022-07-26 PROCEDURE — 77010033678 HC OXYGEN DAILY

## 2022-07-26 PROCEDURE — 74011250636 HC RX REV CODE- 250/636: Performed by: SURGERY

## 2022-07-26 PROCEDURE — C1751 CATH, INF, PER/CENT/MIDLINE: HCPCS

## 2022-07-26 PROCEDURE — 36415 COLL VENOUS BLD VENIPUNCTURE: CPT

## 2022-07-26 PROCEDURE — 82962 GLUCOSE BLOOD TEST: CPT

## 2022-07-26 PROCEDURE — 36430 TRANSFUSION BLD/BLD COMPNT: CPT

## 2022-07-26 PROCEDURE — 86923 COMPATIBILITY TEST ELECTRIC: CPT

## 2022-07-26 PROCEDURE — 85027 COMPLETE CBC AUTOMATED: CPT

## 2022-07-26 PROCEDURE — 74011000250 HC RX REV CODE- 250: Performed by: INTERNAL MEDICINE

## 2022-07-26 PROCEDURE — 74011250637 HC RX REV CODE- 250/637: Performed by: SURGERY

## 2022-07-26 PROCEDURE — 74011636637 HC RX REV CODE- 636/637: Performed by: INTERNAL MEDICINE

## 2022-07-26 PROCEDURE — 94664 DEMO&/EVAL PT USE INHALER: CPT

## 2022-07-26 PROCEDURE — 65270000029 HC RM PRIVATE

## 2022-07-26 PROCEDURE — 85610 PROTHROMBIN TIME: CPT

## 2022-07-26 PROCEDURE — 74011000258 HC RX REV CODE- 258: Performed by: SURGERY

## 2022-07-26 PROCEDURE — 2709999900 HC NON-CHARGEABLE SUPPLY

## 2022-07-26 PROCEDURE — 77030038269 HC DRN EXT URIN PURWCK BARD -A

## 2022-07-26 PROCEDURE — 80053 COMPREHEN METABOLIC PANEL: CPT

## 2022-07-26 PROCEDURE — 83735 ASSAY OF MAGNESIUM: CPT

## 2022-07-26 RX ORDER — DEXTROSE AND POTASSIUM CHLORIDE 5; .15 G/100ML; G/100ML
SOLUTION INTRAVENOUS CONTINUOUS
Status: DISCONTINUED | OUTPATIENT
Start: 2022-07-26 | End: 2022-07-26

## 2022-07-26 RX ORDER — BUMETANIDE 0.25 MG/ML
1 INJECTION INTRAMUSCULAR; INTRAVENOUS DAILY
Status: DISCONTINUED | OUTPATIENT
Start: 2022-07-26 | End: 2022-07-28

## 2022-07-26 RX ORDER — LABETALOL 100 MG/1
100 TABLET, FILM COATED ORAL EVERY 12 HOURS
Status: DISCONTINUED | OUTPATIENT
Start: 2022-07-26 | End: 2022-07-29 | Stop reason: HOSPADM

## 2022-07-26 RX ORDER — SODIUM CHLORIDE 9 MG/ML
250 INJECTION, SOLUTION INTRAVENOUS AS NEEDED
Status: DISCONTINUED | OUTPATIENT
Start: 2022-07-26 | End: 2022-07-29 | Stop reason: HOSPADM

## 2022-07-26 RX ADMIN — SODIUM CHLORIDE, PRESERVATIVE FREE 40 MG: 5 INJECTION INTRAVENOUS at 22:01

## 2022-07-26 RX ADMIN — LABETALOL HYDROCHLORIDE 100 MG: 100 TABLET, FILM COATED ORAL at 09:47

## 2022-07-26 RX ADMIN — Medication 10 ML: at 05:20

## 2022-07-26 RX ADMIN — ENOXAPARIN SODIUM 40 MG: 100 INJECTION SUBCUTANEOUS at 22:02

## 2022-07-26 RX ADMIN — AMLODIPINE BESYLATE 10 MG: 5 TABLET ORAL at 09:47

## 2022-07-26 RX ADMIN — ONDANSETRON 4 MG: 2 INJECTION INTRAMUSCULAR; INTRAVENOUS at 12:03

## 2022-07-26 RX ADMIN — HYDROMORPHONE HYDROCHLORIDE 1 MG: 1 INJECTION, SOLUTION INTRAMUSCULAR; INTRAVENOUS; SUBCUTANEOUS at 17:22

## 2022-07-26 RX ADMIN — BUDESONIDE 250 MCG: 0.5 SUSPENSION RESPIRATORY (INHALATION) at 07:01

## 2022-07-26 RX ADMIN — DEXTROSE MONOHYDRATE AND POTASSIUM CHLORIDE INJECTION, SOLUTION: 5; .149 INJECTION, SOLUTION INTRAVENOUS at 10:34

## 2022-07-26 RX ADMIN — ENOXAPARIN SODIUM 40 MG: 100 INJECTION SUBCUTANEOUS at 09:47

## 2022-07-26 RX ADMIN — HYDROMORPHONE HYDROCHLORIDE 1 MG: 1 INJECTION, SOLUTION INTRAMUSCULAR; INTRAVENOUS; SUBCUTANEOUS at 09:48

## 2022-07-26 RX ADMIN — PIPERACILLIN AND TAZOBACTAM 4.5 G: 4; .5 INJECTION, POWDER, FOR SOLUTION INTRAVENOUS at 13:54

## 2022-07-26 RX ADMIN — HYDROMORPHONE HYDROCHLORIDE 1 MG: 1 INJECTION, SOLUTION INTRAMUSCULAR; INTRAVENOUS; SUBCUTANEOUS at 22:00

## 2022-07-26 RX ADMIN — HYDROMORPHONE HYDROCHLORIDE 1 MG: 1 INJECTION, SOLUTION INTRAMUSCULAR; INTRAVENOUS; SUBCUTANEOUS at 13:54

## 2022-07-26 RX ADMIN — Medication 10 ML: at 22:03

## 2022-07-26 RX ADMIN — FLUCONAZOLE 400 MG: 2 INJECTION, SOLUTION INTRAVENOUS at 22:02

## 2022-07-26 RX ADMIN — PIPERACILLIN AND TAZOBACTAM 4.5 G: 4; .5 INJECTION, POWDER, FOR SOLUTION INTRAVENOUS at 22:20

## 2022-07-26 RX ADMIN — PIPERACILLIN AND TAZOBACTAM 4.5 G: 4; .5 INJECTION, POWDER, FOR SOLUTION INTRAVENOUS at 05:16

## 2022-07-26 RX ADMIN — ONDANSETRON 4 MG: 2 INJECTION INTRAMUSCULAR; INTRAVENOUS at 22:00

## 2022-07-26 RX ADMIN — SODIUM CHLORIDE, PRESERVATIVE FREE 10 ML: 5 INJECTION INTRAVENOUS at 12:04

## 2022-07-26 RX ADMIN — HYDROMORPHONE HYDROCHLORIDE 1 MG: 1 INJECTION, SOLUTION INTRAMUSCULAR; INTRAVENOUS; SUBCUTANEOUS at 01:44

## 2022-07-26 RX ADMIN — HYDROMORPHONE HYDROCHLORIDE 1 MG: 1 INJECTION, SOLUTION INTRAMUSCULAR; INTRAVENOUS; SUBCUTANEOUS at 05:16

## 2022-07-26 RX ADMIN — MICONAZOLE NITRATE 2 % TOPICAL POWDER: at 17:22

## 2022-07-26 RX ADMIN — ARFORMOTEROL TARTRATE 15 MCG: 15 SOLUTION RESPIRATORY (INHALATION) at 20:21

## 2022-07-26 RX ADMIN — BUDESONIDE 250 MCG: 0.5 SUSPENSION RESPIRATORY (INHALATION) at 20:21

## 2022-07-26 RX ADMIN — HYDRALAZINE HYDROCHLORIDE 20 MG: 20 INJECTION INTRAMUSCULAR; INTRAVENOUS at 10:39

## 2022-07-26 RX ADMIN — BUMETANIDE 1 MG: 0.25 INJECTION INTRAMUSCULAR; INTRAVENOUS at 12:03

## 2022-07-26 RX ADMIN — INSULIN LISPRO 2 UNITS: 100 INJECTION, SOLUTION INTRAVENOUS; SUBCUTANEOUS at 09:47

## 2022-07-26 RX ADMIN — ARFORMOTEROL TARTRATE 15 MCG: 15 SOLUTION RESPIRATORY (INHALATION) at 07:01

## 2022-07-26 RX ADMIN — MICONAZOLE NITRATE 2 % TOPICAL POWDER: at 09:50

## 2022-07-26 RX ADMIN — SODIUM CHLORIDE, PRESERVATIVE FREE 40 MG: 5 INJECTION INTRAVENOUS at 09:47

## 2022-07-26 RX ADMIN — LABETALOL HYDROCHLORIDE 100 MG: 100 TABLET, FILM COATED ORAL at 21:58

## 2022-07-26 NOTE — WOUND CARE
Wound visit: follow up visit for skin check; patient is alert, oriented x 4 and most pleasant; patient's nurse Joon Elias at bedside and we along with PCTs moved patient up in bed to reposition; she is resting on a Bariatric Plus surface, glide sheet in use. Assessment:  Buttocks/sacrum - no redness, intact. Gluteal cleft - maceration of intact skin noted, cleansed and protected, no injury. No redness or discoloration to heels. Pannus drying with DriGo sheets in use. Midline abdominal incision BOGDAN with staples. Recommendations/Plan:  Continue bariatric surface - use glide sheet to reduce friction. Turning and repositioning with staff and mobility team.  Off load heels. Continue skin care and moisture management in folds. Please re-consult if any concerns arise.   Justina Fitzpatrick RN,CWON

## 2022-07-26 NOTE — PROGRESS NOTES
Physician Progress Note      Javy Bloodgood  CSN #:                  [id number]  :                       1960  ADMIT DATE:       2022 11:05 AM  DISCH DATE:  RESPONDING  PROVIDER #:        Washington Eid DO, MD          QUERY TEXT:    Meggan Lirattchacorta Afternoon    This patient admitted for Perforated gastric ulcer. Currently the medical record has conflicting documentation regarding the acuity of the diagnosis of Malnutrition. RD was consulted on --Severe Malnutrition  Hospitalist noted on - Moderate Malnutrition. If possible, please document in progress notes and discharge summary if you are evaluating and /or treating any of the following: The medical record reflects the following:  Risk Factors: Perforated bowel, DM, Cancer, Post op repair from gastric ulcer  Clinical Indicators: Per RD assessment pt has acute illness, has had less than 50% intake requirements for 5 days or more NPO day 5. with NGT to sx. and moderate to severe swelling in the extremities she has had recent weight gain edema. Treatment: RD following, plane to advance diet per surgery. RD placed order for geletein bid in increase Kcal/protein. IF unable to remove NGT, will need TPN and monitor Lipids    Thank you for clarifying the acuity of the malnutrition    DAMI RubinN, RN, [facility], [address] [place] [address], [facility]  [phone]  Options provided:  -- Agree with the RD and the patient has Severe Malnutrition. -- Disagree with the RD and the patient has Moderate Malnutrition  -- Other - I will add my own diagnosis  -- Disagree - Not applicable / Not valid  -- Disagree - Clinically unable to determine / Unknown  -- Refer to Clinical Documentation Reviewer    PROVIDER RESPONSE TEXT:    After study, Agree with the RD and the patient has Severe Malnutrition.     Query created by: Alexys Sampson on 2022 11:55 AM      Electronically signed by:  Damaris Rogers MD 2022 12:00 PM

## 2022-07-26 NOTE — PROGRESS NOTES
Abdifatah Guerin melina [place] [address]  [address] [facility], [id number] [place] [address]  [phone]      Medical Progress Note      NAME: Elyse Vera   :  1960  MRM:  [id number]    Date/Time of service: 2022  1:18 PM       Subjective:     Chief Complaint:  Patient was personally seen and examined by me during this time period. Chart reviewed. Having some BMs. C/o swelling. No bleeding        Objective:       Vitals:       Last 24hrs VS reviewed since prior progress note.  Most recent are:    Visit Vitals  BP (!) 168/80   Pulse 92   Temp 98.6 °F (37 °C)   Resp 18   Ht 5' 2\" (1.575 m)   Wt 155.6 kg (343 lb 0.6 oz)   SpO2 95%   BMI 62.74 kg/m²     SpO2 Readings from Last 6 Encounters:   22 95%   22 95%   06/15/22 95%   06/15/22 95%   22 96%   22 95%    O2 Flow Rate (L/min): 1 l/min     Intake/Output Summary (Last 24 hours) at 2022 1318  Last data filed at 2022 0515  Gross per 24 hour   Intake 340 ml   Output --   Net 340 ml          Exam:     Physical Exam:    Gen:  Well-developed, well-nourished, morbidly obese, in no acute distress  HEENT:  Pink conjunctivae, PERRL, hearing intact to voice  Neck:  Supple, without masses, thyroid non-tender  Resp:  No accessory muscle use, clear breath sounds without wheezes rales or rhonchi  Card:  No murmurs, normal S1, S2 without thrills, anasarca   Abd:  Soft, non-tender, non-distended, normoactive bowel sounds are present, surgical dressing c/d/i  Musc:  No cyanosis or clubbing  Skin:  No rashes   Neuro:  Cranial nerves 3-12 are grossly intact, follows commands appropriately  Psych:  Good insight, oriented to person, place and time, alert    Medications Reviewed: (see below)    Lab Data Reviewed: (see below)    ______________________________________________________________________    Medications:     Current Facility-Administered Medications   Medication Dose Route Frequency    0.9% sodium chloride infusion 250 mL  250 mL IntraVENous PRN    labetaloL (NORMODYNE) tablet 100 mg  100 mg Oral Q12H    bumetanide (BUMEX) injection 1 mg  1 mg IntraVENous DAILY    amLODIPine (NORVASC) tablet 10 mg  10 mg Oral DAILY    [Held by provider] lisinopril/hydroCHLOROthiazide (PRINZIDE/ZESTORETIC) 20/12.5 mg   Oral DAILY    insulin lispro (HUMALOG) injection   SubCUTAneous AC&HS    phenol throat spray (CHLORASEPTIC) 1 Spray  1 Spray Oral PRN    HYDROmorphone (DILAUDID) injection 1 mg  1 mg IntraVENous Q3H PRN    labetaloL (NORMODYNE;TRANDATE) 20 mg/4 mL (5 mg/mL) injection 20 mg  20 mg IntraVENous Q6H PRN    piperacillin-tazobactam (ZOSYN) 4.5 g in 0.9% sodium chloride (MBP/ADV) 100 mL MBP  4.5 g IntraVENous Q8H    miconazole (MICOTIN) 2 % powder   Topical BID    sodium chloride (NS) flush 5-40 mL  5-40 mL IntraVENous Q8H    sodium chloride (NS) flush 5-40 mL  5-40 mL IntraVENous PRN    naloxone (NARCAN) injection 0.4 mg  0.4 mg IntraVENous PRN    ondansetron (ZOFRAN) injection 4 mg  4 mg IntraVENous Q4H PRN    enoxaparin (LOVENOX) injection 40 mg  40 mg SubCUTAneous Q12H    arformoteroL (BROVANA) neb solution 15 mcg  15 mcg Nebulization BID RT    And    budesonide (PULMICORT) 500 mcg/2 ml nebulizer suspension  250 mcg Nebulization BID RT    albuterol-ipratropium (DUO-NEB) 2.5 MG-0.5 MG/3 ML  3 mL Nebulization Q6H PRN    glucose chewable tablet 16 g  4 Tablet Oral PRN    glucagon (GLUCAGEN) injection 1 mg  1 mg IntraMUSCular PRN    dextrose 10% infusion 0-250 mL  0-250 mL IntraVENous PRN    fluconazole (DIFLUCAN) 400mg/200 mL IVPB (premix)  400 mg IntraVENous Q24H    hydrALAZINE (APRESOLINE) 20 mg/mL injection 20 mg  20 mg IntraVENous Q6H PRN    pantoprazole (PROTONIX) 40 mg in 0.9% sodium chloride 10 mL injection  40 mg IntraVENous Q12H          Lab Review:     Recent Labs     07/26/22  0336 07/25/22  0217 07/24/22  0440   WBC 10.0 11.6* 11.9*   HGB 6.8* 8.0* 7.9*   HCT 23.7* 28.1* 27.5*    195 192       Recent Labs     07/26/22  0336 07/26/22  0122 07/25/22  0217 07/24/22  0440   * 148* 145 146*   K 3.5 3.5 4.0 4.0   * 112* 111* 111*   CO2 33* 33* 32 31   * 113* 141* 92   BUN 9 9 13 16   CREA 0.58 0.56 0.51* 0.48*   CA 9.4 9.4 9.8 10.0   MG  --  1.9 2.0 2.0   PHOS  --   --  2.6 2.1*   ALB  --  1.5*  --  1.4*   TBILI  --  0.5  --  0.5   ALT  --  36  --  42   INR  --  1.1  --   --        Lab Results   Component Value Date/Time    Glucose (POC) 122 (H) 07/26/2022 12:01 PM    Glucose (POC) 148 (H) 07/26/2022 09:05 AM    Glucose (POC) 118 (H) 07/25/2022 09:37 PM    Glucose (POC) 124 (H) 07/25/2022 05:15 PM    Glucose (POC) 93 07/25/2022 12:27 PM          Assessment / Plan:     66 yo hx of HTN, DM, breast CA, asthma, presented w/ a perforated gastric ulcer s/p repair 07/20. Medicine is following as a consultant    1) Gastric ulcer perforation: s/p surgical repair 07/20. Cont IV Zosyn, IV PPI. Use IV dilaudid prn severe pain. Rest of  management per Gen surg    2) HTN: BP stable. Cont norvasc. Add oral labetalol. Hold ACEi/HCTZ. Use IV hydralazine, IV labetalol prn    3) Acute blood loss anemia: from surgery. Iron studies, B12/folate wnl. Will transfuse 1u RBC today. Monitor Hgb closely, transfuse prn    4) Anasarca: will stop IVF. Start IV bumex daily. Monitor I/O    5) DM type 2: A1C 5.7%. Hold metformin. Cont SSI    6) Asthma: cont nebs prn    7) Severe pro-juan malnutrition: due to surgical issues. Advance diet as tolerated.   Nutrition following    8) Morbid obesity: counseled on diet, exercise, wt loss    9) Hypophos: replete IV prn, monitor BMP    Total time spent with patient care: 40 Minutes **I personally saw and examined the patient during this time period**                 Care Plan discussed with: Patient, nursing     Discussed:  Care Plan    Prophylaxis:  Lovenox    Disposition:   per surg           ___________________________________________________    Attending Physician: Camacho Ferrell MD

## 2022-07-26 NOTE — PROGRESS NOTES
[facility]        Subjective     Transferred to floor  Tolerating liquids  Loose BM  Some increased pain    Objective     Patient Vitals for the past 24 hrs:   Temp Pulse Resp BP SpO2   07/26/22 0746 98.8 °F (37.1 °C) 84 16 (!) 186/76 94 %   07/26/22 0701 -- -- -- -- 99 %   07/26/22 0345 98.7 °F (37.1 °C) 80 21 (!) 167/76 95 %   07/26/22 0033 98.6 °F (37 °C) 84 19 (!) 185/69 97 %   07/25/22 1955 -- -- -- -- 100 %   07/25/22 1919 98.3 °F (36.8 °C) 85 19 (!) 172/75 94 %   07/25/22 1827 99.2 °F (37.3 °C) 85 19 (!) 163/79 94 %   07/25/22 1715 98.1 °F (36.7 °C) 88 20 (!) 151/78 100 %   07/25/22 1600 -- (!) 112 27 (!) 167/132 97 %   07/25/22 1500 -- 77 22 (!) 174/69 98 %   07/25/22 1400 -- 74 28 (!) 166/59 --   07/25/22 1300 -- 89 22 (!) 139/106 --   07/25/22 1240 99.3 °F (37.4 °C) 87 15 103/82 90 %       Date 07/25/22 0700 - 07/26/22 0659 07/26/22 0700 - 07/27/22 0659   Shift 1938-9222 0552-6708 24 Hour Total 7464-3717 7079-1356 24 Hour Total   INTAKE   P.O.  240 240        P. O.  240 240      I. V.(mL/kg/hr) 650(0.3) 100(0.1) 750(0.2)        I.V.  100 100        Volume (dextrose 5 % - 0.45% NaCl infusion) 650  650      Shift Total(mL/kg) 650(4.2) 340(2.2) 990(6.4)      OUTPUT   Urine(mL/kg/hr) 150(0.1)  150(0)        Urine Occurrence(s)    1 x  1 x     Urine Output (mL) (External Urinary Catheter 07/22/22) 150  150      Emesis/NG output 0  0        Output (ml) ([REMOVED] Nasogastric Tube 07/20/22) 0  0      Stool           Stool Occurrence(s)    1 x  1 x   Shift Total(mL/kg) 150(1)  150(1)       340 840      Weight (kg) 155. 6 155.6 155.6 155.6 155.6 155.6       PE  GEN - Awake, alert, communicating appropriately.   NAD  Pulm - CTAB  CV - RRR  Abd - soft, distended, incision intact, TTP near incision without guarding    Labs  Recent Results (from the past 24 hour(s))   GLUCOSE, POC    Collection Time: 07/25/22 12:27 PM   Result Value Ref Range Glucose (POC) 93 65 - 117 mg/dL    Performed by C/ Danae 29, POC    Collection Time: 07/25/22  5:15 PM   Result Value Ref Range    Glucose (POC) 124 (H) 65 - 117 mg/dL    Performed by C/ Danae 29, POC    Collection Time: 07/25/22  9:37 PM   Result Value Ref Range    Glucose (POC) 118 (H) 65 - 117 mg/dL    Performed by Inoc[facility]    METABOLIC PANEL, COMPREHENSIVE    Collection Time: 07/26/22  1:22 AM   Result Value Ref Range    Sodium 148 (H) 136 - 145 mmol/L    Potassium 3.5 3.5 - 5.1 mmol/L    Chloride 112 (H) 97 - 108 mmol/L    CO2 33 (H) 21 - 32 mmol/L    Anion gap 3 (L) 5 - 15 mmol/L    Glucose 113 (H) 65 - 100 mg/dL    BUN 9 6 - 20 MG/DL    Creatinine 0.56 0.55 - 1.02 MG/DL    BUN/Creatinine ratio 16 12 - 20      GFR est AA >60 >60 ml/min/1.73m2    GFR est non-AA >60 >60 ml/min/1.73m2    Calcium 9.4 8.5 - 10.1 MG/DL    Bilirubin, total 0.5 0.2 - 1.0 MG/DL    ALT (SGPT) 36 12 - 78 U/L    AST (SGOT) 26 15 - 37 U/L    Alk.  phosphatase 140 (H) 45 - 117 U/L    Protein, total 5.7 (L) 6.4 - 8.2 g/dL    Albumin 1.5 (L) 3.5 - 5.0 g/dL    Globulin 4.2 (H) 2.0 - 4.0 g/dL    A-G Ratio 0.4 (L) 1.1 - 2.2     MAGNESIUM    Collection Time: 07/26/22  1:22 AM   Result Value Ref Range    Magnesium 1.9 1.6 - 2.4 mg/dL   TYPE & SCREEN    Collection Time: 07/26/22  1:22 AM   Result Value Ref Range    Crossmatch Expiration 07/29/2022,2353     ABO/Rh(D) B POSITIVE     Antibody screen NEG     Unit number [id number]     Blood component type RC LR,2     Unit division 00     Status of unit ISSUED     Crossmatch result Compatible    PROTHROMBIN TIME + INR    Collection Time: 07/26/22  1:22 AM   Result Value Ref Range    INR 1.1 0.9 - 1.1      Prothrombin time 11.3 (H) 9.0 - 11.1 sec   PTT    Collection Time: 07/26/22  1:22 AM   Result Value Ref Range    aPTT 24.5 22.1 - 31.0 sec    aPTT, therapeutic range     58.0 - 77.0 SECS   CBC W/O DIFF    Collection Time: 07/26/22  3:36 AM   Result Value Ref Range WBC 10.0 3.6 - 11.0 K/uL    RBC 2.30 (L) 3.80 - 5.20 M/uL    HGB 6.8 (L) 11.5 - 16.0 g/dL    HCT 23.7 (L) 35.0 - 47.0 %    .0 (H) 80.0 - 99.0 FL    MCH 29.6 26.0 - 34.0 PG    MCHC 28.7 (L) 30.0 - 36.5 g/dL    RDW 18.8 (H) 11.5 - 14.5 %    PLATELET 588 717 - 217 K/uL    MPV 9.1 8.9 - 12.9 FL    NRBC 0.0 0  WBC    ABSOLUTE NRBC 0.00 0.00 - 5.86 K/uL   METABOLIC PANEL, BASIC    Collection Time: 07/26/22  3:36 AM   Result Value Ref Range    Sodium 146 (H) 136 - 145 mmol/L    Potassium 3.5 3.5 - 5.1 mmol/L    Chloride 112 (H) 97 - 108 mmol/L    CO2 33 (H) 21 - 32 mmol/L    Anion gap 1 (L) 5 - 15 mmol/L    Glucose 113 (H) 65 - 100 mg/dL    BUN 9 6 - 20 MG/DL    Creatinine 0.58 0.55 - 1.02 MG/DL    BUN/Creatinine ratio 16 12 - 20      GFR est AA >60 >60 ml/min/1.73m2    GFR est non-AA >60 >60 ml/min/1.73m2    Calcium 9.4 8.5 - 10.1 MG/DL   RBC, ALLOCATE    Collection Time: 07/26/22  7:45 AM   Result Value Ref Range    HISTORY CHECKED? Historical check performed    GLUCOSE, POC    Collection Time: 07/26/22  9:05 AM   Result Value Ref Range    Glucose (POC) 148 (H) 65 - 117 mg/dL    Performed by Julián David is a 65 y. o.yr old female s/p ex lap and repair perforated ulcer  Bowel function beginning to return, although distended  Anemia, receiving transfusion, not likely ongoing bleeding      Plan     Full liquid diet  Will check KUB  PT/OT, IS use, ambulate if able  Follow H/H    25 mins of time was spent with the patient of which > 50% of the time involved face-to-face counseling of the patient regarding the proposed treatment plan.     Juanis Stoll MD

## 2022-07-26 NOTE — PROGRESS NOTES
7/26/2022  Case Management Progress Note    4:08 PM  Called and spoke with Renuka from [facility]. Right now they are not taking any new admissions due to a covid outbreak and are not likely to be taking new admissions until next week, plus she has 22 people in line right now. She will keep patient open for review and hopefully we can send her updated PT/OT notes tomorrow. In the meantime, I have sent referrals to other [facility] facilities that may be appropriate for patient including [address] [place] [address] and [facility]. Will continue to follow and assist with discharge planning. MARCO A Baez    12:14 PM  Patient is 65year old female admitted 7/20 with bowel perforation  Patient's RUR is 19% yellow/moderate risk for readmission  Covid test: none this admission  Chart reviewed--patient discussed at IDR rounds  Per chart patient had a referral sent yesterday to [facility] in [place]. I called twice and finally spoke to a person, however she states to call back in 45 minutes as the admissions person is at lunch. Will re-attempt later in the afternoon.      Transition of Care Plan   Continue medical management/treatment  Referral out to inpatient rehab--Jacques Zeng  Will call back later in the afternoon  CM will continue to follow    MARCO A Baez

## 2022-07-26 NOTE — PROGRESS NOTES
1420  At bedside for PICC placement and patient with existing PAC right chest in place and not accessed and left chest subclavian in place. PAC accessed with sterile technique with positive blood return. Right arm a/c PIV flushed without difficulty. Ramesh served Dr. Arleen Romano for PICC order d/c and order to remove triple subclavian in left chest.  1450  Dr. Reino Apley seeing patient and perfect served him for above orders. Dr. Reino Apley wishes for Tera to be perfect served to request d/c line orders. C/ [address]   Dr. Watson john served concerning above and awaiting reply. Kim 107 aware of above and reaching out to Watson Ascencio for triple d/c order herself.

## 2022-07-26 NOTE — PROGRESS NOTES
Problem: Falls - Risk of  Goal: *Absence of Falls  Description: Document Reynaldogiulia Richard Fall Risk and appropriate interventions in the flowsheet. Outcome: Progressing Towards Goal  Note: Fall Risk Interventions:  Mobility Interventions: Bed/chair exit alarm, Communicate number of staff needed for ambulation/transfer, OT consult for ADLs, Patient to call before getting OOB, PT Consult for mobility concerns, PT Consult for assist device competence, Strengthening exercises (ROM-active/passive), Utilize walker, cane, or other assistive device    Mentation Interventions: Adequate sleep, hydration, pain control, Bed/chair exit alarm, Evaluate medications/consider consulting pharmacy, Increase mobility, More frequent rounding, Toileting rounds, Update white board    Medication Interventions: Assess postural VS orthostatic hypotension, Bed/chair exit alarm, Evaluate medications/consider consulting pharmacy, Patient to call before getting OOB, Teach patient to arise slowly    Elimination Interventions: Bed/chair exit alarm, Call light in reach, Patient to call for help with toileting needs, Toileting schedule/hourly rounds              Problem: Pressure Injury - Risk of  Goal: *Prevention of pressure injury  Description: Document Eliecer Scale and appropriate interventions in the flowsheet. Outcome: Not Progressing Towards Goal  Note: Pressure Injury Interventions:  Sensory Interventions: Assess changes in LOC, Assess need for specialty bed, Avoid rigorous massage over bony prominences, Check visual cues for pain, Discuss PT/OT consult with provider, Float heels, Keep linens dry and wrinkle-free, Maintain/enhance activity level, Minimize linen layers, Monitor skin under medical devices, Pressure redistribution bed/mattress (bed type), Turn and reposition approx.  every two hours (pillows and wedges if needed)    Moisture Interventions: Absorbent underpads, Apply protective barrier, creams and emollients, Assess need for specialty bed, Check for incontinence Q2 hours and as needed, Contain wound drainage, Internal/External urinary devices, Limit adult briefs, Maintain skin hydration (lotion/cream), Minimize layers, Moisture barrier    Activity Interventions: Assess need for specialty bed, Increase time out of bed, Pressure redistribution bed/mattress(bed type), PT/OT evaluation    Mobility Interventions: Assess need for specialty bed, Float heels, Pressure redistribution bed/mattress (bed type), PT/OT evaluation, Turn and reposition approx.  every two hours(pillow and wedges)    Nutrition Interventions: Document food/fluid/supplement intake, Discuss nutritional consult with provider, Offer support with meals,snacks and hydration    Friction and Shear Interventions: Apply protective barrier, creams and emollients, Foam dressings/transparent film/skin sealants, HOB 30 degrees or less, Lift team/patient mobility team, Minimize layers, Transfer aides:transfer board/Ritu lift/ceiling lift, Transferring/repositioning devices

## 2022-07-27 LAB
ANION GAP SERPL CALC-SCNC: 3 MMOL/L (ref 5–15)
BUN SERPL-MCNC: 9 MG/DL (ref 6–20)
BUN/CREAT SERPL: 18 (ref 12–20)
CALCIUM SERPL-MCNC: 9.4 MG/DL (ref 8.5–10.1)
CHLORIDE SERPL-SCNC: 109 MMOL/L (ref 97–108)
CO2 SERPL-SCNC: 32 MMOL/L (ref 21–32)
CREAT SERPL-MCNC: 0.49 MG/DL (ref 0.55–1.02)
ERYTHROCYTE [DISTWIDTH] IN BLOOD BY AUTOMATED COUNT: 19.7 % (ref 11.5–14.5)
GLUCOSE BLD STRIP.AUTO-MCNC: 117 MG/DL (ref 65–117)
GLUCOSE BLD STRIP.AUTO-MCNC: 124 MG/DL (ref 65–117)
GLUCOSE BLD STRIP.AUTO-MCNC: 136 MG/DL (ref 65–117)
GLUCOSE BLD STRIP.AUTO-MCNC: 143 MG/DL (ref 65–117)
GLUCOSE SERPL-MCNC: 178 MG/DL (ref 65–100)
HCT VFR BLD AUTO: 28.8 % (ref 35–47)
HGB BLD-MCNC: 8.6 G/DL (ref 11.5–16)
MAGNESIUM SERPL-MCNC: 1.9 MG/DL (ref 1.6–2.4)
MCH RBC QN AUTO: 29.8 PG (ref 26–34)
MCHC RBC AUTO-ENTMCNC: 29.9 G/DL (ref 30–36.5)
MCV RBC AUTO: 99.7 FL (ref 80–99)
NRBC # BLD: 0 K/UL (ref 0–0.01)
NRBC BLD-RTO: 0 PER 100 WBC
PHOSPHATE SERPL-MCNC: 2.2 MG/DL (ref 2.6–4.7)
PLATELET # BLD AUTO: 199 K/UL (ref 150–400)
PMV BLD AUTO: 8.8 FL (ref 8.9–12.9)
POTASSIUM SERPL-SCNC: 3.6 MMOL/L (ref 3.5–5.1)
RBC # BLD AUTO: 2.89 M/UL (ref 3.8–5.2)
SERVICE CMNT-IMP: ABNORMAL
SERVICE CMNT-IMP: NORMAL
SODIUM SERPL-SCNC: 144 MMOL/L (ref 136–145)
WBC # BLD AUTO: 11 K/UL (ref 3.6–11)

## 2022-07-27 PROCEDURE — 83735 ASSAY OF MAGNESIUM: CPT

## 2022-07-27 PROCEDURE — 77030038269 HC DRN EXT URIN PURWCK BARD -A

## 2022-07-27 PROCEDURE — 82962 GLUCOSE BLOOD TEST: CPT

## 2022-07-27 PROCEDURE — 94761 N-INVAS EAR/PLS OXIMETRY MLT: CPT

## 2022-07-27 PROCEDURE — 74011250636 HC RX REV CODE- 250/636: Performed by: SURGERY

## 2022-07-27 PROCEDURE — 84100 ASSAY OF PHOSPHORUS: CPT

## 2022-07-27 PROCEDURE — 65270000029 HC RM PRIVATE

## 2022-07-27 PROCEDURE — 74011250636 HC RX REV CODE- 250/636: Performed by: INTERNAL MEDICINE

## 2022-07-27 PROCEDURE — 36415 COLL VENOUS BLD VENIPUNCTURE: CPT

## 2022-07-27 PROCEDURE — 74011250637 HC RX REV CODE- 250/637: Performed by: INTERNAL MEDICINE

## 2022-07-27 PROCEDURE — 77010033678 HC OXYGEN DAILY

## 2022-07-27 PROCEDURE — 74011636637 HC RX REV CODE- 636/637: Performed by: INTERNAL MEDICINE

## 2022-07-27 PROCEDURE — 74011000250 HC RX REV CODE- 250: Performed by: SURGERY

## 2022-07-27 PROCEDURE — 80048 BASIC METABOLIC PNL TOTAL CA: CPT

## 2022-07-27 PROCEDURE — 85027 COMPLETE CBC AUTOMATED: CPT

## 2022-07-27 PROCEDURE — 97535 SELF CARE MNGMENT TRAINING: CPT

## 2022-07-27 PROCEDURE — 94640 AIRWAY INHALATION TREATMENT: CPT

## 2022-07-27 PROCEDURE — 94664 DEMO&/EVAL PT USE INHALER: CPT

## 2022-07-27 PROCEDURE — 74011000258 HC RX REV CODE- 258: Performed by: SURGERY

## 2022-07-27 PROCEDURE — 97530 THERAPEUTIC ACTIVITIES: CPT

## 2022-07-27 PROCEDURE — 74011000250 HC RX REV CODE- 250: Performed by: INTERNAL MEDICINE

## 2022-07-27 PROCEDURE — 97116 GAIT TRAINING THERAPY: CPT

## 2022-07-27 PROCEDURE — 74011250637 HC RX REV CODE- 250/637: Performed by: SURGERY

## 2022-07-27 PROCEDURE — C9113 INJ PANTOPRAZOLE SODIUM, VIA: HCPCS | Performed by: INTERNAL MEDICINE

## 2022-07-27 RX ORDER — ACETAMINOPHEN 325 MG/1
650 TABLET ORAL
Status: DISCONTINUED | OUTPATIENT
Start: 2022-07-27 | End: 2022-07-29 | Stop reason: HOSPADM

## 2022-07-27 RX ORDER — OXYCODONE HYDROCHLORIDE 5 MG/1
10 TABLET ORAL
Status: DISCONTINUED | OUTPATIENT
Start: 2022-07-27 | End: 2022-07-29 | Stop reason: HOSPADM

## 2022-07-27 RX ORDER — OXYCODONE HYDROCHLORIDE 5 MG/1
5 TABLET ORAL
Status: DISCONTINUED | OUTPATIENT
Start: 2022-07-27 | End: 2022-07-29 | Stop reason: HOSPADM

## 2022-07-27 RX ADMIN — Medication 10 ML: at 21:07

## 2022-07-27 RX ADMIN — ENOXAPARIN SODIUM 40 MG: 100 INJECTION SUBCUTANEOUS at 21:06

## 2022-07-27 RX ADMIN — Medication 10 ML: at 14:39

## 2022-07-27 RX ADMIN — AMLODIPINE BESYLATE 10 MG: 5 TABLET ORAL at 08:49

## 2022-07-27 RX ADMIN — ARFORMOTEROL TARTRATE 15 MCG: 15 SOLUTION RESPIRATORY (INHALATION) at 22:12

## 2022-07-27 RX ADMIN — BUDESONIDE 250 MCG: 0.5 SUSPENSION RESPIRATORY (INHALATION) at 07:21

## 2022-07-27 RX ADMIN — ARFORMOTEROL TARTRATE 15 MCG: 15 SOLUTION RESPIRATORY (INHALATION) at 07:21

## 2022-07-27 RX ADMIN — Medication 10 ML: at 06:15

## 2022-07-27 RX ADMIN — MICONAZOLE NITRATE 2 % TOPICAL POWDER: at 08:50

## 2022-07-27 RX ADMIN — PIPERACILLIN AND TAZOBACTAM 4.5 G: 4; .5 INJECTION, POWDER, FOR SOLUTION INTRAVENOUS at 06:14

## 2022-07-27 RX ADMIN — PIPERACILLIN AND TAZOBACTAM 4.5 G: 4; .5 INJECTION, POWDER, FOR SOLUTION INTRAVENOUS at 21:07

## 2022-07-27 RX ADMIN — SODIUM CHLORIDE, PRESERVATIVE FREE 40 MG: 5 INJECTION INTRAVENOUS at 21:06

## 2022-07-27 RX ADMIN — POTASSIUM PHOSPHATE, MONOBASIC POTASSIUM PHOSPHATE, DIBASIC: 224; 236 INJECTION, SOLUTION, CONCENTRATE INTRAVENOUS at 12:12

## 2022-07-27 RX ADMIN — HYDRALAZINE HYDROCHLORIDE 20 MG: 20 INJECTION INTRAMUSCULAR; INTRAVENOUS at 21:08

## 2022-07-27 RX ADMIN — INSULIN LISPRO 2 UNITS: 100 INJECTION, SOLUTION INTRAVENOUS; SUBCUTANEOUS at 12:12

## 2022-07-27 RX ADMIN — HYDROMORPHONE HYDROCHLORIDE 1 MG: 1 INJECTION, SOLUTION INTRAMUSCULAR; INTRAVENOUS; SUBCUTANEOUS at 21:38

## 2022-07-27 RX ADMIN — ENOXAPARIN SODIUM 40 MG: 100 INJECTION SUBCUTANEOUS at 08:49

## 2022-07-27 RX ADMIN — BUDESONIDE 250 MCG: 0.5 SUSPENSION RESPIRATORY (INHALATION) at 22:12

## 2022-07-27 RX ADMIN — BUMETANIDE 1 MG: 0.25 INJECTION INTRAMUSCULAR; INTRAVENOUS at 08:49

## 2022-07-27 RX ADMIN — LABETALOL HYDROCHLORIDE 100 MG: 100 TABLET, FILM COATED ORAL at 21:06

## 2022-07-27 RX ADMIN — SODIUM CHLORIDE, PRESERVATIVE FREE 40 MG: 5 INJECTION INTRAVENOUS at 08:49

## 2022-07-27 RX ADMIN — MICONAZOLE NITRATE 2 % TOPICAL POWDER: at 18:00

## 2022-07-27 RX ADMIN — PIPERACILLIN AND TAZOBACTAM 4.5 G: 4; .5 INJECTION, POWDER, FOR SOLUTION INTRAVENOUS at 14:39

## 2022-07-27 RX ADMIN — LABETALOL HYDROCHLORIDE 100 MG: 100 TABLET, FILM COATED ORAL at 08:49

## 2022-07-27 NOTE — PROGRESS NOTES
7/27/2022  Case Management Progress Note    10:08 AM  Patient is 65year old female admitted 7/20 with bowel perforation  Patient's RUR is 18% yellow/moderate risk for readmission  Covid test: none this admission  Chart reviewed  Per chart patient was not able to work with therapy yesterday due to receiving transfusion. Patient will need auth for IPR--further referrals sent yesterday--and will need fresh therapy notes to start auth once we have an accepting facility. At this time no other needs noted, will continue to follow and search for accepting rehab.      Transition of Care Plan   Continue medical management/treatment  Further referrals sent last evening to other IPR facilities  Transportation tbd pending progress  CM will continue to follow    MARCO A Cui

## 2022-07-27 NOTE — PROGRESS NOTES
Problem: Falls - Risk of  Goal: *Absence of Falls  Description: Document Isra Sol Fall Risk and appropriate interventions in the flowsheet.   Outcome: Progressing Towards Goal  Note: Fall Risk Interventions:  Mobility Interventions: Communicate number of staff needed for ambulation/transfer, OT consult for ADLs, PT Consult for mobility concerns, PT Consult for assist device competence    Mentation Interventions: Bed/chair exit alarm, Door open when patient unattended, Evaluate medications/consider consulting pharmacy    Medication Interventions: Evaluate medications/consider consulting pharmacy    Elimination Interventions: Call light in reach, Patient to call for help with toileting needs

## 2022-07-27 NOTE — PROGRESS NOTES
Problem: Self Care Deficits Care Plan (Adult)  Goal: *Acute Goals and Plan of Care (Insert Text)  Description: FUNCTIONAL STATUS PRIOR TO ADMISSION: Patient was independent and active without use of DME.     HOME SUPPORT: The patient lived with daughter but did not require assist.    Occupational Therapy Goals  Initiated 7/22/2022  1. Patient will perform grooming standing at sink with supervision/set-up within 7 day(s). 2.  Patient will perform lower body dressing using adaptive equipment with minimal assistance/contact guard assist within 7 day(s). 3.  Patient will perform toilet transfers with supervision/set-up within 7 day(s). 4.  Patient will perform all aspects of toileting with minimal assistance/contact guard assist within 7 day(s). 5.  Patient will participate in upper extremity therapeutic exercise/activities with independence for 10 minutes within 7 day(s). 6.  Patient will utilize energy conservation techniques during functional activities with verbal and visual cues within 7 day(s). Outcome: Progressing Towards Goal   OCCUPATIONAL THERAPY TREATMENT  Patient: Hilary Philip (89 y.o. female)  Date: 7/27/2022  Diagnosis: Bowel perforation ([facility]) [K63.1] Bowel perforation ([facility] [address]  Procedure(s) (LRB):  LAPAROTOMY EXPLORATORY, repair of gastric ulcer (N/A) 7 Days Post-Op  Precautions: Fall, Skin (Pt is >340 lbs)  Chart, occupational therapy assessment, plan of care, and goals were reviewed. ASSESSMENT  Patient continues with skilled OT services and is progressing towards goals. Pt sits edge of bed min assist in prep for ADL's. She transfers to bedside commode assist x 2, dep hygiene, doffs/dons gown min assist. O2 sats decrease with activity to 87%, returned to nasal cannula and sats increase to 88%. Pt assisted to chair. She is motivated to improve status.       Current Level of Function Impacting Discharge (ADLs): Min assist x 2 BSC transfer, dep BM hygeine    Other factors to consider for discharge:          PLAN :  Patient continues to benefit from skilled intervention to address the above impairments. Continue treatment per established plan of care to address goals. Recommend with staff: Assist x 2 BSC, ADl's, there ex, there act    Recommend next OT session: cont towards goals    Recommendation for discharge: (in order for the patient to meet his/her long term goals)  Therapy 3 hours per day 5-7 days per week    This discharge recommendation:  Has not yet been discussed the attending provider and/or case management    IF patient discharges home will need the following DME:       SUBJECTIVE:   Patient stated I think I am still going.     OBJECTIVE DATA SUMMARY:   Cognitive/Behavioral Status:  Neurologic State: Alert  Orientation Level: Oriented X4  Cognition: Follows commands             Functional Mobility and Transfers for ADLs:  Bed Mobility:  Rolling: Moderate assistance  Supine to Sit: Minimum assistance  Scooting: Minimum assistance    Transfers:  Sit to Stand: Minimum assistance;Assist x1  Functional Transfers  Toilet Transfer : Minimum assistance;Assist x2  Adaptive Equipment: Bedside commode;Walker (comment)  Bed to Chair: Minimum assistance;Assist x2    Balance:  Sitting: Intact  Standing: Intact; With support    ADL Intervention:                 Type of Bath: Chlorhexidine (CHG); Bath Pack; Full         Upper Body Dressing Assistance  Dressing Assistance: Minimum assistance  Hospital Gown: Minimum  assistance         Toileting  Bowel Hygiene:  Total assistance (dependent)         Activity Tolerance:   Fair    After treatment patient left in no apparent distress:   Sitting in chair and Call bell within reach    COMMUNICATION/COLLABORATION:   The patients plan of care was discussed with: Physical therapist and Occupational therapist.     JACQUE Arellano  Time Calculation: 20 mins

## 2022-07-27 NOTE — PROGRESS NOTES
Problem: Mobility Impaired (Adult and Pediatric)  Goal: *Acute Goals and Plan of Care (Insert Text)  Description: FUNCTIONAL STATUS PRIOR TO ADMISSION: Patient was independent and active without use of DME.    HOME SUPPORT PRIOR TO ADMISSION: The patient lived with daughter but did not require assist.    Physical Therapy Goals  Initiated 7/22/2022  1. Patient will move from supine to sit and sit to supine , scoot up and down, and roll side to side in bed with modified independence within 7 day(s). 2.  Patient will transfer from bed to chair and chair to bed with modified independence using the least restrictive device within 7 day(s). 3.  Patient will perform sit to stand with modified independence within 7 day(s). 4.  Patient will ambulate with modified independence for 200 feet with the least restrictive device within 7 day(s). 5.  Patient will ascend/descend 4 stairs with  handrail(s) with supervision/set-up within 7 day(s). Outcome: Progressing Towards Goal   PHYSICAL THERAPY TREATMENT  Patient: Jen Ribeiro (48 y.o. female)  Date: 7/27/2022  Diagnosis: Bowel perforation (HCC) [K63.1] Bowel perforation (HCC)  Procedure(s) (LRB):  LAPAROTOMY EXPLORATORY, repair of gastric ulcer (N/A) 7 Days Post-Op  Precautions: Fall, Skin (Pt is >340 lbs)  Chart, physical therapy assessment, plan of care and goals were reviewed. ASSESSMENT  Patient continues with skilled PT services and is progressing towards goals. Pt received supine in bed soiled in large amount of urine and stool. Extensive time spent rolling in bed for linen and thorough clean up with PCTs assistance. Pr reports she is unable to tell when she is having a BM. Pt then transfers to EOB with Min A for guarding to ensure safety to avoid moving too close to EOB. Pt scoot with Min A to place feet on the floor. Stands after two rocks with Min A x 1 and transfer to [facility]. Tolerated gait distance of 5ft and 7ft this day with RW.  Also stands >1 minute for clean up x 2 times. Pt desaturates to 87% with activity on RA and returned NC at end of session d/t continued saturations 88%. Continue to recommend IPR for return to prior level of independence. Current Level of Function Impacting Discharge (mobility/balance): Min A x 1-2, limited today pt all of bed mobility prior to OOB    Other factors to consider for discharge: noted leaking from abdominal incision         PLAN :  Patient continues to benefit from skilled intervention to address the above impairments. Continue treatment per established plan of care. to address goals. Recommendation for discharge: (in order for the patient to meet his/her long term goals)  Therapy 3 hours per day 5-7 days per week    This discharge recommendation:  Has been made in collaboration with the attending provider and/or case management    IF patient discharges home will need the following DME: to be determined (TBD)       SUBJECTIVE:   Patient stated I'm not ever sure what is coming out. I though I was wet.     OBJECTIVE DATA SUMMARY:   Critical Behavior:  Neurologic State: Alert  Orientation Level: Oriented X4  Cognition: Follows commands, Appropriate decision making, Appropriate for age attention/concentration, Appropriate safety awareness  Safety/Judgement: Awareness of environment  Functional Mobility Training:  Bed Mobility:  Rolling: Moderate assistance  Supine to Sit: Minimum assistance     Scooting: Minimum assistance        Transfers:  Sit to Stand: Minimum assistance;Assist x1  Stand to Sit: Minimum assistance;Assist x2        Bed to Chair: Minimum assistance;Assist x2                    Balance:  Sitting: Intact  Standing: Intact; With support  Ambulation/Gait Training:  Distance (ft): 8 Feet (ft) (aditional 5 ft)  Assistive Device: Walker, rolling;Gait belt           Gait Abnormalities: Antalgic;Decreased step clearance        Base of Support: Widened     Speed/[place]: Pace decreased (<100 feet/min); Slow  Step Length: Right shortened;Left shortened            Activity Tolerance:   Fair    After treatment patient left in no apparent distress:   Sitting in chair and Call bell within reach    COMMUNICATION/COLLABORATION:   The patients plan of care was discussed with: Registered nurse.      Silvia Jewell, PT   Time Calculation: 39 mins

## 2022-07-27 NOTE — PROGRESS NOTES
[facility]        Subjective     No acute events  Pain improved  Passing gas, had diarrhea      Objective     Patient Vitals for the past 24 hrs:   Temp Pulse Resp BP SpO2   07/27/22 1227 99.1 °F (37.3 °C) 77 17 (!) 157/54 93 %   07/27/22 0755 99.3 °F (37.4 °C) 81 18 (!) 166/71 91 %   07/27/22 0737 97.6 °F (36.4 °C) 65 17 (!) 143/71 98 %   07/26/22 2157 97.7 °F (36.5 °C) 81 18 (!) 144/78 96 %   07/26/22 2024 -- -- -- -- 94 %   07/26/22 1552 98.3 °F (36.8 °C) 83 17 (!) 177/73 95 %   07/26/22 1344 98.5 °F (36.9 °C) 87 17 (!) 159/73 98 %       Date 07/26/22 0700 - 07/27/22 0659 07/27/22 0700 - 07/28/22 0659   Shift 8141-8760 0292-5276 24 Hour Total 4507-4060 3947-6539 24 Hour Total   INTAKE   Blood 360  360        Volume (TRANSFUSE PACKED RBC'S) 360  360      Shift Total(mL/kg) 360(2.3)  360(2.3)      OUTPUT   Urine(mL/kg/hr)           Urine Occurrence(s) 1 x  1 x 1 x  1 x   Emesis/NG output           Emesis Occurrence(s)    0 x  0 x   Stool           Stool Occurrence(s) 1 x  1 x 3 x  3 x   Shift Total(mL/kg)           360      Weight (kg) 155. 6 155.6 155.6 155.6 155.6 155.6       PE  GEN - Awake, alert, communicating appropriately.   NAD  Pulm - CTAB  CV - RRR  Abd - soft, distention slight improved, incision intact, small serous drainage      Labs  Recent Results (from the past 24 hour(s))   HEMOGLOBIN    Collection Time: 07/26/22  2:50 PM   Result Value Ref Range    HGB 9.0 (L) 11.5 - 16.0 g/dL   GLUCOSE, POC    Collection Time: 07/26/22  3:40 PM   Result Value Ref Range    Glucose (POC) 132 (H) 65 - 117 mg/dL    Performed by Jeffy Gross (PCT)    GLUCOSE, POC    Collection Time: 07/26/22 10:54 PM   Result Value Ref Range    Glucose (POC) 139 (H) 65 - 117 mg/dL    Performed by Cas DORSEY (PCT)    GLUCOSE, POC    Collection Time: 07/27/22  7:54 AM   Result Value Ref Range    Glucose (POC) 117 65 - 117 mg/dL    Performed by Osnabrock-Chas Ambre    CBC W/O DIFF    Collection Time: 07/27/22  8:51 AM   Result Value Ref Range    WBC 11.0 3.6 - 11.0 K/uL    RBC 2.89 (L) 3.80 - 5.20 M/uL    HGB 8.6 (L) 11.5 - 16.0 g/dL    HCT 28.8 (L) 35.0 - 47.0 %    MCV 99.7 (H) 80.0 - 99.0 FL    MCH 29.8 26.0 - 34.0 PG    MCHC 29.9 (L) 30.0 - 36.5 g/dL    RDW 19.7 (H) 11.5 - 14.5 %    PLATELET 184 594 - 359 K/uL    MPV 8.8 (L) 8.9 - 12.9 FL    NRBC 0.0 0  WBC    ABSOLUTE NRBC 0.00 0.00 - 0.01 K/uL   MAGNESIUM    Collection Time: 07/27/22  8:51 AM   Result Value Ref Range    Magnesium 1.9 1.6 - 2.4 mg/dL   METABOLIC PANEL, BASIC    Collection Time: 07/27/22  8:51 AM   Result Value Ref Range    Sodium 144 136 - 145 mmol/L    Potassium 3.6 3.5 - 5.1 mmol/L    Chloride 109 (H) 97 - 108 mmol/L    CO2 32 21 - 32 mmol/L    Anion gap 3 (L) 5 - 15 mmol/L    Glucose 178 (H) 65 - 100 mg/dL    BUN 9 6 - 20 MG/DL    Creatinine 0.49 (L) 0.55 - 1.02 MG/DL    BUN/Creatinine ratio 18 12 - 20      GFR est AA >60 >60 ml/min/1.73m2    GFR est non-AA >60 >60 ml/min/1.73m2    Calcium 9.4 8.5 - 10.1 MG/DL   PHOSPHORUS    Collection Time: 07/27/22  8:51 AM   Result Value Ref Range    Phosphorus 2.2 (L) 2.6 - 4.7 MG/DL   GLUCOSE, POC    Collection Time: 07/27/22 12:00 PM   Result Value Ref Range    Glucose (POC) 143 (H) 65 - 117 mg/dL    Performed by Suresh Grande is a 65 y. o.yr old female s/p repair perforated gastric ulcer  Bowel function improving, colonic dilation on x ray, but passing [address] for regular diet  Stop date for antibiotics tomorrow  PT/OT  Ok to shower, can put dressing on incision as needed for drainage    20 mins of time was spent with the patient of which > 50% of the time involved face-to-face counseling of the patient regarding the proposed treatment plan.     Angela Mcgee MD

## 2022-07-27 NOTE — PROGRESS NOTES
Abdifatah Guerin melina [place] [address]  [address] [facility], [address] [place] [address]  [phone]      Medical Progress Note      NAME: Pavan Redmond   :  1960  MRM:  [id number]    Date/Time of service: 2022  11:12 AM       Subjective:     Chief Complaint:  Patient was personally seen and examined by me during this time period. Chart reviewed. Edema and blood pressure slightly better. C/o cough       Objective:       Vitals:       Last 24hrs VS reviewed since prior progress note.  Most recent are:    Visit Vitals  BP (!) 166/71 (BP 1 Location: Right lower arm, BP Patient Position: At rest)   Pulse 81   Temp 99.3 °F (37.4 °C)   Resp 18   Ht 5' 2\" (1.575 m)   Wt 155.6 kg (343 lb 0.6 oz)   SpO2 91%   BMI 62.74 kg/m²     SpO2 Readings from Last 6 Encounters:   22 91%   22 95%   06/15/22 95%   06/15/22 95%   22 96%   22 95%    O2 Flow Rate (L/min): 1 l/min     Intake/Output Summary (Last 24 hours) at 2022 1112  Last data filed at 2022 1344  Gross per 24 hour   Intake 360 ml   Output --   Net 360 ml          Exam:     Physical Exam:    Gen:  Well-developed, well-nourished, morbidly obese, in no acute distress  HEENT:  Pink conjunctivae, PERRL, hearing intact to voice  Neck:  Supple, without masses, thyroid non-tender  Resp:  No accessory muscle use, clear breath sounds without wheezes rales or rhonchi  Card:  No murmurs, normal S1, S2 without thrills, anasarca   Abd:  Soft, non-tender, non-distended, normoactive bowel sounds are present, surgical dressing c/d/i  Musc:  No cyanosis or clubbing  Skin:  No rashes   Neuro:  Cranial nerves 3-12 are grossly intact, follows commands appropriately  Psych:  Good insight, oriented to person, place and time, alert    Medications Reviewed: (see below)    Lab Data Reviewed: (see below)    ______________________________________________________________________    Medications:     Current Facility-Administered Medications   Medication Dose Route Frequency    0.9% sodium chloride infusion 250 mL  250 mL IntraVENous PRN    labetaloL (NORMODYNE) tablet 100 mg  100 mg Oral Q12H    bumetanide (BUMEX) injection 1 mg  1 mg IntraVENous DAILY    amLODIPine (NORVASC) tablet 10 mg  10 mg Oral DAILY    [Held by provider] lisinopril/hydroCHLOROthiazide (PRINZIDE/ZESTORETIC) 20/12.5 mg   Oral DAILY    insulin lispro (HUMALOG) injection   SubCUTAneous AC&HS    phenol throat spray (CHLORASEPTIC) 1 Spray  1 Spray Oral PRN    HYDROmorphone (DILAUDID) injection 1 mg  1 mg IntraVENous Q3H PRN    labetaloL (NORMODYNE;TRANDATE) 20 mg/4 mL (5 mg/mL) injection 20 mg  20 mg IntraVENous Q6H PRN    piperacillin-tazobactam (ZOSYN) 4.5 g in 0.9% sodium chloride (MBP/ADV) 100 mL MBP  4.5 g IntraVENous Q8H    miconazole (MICOTIN) 2 % powder   Topical BID    sodium chloride (NS) flush 5-40 mL  5-40 mL IntraVENous Q8H    sodium chloride (NS) flush 5-40 mL  5-40 mL IntraVENous PRN    naloxone (NARCAN) injection 0.4 mg  0.4 mg IntraVENous PRN    ondansetron (ZOFRAN) injection 4 mg  4 mg IntraVENous Q4H PRN    enoxaparin (LOVENOX) injection 40 mg  40 mg SubCUTAneous Q12H    arformoteroL (BROVANA) neb solution 15 mcg  15 mcg Nebulization BID RT    And    budesonide (PULMICORT) 500 mcg/2 ml nebulizer suspension  250 mcg Nebulization BID RT    albuterol-ipratropium (DUO-NEB) 2.5 MG-0.5 MG/3 ML  3 mL Nebulization Q6H PRN    glucose chewable tablet 16 g  4 Tablet Oral PRN    glucagon (GLUCAGEN) injection 1 mg  1 mg IntraMUSCular PRN    dextrose 10% infusion 0-250 mL  0-250 mL IntraVENous PRN    hydrALAZINE (APRESOLINE) 20 mg/mL injection 20 mg  20 mg IntraVENous Q6H PRN    pantoprazole (PROTONIX) 40 mg in 0.9% sodium chloride 10 mL injection  40 mg IntraVENous Q12H          Lab Review:     Recent Labs     07/27/22  0851 07/26/22  1450 07/26/22  0336 07/25/22  0217   WBC 11.0  --  10.0 11.6*   HGB 8.6* 9.0* 6.8* 8.0*   HCT 28.8*  --  23.7* 28.1*     --  215 195       Recent Labs 07/27/22  0851 07/26/22  0336 07/26/22  0122 07/25/22  0217    146* 148* 145   K 3.6 3.5 3.5 4.0   * 112* 112* 111*   CO2 32 33* 33* 32   * 113* 113* 141*   BUN 9 9 9 13   CREA 0.49* 0.58 0.56 0.51*   CA 9.4 9.4 9.4 9.8   MG 1.9  --  1.9 2.0   PHOS 2.2*  --   --  2.6   ALB  --   --  1.5*  --    TBILI  --   --  0.5  --    ALT  --   --  36  --    INR  --   --  1.1  --        Lab Results   Component Value Date/Time    Glucose (POC) 117 07/27/2022 07:54 AM    Glucose (POC) 139 (H) 07/26/2022 10:54 PM    Glucose (POC) 132 (H) 07/26/2022 03:40 PM    Glucose (POC) 122 (H) 07/26/2022 12:01 PM    Glucose (POC) 148 (H) 07/26/2022 09:05 AM          Assessment / Plan:     64 yo hx of HTN, DM, breast CA, asthma, presented w/ a perforated gastric ulcer s/p repair 07/20. Medicine is following as a consultant    1) Gastric ulcer perforation: s/p surgical repair 07/20. Cont IV Zosyn, IV PPI. Use IV dilaudid prn severe pain. Rest of  management per Gen surg    2) HTN: BP stable. Cont norvasc, labetalol (new med), IV bumex. Hold ACEi/HCTZ. Use IV hydralazine, IV labetalol prn    3) Acute blood loss anemia: from surgery. Iron studies, B12/folate wnl. S/p 1u RBCs. Monitor Hgb closely, transfuse prn    4) Anasarca: stopped IVF. Cont IV bumex daily. Monitor I/O    5) DM type 2: A1C 5.7%. Hold metformin. Cont SSI    6) Asthma: cont nebs prn    7) Severe pro-juan malnutrition: due to surgical issues. Advance diet as tolerated.   Nutrition following    8) Morbid obesity: counseled on diet, exercise, wt loss    9) Hypophos: replete IV prn, monitor BMP    Total time spent with patient care: 35 Minutes **I personally saw and examined the patient during this time period**                 Care Plan discussed with: Patient, nursing     Discussed:  Care Plan    Prophylaxis:  Lovenox    Disposition:   per surg           ___________________________________________________    Attending Physician: Curt George MD

## 2022-07-27 NOTE — PROGRESS NOTES
Comprehensive Nutrition Assessment    Type and Reason for Visit: Reassess    Nutrition Recommendations/Plan:   Advance diet as able/per surgery, recommend regular  Provide Ensure Enlive once daily (350 kcal, 44 g carbs, 20 g P) to aid in meeting kcal/protein needs. Provide Gelatein once daily to increase kcal/protein intake (80 kcal, <1 g carbs, 20 g protein)   Please obtain updated measured weight      Malnutrition Assessment:  Malnutrition Status:  Severe malnutrition (07/27/22 1136)    Context:  Acute illness     Findings of the 6 clinical characteristics of malnutrition:   Energy Intake:  50% or less of est energy requirements for 5 or more days  Weight Loss:  No significant weight loss     Body Fat Loss:  Unable to assess,     Muscle Mass Loss:  Unable to assess,    Fluid Accumulation:  Moderate to severe, Extremities   Strength:  Not performed     Nutrition Assessment:     7/27: Follow up. Patient on med-surg unit now. No new weight obtained. Diet has been advanced to full liquids. Patient reports increased appetite, and consumed most of breakfast this morning. Not yet feeling ready for solids. Enjoying clear liquid ONS and voices acceptance of Ensure once daily in addition to this. No pain with chewing/swallowing. Reports N/V last 24 hours. Edema continues but has greatly improved. Nutrition Related Findings:      Wound Type: Multiple, Surgical incision  Last Bowel Movement Date: 07/27/22  Stool Appearance: Loose  Abdominal Assessment: Intact, Obese, Tender, Other (comment) (midline staples)  Appetite: Fair  Bowel Sounds: Hypoactive   Edema:Generalized: Pitting (7/26/2022  9:58 PM)  LLE: 2+ (7/26/2022  9:58 PM)  LUE: 1+ (7/26/2022  9:58 PM)  RLE: 2+ (7/26/2022  9:58 PM)  RUE: 1+ (7/26/2022  9:58 PM)      Nutr.  Labs:    Lab Results   Component Value Date/Time    GFR est AA >60 07/27/2022 08:51 AM    GFR est non-AA >60 07/27/2022 08:51 AM    Creatinine 0.49 (L) 07/27/2022 08:51 AM    BUN 9 07/27/2022 08:51 AM    Sodium 144 07/27/2022 08:51 AM    Potassium 3.6 07/27/2022 08:51 AM    Chloride 109 (H) 07/27/2022 08:51 AM    CO2 32 07/27/2022 08:51 AM       Lab Results   Component Value Date/Time    Glucose 178 (H) 07/27/2022 08:51 AM    Glucose (POC) 117 07/27/2022 07:54 AM       Lab Results   Component Value Date/Time    Hemoglobin A1c 5.7 (H) 07/21/2022 01:41 AM       Nutr. Meds:  Norvasc, bumex, Lovenox, humalog, labetalol daily and PRN, zofran PRN, Protonix, zosyn    Current Nutrition Intake & Therapies:  Average Meal Intake: 51-75%  Average Supplement Intake: %  ADULT DIET Full Liquid  ADULT ORAL NUTRITION SUPPLEMENT Dinner; Protein Modular  ADULT ORAL NUTRITION SUPPLEMENT Lunch; Standard High Calorie/High Protein    Anthropometric Measures:  Height: 5' 2\" (157.5 cm)  Ideal Body Weight (IBW): 110 lbs (50 kg)     Current Body Wt:  155.6 kg (343 lb 0.6 oz), 311.9 % IBW.  Not specified  Current BMI (kg/m2): 62.7  Usual Body Weight: 137 kg (302 lb)  % Weight Change (Calculated): 13.6  Weight Adjustment: No adjustment                 BMI Category: Obese class 3 (BMI 40.0 or greater)    Estimated Daily Nutrient Needs:  Energy Requirements Based On: Formula  Weight Used for Energy Requirements: Current  Energy (kcal/day): 2198 (MSJ x 1.2 x 1.1 - 500)  Weight Used for Protein Requirements: Ideal  Protein (g/day): 100 (2 g/kg IBW)  Method Used for Fluid Requirements: 1 ml/kcal  Fluid (ml/day): 2198    Nutrition Diagnosis:   Severe malnutrition related to increased demand for energy/nutrients, inadequate protein-energy intake as evidenced by localized or generalized fluid accumulation, Criteria as identified in malnutrition assessment, poor intake prior to admission      Nutrition Interventions:   Food and/or Nutrient Delivery: Modify current diet, Modify oral nutrition supplement  Nutrition Education/Counseling: No recommendations at this time  Coordination of Nutrition Care: Continue to monitor while inpatient, Interdisciplinary rounds  Plan of Care discussed with: IDR team    Goals:  Previous Goal Met: Progressing toward goal(s)  Goals: PO intake 50% or greater, by next RD assessment  Specify Other Goals: Provide and tolerate 50% of estimated kcal needs within 2 - 3 days    Nutrition Monitoring and Evaluation:   Behavioral-Environmental Outcomes: None identified  Food/Nutrient Intake Outcomes: Diet advancement/tolerance  Physical Signs/Symptoms Outcomes: Biochemical data, Fluid status or edema, Meal time behavior, GI status, Weight    Discharge Planning:    Continue oral nutrition supplement    Eleanor Ross MS, RD  Contact: Ext: [phone], or via [employer]

## 2022-07-28 ENCOUNTER — APPOINTMENT (OUTPATIENT)
Dept: NON INVASIVE DIAGNOSTICS | Age: 62
DRG: 220 | End: 2022-07-28
Attending: INTERNAL MEDICINE
Payer: MEDICAID

## 2022-07-28 LAB
ANION GAP SERPL CALC-SCNC: 4 MMOL/L (ref 5–15)
BUN SERPL-MCNC: 10 MG/DL (ref 6–20)
BUN/CREAT SERPL: 18 (ref 12–20)
CALCIUM SERPL-MCNC: 9.3 MG/DL (ref 8.5–10.1)
CHLORIDE SERPL-SCNC: 107 MMOL/L (ref 97–108)
CO2 SERPL-SCNC: 33 MMOL/L (ref 21–32)
CREAT SERPL-MCNC: 0.56 MG/DL (ref 0.55–1.02)
ECHO AO ASC DIAM: 2.2 CM
ECHO AO ASCENDING AORTA INDEX: 0.92 CM/M2
ECHO LA DIAMETER INDEX: 1.63 CM/M2
ECHO LA DIAMETER: 3.9 CM
ECHO LV FRACTIONAL SHORTENING: 29 % (ref 28–44)
ECHO LV INTERNAL DIMENSION DIASTOLE INDEX: 2.13 CM/M2
ECHO LV INTERNAL DIMENSION DIASTOLIC: 5.1 CM (ref 3.9–5.3)
ECHO LV INTERNAL DIMENSION SYSTOLIC INDEX: 1.5 CM/M2
ECHO LV INTERNAL DIMENSION SYSTOLIC: 3.6 CM
ECHO LV IVSD: 1 CM (ref 0.6–0.9)
ECHO LV MASS 2D: 188 G (ref 67–162)
ECHO LV MASS INDEX 2D: 78.3 G/M2 (ref 43–95)
ECHO LV POSTERIOR WALL DIASTOLIC: 1 CM (ref 0.6–0.9)
ECHO LV RELATIVE WALL THICKNESS RATIO: 0.39
ECHO LVOT AREA: 3.1 CM2
ECHO LVOT DIAM: 2 CM
ECHO PV MAX VELOCITY: 1.2 M/S
ECHO PV PEAK GRADIENT: 6 MMHG
ECHO TV REGURGITANT MAX VELOCITY: 3.07 M/S
ECHO TV REGURGITANT PEAK GRADIENT: 38 MMHG
ERYTHROCYTE [DISTWIDTH] IN BLOOD BY AUTOMATED COUNT: 19.3 % (ref 11.5–14.5)
GLUCOSE BLD STRIP.AUTO-MCNC: 105 MG/DL (ref 65–117)
GLUCOSE BLD STRIP.AUTO-MCNC: 116 MG/DL (ref 65–117)
GLUCOSE BLD STRIP.AUTO-MCNC: 133 MG/DL (ref 65–117)
GLUCOSE BLD STRIP.AUTO-MCNC: 147 MG/DL (ref 65–117)
GLUCOSE SERPL-MCNC: 111 MG/DL (ref 65–100)
HCT VFR BLD AUTO: 26.3 % (ref 35–47)
HGB BLD-MCNC: 7.7 G/DL (ref 11.5–16)
MAGNESIUM SERPL-MCNC: 1.9 MG/DL (ref 1.6–2.4)
MCH RBC QN AUTO: 29.5 PG (ref 26–34)
MCHC RBC AUTO-ENTMCNC: 29.3 G/DL (ref 30–36.5)
MCV RBC AUTO: 100.8 FL (ref 80–99)
NRBC # BLD: 0 K/UL (ref 0–0.01)
NRBC BLD-RTO: 0 PER 100 WBC
PHOSPHATE SERPL-MCNC: 2.7 MG/DL (ref 2.6–4.7)
PLATELET # BLD AUTO: 193 K/UL (ref 150–400)
PMV BLD AUTO: 8.7 FL (ref 8.9–12.9)
POTASSIUM SERPL-SCNC: 3.4 MMOL/L (ref 3.5–5.1)
RBC # BLD AUTO: 2.61 M/UL (ref 3.8–5.2)
SERVICE CMNT-IMP: ABNORMAL
SERVICE CMNT-IMP: ABNORMAL
SERVICE CMNT-IMP: NORMAL
SERVICE CMNT-IMP: NORMAL
SODIUM SERPL-SCNC: 144 MMOL/L (ref 136–145)
WBC # BLD AUTO: 10.5 K/UL (ref 3.6–11)

## 2022-07-28 PROCEDURE — 97535 SELF CARE MNGMENT TRAINING: CPT

## 2022-07-28 PROCEDURE — 74011250637 HC RX REV CODE- 250/637: Performed by: SURGERY

## 2022-07-28 PROCEDURE — 85027 COMPLETE CBC AUTOMATED: CPT

## 2022-07-28 PROCEDURE — 94761 N-INVAS EAR/PLS OXIMETRY MLT: CPT

## 2022-07-28 PROCEDURE — 74011250636 HC RX REV CODE- 250/636: Performed by: SURGERY

## 2022-07-28 PROCEDURE — 74011000250 HC RX REV CODE- 250: Performed by: SURGERY

## 2022-07-28 PROCEDURE — 36591 DRAW BLOOD OFF VENOUS DEVICE: CPT

## 2022-07-28 PROCEDURE — 74011250637 HC RX REV CODE- 250/637: Performed by: INTERNAL MEDICINE

## 2022-07-28 PROCEDURE — 93306 TTE W/DOPPLER COMPLETE: CPT

## 2022-07-28 PROCEDURE — 74011000250 HC RX REV CODE- 250: Performed by: INTERNAL MEDICINE

## 2022-07-28 PROCEDURE — 94640 AIRWAY INHALATION TREATMENT: CPT

## 2022-07-28 PROCEDURE — C9113 INJ PANTOPRAZOLE SODIUM, VIA: HCPCS | Performed by: INTERNAL MEDICINE

## 2022-07-28 PROCEDURE — 84100 ASSAY OF PHOSPHORUS: CPT

## 2022-07-28 PROCEDURE — 65270000029 HC RM PRIVATE

## 2022-07-28 PROCEDURE — 77030038269 HC DRN EXT URIN PURWCK BARD -A

## 2022-07-28 PROCEDURE — 74011250636 HC RX REV CODE- 250/636: Performed by: INTERNAL MEDICINE

## 2022-07-28 PROCEDURE — 83735 ASSAY OF MAGNESIUM: CPT

## 2022-07-28 PROCEDURE — 82962 GLUCOSE BLOOD TEST: CPT

## 2022-07-28 PROCEDURE — 80048 BASIC METABOLIC PNL TOTAL CA: CPT

## 2022-07-28 PROCEDURE — 93306 TTE W/DOPPLER COMPLETE: CPT | Performed by: INTERNAL MEDICINE

## 2022-07-28 PROCEDURE — 77010033678 HC OXYGEN DAILY

## 2022-07-28 PROCEDURE — 97530 THERAPEUTIC ACTIVITIES: CPT

## 2022-07-28 PROCEDURE — 74011636637 HC RX REV CODE- 636/637: Performed by: INTERNAL MEDICINE

## 2022-07-28 PROCEDURE — 74011000258 HC RX REV CODE- 258: Performed by: SURGERY

## 2022-07-28 RX ORDER — AMOXICILLIN AND CLAVULANATE POTASSIUM 562.5; 437.5; 62.5 MG/1; MG/1; MG/1
1 TABLET, FILM COATED, EXTENDED RELEASE ORAL EVERY 12 HOURS
Status: DISCONTINUED | OUTPATIENT
Start: 2022-07-28 | End: 2022-07-29 | Stop reason: HOSPADM

## 2022-07-28 RX ORDER — CLARITHROMYCIN 500 MG/1
500 TABLET, FILM COATED ORAL 2 TIMES DAILY
Qty: 27 TABLET | Refills: 0 | Status: SHIPPED
Start: 2022-07-28 | End: 2022-08-16

## 2022-07-28 RX ORDER — CLARITHROMYCIN 500 MG/1
500 TABLET, FILM COATED ORAL 2 TIMES DAILY
Status: DISCONTINUED | OUTPATIENT
Start: 2022-07-28 | End: 2022-07-29 | Stop reason: HOSPADM

## 2022-07-28 RX ORDER — BUMETANIDE 0.25 MG/ML
1 INJECTION INTRAMUSCULAR; INTRAVENOUS EVERY 12 HOURS
Status: DISCONTINUED | OUTPATIENT
Start: 2022-07-28 | End: 2022-07-29 | Stop reason: HOSPADM

## 2022-07-28 RX ORDER — AMOXICILLIN AND CLAVULANATE POTASSIUM 562.5; 437.5; 62.5 MG/1; MG/1; MG/1
1 TABLET, FILM COATED, EXTENDED RELEASE ORAL EVERY 12 HOURS
Qty: 27 TABLET | Refills: 0 | Status: SHIPPED
Start: 2022-07-28 | End: 2022-08-16

## 2022-07-28 RX ADMIN — ARFORMOTEROL TARTRATE 15 MCG: 15 SOLUTION RESPIRATORY (INHALATION) at 07:47

## 2022-07-28 RX ADMIN — MICONAZOLE NITRATE 2 % TOPICAL POWDER: at 12:00

## 2022-07-28 RX ADMIN — BUDESONIDE 250 MCG: 0.5 SUSPENSION RESPIRATORY (INHALATION) at 20:55

## 2022-07-28 RX ADMIN — LABETALOL HYDROCHLORIDE 100 MG: 100 TABLET, FILM COATED ORAL at 22:02

## 2022-07-28 RX ADMIN — ENOXAPARIN SODIUM 40 MG: 100 INJECTION SUBCUTANEOUS at 09:54

## 2022-07-28 RX ADMIN — BUDESONIDE 250 MCG: 0.5 SUSPENSION RESPIRATORY (INHALATION) at 07:47

## 2022-07-28 RX ADMIN — BUMETANIDE 1 MG: 0.25 INJECTION INTRAMUSCULAR; INTRAVENOUS at 10:00

## 2022-07-28 RX ADMIN — AMOXICILLIN AND CLAVULANATE POTASSIUM 1 TABLET: 562.5; 437.5; 62.5 TABLET, MULTILAYER, EXTENDED RELEASE ORAL at 22:02

## 2022-07-28 RX ADMIN — ENOXAPARIN SODIUM 40 MG: 100 INJECTION SUBCUTANEOUS at 22:02

## 2022-07-28 RX ADMIN — ARFORMOTEROL TARTRATE 15 MCG: 15 SOLUTION RESPIRATORY (INHALATION) at 20:55

## 2022-07-28 RX ADMIN — CLARITHROMYCIN 500 MG: 500 TABLET ORAL at 18:42

## 2022-07-28 RX ADMIN — Medication 10 ML: at 18:40

## 2022-07-28 RX ADMIN — Medication 10 ML: at 06:02

## 2022-07-28 RX ADMIN — INSULIN LISPRO 2 UNITS: 100 INJECTION, SOLUTION INTRAVENOUS; SUBCUTANEOUS at 11:59

## 2022-07-28 RX ADMIN — Medication 10 ML: at 22:03

## 2022-07-28 RX ADMIN — PIPERACILLIN AND TAZOBACTAM 4.5 G: 4; .5 INJECTION, POWDER, FOR SOLUTION INTRAVENOUS at 06:02

## 2022-07-28 RX ADMIN — Medication 1 CAPSULE: at 10:07

## 2022-07-28 RX ADMIN — POTASSIUM BICARBONATE 20 MEQ: 782 TABLET, EFFERVESCENT ORAL at 09:55

## 2022-07-28 RX ADMIN — BUMETANIDE 1 MG: 0.25 INJECTION INTRAMUSCULAR; INTRAVENOUS at 22:02

## 2022-07-28 RX ADMIN — POTASSIUM BICARBONATE 20 MEQ: 782 TABLET, EFFERVESCENT ORAL at 18:39

## 2022-07-28 RX ADMIN — SODIUM CHLORIDE, PRESERVATIVE FREE 40 MG: 5 INJECTION INTRAVENOUS at 22:03

## 2022-07-28 RX ADMIN — CLARITHROMYCIN 500 MG: 500 TABLET ORAL at 12:01

## 2022-07-28 RX ADMIN — HYDROMORPHONE HYDROCHLORIDE 1 MG: 1 INJECTION, SOLUTION INTRAMUSCULAR; INTRAVENOUS; SUBCUTANEOUS at 01:43

## 2022-07-28 RX ADMIN — MICONAZOLE NITRATE 2 % TOPICAL POWDER: at 19:03

## 2022-07-28 RX ADMIN — LABETALOL HYDROCHLORIDE 100 MG: 100 TABLET, FILM COATED ORAL at 09:59

## 2022-07-28 RX ADMIN — SODIUM CHLORIDE, PRESERVATIVE FREE 40 MG: 5 INJECTION INTRAVENOUS at 10:01

## 2022-07-28 RX ADMIN — AMOXICILLIN AND CLAVULANATE POTASSIUM 1 TABLET: 562.5; 437.5; 62.5 TABLET, MULTILAYER, EXTENDED RELEASE ORAL at 14:23

## 2022-07-28 RX ADMIN — AMLODIPINE BESYLATE 10 MG: 5 TABLET ORAL at 09:52

## 2022-07-28 NOTE — PROGRESS NOTES
Problem: Pressure Injury - Risk of  Goal: *Prevention of pressure injury  Description: Document Eliecer Scale and appropriate interventions in the flowsheet. Outcome: Progressing Towards Goal  Note: Pressure Injury Interventions:  Sensory Interventions: Assess changes in LOC    Moisture Interventions: Absorbent underpads    Activity Interventions: Increase time out of bed    Mobility Interventions: PT/OT evaluation    Nutrition Interventions: Document food/fluid/supplement intake    Friction and Shear Interventions: Apply protective barrier, creams and emollients                Problem: Patient Education: Go to Patient Education Activity  Goal: Patient/Family Education  Outcome: Progressing Towards Goal     Problem: Falls - Risk of  Goal: *Absence of Falls  Description: Document Sofia Fall Risk and appropriate interventions in the flowsheet.   Outcome: Progressing Towards Goal  Note: Fall Risk Interventions:  Mobility Interventions: Bed/chair exit alarm    Mentation Interventions: Bed/chair exit alarm, Adequate sleep, hydration, pain control    Medication Interventions: Bed/chair exit alarm    Elimination Interventions: Bed/chair exit alarm, Call light in reach, Patient to call for help with toileting needs              Problem: Patient Education: Go to Patient Education Activity  Goal: Patient/Family Education  Outcome: Progressing Towards Goal     Problem: Patient Education: Go to Patient Education Activity  Goal: Patient/Family Education  Outcome: Progressing Towards Goal     Problem: Patient Education: Go to Patient Education Activity  Goal: Patient/Family Education  Outcome: Progressing Towards Goal     Problem: Patient Education: Go to Patient Education Activity  Goal: Patient/Family Education  Outcome: Progressing Towards Goal     Problem: Surgical Pathway Day of Surgery  Goal: Off Pathway (Use only if patient is Off Pathway)  Outcome: Progressing Towards Goal  Goal: Activity/Safety  Outcome: Progressing Towards Goal  Goal: Consults, if ordered  Outcome: Progressing Towards Goal  Goal: Nutrition/Diet  Outcome: Progressing Towards Goal  Goal: Medications  Outcome: Progressing Towards Goal  Goal: Respiratory  Outcome: Progressing Towards Goal  Goal: Treatments/Interventions/Procedures  Outcome: Progressing Towards Goal  Goal: Psychosocial  Outcome: Progressing Towards Goal  Goal: *No signs and symptoms of infection or wound complications  Outcome: Progressing Towards Goal  Goal: *Optimal pain control at patient's stated goal  Outcome: Progressing Towards Goal  Goal: *Adequate urinary output (equal to or greater than 30 milliliters/hour)  Description: Ambulatory Surgery patients voiding without difficulty.   Outcome: Progressing Towards Goal  Goal: *Hemodynamically stable  Outcome: Progressing Towards Goal     Problem: Surgical Pathway Post-Op Day 1  Goal: Activity/Safety  Outcome: Progressing Towards Goal  Goal: Diagnostic Test/Procedures  Outcome: Progressing Towards Goal  Goal: Medications  Outcome: Progressing Towards Goal  Goal: Respiratory  Outcome: Progressing Towards Goal  Goal: Treatments/Interventions/Procedures  Outcome: Progressing Towards Goal  Goal: Psychosocial  Outcome: Progressing Towards Goal  Goal: *No signs and symptoms of infection or wound complications  Outcome: Progressing Towards Goal  Goal: *Optimal pain control at patient's stated goal  Outcome: Progressing Towards Goal  Goal: *Adequate urinary output (equal to or greater than 30 milliliters/hour)  Outcome: Progressing Towards Goal  Goal: *Hemodynamically stable  Outcome: Progressing Towards Goal  Goal: *Lungs clear or at baseline  Outcome: Progressing Towards Goal     Problem: Nutrition Deficit  Goal: *Optimize nutritional status  Outcome: Progressing Towards Goal     Problem: Patient Education: Go to Patient Education Activity  Goal: Patient/Family Education  Outcome: Progressing Towards Goal     Problem: Falls - Risk of  Goal: *Absence of Falls  Description: Document [place] Fall Risk and appropriate interventions in the flowsheet. Outcome: Progressing Towards Goal  Note: Fall Risk Interventions:  Mobility Interventions: Bed/chair exit alarm    Mentation Interventions: Bed/chair exit alarm, Adequate sleep, hydration, pain control    Medication Interventions: Bed/chair exit alarm    Elimination Interventions: Bed/chair exit alarm, Call light in reach, Patient to call for help with toileting needs              Problem: Patient Education: Go to Patient Education Activity  Goal: Patient/Family Education  Outcome: Progressing Towards Goal     Problem: Patient Education: Go to Patient Education Activity  Goal: Patient/Family Education  Outcome: Progressing Towards Goal     Problem: Patient Education: Go to Patient Education Activity  Goal: Patient/Family Education  Outcome: Progressing Towards Goal     Problem: Patient Education: Go to Patient Education Activity  Goal: Patient/Family Education  Outcome: Progressing Towards Goal     Problem: Surgical Pathway Day of Surgery  Goal: Medications  Outcome: Progressing Towards Goal     Problem: Surgical Pathway Day of Surgery  Goal: Respiratory  Outcome: Progressing Towards Goal     Problem: Surgical Pathway Day of Surgery  Goal: Treatments/Interventions/Procedures  Outcome: Progressing Towards Goal     Problem: Surgical Pathway Day of Surgery  Goal: Psychosocial  Outcome: Progressing Towards Goal     Problem: Surgical Pathway Day of Surgery  Goal: *No signs and symptoms of infection or wound complications  Outcome: Progressing Towards Goal     Problem: Surgical Pathway Day of Surgery  Goal: *Optimal pain control at patient's stated goal  Outcome: Progressing Towards Goal     Problem: Surgical Pathway Day of Surgery  Goal: *Adequate urinary output (equal to or greater than 30 milliliters/hour)  Description: Ambulatory Surgery patients voiding without difficulty.   Outcome: Progressing Towards Goal

## 2022-07-28 NOTE — PROGRESS NOTES
[facility]        Subjective     Feels swollen  Still loose BMs, passing gas  Tolerating diet  OOB with PT    Objective     Patient Vitals for the past 24 hrs:   Temp Pulse Resp BP SpO2   07/28/22 0738 99 °F (37.2 °C) -- -- -- --   07/28/22 0633 98.2 °F (36.8 °C) 73 16 (!) 144/66 93 %   07/28/22 0311 98.2 °F (36.8 °C) 75 16 (!) 131/58 92 %   07/27/22 2248 99 °F (37.2 °C) 80 16 (!) 155/67 91 %   07/27/22 2215 -- -- -- -- 93 %   07/27/22 2038 -- -- -- (!) 175/73 --   07/27/22 2037 98.6 °F (37 °C) 81 17 (!) 183/76 94 %   07/27/22 1227 99.1 °F (37.3 °C) 77 17 (!) 157/54 93 %       Date 07/27/22 0700 - 07/28/22 0659 07/28/22 0700 - 07/29/22 0659   Shift 3732-3276 1979-5979 24 Hour Total 5098-7660 6105-2819 24 Hour Total   INTAKE   P.O.  100 100        P. O.  100 100      I. V.(mL/kg/hr) 500(0.3)  500(0.1)        Volume (potassium phosphate 30 mmol in 0.9% sodium chloride 500 mL infusion) 500  500      Shift Total(mL/kg) 500(3.2) 100(0.6) 600(3.9)      OUTPUT   Urine(mL/kg/hr)           Urine Occurrence(s) 2 x  2 x      Emesis/NG output           Emesis Occurrence(s) 0 x  0 x      Stool           Stool Occurrence(s) 3 x  3 x      Shift Total(mL/kg)          100 600      Weight (kg) 155. 6 155.6 155.6 155.6 155.6 155.6       PE  GEN - Awake, alert, communicating appropriately.   NAD  Pulm - CTAB  CV - RRR  Abd - soft, NT, incision intact with some serous drainage      Labs  Recent Results (from the past 24 hour(s))   GLUCOSE, POC    Collection Time: 07/27/22 12:00 PM   Result Value Ref Range    Glucose (POC) 143 (H) 65 - 117 mg/dL    Performed by Emmy Samuels    GLUCOSE, POC    Collection Time: 07/27/22  5:04 PM   Result Value Ref Range    Glucose (POC) 124 (H) 65 - 117 mg/dL    Performed by [facility] YULISSA    GLUCOSE, POC    Collection Time: 07/27/22  9:16 PM   Result Value Ref Range    Glucose (POC) 136 (H) 65 - 117 mg/dL    Performed by Kenroy King CON    CBC W/O DIFF    Collection Time: 07/28/22  4:30 AM   Result Value Ref Range    WBC 10.5 3.6 - 11.0 K/uL    RBC 2.61 (L) 3.80 - 5.20 M/uL    HGB 7.7 (L) 11.5 - 16.0 g/dL    HCT 26.3 (L) 35.0 - 47.0 %    .8 (H) 80.0 - 99.0 FL    MCH 29.5 26.0 - 34.0 PG    MCHC 29.3 (L) 30.0 - 36.5 g/dL    RDW 19.3 (H) 11.5 - 14.5 %    PLATELET 920 399 - 055 K/uL    MPV 8.7 (L) 8.9 - 12.9 FL    NRBC 0.0 0  WBC    ABSOLUTE NRBC 0.00 0.00 - 1.20 K/uL   METABOLIC PANEL, BASIC    Collection Time: 07/28/22  4:30 AM   Result Value Ref Range    Sodium 144 136 - 145 mmol/L    Potassium 3.4 (L) 3.5 - 5.1 mmol/L    Chloride 107 97 - 108 mmol/L    CO2 33 (H) 21 - 32 mmol/L    Anion gap 4 (L) 5 - 15 mmol/L    Glucose 111 (H) 65 - 100 mg/dL    BUN 10 6 - 20 MG/DL    Creatinine 0.56 0.55 - 1.02 MG/DL    BUN/Creatinine ratio 18 12 - 20      GFR est AA >60 >60 ml/min/1.73m2    GFR est non-AA >60 >60 ml/min/1.73m2    Calcium 9.3 8.5 - 10.1 MG/DL   PHOSPHORUS    Collection Time: 07/28/22  4:30 AM   Result Value Ref Range    Phosphorus 2.7 2.6 - 4.7 MG/DL   MAGNESIUM    Collection Time: 07/28/22  4:30 AM   Result Value Ref Range    Magnesium 1.9 1.6 - 2.4 mg/dL   GLUCOSE, POC    Collection Time: 07/28/22  7:35 AM   Result Value Ref Range    Glucose (POC) 116 65 - 117 mg/dL    Performed by Sonya Terry (CON)          Assessment     Julio Cesar Pinto is a 65 y. o.yr old female s/p repair gastric perforation  Doing fairly well  Ileus resolved, tolerating diet  Edema, remains on O2 via [address] from surgical point of view for transfer to rehab when able  Start empiric antibiotic course for H. Pylori, dc IV antibiotics    20 mins of time was spent with the patient of which > 50% of the time involved face-to-face counseling of the patient regarding the proposed treatment plan.     Moraima Ponce MD

## 2022-07-28 NOTE — PROGRESS NOTES
Problem: Mobility Impaired (Adult and Pediatric)  Goal: *Acute Goals and Plan of Care (Insert Text)  Description: FUNCTIONAL STATUS PRIOR TO ADMISSION: Patient was independent and active without use of DME.    HOME SUPPORT PRIOR TO ADMISSION: The patient lived with daughter but did not require assist.    Physical Therapy Goals  Initiated 7/22/2022  1. Patient will move from supine to sit and sit to supine , scoot up and down, and roll side to side in bed with modified independence within 7 day(s). 2.  Patient will transfer from bed to chair and chair to bed with modified independence using the least restrictive device within 7 day(s). 3.  Patient will perform sit to stand with modified independence within 7 day(s). 4.  Patient will ambulate with modified independence for 200 feet with the least restrictive device within 7 day(s). 5.  Patient will ascend/descend 4 stairs with  handrail(s) with supervision/set-up within 7 day(s). Outcome: Progressing Towards Goal   PHYSICAL THERAPY TREATMENT  Patient: Ashkan Quan (89 y.o. female)  Date: 7/28/2022  Diagnosis: Bowel perforation (HCC) [K63.1] Bowel perforation (HCC)  Procedure(s) (LRB):  LAPAROTOMY EXPLORATORY, repair of gastric ulcer (N/A) 8 Days Post-Op  Precautions: Fall, Skin (Pt is >340 lbs)  Chart, physical therapy assessment, plan of care and goals were reviewed. ASSESSMENT  Patient continues with skilled PT services and is progressing towards goals. Pt received supine in bed on 1LO2. Pt is A&Ox4 and very cooperative. She is now post op #8 from Lap Exp for bowel perf with gastric ulcer repair. Pt rolling  with Mod to Min A x2. Pt also able to pull self up to [facility] with BUE on rails and bed in [place]. Morbid obesity making bed mobility challenging in conjunction with only being 5' tall. Bed ht is too high even with deflation for safe sitting on EOB. Fall risk with these circumstances making it biomechanically challenging to pt and staff.   SOB noted with all bed mobility needing 2LO2 to breathe comfortable. Pt is capable of transferring OOB to recliner, yet needs approp bed to allow to to be done safely. RN informed and agreeable to get better functional  bed for this pt. Current Level of Function Impacting Discharge (mobility/balance): Mod Ax2/fair    Other factors to consider for discharge: per above         PLAN :  Patient continues to benefit from skilled intervention to address the above impairments. Continue treatment per established plan of care. to address goals. Recommendation for discharge: (in order for the patient to meet his/her long term goals)  Therapy 3 hours per day 5-7 days per week    This discharge recommendation:  Has been made in collaboration with the attending provider and/or case management    IF patient discharges home will need the following DME: to be determined (TBD)       SUBJECTIVE:   Patient stated Glendora Ice you so much.     OBJECTIVE DATA SUMMARY:   Critical Behavior:  Neurologic State: Alert  Orientation Level: Oriented X4  Cognition: Appropriate decision making, Appropriate for age attention/concentration, Appropriate safety awareness  Safety/Judgement: Awareness of environment  Functional Mobility Training:  Bed Mobility:  Rolling:  Moderate assistance  Supine to Sit: Moderate assistance;Assist x2  Sit to Supine: Maximum assistance;Assist x2           Transfers:      Unable  to safely perform with elevated bed ht                             Balance:     Ambulation/Gait Training:      Prior to this tx pt able walked 8' with RW with Min to Mod A x2                                              Activity Tolerance:   Fair    After treatment patient left in no apparent distress:   Supine in bed, Heels elevated for pressure relief, Call bell within reach, Bed / chair alarm activated, and Side rails x 3    COMMUNICATION/COLLABORATION:   The patients plan of care was discussed with: Occupational therapy assistant and Registered nurse.      Lydia Romero, PT   Time Calculation: 36 mins

## 2022-07-28 NOTE — PROGRESS NOTES
Stopped in to visit pt as her oncologist.  Nothing to add, team is taking great care of her. Provided support to pt. Will check-in with her periodically to follow her course and make sure she has follow up with me.   Not on active therapy, had completed chemotherapy

## 2022-07-28 NOTE — PROGRESS NOTES
Problem: Self Care Deficits Care Plan (Adult)  Goal: *Acute Goals and Plan of Care (Insert Text)  Description: FUNCTIONAL STATUS PRIOR TO ADMISSION: Patient was independent and active without use of DME.     HOME SUPPORT: The patient lived with daughter but did not require assist.    Occupational Therapy Goals  Initiated 7/22/2022  1. Patient will perform grooming standing at sink with supervision/set-up within 7 day(s). 2.  Patient will perform lower body dressing using adaptive equipment with minimal assistance/contact guard assist within 7 day(s). 3.  Patient will perform toilet transfers with supervision/set-up within 7 day(s). 4.  Patient will perform all aspects of toileting with minimal assistance/contact guard assist within 7 day(s). 5.  Patient will participate in upper extremity therapeutic exercise/activities with independence for 10 minutes within 7 day(s). 6.  Patient will utilize energy conservation techniques during functional activities with verbal and visual cues within 7 day(s). Outcome: Progressing Towards Goal   OCCUPATIONAL THERAPY TREATMENT  Patient: La Nena Mckeon (71 y.o. female)  Date: 7/28/2022  Diagnosis: Bowel perforation ([facility]) [K63.1] Bowel perforation ([facility] [address]  Procedure(s) (LRB):  LAPAROTOMY EXPLORATORY, repair of gastric ulcer (N/A) 8 Days Post-Op  Precautions: Fall, Skin (Pt is >340 lbs)  Chart, occupational therapy assessment, plan of care, and goals were reviewed. ASSESSMENT  Patient continues with skilled OT services and is progressing towards goals. Pt engaged with rolling side to side to don brief assist x 2. Pt sat up up edge of bed however bed mechanics were not working properly to ensure safe sitting. Pt returned to supine and she does desat with activity on 1 L O2 to 87%, with 2 L O2 sats increase to 95%. Pt engaged with multiple rolling for self care this date. She is able to pull herself up in bed using bed rails, pt limited by morbid obesity.      Current Level of Function Impacting Discharge (ADLs): max assist LB dress, min assist UB dress    Other factors to consider for discharge:          PLAN :  Patient continues to benefit from skilled intervention to address the above impairments. Continue treatment per established plan of care to address goals. Recommend with staff: bed in chair position for ADL's, there ex, there act,     Recommend next OT session: cont towards goals    Recommendation for discharge: (in order for the patient to meet his/her long term goals)  Therapy 3 hours per day 5-7 days per week    This discharge recommendation:  Has not yet been discussed the attending provider and/or case management    IF patient discharges home will need the following DME:        SUBJECTIVE:   Patient stated I would like to sit up.     OBJECTIVE DATA SUMMARY:   Cognitive/Behavioral Status:  Neurologic State: Alert  Orientation Level: Oriented X4  Cognition: Appropriate decision making; Appropriate for age attention/concentration; Appropriate safety awareness             Functional Mobility and Transfers for ADLs:  Bed Mobility:  Rolling: Moderate assistance  Supine to Sit: Moderate assistance;Assist x2  Sit to Supine: Maximum assistance;Assist x2    Transfers:   Not tested          Balance:Impaired standing balance       ADL Intervention:       Type of Bath: Chlorhexidine (CHG); Bath Pack              Lower Body Dressing Assistance  Dressing Assistance: Maximum assistance  Underpants: Maximum assistance  Leg Crossed Method Used: No  Position Performed: Supine  Cues: Don         Activity Tolerance:   Fair    After treatment patient left in no apparent distress:   Call bell within reach    COMMUNICATION/COLLABORATION:   The patients plan of care was discussed with: Physical therapist, Occupational therapist, and Registered nurse.      JACQUE Clarke  Time Calculation: 34 mins

## 2022-07-28 NOTE — PROGRESS NOTES
7/28/2022  Case Management Progress Note    4:24 PM  [facility] confirmed 0930 transport time. MARCO A Mckenzie    4:04 PM  SHEILA Viry reached [facility] on 11th try, and they do not have any availability this afternoon. I have rescheduled [facility] for 0930 tomorrow morning to avoid a discharge in the middle of the night. MARCO A Mckenzie    3:58 PM  [facility] would not be able to do the transport until at least 9pm (and they refuse to guarantee that time) due to patient's bariatric status. CM team called [facility] x[facility] with no response. This CM also called [facility] and they do not have any availability at all this evening. MARCO A Mckenzie    3:02 PM  [facility] does have a bed for patient this evening. Messaged attending for discharge and setting up stretcher. MARCO A Mckenzie    2:08 PM  Patient's [facility] was approved for [facility], per Bhumika we will know about bed status by MARCO A Peña    9:57 AM  Patient is 65year old female admitted 7/20 with bowel perforation  Patient's RUR is 19% yellow/moderate risk for readmission  Covid test: none this admission  Chart reviewed  Patient has been accepted by [facility] which was her preference and Bhumika (liaison) has started authorization. Per chart patient is ok for a regular diet today and it is also her last day of antibiotics. Will continue to follow and update as auth updates come in.      Transition of [address] started for [facility]  Transportation tbd pending progress  CM will continue to follow    MARCO A Mckenzie

## 2022-07-28 NOTE — PROGRESS NOTES
Abdifatah Guerin [facility] 79  [address] [facility], [address] [place] [address]  [phone]      Medical Progress Note      NAME: Elyse Vera   :  1960  MRM:  [id number]    Date/Time of service: 2022  12:45 PM       Subjective:     Chief Complaint:  Patient was personally seen and examined by me during this time period. Chart reviewed. C/o swelling, mild dyspnea       Objective:       Vitals:       Last 24hrs VS reviewed since prior progress note.  Most recent are:    Visit Vitals  BP (!) 144/66 (BP 1 Location: Right lower arm, BP Patient Position: At rest)   Pulse (P) 81   Temp 99 °F (37.2 °C)   Resp 16   Ht 5' 2\" (1.575 m)   Wt 155.6 kg (343 lb 0.6 oz)   SpO2 (P) 92%   BMI 62.74 kg/m²     SpO2 Readings from Last 6 Encounters:   22 (P) 92%   22 95%   06/15/22 95%   06/15/22 95%   22 96%   22 95%    O2 Flow Rate (L/min): 1 l/min     Intake/Output Summary (Last 24 hours) at 2022 1245  Last data filed at 2022 0410  Gross per 24 hour   Intake 100 ml   Output --   Net 100 ml          Exam:     Physical Exam:    Gen:  Well-developed, well-nourished, morbidly obese, in no acute distress  HEENT:  Pink conjunctivae, PERRL, hearing intact to voice  Neck:  Supple, without masses, thyroid non-tender  Resp:  No accessory muscle use, scant crackles at bases  Card:  No murmurs, normal S1, S2 without thrills, anasarca   Abd:  Soft, non-tender, non-distended, normoactive bowel sounds are present, surgical dressing c/d/i  Musc:  No cyanosis or clubbing  Skin:  No rashes   Neuro:  Cranial nerves 3-12 are grossly intact, follows commands appropriately  Psych:  Good insight, oriented to person, place and time, alert    Medications Reviewed: (see below)    Lab Data Reviewed: (see below)    ______________________________________________________________________    Medications:     Current Facility-Administered Medications   Medication Dose Route Frequency L.acidophilus-paracasei-S.thermophil-bifidobacter (RISAQUAD) 8 billion cell capsule  1 Capsule Oral DAILY    potassium bicarb-citric acid (EFFER-K) tablet 20 mEq  20 mEq Oral BID    clarithromycin (BIAXIN) tablet 500 mg  500 mg Oral BID    amoxicillin-clavulanate (AUGMENTIN) 1,000-62.5 mg ER tablet 1 Tablet  1 Tablet Oral Q12H    bumetanide (BUMEX) injection 1 mg  1 mg IntraVENous Q12H    acetaminophen (TYLENOL) tablet 650 mg  650 mg Oral Q6H PRN    oxyCODONE IR (ROXICODONE) tablet 5 mg  5 mg Oral Q6H PRN    oxyCODONE IR (ROXICODONE) tablet 10 mg  10 mg Oral Q6H PRN    0.9% sodium chloride infusion 250 mL  250 mL IntraVENous PRN    labetaloL (NORMODYNE) tablet 100 mg  100 mg Oral Q12H    amLODIPine (NORVASC) tablet 10 mg  10 mg Oral DAILY    [Held by provider] lisinopril/hydroCHLOROthiazide (PRINZIDE/ZESTORETIC) 20/12.5 mg   Oral DAILY    insulin lispro (HUMALOG) injection   SubCUTAneous AC&HS    phenol throat spray (CHLORASEPTIC) 1 Spray  1 Spray Oral PRN    HYDROmorphone (DILAUDID) injection 1 mg  1 mg IntraVENous Q3H PRN    labetaloL (NORMODYNE;TRANDATE) 20 mg/4 mL (5 mg/mL) injection 20 mg  20 mg IntraVENous Q6H PRN    miconazole (MICOTIN) 2 % powder   Topical BID    sodium chloride (NS) flush 5-40 mL  5-40 mL IntraVENous Q8H    sodium chloride (NS) flush 5-40 mL  5-40 mL IntraVENous PRN    naloxone (NARCAN) injection 0.4 mg  0.4 mg IntraVENous PRN    ondansetron (ZOFRAN) injection 4 mg  4 mg IntraVENous Q4H PRN    enoxaparin (LOVENOX) injection 40 mg  40 mg SubCUTAneous Q12H    arformoteroL (BROVANA) neb solution 15 mcg  15 mcg Nebulization BID RT    And    budesonide (PULMICORT) 500 mcg/2 ml nebulizer suspension  250 mcg Nebulization BID RT    albuterol-ipratropium (DUO-NEB) 2.5 MG-0.5 MG/3 ML  3 mL Nebulization Q6H PRN    glucose chewable tablet 16 g  4 Tablet Oral PRN    glucagon (GLUCAGEN) injection 1 mg  1 mg IntraMUSCular PRN    dextrose 10% infusion 0-250 mL  0-250 mL IntraVENous PRN    hydrALAZINE (APRESOLINE) 20 mg/mL injection 20 mg  20 mg IntraVENous Q6H PRN    pantoprazole (PROTONIX) 40 mg in 0.9% sodium chloride 10 mL injection  40 mg IntraVENous Q12H          Lab Review:     Recent Labs     07/28/22  0430 07/27/22  0851 07/26/22  1450 07/26/22  0336   WBC 10.5 11.0  --  10.0   HGB 7.7* 8.6* 9.0* 6.8*   HCT 26.3* 28.8*  --  23.7*    199  --  215       Recent Labs     07/28/22  0430 07/27/22  0851 07/26/22  0336 07/26/22  0122    144 146* 148*   K 3.4* 3.6 3.5 3.5    109* 112* 112*   CO2 33* 32 33* 33*   * 178* 113* 113*   BUN 10 9 9 9   CREA 0.56 0.49* 0.58 0.56   CA 9.3 9.4 9.4 9.4   MG 1.9 1.9  --  1.9   PHOS 2.7 2.2*  --   --    ALB  --   --   --  1.5*   TBILI  --   --   --  0.5   ALT  --   --   --  36   INR  --   --   --  1.1       Lab Results   Component Value Date/Time    Glucose (POC) 147 (H) 07/28/2022 11:31 AM    Glucose (POC) 116 07/28/2022 07:35 AM    Glucose (POC) 136 (H) 07/27/2022 09:16 PM    Glucose (POC) 124 (H) 07/27/2022 05:04 PM    Glucose (POC) 143 (H) 07/27/2022 12:00 PM          Assessment / Plan:     64 yo hx of HTN, DM, breast CA, asthma, presented w/ a perforated gastric ulcer s/p repair 07/20. Medicine is following as a consultant    1) Gastric ulcer perforation: s/p surgical repair 07/20. Cont IV Zosyn, IV PPI. Use IV dilaudid prn severe pain. Rest of management per Gen surg    2) HTN: BP stable. Cont norvasc, labetalol (new med), IV bumex. Hold ACEi/HCTZ. Use IV hydralazine, IV labetalol prn    3) Acute blood loss anemia: from surgery. Iron studies, B12/folate wnl. S/p 1u RBCs. Monitor Hgb closely, transfuse prn    4) Anasarca: iatrogenic. Stopped IVF. Will increase bumex to q12h. Check echo. Monitor I/O    5) DM type 2: A1C 5.7%. Hold metformin. Cont SSI    6) Asthma: cont nebs prn    7) Severe pro-juan malnutrition: due to surgical issues. Advance diet as tolerated.   Nutrition following    8) Morbid obesity: counseled on diet, exercise, wt loss    9) Hypophos: replete IV prn, monitor BMP    Total time spent with patient care: 35 Minutes **I personally saw and examined the patient during this time period**                 Care Plan discussed with: Patient, nursing     Discussed:  Care Plan    Prophylaxis:  Lovenox    Disposition:   per surg           ___________________________________________________    Attending Physician: Javier Vargas MD

## 2022-07-28 NOTE — PROGRESS NOTES
Bedside shift change report given to Antoinette (oncoming nurse) by Tiffanie Monroe (offgoing nurse). Report included the following information SBAR, Kardex, Intake/Output, MAR, and Recent Results.

## 2022-07-28 NOTE — PROGRESS NOTES
Bedside shift change report given to Joey (oncoming nurse) by Endy Adames (offgoing nurse). Report included the following information SBAR, Kardex, Procedure Summary, Intake/Output, MAR, Recent Results, Med Rec Status, and Cardiac Rhythm   .

## 2022-07-29 VITALS
OXYGEN SATURATION: 95 % | BODY MASS INDEX: 53.92 KG/M2 | HEIGHT: 62 IN | WEIGHT: 293 LBS | SYSTOLIC BLOOD PRESSURE: 147 MMHG | DIASTOLIC BLOOD PRESSURE: 71 MMHG | HEART RATE: 83 BPM | TEMPERATURE: 99 F | RESPIRATION RATE: 17 BRPM

## 2022-07-29 LAB
ALBUMIN SERPL-MCNC: 1.3 G/DL (ref 3.5–5)
ALBUMIN/GLOB SERPL: 0.4 {RATIO} (ref 1.1–2.2)
ALP SERPL-CCNC: 78 U/L (ref 45–117)
ALT SERPL-CCNC: 43 U/L (ref 12–78)
ANION GAP SERPL CALC-SCNC: 3 MMOL/L (ref 5–15)
AST SERPL-CCNC: 47 U/L (ref 15–37)
BILIRUB SERPL-MCNC: 0.4 MG/DL (ref 0.2–1)
BUN SERPL-MCNC: 8 MG/DL (ref 6–20)
BUN/CREAT SERPL: 22 (ref 12–20)
CALCIUM SERPL-MCNC: 7.8 MG/DL (ref 8.5–10.1)
CHLORIDE SERPL-SCNC: 112 MMOL/L (ref 97–108)
CO2 SERPL-SCNC: 30 MMOL/L (ref 21–32)
CREAT SERPL-MCNC: 0.36 MG/DL (ref 0.55–1.02)
ERYTHROCYTE [DISTWIDTH] IN BLOOD BY AUTOMATED COUNT: 18.9 % (ref 11.5–14.5)
GLOBULIN SER CALC-MCNC: 3.5 G/DL (ref 2–4)
GLUCOSE BLD STRIP.AUTO-MCNC: 107 MG/DL (ref 65–117)
GLUCOSE BLD STRIP.AUTO-MCNC: 122 MG/DL (ref 65–117)
GLUCOSE BLD STRIP.AUTO-MCNC: 157 MG/DL (ref 65–117)
GLUCOSE SERPL-MCNC: 99 MG/DL (ref 65–100)
HCT VFR BLD AUTO: 23 % (ref 35–47)
HGB BLD-MCNC: 6.8 G/DL (ref 11.5–16)
HISTORY CHECKED?,CKHIST: NORMAL
MAGNESIUM SERPL-MCNC: 1.6 MG/DL (ref 1.6–2.4)
MCH RBC QN AUTO: 29.7 PG (ref 26–34)
MCHC RBC AUTO-ENTMCNC: 29.6 G/DL (ref 30–36.5)
MCV RBC AUTO: 100.4 FL (ref 80–99)
NRBC # BLD: 0 K/UL (ref 0–0.01)
NRBC BLD-RTO: 0 PER 100 WBC
PHOSPHATE SERPL-MCNC: 1.8 MG/DL (ref 2.6–4.7)
PLATELET # BLD AUTO: 173 K/UL (ref 150–400)
PMV BLD AUTO: 9 FL (ref 8.9–12.9)
POTASSIUM SERPL-SCNC: 2.7 MMOL/L (ref 3.5–5.1)
PROT SERPL-MCNC: 4.8 G/DL (ref 6.4–8.2)
RBC # BLD AUTO: 2.29 M/UL (ref 3.8–5.2)
SERVICE CMNT-IMP: ABNORMAL
SERVICE CMNT-IMP: ABNORMAL
SERVICE CMNT-IMP: NORMAL
SODIUM SERPL-SCNC: 145 MMOL/L (ref 136–145)
WBC # BLD AUTO: 8.3 K/UL (ref 3.6–11)

## 2022-07-29 PROCEDURE — 77030038269 HC DRN EXT URIN PURWCK BARD -A

## 2022-07-29 PROCEDURE — 74011000250 HC RX REV CODE- 250: Performed by: INTERNAL MEDICINE

## 2022-07-29 PROCEDURE — 82962 GLUCOSE BLOOD TEST: CPT

## 2022-07-29 PROCEDURE — 74011250637 HC RX REV CODE- 250/637: Performed by: INTERNAL MEDICINE

## 2022-07-29 PROCEDURE — 36415 COLL VENOUS BLD VENIPUNCTURE: CPT

## 2022-07-29 PROCEDURE — 74011250636 HC RX REV CODE- 250/636: Performed by: SURGERY

## 2022-07-29 PROCEDURE — 74011250636 HC RX REV CODE- 250/636: Performed by: INTERNAL MEDICINE

## 2022-07-29 PROCEDURE — 74011250637 HC RX REV CODE- 250/637: Performed by: SURGERY

## 2022-07-29 PROCEDURE — 74011250637 HC RX REV CODE- 250/637: Performed by: STUDENT IN AN ORGANIZED HEALTH CARE EDUCATION/TRAINING PROGRAM

## 2022-07-29 PROCEDURE — 36430 TRANSFUSION BLD/BLD COMPNT: CPT

## 2022-07-29 PROCEDURE — C9113 INJ PANTOPRAZOLE SODIUM, VIA: HCPCS | Performed by: INTERNAL MEDICINE

## 2022-07-29 PROCEDURE — 80053 COMPREHEN METABOLIC PANEL: CPT

## 2022-07-29 PROCEDURE — 77010033678 HC OXYGEN DAILY

## 2022-07-29 PROCEDURE — 85027 COMPLETE CBC AUTOMATED: CPT

## 2022-07-29 PROCEDURE — 84100 ASSAY OF PHOSPHORUS: CPT

## 2022-07-29 PROCEDURE — P9016 RBC LEUKOCYTES REDUCED: HCPCS

## 2022-07-29 PROCEDURE — 83735 ASSAY OF MAGNESIUM: CPT

## 2022-07-29 PROCEDURE — 74011000250 HC RX REV CODE- 250: Performed by: SURGERY

## 2022-07-29 PROCEDURE — 94761 N-INVAS EAR/PLS OXIMETRY MLT: CPT

## 2022-07-29 PROCEDURE — 94640 AIRWAY INHALATION TREATMENT: CPT

## 2022-07-29 RX ORDER — POTASSIUM CHLORIDE 750 MG/1
40 TABLET, FILM COATED, EXTENDED RELEASE ORAL
Status: COMPLETED | OUTPATIENT
Start: 2022-07-29 | End: 2022-07-29

## 2022-07-29 RX ORDER — HEPARIN 100 UNIT/ML
300 SYRINGE INTRAVENOUS ONCE
Status: COMPLETED | OUTPATIENT
Start: 2022-07-29 | End: 2022-07-29

## 2022-07-29 RX ORDER — SODIUM CHLORIDE 9 MG/ML
250 INJECTION, SOLUTION INTRAVENOUS AS NEEDED
Status: DISCONTINUED | OUTPATIENT
Start: 2022-07-29 | End: 2022-07-29 | Stop reason: HOSPADM

## 2022-07-29 RX ORDER — PANTOPRAZOLE SODIUM 40 MG/1
40 TABLET, DELAYED RELEASE ORAL 2 TIMES DAILY
Qty: 60 TABLET | Refills: 0 | Status: ON HOLD
Start: 2022-07-29 | End: 2022-09-13 | Stop reason: SDUPTHER

## 2022-07-29 RX ADMIN — BUMETANIDE 1 MG: 0.25 INJECTION INTRAMUSCULAR; INTRAVENOUS at 10:20

## 2022-07-29 RX ADMIN — ARFORMOTEROL TARTRATE 15 MCG: 15 SOLUTION RESPIRATORY (INHALATION) at 08:09

## 2022-07-29 RX ADMIN — Medication 10 ML: at 05:44

## 2022-07-29 RX ADMIN — ENOXAPARIN SODIUM 40 MG: 100 INJECTION SUBCUTANEOUS at 10:21

## 2022-07-29 RX ADMIN — Medication 10 ML: at 13:06

## 2022-07-29 RX ADMIN — MICONAZOLE NITRATE 2 % TOPICAL POWDER: at 10:25

## 2022-07-29 RX ADMIN — POTASSIUM PHOSPHATE, MONOBASIC POTASSIUM PHOSPHATE, DIBASIC: 224; 236 INJECTION, SOLUTION, CONCENTRATE INTRAVENOUS at 10:21

## 2022-07-29 RX ADMIN — AMOXICILLIN AND CLAVULANATE POTASSIUM 1 TABLET: 562.5; 437.5; 62.5 TABLET, MULTILAYER, EXTENDED RELEASE ORAL at 10:22

## 2022-07-29 RX ADMIN — POTASSIUM CHLORIDE 40 MEQ: 750 TABLET, FILM COATED, EXTENDED RELEASE ORAL at 06:49

## 2022-07-29 RX ADMIN — HEPARIN 300 UNITS: 100 SYRINGE at 15:24

## 2022-07-29 RX ADMIN — LABETALOL HYDROCHLORIDE 100 MG: 100 TABLET, FILM COATED ORAL at 10:22

## 2022-07-29 RX ADMIN — Medication 1 CAPSULE: at 10:22

## 2022-07-29 RX ADMIN — AMLODIPINE BESYLATE 10 MG: 5 TABLET ORAL at 10:22

## 2022-07-29 RX ADMIN — SODIUM CHLORIDE, PRESERVATIVE FREE 40 MG: 5 INJECTION INTRAVENOUS at 10:22

## 2022-07-29 RX ADMIN — POTASSIUM CHLORIDE 40 MEQ: 750 TABLET, FILM COATED, EXTENDED RELEASE ORAL at 10:21

## 2022-07-29 RX ADMIN — BUDESONIDE 250 MCG: 0.5 SUSPENSION RESPIRATORY (INHALATION) at 08:10

## 2022-07-29 RX ADMIN — CLARITHROMYCIN 500 MG: 500 TABLET ORAL at 11:49

## 2022-07-29 NOTE — PROGRESS NOTES
Bedside and Verbal shift change report given to Alysha (oncoming nurse) by Clemencia Moya (offgoing nurse). Report included the following information SBAR, Kardex, Intake/Output, MAR, and Recent Results.
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
## 2022-07-29 NOTE — DISCHARGE INSTRUCTIONS
Patient Discharge Instructions    Tiburcio Casey / [id number] : 1960    Admitted 2022 Discharged: 2022       Stomach Ulcer Repair      FOLLOW-UP:  Please make an appointment with your physician in ~7 day(s). Call your physician immediately if you have any fevers greater than 101.5, drainage from your wound that is not clear or looks infected, persistent bleeding, increasing abdominal pain, problems urinating, or persistent nausea/vomiting. WOUND CARE INSTRUCTIONS:   You may shower. You may remove dressings at that time. It is ok to let water and soap run over the incisions, but do not scrub. Cover the incision with gauze as needed for drainage. Try to keep the wound dry and avoid ointments on the wound unless directed to do so. If the wound becomes bright red and painful or starts to drain infected material that is not clear, please contact your physician immediately. You should also call if you begin to drain fluid that is thin and greenish-brown from the wound and appears to look like bile. If the wound though is mildly pink and has a thick firm ridge underneath it, this is normal, and is referred to as a healing ridge. This will resolve over the next 4-6 weeks. DIET:  You may eat any foods that you can tolerate. It is a good idea to eat a high fiber diet and take in plenty of fluids to prevent constipation. If you do become constipated you may want to take a mild laxative or take ducolax tablets on a daily basis until your bowel habits are regular. Constipation can be very uncomfortable, along with straining, after recent abdominal surgery. ACTIVITY:  You are encouraged to cough and deep breath or use your incentive spirometer if you were given one, every 15-30 minutes when awake. This will help prevent respiratory complications and low grade fevers post-operatively.   You may want to hug a pillow when coughing and sneezing to add additional support to the surgical area(s) which will decrease pain during these times. You are encouraged to walk and engage in light activity for the next four weeks. You should not lift more than 15 pounds during this time frame as it could put you at increased risk for a post-operative hernia. Twenty pounds is roughly equivalent to a plastic bag of groceries. Most people are able to return to work within 1 to 2 weeks after surgery. You may shower 24 hours after surgery. Pat the cut (incision) dry. Do not take a bath or swim for the first two weeks. Your doctor will tell you when you can have sex again. MEDICATIONS:  Try to take narcotic medications and anti-inflammatory medications, such as tylenol, ibuprofen, naprosyn, etc., with food. This will minimize stomach upset from the medication. Should you develop nausea and vomiting from the pain medication, or develop a rash, please discontinue the medication and contact your physician. You should not drive, make important decisions, or operate machinery when taking narcotic pain medication. It is important that you take the medication exactly as they are prescribed. Keep your medication in the bottles provided by the pharmacist and keep a list of the medication names, dosages, and times to be taken in your wallet. Do not take other medications without consulting your doctor. Take prescribed medications as needed for pain, or over the counter medications as needed for  pain. Be sure to limit the amount of acetaminophen (Tylenol) from any source. QUESTIONS:  Please feel free to call Dr. Elizabeth Christina office ([phone]) if you have any questions, and they will be glad to assist you. Follow-up with Dr. Holly Choi about one week. Call the office to schedule your appointment. Information obtained by :    I understand that if any problems occur once I am at home I am to contact my physician. I understand and acknowledge receipt of the instructions indicated above. Physician's or R.N.'s Signature                                                                  Date/Time                                                                                                                                              Patient or Representative Signature                                                          Date/Time

## 2022-07-29 NOTE — PROGRESS NOTES
8:56 am:  Patient receiving transfusion. [facility] transport rescheduled to 1:30 pm today.     Shanna Jaime LCSW

## 2022-07-29 NOTE — PROGRESS NOTES
Abdifatah Apoorva [facility] 79  [address]  [phone]      Medical Progress Note      NAME: Mayi Spear   :  1960  MRM:  [id number]    Date of service: 2022  8:33 AM       Assessment and Plan:   Gastric ulcer perforation: s/p surgical repair . Treated with IV Zosyn, IV PPI. Discharged on augmentin. Continue diet per surgery. 2.  HTN: BP stable. Cont norvasc, labetalol (new med). Hold ACEi/HCTZ. Use IV hydralazine, IV labetalol prn     3. Acute blood loss anemia: from surgery. Iron studies, B12/folate wnl. Hb is 6.8 this morning and is getting blood transfusion. 4.  Anasarca: iatrogenic. On bumex q12h. Normal EF. Monitor I/O     5.  DM type 2: A1C 5.7%. Cont metformin. Cont SSI     6. Asthma, stable. cont nebs prn     7. Severe pro-juan malnutrition: due to surgical issues. Nutrition following     8. Morbid obesity: would benefit from weight loss and lifestyle modification      9. Hypophos/ hypokalemia: replete          Subjective:     Chief Complaint[de-identified] Patient was seen and examined as a follow up for gastric ulcer perforation. Chart was reviewed. feels well. ROS:  (bold if positive, if negative)    Tolerating PT  Tolerating Diet        Objective:     Last 24hrs VS reviewed since prior progress note.  Most recent are:    Visit Vitals  BP (!) 165/74 (BP 1 Location: Right lower arm)   Pulse 79   Temp 99 °F (37.2 °C)   Resp 16   Ht 5' 2\" (1.575 m)   Wt 155.6 kg (343 lb 0.6 oz)   SpO2 95%   BMI 62.74 kg/m²     SpO2 Readings from Last 6 Encounters:   22 95%   22 95%   06/15/22 95%   06/15/22 95%   22 96%   22 95%    O2 Flow Rate (L/min): 2 l/min     Intake/Output Summary (Last 24 hours) at 2022 7879  Last data filed at 2022 0057  Gross per 24 hour   Intake 1120 ml   Output 600 ml   Net 520 ml        Physical Exam:    Gen:  obese, in no acute distress  HEENT:  Pink conjunctivae, PERRL, hearing intact to voice, moist mucous membranes  Neck:  Supple, without masses, thyroid non-tender  Resp:  No accessory muscle use, clear breath sounds without wheezes rales or rhonchi  Card:  No murmurs, normal S1, S2 without thrills, bruits or peripheral edema  Abd:  Soft, non-tender, non-distended, normoactive bowel sounds are present, no palpable organomegaly and no detectable hernias  Lymph:  No cervical or inguinal adenopathy  Musc:  No cyanosis or clubbing  Skin:  No rashes or ulcers, skin turgor is good  Neuro:  Cranial nerves are grossly intact, no focal motor weakness, follows commands appropriately  Psych:  Good insight, oriented to person, place and time, alert  __________________________________________________________________  Medications Reviewed: (see below)  Medications:     Current Facility-Administered Medications   Medication Dose Route Frequency    0.9% sodium chloride infusion 250 mL  250 mL IntraVENous PRN    potassium phosphate 30 mmol in 0.9% sodium chloride 250 mL infusion   IntraVENous ONCE    potassium chloride SR (KLOR-CON 10) tablet 40 mEq  40 mEq Oral NOW    L.acidophilus-paracasei-S.thermophil-bifidobacter (RISAQUAD) 8 billion cell capsule  1 Capsule Oral DAILY    clarithromycin (BIAXIN) tablet 500 mg  500 mg Oral BID    amoxicillin-clavulanate (AUGMENTIN) 1,000-62.5 mg ER tablet 1 Tablet  1 Tablet Oral Q12H    bumetanide (BUMEX) injection 1 mg  1 mg IntraVENous Q12H    acetaminophen (TYLENOL) tablet 650 mg  650 mg Oral Q6H PRN    oxyCODONE IR (ROXICODONE) tablet 5 mg  5 mg Oral Q6H PRN    oxyCODONE IR (ROXICODONE) tablet 10 mg  10 mg Oral Q6H PRN    0.9% sodium chloride infusion 250 mL  250 mL IntraVENous PRN    labetaloL (NORMODYNE) tablet 100 mg  100 mg Oral Q12H    amLODIPine (NORVASC) tablet 10 mg  10 mg Oral DAILY    [Held by provider] lisinopril/hydroCHLOROthiazide (PRINZIDE/ZESTORETIC) 20/12.5 mg   Oral DAILY    insulin lispro (HUMALOG) injection   SubCUTAneous AC&HS    phenol throat spray (CHLORASEPTIC) 1 Spray  1 Spray Oral PRN    HYDROmorphone (DILAUDID) injection 1 mg  1 mg IntraVENous Q3H PRN    labetaloL (NORMODYNE;TRANDATE) 20 mg/4 mL (5 mg/mL) injection 20 mg  20 mg IntraVENous Q6H PRN    miconazole (MICOTIN) 2 % powder   Topical BID    sodium chloride (NS) flush 5-40 mL  5-40 mL IntraVENous Q8H    sodium chloride (NS) flush 5-40 mL  5-40 mL IntraVENous PRN    naloxone (NARCAN) injection 0.4 mg  0.4 mg IntraVENous PRN    ondansetron (ZOFRAN) injection 4 mg  4 mg IntraVENous Q4H PRN    enoxaparin (LOVENOX) injection 40 mg  40 mg SubCUTAneous Q12H    arformoteroL (BROVANA) neb solution 15 mcg  15 mcg Nebulization BID RT    And    budesonide (PULMICORT) 500 mcg/2 ml nebulizer suspension  250 mcg Nebulization BID RT    albuterol-ipratropium (DUO-NEB) 2.5 MG-0.5 MG/3 ML  3 mL Nebulization Q6H PRN    glucose chewable tablet 16 g  4 Tablet Oral PRN    glucagon (GLUCAGEN) injection 1 mg  1 mg IntraMUSCular PRN    dextrose 10% infusion 0-250 mL  0-250 mL IntraVENous PRN    hydrALAZINE (APRESOLINE) 20 mg/mL injection 20 mg  20 mg IntraVENous Q6H PRN    pantoprazole (PROTONIX) 40 mg in 0.9% sodium chloride 10 mL injection  40 mg IntraVENous Q12H        Lab Data Reviewed: (see below)  Lab Review:     Recent Labs     07/29/22 0352 07/28/22  0430 07/27/22  0851   WBC 8.3 10.5 11.0   HGB 6.8* 7.7* 8.6*   HCT 23.0* 26.3* 28.8*    193 199     Recent Labs     07/29/22 0352 07/28/22  0430 07/27/22  0851    144 144   K 2.7* 3.4* 3.6   * 107 109*   CO2 30 33* 32   GLU 99 111* 178*   BUN 8 10 9   CREA 0.36* 0.56 0.49*   CA 7.8* 9.3 9.4   MG 1.6 1.9 1.9   PHOS 1.8* 2.7 2.2*   ALB 1.3*  --   --    TBILI 0.4  --   --    ALT 43  --   --      Lab Results   Component Value Date/Time    Glucose (POC) 107 07/29/2022 07:28 AM    Glucose (POC) 105 07/28/2022 11:22 PM    Glucose (POC) 133 (H) 07/28/2022 05:17 PM    Glucose (POC) 147 (H) 07/28/2022 11:31 AM    Glucose (POC) 116 07/28/2022 07:35 AM No results for input(s): PH, PCO2, PO2, HCO3, FIO2 in the last 72 hours. No results for input(s): INR, INREXT in the last 72 hours. All Micro Results       None            I have reviewed notes of prior 24hr. Other pertinent lab: Total time spent with patient: 35 minutes I personally reviewed chart, notes, data and current medications in the medical record. I have personally examined and treated the patient at bedside during this period.                  Care Plan discussed with: Patient, Care Manager, Nursing Staff, and >50% of time spent in counseling and coordination of care    Discussed:  Care Plan    Prophylaxis:  SCD's    Disposition:  SNF/LTC           ___________________________________________________    Attending Physician: Carolyn Ellington MD

## 2022-07-29 NOTE — DISCHARGE SUMMARY
Physician Discharge Summary     Patient ID:  Lizeth Ramirez  [id number]  19 y.o.  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 She was initially admitted to ICU and remained intubated post-operatively. She was extubated. She had progressive return of bowel function. An UGI confirmed no leak, and her diet was advanced. She had some respiratory issues, likely due to a combination of prior conditions and fluid resuscitation. She had anemia, although this was not likely due to post-operative bleeding. She was discharged to rehab facility. Plan is for continued empiric treatment for H. Pylori, although ulcer may be from NSAID use. Consults: Hospitalist and Pulmonary/Critical Care      Disposition: inpatient rehab    Patient Instructions:   Current Discharge Medication List        START taking these medications    Details   clarithromycin (BIAXIN) 500 mg tablet Take 1 Tablet by mouth two (2) times a day for 27 doses.   Qty: 27 Tablet, Refills: 0      amoxicillin-clavulanate (AUGMENTIN) 1,000-62.5 mg ER tablet Take 1 Tablet by mouth every twelve (12) hours for 27 doses. Qty: 27 Tablet, Refills: 0           CONTINUE these medications which have NOT CHANGED    Details   lisinopril-hydroCHLOROthiazide (PRINZIDE, ZESTORETIC) 20-12.5 mg per tablet Take 1 Tablet by mouth daily. lidocaine-prilocaine (EMLA) topical cream Apply thin layer to port 30-60 minutes prior to infusion  Qty: 30 g, Refills: 0    Associated Diagnoses: Malignant neoplasm of lower-inner quadrant of left breast in female, estrogen receptor negative (HCC)      albuterol-ipratropium (DUO-NEB) 2.5 mg-0.5 mg/3 ml nebu 3 mL by Nebulization route every six (6) hours as needed for Wheezing. albuterol (PROVENTIL HFA, VENTOLIN HFA, PROAIR HFA) 90 mcg/actuation inhaler Take  by inhalation every six (6) hours as needed for Wheezing. metFORMIN (GLUMETZA ER) 500 mg TG24 24 hour tablet Take 1 Tablet by mouth every morning. fluticasone propion-salmeteroL (ADVAIR/WIXELA) 100-50 mcg/dose diskus inhaler Take 1 Puff by inhalation every twelve (12) hours. amLODIPine (NORVASC) 10 mg tablet Take 10 mg by mouth every morning. dexAMETHasone (DECADRON) 4 mg tablet Take 8mg (2 tablets) by mouth twice daily on the day before and day after chemotherapy. Qty: 50 Tablet, Refills: 1    Associated Diagnoses: Malignant neoplasm of lower-inner quadrant of left breast in female, estrogen receptor negative (HCC)      ondansetron hcl (ZOFRAN) 8 mg tablet Take 1 Tablet by mouth every eight (8) hours as needed for Nausea or Vomiting. Qty: 30 Tablet, Refills: 3    Associated Diagnoses: Malignant neoplasm of lower-inner quadrant of left breast in female, estrogen receptor negative (HCC)      prochlorperazine (Compazine) 10 mg tablet Take 1 Tablet by mouth every six (6) hours as needed for Nausea or Vomiting.   Qty: 30 Tablet, Refills: 2    Associated Diagnoses: Malignant neoplasm of lower-inner quadrant of left breast in female, estrogen receptor negative ([facility]             Activity: See surgical instructions  Diet: Regular Diet  Wound Care: As directed    Follow-up with Aly Sagastume MD in one week  Follow-up tests/labs none needed    30 mins of time was spent with the patient of which > 50% of the time involved face-to-face counseling of the patient regarding the proposed treatment plan.   Signed:  Aly Sagastume MD  7/29/2022  11:59 AM

## 2022-07-30 LAB
ABO + RH BLD: NORMAL
BLD PROD TYP BPU: NORMAL
BLD PROD TYP BPU: NORMAL
BLOOD GROUP ANTIBODIES SERPL: NORMAL
BPU ID: NORMAL
BPU ID: NORMAL
CROSSMATCH RESULT,%XM: NORMAL
CROSSMATCH RESULT,%XM: NORMAL
SPECIMEN EXP DATE BLD: NORMAL
STATUS OF UNIT,%ST: NORMAL
STATUS OF UNIT,%ST: NORMAL
UNIT DIVISION, %UDIV: 0
UNIT DIVISION, %UDIV: 0

## 2022-08-05 ENCOUNTER — HOSPITAL ENCOUNTER (OUTPATIENT)
Dept: LAB | Age: 62
Discharge: HOME | End: 2022-08-05

## 2022-08-05 LAB
APPEARANCE UR: ABNORMAL
BACTERIA URNS QL MICRO: NEGATIVE /HPF
BILIRUB UR QL: NEGATIVE
COLOR UR: YELLOW
GLUCOSE UR STRIP.AUTO-MCNC: NEGATIVE MG/DL
HGB UR QL STRIP: ABNORMAL
KETONES UR QL STRIP.AUTO: NEGATIVE MG/DL
LEUKOCYTE ESTERASE UR QL STRIP.AUTO: ABNORMAL
MUCOUS THREADS URNS QL MICRO: ABNORMAL /LPF
NITRITE UR QL STRIP.AUTO: NEGATIVE
PH UR STRIP: 7 [PH] (ref 5–8)
PROT UR STRIP-MCNC: NEGATIVE MG/DL
RBC #/AREA URNS HPF: ABNORMAL /HPF (ref 0–5)
SP GR UR REFRACTOMETRY: 1 (ref 1–1.03)
UROBILINOGEN UR QL STRIP.AUTO: NORMAL EU/DL (ref 0.1–1)
WBC URNS QL MICRO: ABNORMAL /HPF (ref 0–4)

## 2022-08-05 PROCEDURE — 87086 URINE CULTURE/COLONY COUNT: CPT

## 2022-08-05 PROCEDURE — 81001 URINALYSIS AUTO W/SCOPE: CPT

## 2022-08-07 LAB
BACTERIA SPEC CULT: NORMAL
SPECIAL REQUESTS,SREQ: NORMAL

## 2022-08-10 ENCOUNTER — TELEPHONE (OUTPATIENT)
Dept: SURGERY | Age: 62
End: 2022-08-10

## 2022-08-10 NOTE — TELEPHONE ENCOUNTER
Xiomara with [facility] calling, states that pt is in [place] an will discharge on Friday. States pt has a PCP at a free clinic but that they don't follow for [facility]. Asking if you will follow pt for [facility] orders?     Call [phone]

## 2022-08-10 NOTE — TELEPHONE ENCOUNTER
Returned call  spoke with [address] Coordinator with [facility] in [place]. Verified patient using 2 patient identifiers. She wants to know if Dr. Janeen Alaniz would follow this patient for the Home Health Orders? Informed will forward this information to Dr. Janeen Alaniz to review upon returning to the office tomorrow and get back with her with recommendations.

## 2022-08-11 ENCOUNTER — HOSPITAL ENCOUNTER (OUTPATIENT)
Dept: LAB | Age: 62
Discharge: HOME | End: 2022-08-11

## 2022-08-11 ENCOUNTER — HOSPITAL ENCOUNTER (INPATIENT)
Age: 62
LOS: 4 days | Discharge: HOME HEALTH | DRG: 469 | End: 2022-08-16
Attending: STUDENT IN AN ORGANIZED HEALTH CARE EDUCATION/TRAINING PROGRAM | Admitting: INTERNAL MEDICINE
Payer: MEDICAID

## 2022-08-11 ENCOUNTER — APPOINTMENT (OUTPATIENT)
Dept: CT IMAGING | Age: 62
DRG: 469 | End: 2022-08-11
Attending: STUDENT IN AN ORGANIZED HEALTH CARE EDUCATION/TRAINING PROGRAM
Payer: MEDICAID

## 2022-08-11 DIAGNOSIS — R60.1 ANASARCA: ICD-10-CM

## 2022-08-11 DIAGNOSIS — N17.9 AKI (ACUTE KIDNEY INJURY) (HCC): Primary | ICD-10-CM

## 2022-08-11 DIAGNOSIS — D64.9 ANEMIA, UNSPECIFIED TYPE: ICD-10-CM

## 2022-08-11 DIAGNOSIS — I10 HYPERTENSION, UNSPECIFIED TYPE: ICD-10-CM

## 2022-08-11 DIAGNOSIS — E86.0 DEHYDRATION: ICD-10-CM

## 2022-08-11 LAB
ANION GAP SERPL CALC-SCNC: 7 MMOL/L (ref 5–15)
ANION GAP SERPL CALC-SCNC: 7 MMOL/L (ref 5–15)
BASOPHILS # BLD: 0.2 K/UL (ref 0–0.1)
BASOPHILS NFR BLD: 2 % (ref 0–1)
BNP SERPL-MCNC: 2047 PG/ML
BUN SERPL-MCNC: 35 MG/DL (ref 6–20)
BUN SERPL-MCNC: 38 MG/DL (ref 6–20)
BUN/CREAT SERPL: 9 (ref 12–20)
BUN/CREAT SERPL: 9 (ref 12–20)
CA-I BLD-MCNC: 9 MG/DL (ref 8.5–10.1)
CA-I BLD-MCNC: 9.3 MG/DL (ref 8.5–10.1)
CHLORIDE SERPL-SCNC: 98 MMOL/L (ref 97–108)
CHLORIDE SERPL-SCNC: 98 MMOL/L (ref 97–108)
CK SERPL-CCNC: 89 U/L (ref 26–192)
CO2 SERPL-SCNC: 26 MMOL/L (ref 21–32)
CO2 SERPL-SCNC: 29 MMOL/L (ref 21–32)
CREAT SERPL-MCNC: 4.11 MG/DL (ref 0.55–1.02)
CREAT SERPL-MCNC: 4.43 MG/DL (ref 0.55–1.02)
DIFFERENTIAL METHOD BLD: ABNORMAL
EOSINOPHIL # BLD: 0.1 K/UL (ref 0–0.4)
EOSINOPHIL NFR BLD: 1 % (ref 0–7)
ERYTHROCYTE [DISTWIDTH] IN BLOOD BY AUTOMATED COUNT: 18.7 % (ref 11.5–14.5)
GLUCOSE SERPL-MCNC: 120 MG/DL (ref 65–100)
GLUCOSE SERPL-MCNC: 132 MG/DL (ref 65–100)
HCT VFR BLD AUTO: 28.7 % (ref 35–47)
HGB BLD-MCNC: 8.3 G/DL (ref 11.5–16)
IMM GRANULOCYTES # BLD AUTO: 0 K/UL
IMM GRANULOCYTES NFR BLD AUTO: 0 %
LACTATE SERPL-SCNC: 2.8 MMOL/L (ref 0.4–2)
LYMPHOCYTES # BLD: 1.4 K/UL (ref 0.8–3.5)
LYMPHOCYTES NFR BLD: 14 % (ref 12–49)
MCH RBC QN AUTO: 28.2 PG (ref 26–34)
MCHC RBC AUTO-ENTMCNC: 28.9 G/DL (ref 30–36.5)
MCV RBC AUTO: 97.6 FL (ref 80–99)
MONOCYTES # BLD: 1 K/UL (ref 0–1)
MONOCYTES NFR BLD: 10 % (ref 5–13)
MYELOCYTES NFR BLD MANUAL: 2 %
NEUTS SEG # BLD: 7 K/UL (ref 1.8–8)
NEUTS SEG NFR BLD: 71 % (ref 32–75)
NRBC # BLD: 0.02 K/UL (ref 0–0.01)
NRBC # BLD: 0.1 K/UL
NRBC BLD MANUAL-RTO: 1 PER 100 WBC
NRBC BLD-RTO: 0.2 PER 100 WBC
PHOSPHATE SERPL-MCNC: 3.3 MG/DL (ref 2.6–4.7)
PLATELET # BLD AUTO: 448 K/UL (ref 150–400)
PMV BLD AUTO: 8.5 FL (ref 8.9–12.9)
POTASSIUM SERPL-SCNC: 4.1 MMOL/L (ref 3.5–5.1)
POTASSIUM SERPL-SCNC: 4.2 MMOL/L (ref 3.5–5.1)
RBC # BLD AUTO: 2.94 M/UL (ref 3.8–5.2)
RBC MORPH BLD: ABNORMAL
SODIUM SERPL-SCNC: 131 MMOL/L (ref 136–145)
SODIUM SERPL-SCNC: 134 MMOL/L (ref 136–145)
WBC # BLD AUTO: 9.8 K/UL (ref 3.6–11)

## 2022-08-11 PROCEDURE — 84100 ASSAY OF PHOSPHORUS: CPT

## 2022-08-11 PROCEDURE — 99285 EMERGENCY DEPT VISIT HI MDM: CPT

## 2022-08-11 PROCEDURE — 81001 URINALYSIS AUTO W/SCOPE: CPT

## 2022-08-11 PROCEDURE — 85025 COMPLETE CBC W/AUTO DIFF WBC: CPT

## 2022-08-11 PROCEDURE — 74176 CT ABD & PELVIS W/O CONTRAST: CPT

## 2022-08-11 PROCEDURE — 80048 BASIC METABOLIC PNL TOTAL CA: CPT

## 2022-08-11 PROCEDURE — 83880 ASSAY OF NATRIURETIC PEPTIDE: CPT

## 2022-08-11 PROCEDURE — 74011250636 HC RX REV CODE- 250/636: Performed by: STUDENT IN AN ORGANIZED HEALTH CARE EDUCATION/TRAINING PROGRAM

## 2022-08-11 PROCEDURE — 82550 ASSAY OF CK (CPK): CPT

## 2022-08-11 PROCEDURE — 83605 ASSAY OF LACTIC ACID: CPT

## 2022-08-11 PROCEDURE — 36415 COLL VENOUS BLD VENIPUNCTURE: CPT

## 2022-08-11 RX ORDER — SODIUM CHLORIDE 9 MG/ML
1000 INJECTION, SOLUTION INTRAVENOUS ONCE
Status: COMPLETED | OUTPATIENT
Start: 2022-08-11 | End: 2022-08-11

## 2022-08-11 RX ADMIN — SODIUM CHLORIDE 1000 ML: 9 INJECTION, SOLUTION INTRAVENOUS at 23:00

## 2022-08-11 NOTE — TELEPHONE ENCOUNTER
Returned call to [address]  Coordinator for [facility] but she was out to lunch. Spoke with Beth Breaux verified patient using 2 patient identifiers and made her aware of the message from yesterday re: to see if Dr. Kareem Zelaya would follow this patient for her [address] orders upon her d/c from [facility] on Friday. Dr. Kareem Zelaya is unsure what she would need Home Health for and recommended that the patient would need to find a provider for long term follow up for [address], especially if it's not surgically related. Imani Hutchins voiced understandings and will make Quin Gonzales aware of this call and have her follow up with [facility].

## 2022-08-12 ENCOUNTER — APPOINTMENT (OUTPATIENT)
Dept: GENERAL RADIOLOGY | Age: 62
DRG: 469 | End: 2022-08-12
Attending: INTERNAL MEDICINE
Payer: MEDICAID

## 2022-08-12 LAB
ALBUMIN SERPL-MCNC: 2.3 G/DL (ref 3.5–5)
ALBUMIN SERPL-MCNC: 2.4 G/DL (ref 3.5–5)
ALBUMIN/GLOB SERPL: 0.7 {RATIO} (ref 1.1–2.2)
ALBUMIN/GLOB SERPL: 0.7 {RATIO} (ref 1.1–2.2)
ALP SERPL-CCNC: 73 U/L (ref 45–117)
ALP SERPL-CCNC: 74 U/L (ref 45–117)
ALT SERPL-CCNC: 24 U/L (ref 12–78)
ALT SERPL-CCNC: 26 U/L (ref 12–78)
ANION GAP SERPL CALC-SCNC: 6 MMOL/L (ref 5–15)
APPEARANCE UR: ABNORMAL
AST SERPL W P-5'-P-CCNC: 19 U/L (ref 15–37)
AST SERPL W P-5'-P-CCNC: 21 U/L (ref 15–37)
BACTERIA URNS QL MICRO: NEGATIVE /HPF
BILIRUB DIRECT SERPL-MCNC: <0.1 MG/DL (ref 0–0.2)
BILIRUB SERPL-MCNC: 0.2 MG/DL (ref 0.2–1)
BILIRUB SERPL-MCNC: 0.3 MG/DL (ref 0.2–1)
BILIRUB UR QL: 3
BUN SERPL-MCNC: 38 MG/DL (ref 6–20)
BUN/CREAT SERPL: 8 (ref 12–20)
CA-I BLD-MCNC: 9.1 MG/DL (ref 8.5–10.1)
CHLORIDE SERPL-SCNC: 101 MMOL/L (ref 97–108)
CO2 SERPL-SCNC: 29 MMOL/L (ref 21–32)
COLOR UR: YELLOW
CREAT SERPL-MCNC: 4.69 MG/DL (ref 0.55–1.02)
CREAT UR-MCNC: 289 MG/DL
EOSINOPHIL #/AREA URNS HPF: NEGATIVE /[HPF]
GLOBULIN SER CALC-MCNC: 3.3 G/DL (ref 2–4)
GLOBULIN SER CALC-MCNC: 3.6 G/DL (ref 2–4)
GLUCOSE BLD STRIP.AUTO-MCNC: 106 MG/DL (ref 65–117)
GLUCOSE BLD STRIP.AUTO-MCNC: 123 MG/DL (ref 65–117)
GLUCOSE BLD STRIP.AUTO-MCNC: 128 MG/DL (ref 65–117)
GLUCOSE BLD STRIP.AUTO-MCNC: 131 MG/DL (ref 65–117)
GLUCOSE SERPL-MCNC: 103 MG/DL (ref 65–100)
GLUCOSE UR STRIP.AUTO-MCNC: NORMAL MG/DL
HGB UR QL STRIP: NEGATIVE
HYALINE CASTS URNS QL MICRO: ABNORMAL /LPF (ref 0–5)
KETONES UR QL STRIP.AUTO: NEGATIVE MG/DL
LACTATE SERPL-SCNC: 1.8 MMOL/L (ref 0.4–2)
LEUKOCYTE ESTERASE UR QL STRIP.AUTO: NEGATIVE
MUCOUS THREADS URNS QL MICRO: ABNORMAL /LPF
NITRITE UR QL STRIP.AUTO: NEGATIVE
OSMOLALITY SERPL: 298 MOSM/KG H2O
PERFORMED BY, TECHID: ABNORMAL
PERFORMED BY, TECHID: NORMAL
PH UR STRIP: 5 [PH] (ref 5–8)
POTASSIUM SERPL-SCNC: 4.1 MMOL/L (ref 3.5–5.1)
POTASSIUM UR-SCNC: 33 MMOL/L
PROT SERPL-MCNC: 5.6 G/DL (ref 6.4–8.2)
PROT SERPL-MCNC: 6 G/DL (ref 6.4–8.2)
PROT UR STRIP-MCNC: ABNORMAL MG/DL
PROT UR-MCNC: 92 MG/DL (ref 0–11.9)
PROT UR-MCNC: 93 MG/DL (ref 0–11.9)
PROT/CREAT UR-RTO: 0.3
RBC #/AREA URNS HPF: ABNORMAL /HPF (ref 0–5)
SODIUM SERPL-SCNC: 136 MMOL/L (ref 136–145)
SODIUM UR-SCNC: 14 MMOL/L
SP GR UR REFRACTOMETRY: 1.02 (ref 1–1.03)
UA: UC IF INDICATED,UAUC: ABNORMAL
UROBILINOGEN UR QL STRIP.AUTO: NORMAL EU/DL (ref 0.1–1)
WBC URNS QL MICRO: ABNORMAL /HPF (ref 0–4)

## 2022-08-12 PROCEDURE — 77010033678 HC OXYGEN DAILY

## 2022-08-12 PROCEDURE — 94640 AIRWAY INHALATION TREATMENT: CPT

## 2022-08-12 PROCEDURE — 71045 X-RAY EXAM CHEST 1 VIEW: CPT

## 2022-08-12 PROCEDURE — 84300 ASSAY OF URINE SODIUM: CPT

## 2022-08-12 PROCEDURE — 74011000250 HC RX REV CODE- 250: Performed by: INTERNAL MEDICINE

## 2022-08-12 PROCEDURE — 83930 ASSAY OF BLOOD OSMOLALITY: CPT

## 2022-08-12 PROCEDURE — 74011250636 HC RX REV CODE- 250/636: Performed by: INTERNAL MEDICINE

## 2022-08-12 PROCEDURE — 74011250637 HC RX REV CODE- 250/637: Performed by: STUDENT IN AN ORGANIZED HEALTH CARE EDUCATION/TRAINING PROGRAM

## 2022-08-12 PROCEDURE — 80076 HEPATIC FUNCTION PANEL: CPT

## 2022-08-12 PROCEDURE — 65270000029 HC RM PRIVATE

## 2022-08-12 PROCEDURE — 74011250637 HC RX REV CODE- 250/637: Performed by: INTERNAL MEDICINE

## 2022-08-12 PROCEDURE — 84133 ASSAY OF URINE POTASSIUM: CPT

## 2022-08-12 PROCEDURE — 84156 ASSAY OF PROTEIN URINE: CPT

## 2022-08-12 PROCEDURE — 87205 SMEAR GRAM STAIN: CPT

## 2022-08-12 PROCEDURE — 83605 ASSAY OF LACTIC ACID: CPT

## 2022-08-12 PROCEDURE — 82962 GLUCOSE BLOOD TEST: CPT

## 2022-08-12 PROCEDURE — 94761 N-INVAS EAR/PLS OXIMETRY MLT: CPT

## 2022-08-12 PROCEDURE — 36415 COLL VENOUS BLD VENIPUNCTURE: CPT

## 2022-08-12 PROCEDURE — 80053 COMPREHEN METABOLIC PANEL: CPT

## 2022-08-12 RX ORDER — ACETAMINOPHEN 650 MG/1
650 SUPPOSITORY RECTAL
Status: DISCONTINUED | OUTPATIENT
Start: 2022-08-12 | End: 2022-08-16 | Stop reason: HOSPADM

## 2022-08-12 RX ORDER — HYDRALAZINE HYDROCHLORIDE 25 MG/1
25 TABLET, FILM COATED ORAL 4 TIMES DAILY
Status: DISCONTINUED | OUTPATIENT
Start: 2022-08-12 | End: 2022-08-12

## 2022-08-12 RX ORDER — PANTOPRAZOLE SODIUM 40 MG/1
40 TABLET, DELAYED RELEASE ORAL
Status: DISCONTINUED | OUTPATIENT
Start: 2022-08-12 | End: 2022-08-16 | Stop reason: HOSPADM

## 2022-08-12 RX ORDER — MAGNESIUM SULFATE 100 %
4 CRYSTALS MISCELLANEOUS AS NEEDED
Status: DISCONTINUED | OUTPATIENT
Start: 2022-08-12 | End: 2022-08-16 | Stop reason: HOSPADM

## 2022-08-12 RX ORDER — ACETAMINOPHEN 325 MG/1
650 TABLET ORAL
Status: DISCONTINUED | OUTPATIENT
Start: 2022-08-12 | End: 2022-08-16 | Stop reason: HOSPADM

## 2022-08-12 RX ORDER — SODIUM CHLORIDE 0.9 % (FLUSH) 0.9 %
5-40 SYRINGE (ML) INJECTION AS NEEDED
Status: DISCONTINUED | OUTPATIENT
Start: 2022-08-12 | End: 2022-08-16 | Stop reason: HOSPADM

## 2022-08-12 RX ORDER — BUDESONIDE AND FORMOTEROL FUMARATE DIHYDRATE 80; 4.5 UG/1; UG/1
2 AEROSOL RESPIRATORY (INHALATION)
Status: DISCONTINUED | OUTPATIENT
Start: 2022-08-12 | End: 2022-08-16 | Stop reason: HOSPADM

## 2022-08-12 RX ORDER — ONDANSETRON 4 MG/1
4 TABLET, ORALLY DISINTEGRATING ORAL
Status: DISCONTINUED | OUTPATIENT
Start: 2022-08-12 | End: 2022-08-16 | Stop reason: HOSPADM

## 2022-08-12 RX ORDER — ENOXAPARIN SODIUM 100 MG/ML
30 INJECTION SUBCUTANEOUS DAILY
Status: DISCONTINUED | OUTPATIENT
Start: 2022-08-12 | End: 2022-08-15

## 2022-08-12 RX ORDER — GABAPENTIN 100 MG/1
100 CAPSULE ORAL 3 TIMES DAILY
Status: DISCONTINUED | OUTPATIENT
Start: 2022-08-12 | End: 2022-08-14

## 2022-08-12 RX ORDER — SENNOSIDES 8.6 MG/1
1 TABLET ORAL DAILY PRN
Status: DISCONTINUED | OUTPATIENT
Start: 2022-08-12 | End: 2022-08-16 | Stop reason: HOSPADM

## 2022-08-12 RX ORDER — DEXTROSE MONOHYDRATE 100 MG/ML
0-250 INJECTION, SOLUTION INTRAVENOUS AS NEEDED
Status: DISCONTINUED | OUTPATIENT
Start: 2022-08-12 | End: 2022-08-16 | Stop reason: HOSPADM

## 2022-08-12 RX ORDER — TRAZODONE HYDROCHLORIDE 50 MG/1
25 TABLET ORAL
Status: DISCONTINUED | OUTPATIENT
Start: 2022-08-12 | End: 2022-08-16 | Stop reason: HOSPADM

## 2022-08-12 RX ORDER — ONDANSETRON 2 MG/ML
4 INJECTION INTRAMUSCULAR; INTRAVENOUS
Status: DISCONTINUED | OUTPATIENT
Start: 2022-08-12 | End: 2022-08-16 | Stop reason: HOSPADM

## 2022-08-12 RX ORDER — LANOLIN ALCOHOL/MO/W.PET/CERES
3 CREAM (GRAM) TOPICAL
Status: DISCONTINUED | OUTPATIENT
Start: 2022-08-12 | End: 2022-08-16 | Stop reason: HOSPADM

## 2022-08-12 RX ORDER — INSULIN LISPRO 100 [IU]/ML
INJECTION, SOLUTION INTRAVENOUS; SUBCUTANEOUS
Status: DISCONTINUED | OUTPATIENT
Start: 2022-08-12 | End: 2022-08-16 | Stop reason: HOSPADM

## 2022-08-12 RX ORDER — ALBUTEROL SULFATE 90 UG/1
2 AEROSOL, METERED RESPIRATORY (INHALATION)
Status: DISCONTINUED | OUTPATIENT
Start: 2022-08-12 | End: 2022-08-16 | Stop reason: HOSPADM

## 2022-08-12 RX ORDER — MECLIZINE HYDROCHLORIDE 25 MG/1
25 TABLET ORAL
Status: DISCONTINUED | OUTPATIENT
Start: 2022-08-12 | End: 2022-08-16 | Stop reason: HOSPADM

## 2022-08-12 RX ORDER — IPRATROPIUM BROMIDE AND ALBUTEROL SULFATE 2.5; .5 MG/3ML; MG/3ML
3 SOLUTION RESPIRATORY (INHALATION)
Status: DISCONTINUED | OUTPATIENT
Start: 2022-08-12 | End: 2022-08-16 | Stop reason: HOSPADM

## 2022-08-12 RX ORDER — SODIUM CHLORIDE 0.9 % (FLUSH) 0.9 %
5-40 SYRINGE (ML) INJECTION EVERY 8 HOURS
Status: DISCONTINUED | OUTPATIENT
Start: 2022-08-12 | End: 2022-08-16 | Stop reason: HOSPADM

## 2022-08-12 RX ORDER — AMLODIPINE BESYLATE 5 MG/1
10 TABLET ORAL
Status: DISCONTINUED | OUTPATIENT
Start: 2022-08-12 | End: 2022-08-12

## 2022-08-12 RX ORDER — PANTOPRAZOLE SODIUM 40 MG/1
40 TABLET, DELAYED RELEASE ORAL 2 TIMES DAILY
Status: DISCONTINUED | OUTPATIENT
Start: 2022-08-12 | End: 2022-08-12

## 2022-08-12 RX ADMIN — ACETAMINOPHEN 650 MG: 325 TABLET, FILM COATED ORAL at 09:20

## 2022-08-12 RX ADMIN — MELATONIN TAB 3 MG 3 MG: 3 TAB at 21:49

## 2022-08-12 RX ADMIN — BUDESONIDE AND FORMOTEROL FUMARATE DIHYDRATE 2 PUFF: 80; 4.5 AEROSOL RESPIRATORY (INHALATION) at 20:00

## 2022-08-12 RX ADMIN — SODIUM CHLORIDE, PRESERVATIVE FREE 10 ML: 5 INJECTION INTRAVENOUS at 16:37

## 2022-08-12 RX ADMIN — BUDESONIDE AND FORMOTEROL FUMARATE DIHYDRATE 2 PUFF: 80; 4.5 AEROSOL RESPIRATORY (INHALATION) at 08:31

## 2022-08-12 RX ADMIN — GABAPENTIN 100 MG: 100 CAPSULE ORAL at 16:38

## 2022-08-12 RX ADMIN — PANTOPRAZOLE SODIUM 40 MG: 40 TABLET, DELAYED RELEASE ORAL at 16:38

## 2022-08-12 RX ADMIN — SODIUM CHLORIDE, PRESERVATIVE FREE 5 ML: 5 INJECTION INTRAVENOUS at 04:34

## 2022-08-12 RX ADMIN — PANTOPRAZOLE SODIUM 40 MG: 40 TABLET, DELAYED RELEASE ORAL at 09:00

## 2022-08-12 RX ADMIN — SODIUM CHLORIDE, PRESERVATIVE FREE 10 ML: 5 INJECTION INTRAVENOUS at 21:49

## 2022-08-12 RX ADMIN — SODIUM CHLORIDE, PRESERVATIVE FREE 5 ML: 5 INJECTION INTRAVENOUS at 05:53

## 2022-08-12 RX ADMIN — ENOXAPARIN SODIUM 30 MG: 100 INJECTION SUBCUTANEOUS at 08:34

## 2022-08-12 RX ADMIN — GABAPENTIN 100 MG: 100 CAPSULE ORAL at 21:49

## 2022-08-12 NOTE — H&P
History and Physical    Patient: Julio Cesar Pinto MRN: [id number]  SSN: [id number]    YOB: 1960  Age: 65 y.o. Sex: female      Subjective:      Julio Cesar Pinto is a 65 y.o. female with PMH of diabetes, hypertension, asthma and recent gastric ulcer perforation. She presented to the ED from rehab due to KIAN. Of note, she started to have shortness of breath secondary to pulmonary edema and generalized swelling during last hospitalization for gastric perforation. She has no history of CHF and ECHO at that time showed normal systolic and diastolic function. Has given diuretics with respiratory improvement. However, since admitted to rehab, her generalized swelling continue to worsened. She was put on 2mg bumax daily, but that did not helped. On weds night, after urination, she reports feeling some pain in her thighs and given ?tylenol and ?gabapentin, no antibiotics at that time. However, since then she had not had any urination throughout Thursday and today. Creatinine reportedly at 4. She had normal creatinine on last admission, which progressively worsened to 1.5 in rehab prior to this. She otherwise denies chest pain or dyspnea, however reports having orthopnea. In the ED, again re-demonstrate creatinine of 4.43. LA 2.8, Pro-. Chest X-ray showed mild pulmonary edema, improved since prior.  Straight cath in ED with little urine output, bladder scan with little post-void residue     Past Medical History:   Diagnosis Date    Arrhythmia     heart murmur    Arthritis     Right knee, DJD left knee    Asthma     Cancer ([facility] [address]     COVID-19 01/29/2021    Diabetes ([facility] [address]     Pre diabetes    Hypertension     Morbid obesity ([facility] [address]     Neuropathy     Patient reports numbness and tingling in her legs and feet from her back    Sleep apnea     was ordered a CPAP years ago, but never used     Past Surgical History:   Procedure Laterality Date    HX BREAST LUMPECTOMY Left 3/2/2022    LEFT BREAST LUMPECTOMY WITH LEFT BREAST SENTINEL NODE BIOPSY WITH BLUE DYE AND PORT A CATH INSERTION (URGENT) performed by Nikolas Cintron MD at [address]    HX CHOLECYSTECTOMY      HX GYN      c section    HX GYN      d&c    HX WISDOM TEETH EXTRACTION Bilateral       Family History   Problem Relation Age of Onset    Hypertension Mother     Heart Disease Mother     Heart Disease Father     Hypertension Father     Breast Cancer Maternal Aunt     Breast Cancer Other     Breast Cancer Maternal Aunt      Social History     Tobacco Use    Smoking status: Never    Smokeless tobacco: Never   Substance Use Topics    Alcohol use: Yes     Comment: social once per month      Prior to Admission medications    Medication Sig Start Date End Date Taking? Authorizing Provider   pantoprazole (PROTONIX) 40 mg tablet Take 1 Tablet by mouth two (2) times a day. 7/29/22   Libertad Martínez MD   clarithromycin (BIAXIN) 500 mg tablet Take 1 Tablet by mouth two (2) times a day for 27 doses. 7/28/22 8/11/22  Negin Alegria MD   amoxicillin-clavulanate (AUGMENTIN) 1,000-62.5 mg ER tablet Take 1 Tablet by mouth every twelve (12) hours for 27 doses. 7/28/22 8/11/22  Negin Alegria MD   dexAMETHasone (DECADRON) 4 mg tablet Take 8mg (2 tablets) by mouth twice daily on the day before and day after chemotherapy. 5/4/22   Tamica Mckeon MD   lisinopril-hydroCHLOROthiazide (PRINZIDE, ZESTORETIC) 20-12.5 mg per tablet Take 1 Tablet by mouth daily. Provider, Historical   lidocaine-prilocaine (EMLA) topical cream Apply thin layer to port 30-60 minutes prior to infusion 3/29/22   Milagro Olguin NP   ondansetron hcl (ZOFRAN) 8 mg tablet Take 1 Tablet by mouth every eight (8) hours as needed for Nausea or Vomiting. Patient not taking: Reported on 7/20/2022 3/29/22   Lisbeth DORSEY NP   prochlorperazine (Compazine) 10 mg tablet Take 1 Tablet by mouth every six (6) hours as needed for Nausea or Vomiting.   Patient not taking: Reported on 7/20/2022 3/29/22 Milagro Olguin, NP   albuterol-ipratropium (DUO-NEB) 2.5 mg-0.5 mg/3 ml nebu 3 mL by Nebulization route every six (6) hours as needed for Wheezing. Provider, Historical   albuterol (PROVENTIL HFA, VENTOLIN HFA, PROAIR HFA) 90 mcg/actuation inhaler Take  by inhalation every six (6) hours as needed for Wheezing. Provider, Historical   metFORMIN (GLUMETZA ER) 500 mg TG24 24 hour tablet Take 1 Tablet by mouth every morning. Provider, Historical   fluticasone propion-salmeteroL (ADVAIR/WIXELA) 100-50 mcg/dose diskus inhaler Take 1 Puff by inhalation every twelve (12) hours. Provider, Historical   amLODIPine (NORVASC) 10 mg tablet Take 10 mg by mouth every morning. Provider, Historical        Allergies   Allergen Reactions    Nuts [Tree Nut] Rash and Itching     Throat itching       Review of Systems:   Constitutional: No fevers, No chills, No fatigue, No weakness  Eyes: No visual disturbance  Ears, Nose, Mouth, Throat, and Face: No nasal congestion, No sore throat  Respiratory: No cough, No sputum, No wheezing, No SOB  Cardiovascular: No chest pain, No Palpitations   Gastrointestinal: No nausea, No vomiting, No diarrhea, No constipation, No abdominal pain  Genitourinary: No frequency, No dysuria, No hematuria  Integument/Breast: No rash, No skin lesion(s), No dryness  Musculoskeletal: No arthralgias, No neck pain, No back pain  Neurological: No headaches, No dizziness, No confusion,  No seizures  Behavioral/Psychiatric: No anxiety, No depression      Objective:     Vitals:    08/12/22 0024 08/12/22 0054 08/12/22 0109 08/12/22 0124   BP: (!) 183/71 (!) 193/75 (!) 164/80 (!) 192/74   Pulse: 89 87 86 93   Resp: 27 17 15 22   Temp:       SpO2: 100% 100% 100% 100%   Weight:       Height:            Physical Exam:   General: alert, cooperative, no distress  Eye: conjunctivae/corneas clear. PERRL, EOM's intact. Throat and Neck: normal and no erythema or exudates noted.  No mass   Lung: clear to auscultation bilaterally  Heart: regular rate and rhythm,   Abdomen: soft, non-tender. Bowel sounds normal. No masses,  Extremities: 3+ edema in all extremities. able to move all extremities normal, atraumatic  Skin: Normal.  Neurologic: AOx3. Motor function and sensation grossly intact. Psychiatric: non focal    Recent Results (from the past 24 hour(s))   METABOLIC PANEL, BASIC    Collection Time: 08/11/22  4:00 PM   Result Value Ref Range    Sodium 134 (L) 136 - 145 mmol/L    Potassium 4.2 3.5 - 5.1 mmol/L    Chloride 98 97 - 108 mmol/L    CO2 29 21 - 32 mmol/L    Anion gap 7 5 - 15 mmol/L    Glucose 132 (H) 65 - 100 mg/dL    BUN 35 (H) 6 - 20 mg/dL    Creatinine 4.11 (H) 0.55 - 1.02 mg/dL    BUN/Creatinine ratio 9 (L) 12 - 20      GFR est AA 13 (L) >60 ml/min/1.73m2    GFR est non-AA 11 (L) >60 ml/min/1.73m2    Calcium 9.3 8.5 - 10.1 mg/dL   CBC WITH AUTOMATED DIFF    Collection Time: 08/11/22  4:00 PM   Result Value Ref Range    WBC 9.8 3.6 - 11.0 K/uL    RBC 2.94 (L) 3.80 - 5.20 M/uL    HGB 8.3 (L) 11.5 - 16.0 g/dL    HCT 28.7 (L) 35.0 - 47.0 %    MCV 97.6 80.0 - 99.0 FL    MCH 28.2 26.0 - 34.0 PG    MCHC 28.9 (L) 30.0 - 36.5 g/dL    RDW 18.7 (H) 11.5 - 14.5 %    PLATELET 689 (H) 753 - 400 K/uL    MPV 8.5 (L) 8.9 - 12.9 FL    NRBC 0.2 (H) 0.0  WBC    ABSOLUTE NRBC 0.02 (H) 0.00 - 0.01 K/uL    NEUTROPHILS 71 32 - 75 %    LYMPHOCYTES 14 12 - 49 %    MONOCYTES 10 5 - 13 %    EOSINOPHILS 1 0 - 7 %    BASOPHILS 2 (H) 0 - 1 %    MYELOCYTES 2 (H) 0 %    NRBC 1.0  WBC    IMMATURE GRANULOCYTES 0 %    ABS. NEUTROPHILS 7.0 1.8 - 8.0 K/UL    ABS. LYMPHOCYTES 1.4 0.8 - 3.5 K/UL    ABS. MONOCYTES 1.0 0.0 - 1.0 K/UL    ABS. EOSINOPHILS 0.1 0.0 - 0.4 K/UL    ABS. BASOPHILS 0.2 (H) 0.0 - 0.1 K/UL    ABSOLUTE NRBC 0.10 K/uL    ABS. IMM.  GRANS. 0.0 K/UL    DF Manual      RBC COMMENTS Hypochromia  2+       METABOLIC PANEL, BASIC    Collection Time: 08/11/22 10:36 PM   Result Value Ref Range    Sodium 131 (L) 136 - 145 mmol/L Potassium 4.1 3.5 - 5.1 mmol/L    Chloride 98 97 - 108 mmol/L    CO2 26 21 - 32 mmol/L    Anion gap 7 5 - 15 mmol/L    Glucose 120 (H) 65 - 100 mg/dL    BUN 38 (H) 6 - 20 mg/dL    Creatinine 4.43 (H) 0.55 - 1.02 mg/dL    BUN/Creatinine ratio 9 (L) 12 - 20      GFR est AA 12 (L) >60 ml/min/1.73m2    GFR est non-AA 10 (L) >60 ml/min/1.73m2    Calcium 9.0 8.5 - 10.1 mg/dL   PHOSPHORUS    Collection Time: 08/11/22 10:36 PM   Result Value Ref Range    Phosphorus 3.3 2.6 - 4.7 mg/dL   URINALYSIS W/ REFLEX CULTURE    Collection Time: 08/11/22 10:36 PM    Specimen: Urine   Result Value Ref Range    Color Yellow      Appearance Hazy (A) Clear      Specific gravity 1.020 1.003 - 1.030      pH (UA) 5.0 5.0 - 8.0      Protein Trace (A) Negative mg/dL    Glucose Normal (A) Negative mg/dL    Ketone Negative Negative mg/dL    Bilirubin 3 (A) Negative      Blood Negative Negative      Urobilinogen Normal 0.1 - 1.0 EU/dL    Nitrites Negative Negative      Leukocyte Esterase Negative Negative      WBC 5-10 0 - 4 /hpf    RBC 0-5 0 - 5 /hpf    Bacteria Negative Negative /hpf    UA:UC IF INDICATED Culture not indicated by UA result Culture not indicated by UA result      Mucus 1+ (A) Negative /lpf    Hyaline cast 5-10 0 - 5 /lpf   LACTIC ACID    Collection Time: 08/11/22 10:36 PM   Result Value Ref Range    Lactic acid 2.8 (HH) 0.4 - 2.0 mmol/L   CK    Collection Time: 08/11/22 10:36 PM   Result Value Ref Range    CK 89 26 - 192 U/L   NT-PRO BNP    Collection Time: 08/11/22 10:36 PM   Result Value Ref Range    NT pro-BNP 2,047 (H) <125 pg/mL       XR Results (maximum last 3): Results from [place] encounter on 07/20/22    XR ABD (KUB)    Narrative  CLINICAL HISTORY: Abdominal distention    Comparison to 7/25/2022    Single KUB demonstrates diffuse colonic distention, seemingly increased compared  to the prior exam. Midline surgical staples are noted.     Impression  Diffuse colonic distention seemingly increased compared to the prior  exam possibly related to postoperative ileus. XR UPPER GI SERIES W KUB    Narrative  EXAM:  XR UPPER GI SERIES W KUB    INDICATION:  post repair gastric ulcer, evaluate for leak    COMPARISON: CT neck chest abdomen and pelvis 7/12/2022. FLUOROSCOPY DOSE (air kerma):  135.478 mGy. FINDINGS:  The preliminary radiograph of the abdomen demonstrates a nonobstructive gas  pattern. NG tube terminates over the distal stomach. Cholecystectomy clips. A Limited monophasic examination was performed. The patient ingested thin barium  without difficulty. NG tube is in place, precluding evaluation of the esophagus. There is no definite hiatal hernia or gastroesophageal reflux. .    The stomach is normal in size, shape and position with no mass, ulcer or other  abnormality. No extraluminal contrast to suggest leak. Focal concave contour  deformity at the anterior aspect of the body (2-9), may reflect postsurgical  change. The duodenal bulb and sweep are suboptimally opacified. Impression  Limited upper GI series. No leak identified. Suspected focal  postsurgical irregularity at the anterior gastric body. XR CHEST PORT    Narrative  EXAM: XR CHEST PORT    INDICATION: ET tube    COMPARISON: 7/20/2022    FINDINGS: A portable AP radiograph of the chest was obtained at 454 hours. Cardiac monitoring leads. Left IJ Port-A-Cath. Nasogastric tube courses into the  stomach. Endotracheal tube is approximately 4.0 cm above the gloria. . Small left  pleural effusion and left basilar opacity with mild pulmonary edema. Cle[place] Heart size  is enlarged. .  The bones and soft tissues are grossly within normal limits. Impression  Mild pulmonary edema. Small left pleural effusion slightly  increased. Left basilar opacity may represent volume loss. CT Results (maximum last 3):   Results from [place] encounter on 08/11/22    CT ABD PELV WO CONT    Narrative  EXAM:  CT ABD PELV WO CONT    INDICATION: Urinary retention. COMPARISON: Upper GI 7/25/2022. CT 7/12/2022. TECHNIQUE: Helical CT of the abdomen  and pelvis  without contrast. Coronal and  sagittal reformats are performed. CT dose reduction was achieved through use of  a standardized protocol tailored for this examination and automatic exposure  control for dose modulation. FINDINGS:  Solid organ evaluation is limited without contrast.    The visualized lung bases demonstrate a small left pleural effusion and left  lower lobe opacity. The heart size is normal. There is no pericardial or right  pleural effusion. There is no renal, ureteral, or bladder calculus. The kidneys are symmetric  without hydronephrosis. There is no perinephric fluid or fat stranding. The liver, spleen, pancreas, and right adrenal gland are normal. There is a  stable left adrenal nodule measuring 3.4 cm. The gall bladder is surgically  absent without intra- or extra-hepatic biliary dilatation. Midline abdominal skin staples are noted. There is mild mesenteric stranding in  the upper anterior abdomen. There is a low density collection in the left upper  quadrant abutting the greater curvature of the stomach measuring 11.9 x 9.0 cm. There are no dilated bowel loops. The appendix is normal.    There are no enlarged lymph nodes. There is no free air. The aorta tapers  without aneurysm. The urinary bladder is nondistended with a Solitario catheter in place. There is no  pelvic mass. The bony structures are age-appropriate. Impression  1. Decompressed urinary bladder with a Solitario catheter in place. No  hydronephrosis. 2. Anterior abdominal mesenteric stranding may be postsurgical. There is a low  density collection adjacent to the greater curvature of the stomach measuring  11.9 x 9.0 cm, suspicious for a contained gastric perforation. 3. Small left pleural effusion with adjacent atelectasis versus infection.       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 ORAL CONTRAST: Oral contrast was administered to better evaluate the bowel. TECHNIQUE:  Following the uneventful intravenous administration of contrast, thin axial  images were obtained through the chest, abdomen and pelvis. Coronal and sagittal  reformats were generated. CT dose reduction was achieved through use of a  standardized protocol tailored for this examination and automatic exposure  control for dose modulation. FINDINGS:    CHEST WALL: Right chest wall Port-A-Cath. No axillary adenopathy. Possible scar  is noted in the left axilla. THYROID: No nodule. MEDIASTINUM: No mass or lymphadenopathy. ANAY: No mass or lymphadenopathy. THORACIC AORTA: No dissection or aneurysm. MAIN PULMONARY ARTERY: Normal in caliber. TRACHEA/BRONCHI: Patent. ESOPHAGUS: No wall thickening or dilatation. HEART: Normal in size. PLEURA: No effusion or pneumothorax. LUNGS: No nodule, mass, or airspace disease. LIVER: Mild hepatic steatosis. No focal liver lesions. BILIARY TREE: Cholecystectomy. CBD is not dilated. SPLEEN: within normal limits. PANCREAS: No mass or ductal dilatation. ADRENALS: 2.6 x 3.4 cm left adrenal mass with Hounsfield density greater than  expected for simple adenoma. KIDNEYS: No mass, calculus, or hydronephrosis. STOMACH: Unremarkable. SMALL BOWEL: No dilatation or wall thickening. COLON: No dilatation or wall thickening. APPENDIX: Unremarkable  PERITONEUM: No ascites or pneumoperitoneum. RETROPERITONEUM: No lymphadenopathy or aortic aneurysm. REPRODUCTIVE ORGANS: Unremarkable  URINARY BLADDER: No mass or calculus. BONES: Multilevel degenerative changes in the thoracic and lumbar spine.   ABDOMINAL WALL: Subcutaneous fat stranding and skin thickening in the lower  anterior abdominal wall. ADDITIONAL COMMENTS: N/A    Impression  1. No lymphadenopathy is identified in the chest, abdomen, or pelvis. 2.  Left adrenal nodule which may represent a lipid poor adrenal adenoma. Recommend dedicated adrenal CT. 3.  Skin thickening and 17 is edema in the lower anterior abdominal wall. Correlate for cellulitis. CT NECK SOFT TISSUE W CONT    Narrative  INDICATION: LN + for castelman's disease; staging studies Castleman disease /  PET denied by insurance. During peer to peer given auth # [id number] for CT w  cont    COMPARISON: Whole body bone scintigraphy 5/24/2022    TECHNIQUE:  Axial neck CT was performed after the uneventful administration of  IV contrast. Sagittal and coronal reformats were generated. CT dose reduction was achieved through use of a standardized protocol tailored  for this examination and automatic exposure control for dose modulation. CONTRAST: 100 mL Isovue 370    FINDINGS:  Of note, CT images of neck are stored under the accession for the CT chest  abdomen pelvis (#[id number])    NASOPHARYNX: Normal.  SUPRAHYOID NECK: Normal oropharynx, oral cavity, parapharyngeal space, and  retropharyngeal space. INFRAHYOID NECK: Normal larynx, hypopharynx and supraglottis. PAROTID GLANDS: Normal.  SUBMANDIBULAR GLANDS: Normal.  THYROID GLAND: Normal. No nodule. LYMPH NODES: None enlarged by CT size criteria . OSSEOUS STRUCTURES: No destructive bone lesion. Degenerative changes. ADDITIONAL COMMENTS: Atherosclerosis. Partially visualized right pectoral  infusion port. Impression  1. Of note, CT images of neck are stored under the accession for the CT chest  abdomen pelvis (#[id number]). 2.  No cervical lymphadenopathy. 3.  CT chest, abdomen, and pelvis dictated separately. MRI Results (maximum last 3): No results found for this or any previous visit. Nuclear Medicine Results (maximum last 3):   Results from [facility] encounter on 05/24/22    NM BONE SCAN [address] [facility] BODY    Narrative  EXAM: Whole Body Nuclear Bone Scan is performed with IV injection of 23.8 mCi  technetium 99m. INDICATION: Breast cancer with elevated calcium - eval for metastatic disease    Comparison Bone Scan: None. Correlation Imaging Studies: None. TECHNIQUE: Anterior and posterior whole body scintigraphic planar images are  obtained. FINDINGS:  There is degenerative/arthritic-type activity in the knees. There is no evidence of fracture, osteomyelitis or bone tumor. There is no pattern of skeletal metastases. Impression  No evidence of skeletal metastases. US Results (maximum last 3): No results found for this or any previous visit. Assessment and plan:   # KIAN  - unsure cause. Kidneys without hydronephrosis in CT.   - Ddx: SE from diuretics vs poor kidney perfusion secondary to fluid retention vs intrinsic kidney disease.   - Received 1 L of NS in ED, will repeat CMP in am to monitor change. - Urine studies for FeURea and miroalbumin/creatinine ratio ordered  - Consult nephrology     # Anasarca  - Unsure cause. Happened before KIAN, but no definite diagnosis of CHF, CKD or cirrhosis. - Albumin was low on last admission, will recheck. Patient denies decreased oral intake. - Will check for albuminuria.   - High protein nutrition supplement. # Hypertension  - Resume amlodipine, hydralazine. Hold nephrotoxic medications. # Diabetes   - Hold metformin  - POC + correctional insulin     # Asthma  - Continue home medications.       # Full code by default, need further clarification    # Medication reconciliation incomplete, appreciate assistance from pharmacy or nursing staff        Signed By: Dennis Nye MD     August 12, 2022

## 2022-08-12 NOTE — ED NOTES
TRANSFER - OUT REPORT:    Verbal report given to nurse receiving pt  Marquis Curry  being transferred to 411(unit) for routine progression of care       Report consisted of patients Situation, Background, Assessment and   Recommendations(SBAR). Information from the following report(s) ED Summary, Intake/Output and Recent Results was reviewed with the receiving nurse. Lines:   Venous Access Device R side Port-a-cath 04/13/22 (Active)       Peripheral IV 08/11/22 Right Other(comment) (Active)   Site Assessment Clean, dry, & intact 08/11/22 2237   Phlebitis Assessment 0 08/11/22 2237   Infiltration Assessment 0 08/11/22 2237   Dressing Status Clean, dry, & intact 08/11/22 2237   Hub Color/Line Status Pink 08/11/22 2237   Action Taken Blood drawn 08/11/22 2237       Peripheral IV 08/11/22 Right (Active)        Opportunity for questions and clarification was provided.       Patient transported with:   [employer]

## 2022-08-12 NOTE — PROGRESS NOTES
Problem: Falls - Risk of  Goal: *Absence of Falls  Description: Document Sherrell Robertson Fall Risk and appropriate interventions in the flowsheet.   Outcome: Progressing Towards Goal  Note: Fall Risk Interventions:

## 2022-08-12 NOTE — ED NOTES
Patient straight cathed for urine, 30ml out, no urine output since last night. No urine showing on bladder scanner.

## 2022-08-12 NOTE — PROGRESS NOTES
Hospitalist Progress Note    Subjective:   Daily Progress Note: 8/12/2022 11:07 AM    Hospital Course:  Theo Lennox is a 73-year-old female with PMHx of diabetes mellitus, hypertension, asthma and recent gastric ulcer perforation who presented to the ED from rehab  facility due to KIAN. Of note, she started to have shortness of breath secondary to pulmonary edema and generalized swelling during last hospitalization for gastric perforation. She has no history of CHF and ECHO at that time showed normal systolic and diastolic function. She was given diuretics with respiratory improvement. However, since admitted to rehab, her generalized swelling continued to worsen. She was put on 2mg bumax daily without improvement. Overnight 08/10/22, after urination, she reports feeling some pain in her thighs and given tylenol and gabapentin with some relief. No antibiotics at that time. However, since then she has not had any urination for >24  hours. Creatinine reportedly at 4. She had normal creatinine on last admission, which progressively worsened to 1.5 in rehab prior to this. She otherwise denies chest pain or dyspnea, however reports having orthopnea. In the ED, hypoxic with SpO2 81% and placed on 4 L with improvement. KIAN with creatinine of 4.43. LA 2.8, Pro-BNP 2,047. Chest X-ray showed mild pulmonary edema, improved since prior. Straight cath in ED with little urine output, bladder scan with little post-void residual. She was given 1 L IV fluid bolus in the ED. Nephrology consult. Subjective:    Patient seen and examined at bedside. She is complaining of tingling sensation of both lower extremities, mainly near her hips. Denies any numbness/tingling in feet. Patient states pain was relieved when she was given gabapentin in rehab facility.      Current Facility-Administered Medications   Medication Dose Route Frequency    albuterol (PROVENTIL HFA, VENTOLIN HFA, PROAIR HFA) inhaler 2 Puff  2 Puff Inhalation Q6H PRN    albuterol-ipratropium (DUO-NEB) 2.5 MG-0.5 MG/3 ML  3 mL Nebulization Q6H PRN    amLODIPine (NORVASC) tablet 10 mg  10 mg Oral 7am    budesonide-formoterol (SYMBICORT) 80-4.5 mcg inhaler  2 Puff Inhalation BID RT    hydrALAZINE (APRESOLINE) tablet 25 mg  25 mg Oral QID    melatonin tablet 3 mg  3 mg Oral QHS    meclizine (ANTIVERT) tablet 25 mg  25 mg Oral Q6H PRN    traZODone (DESYREL) tablet 25 mg  25 mg Oral QHS PRN    insulin lispro (HUMALOG) injection   SubCUTAneous TIDAC    glucose chewable tablet 16 g  4 Tablet Oral PRN    glucagon (GLUCAGEN) injection 1 mg  1 mg IntraMUSCular PRN    dextrose 10% infusion 0-250 mL  0-250 mL IntraVENous PRN    sodium chloride (NS) flush 5-40 mL  5-40 mL IntraVENous Q8H    sodium chloride (NS) flush 5-40 mL  5-40 mL IntraVENous PRN    acetaminophen (TYLENOL) tablet 650 mg  650 mg Oral Q6H PRN    Or    acetaminophen (TYLENOL) suppository 650 mg  650 mg Rectal Q6H PRN    ondansetron (ZOFRAN ODT) tablet 4 mg  4 mg Oral Q8H PRN    Or    ondansetron (ZOFRAN) injection 4 mg  4 mg IntraVENous Q6H PRN    enoxaparin (LOVENOX) injection 30 mg  30 mg SubCUTAneous DAILY    senna (SENOKOT) tablet 8.6 mg  1 Tablet Oral DAILY PRN    pantoprazole (PROTONIX) tablet 40 mg  40 mg Oral ACB&D        Review of Systems  Constitutional: No fevers, No chills, No sweats, No fatigue, No Weakness  Eyes: No redness  Ears, nose, mouth, throat, and face: No nasal congestion, No sore throat, No voice change  Respiratory: No Shortness of Breath, No cough, No wheezing  Cardiovascular: No chest pain, No palpitations, No extremity edema  Gastrointestinal: No nausea, No vomiting, No diarrhea, No abdominal pain  Genitourinary: No frequency, + dysuria, + dark urine, No hematuria  Integument/breast: No skin lesion(s)   Neurological: No Confusion, No headaches, No dizziness      Objective:     Visit Vitals  BP (!) 96/48   Pulse 82   Temp 98.8 °F (37.1 °C)   Resp 19   Ht 5' 2\" (1.575 m)   Wt 149.7 kg (330 lb)   LMP 2021 (Approximate)   SpO2 97%   Breastfeeding No   BMI 60.36 kg/m²    O2 Flow Rate (L/min): 2 l/min O2 Device: Nasal cannula    Temp (24hrs), Av.9 °F (37.2 °C), Min:98.2 °F (36.8 °C), Max:100 °F (37.8 °C)      701 - 1900  In: 300 [P.O.:300]  Out: -   08/10 1901 - 700  In: -   Out: 30 [Urine:30]    PHYSICAL EXAM:  Constitutional: No acute distress  Skin: Generalized pallor. Extremities and face reveal no rashes. HEENT: Sclerae anicteric. PERRL. No oral ulcers. The neck is supple and no masses. Cardiovascular: Regular rate and rhythm. +S1/S2. No murmur or gallop. Respiratory:  Clear breath sounds bilaterally with no wheezes, rales, or rhonchi. GI: Abdomen nondistended, soft, and nontender. Normal active bowel sounds. Rectal: Deferred   Musculoskeletal: No pitting edema of the lower legs. Able to move all ext  Neurological:  Patient is alert and oriented x3.  Cranial nerves II-XII grossly intact  Psychiatric: Mood appears appropriate       Data Review    Recent Results (from the past 24 hour(s))   METABOLIC PANEL, BASIC    Collection Time: 22  4:00 PM   Result Value Ref Range    Sodium 134 (L) 136 - 145 mmol/L    Potassium 4.2 3.5 - 5.1 mmol/L    Chloride 98 97 - 108 mmol/L    CO2 29 21 - 32 mmol/L    Anion gap 7 5 - 15 mmol/L    Glucose 132 (H) 65 - 100 mg/dL    BUN 35 (H) 6 - 20 mg/dL    Creatinine 4.11 (H) 0.55 - 1.02 mg/dL    BUN/Creatinine ratio 9 (L) 12 - 20      GFR est AA 13 (L) >60 ml/min/1.73m2    GFR est non-AA 11 (L) >60 ml/min/1.73m2    Calcium 9.3 8.5 - 10.1 mg/dL   CBC WITH AUTOMATED DIFF    Collection Time: 22  4:00 PM   Result Value Ref Range    WBC 9.8 3.6 - 11.0 K/uL    RBC 2.94 (L) 3.80 - 5.20 M/uL    HGB 8.3 (L) 11.5 - 16.0 g/dL    HCT 28.7 (L) 35.0 - 47.0 %    MCV 97.6 80.0 - 99.0 FL    MCH 28.2 26.0 - 34.0 PG    MCHC 28.9 (L) 30.0 - 36.5 g/dL    RDW 18.7 (H) 11.5 - 14.5 %    PLATELET 203 (H) 536 - 400 K/uL    MPV 8.5 (L) 8.9 - 12.9 FL NRBC 0.2 (H) 0.0  WBC    ABSOLUTE NRBC 0.02 (H) 0.00 - 0.01 K/uL    NEUTROPHILS 71 32 - 75 %    LYMPHOCYTES 14 12 - 49 %    MONOCYTES 10 5 - 13 %    EOSINOPHILS 1 0 - 7 %    BASOPHILS 2 (H) 0 - 1 %    MYELOCYTES 2 (H) 0 %    NRBC 1.0  WBC    IMMATURE GRANULOCYTES 0 %    ABS. NEUTROPHILS 7.0 1.8 - 8.0 K/UL    ABS. LYMPHOCYTES 1.4 0.8 - 3.5 K/UL    ABS. MONOCYTES 1.0 0.0 - 1.0 K/UL    ABS. EOSINOPHILS 0.1 0.0 - 0.4 K/UL    ABS. BASOPHILS 0.2 (H) 0.0 - 0.1 K/UL    ABSOLUTE NRBC 0.10 K/uL    ABS. IMM.  GRANS. 0.0 K/UL    DF Manual      RBC COMMENTS Hypochromia  2+       METABOLIC PANEL, BASIC    Collection Time: 08/11/22 10:36 PM   Result Value Ref Range    Sodium 131 (L) 136 - 145 mmol/L    Potassium 4.1 3.5 - 5.1 mmol/L    Chloride 98 97 - 108 mmol/L    CO2 26 21 - 32 mmol/L    Anion gap 7 5 - 15 mmol/L    Glucose 120 (H) 65 - 100 mg/dL    BUN 38 (H) 6 - 20 mg/dL    Creatinine 4.43 (H) 0.55 - 1.02 mg/dL    BUN/Creatinine ratio 9 (L) 12 - 20      GFR est AA 12 (L) >60 ml/min/1.73m2    GFR est non-AA 10 (L) >60 ml/min/1.73m2    Calcium 9.0 8.5 - 10.1 mg/dL   PHOSPHORUS    Collection Time: 08/11/22 10:36 PM   Result Value Ref Range    Phosphorus 3.3 2.6 - 4.7 mg/dL   URINALYSIS W/ REFLEX CULTURE    Collection Time: 08/11/22 10:36 PM    Specimen: Urine   Result Value Ref Range    Color Yellow      Appearance Hazy (A) Clear      Specific gravity 1.020 1.003 - 1.030      pH (UA) 5.0 5.0 - 8.0      Protein Trace (A) Negative mg/dL    Glucose Normal (A) Negative mg/dL    Ketone Negative Negative mg/dL    Bilirubin 3 (A) Negative      Blood Negative Negative      Urobilinogen Normal 0.1 - 1.0 EU/dL    Nitrites Negative Negative      Leukocyte Esterase Negative Negative      WBC 5-10 0 - 4 /hpf    RBC 0-5 0 - 5 /hpf    Bacteria Negative Negative /hpf    UA:UC IF INDICATED Culture not indicated by UA result Culture not indicated by UA result      Mucus 1+ (A) Negative /lpf    Hyaline cast 5-10 0 - 5 /lpf   LACTIC ACID    Collection Time: 08/11/22 10:36 PM   Result Value Ref Range    Lactic acid 2.8 (HH) 0.4 - 2.0 mmol/L   CK    Collection Time: 08/11/22 10:36 PM   Result Value Ref Range    CK 89 26 - 192 U/L   NT-PRO BNP    Collection Time: 08/11/22 10:36 PM   Result Value Ref Range    NT pro-BNP 2,047 (H) <974 pg/mL   METABOLIC PANEL, COMPREHENSIVE    Collection Time: 08/12/22  6:53 AM   Result Value Ref Range    Sodium 136 136 - 145 mmol/L    Potassium 4.1 3.5 - 5.1 mmol/L    Chloride 101 97 - 108 mmol/L    CO2 29 21 - 32 mmol/L    Anion gap 6 5 - 15 mmol/L    Glucose 103 (H) 65 - 100 mg/dL    BUN 38 (H) 6 - 20 mg/dL    Creatinine 4.69 (H) 0.55 - 1.02 mg/dL    BUN/Creatinine ratio 8 (L) 12 - 20      GFR est AA 11 (L) >60 ml/min/1.73m2    GFR est non-AA 9 (L) >60 ml/min/1.73m2    Calcium 9.1 8.5 - 10.1 mg/dL    Bilirubin, total 0.3 0.2 - 1.0 mg/dL    AST (SGOT) 19 15 - 37 U/L    ALT (SGPT) 26 12 - 78 U/L    Alk. phosphatase 74 45 - 117 U/L    Protein, total 6.0 (L) 6.4 - 8.2 g/dL    Albumin 2.4 (L) 3.5 - 5.0 g/dL    Globulin 3.6 2.0 - 4.0 g/dL    A-G Ratio 0.7 (L) 1.1 - 2.2     GLUCOSE, POC    Collection Time: 08/12/22  8:12 AM   Result Value Ref Range    Glucose (POC) 106 65 - 117 mg/dL    Performed by Karen Barboza    HEPATIC FUNCTION PANEL    Collection Time: 08/12/22  9:38 AM   Result Value Ref Range    Protein, total 5.6 (L) 6.4 - 8.2 g/dL    Albumin 2.3 (L) 3.5 - 5.0 g/dL    Globulin 3.3 2.0 - 4.0 g/dL    A-G Ratio 0.7 (L) 1.1 - 2.2      Bilirubin, total 0.2 0.2 - 1.0 mg/dL    Bilirubin, direct <0.1 0.0 - 0.2 mg/dL    Alk. phosphatase 73 45 - 117 U/L    AST (SGOT) 21 15 - 37 U/L    ALT (SGPT) 24 12 - 78 U/L   LACTIC ACID    Collection Time: 08/12/22  9:38 AM   Result Value Ref Range    Lactic acid 1.8 0.4 - 2.0 mmol/L       XR CHEST PORT   Final Result   Mild central pulmonary vascular congestion, decreased since   7/21/2022, without overt pulmonary edema. CT ABD PELV WO CONT   Final Result   1.  Decompressed urinary bladder with a Solitario catheter in place. No   hydronephrosis. 2. Anterior abdominal mesenteric stranding may be postsurgical. There is a low   density collection adjacent to the greater curvature of the stomach measuring   11.9 x 9.0 cm, suspicious for a contained gastric perforation. 3. Small left pleural effusion with adjacent atelectasis versus infection. Active Problems:    KIAN (acute kidney injury) ([facility] [address] (7/20/2022)        Assessment/Plan:     1. KIAN  - Etiology unclear  - Ddx: SE from diuretics vs poor kidney perfusion secondary to fluid retention vs intrinsic kidney disease.  - CT abd negative for hydronephrosis  - S/p 1 L bolus of NS in ED  - Urine studies for FeURea and miroalbumin/creatinine ratio ordered  - Consult nephrology     2. Anasarca  - Unsure cause. Happened before KIAN, but no definite diagnosis of CHF, CKD or cirrhosis. - Albumin was low on last admission, will recheck. Patient denies decreased oral intake. - Will check for albuminuria.  - High protein nutrition supplement. 3. Hypertension  - Resume amlodipine, hydralazine. Hold nephrotoxic medications. 4. Diabetes  5. Neuropathy  - Hold metformin  - POC + correctional insulin  - Start low-dose gabapentin     6. Asthma  7. Hx PARMINDER - refused CPAP years ago  - Stable. Continue home medications. 8. Hx breast cancer  - Completed chemotherapy. She was supposed to start radiation 2 weeks ago but delay s/t hospitalization/rehab. DVT Prophylaxis: Lovenox  Code Status: Full  POA:    Discharge Barriers:    - Nephrology consult   - Improvement in renal functions     Care Plan discussed with: patient and nursing     Total time spent with patient: >35 minutes.

## 2022-08-12 NOTE — ED NOTES
Report received from Cinda, [facility], [facility]; assuming care at this time

## 2022-08-12 NOTE — CONSULTS
[address] Renal Consult Note      NAME:  Elyse Vera   :   1960   MRN:  [id number]     Requesting Physician Tamala Dakins, MD   Reason for Consult:  Acute kidney injury     PCP:  Olga Leahy NP     Date/Time:  2022 11:54 AM          Subjective:     CHIEF COMPLAINT: KIAN     HISTORY OF PRESENT ILLNESS:     Ms. Mango Moore is a 65 y.o.  female who is admitted to the medical service with acute kidney injury. We are asked to evaluate for KIAN. Patient was at [facility] rehab and was noted to have changes of acute kidney injury. She was subsequently transferred to [facility]. Serum creatinine in late July was 0.362 0.58. On presentation creatinine was 4.11 and has subsequently peaked at 4.43. Review of records suggest she had been aggressively diuresed because of the lower extremity edema. No antecedent use of nonsteroidals. She had been on antibiotics but no evidence of acute interstitial nephritis on labs. Of note, admission BNP was 200. She denies shortness of breath or chest pain. She appears deconditioned and does have dyspnea on exertion. Chest x-ray on admission suggested pulmonary edema. She has noticed decreased urine output and bladder scan did not reveal significant amount of volume. No hematuria or dysuria. She has chronic lower extremity edema, appears to be lymphedema.     Past Medical History:   Diagnosis Date    Arrhythmia     heart murmur    Arthritis     Right knee, DJD left knee    Asthma     Cancer ([address]     COVID-19 2021    Diabetes ([address]     Pre diabetes    Hypertension     Morbid obesity ([address]     Neuropathy     Patient reports numbness and tingling in her legs and feet from her back    Sleep apnea     was ordered a CPAP years ago, but never used        Past Surgical History:   Procedure Laterality Date    HX BREAST LUMPECTOMY Left 3/2/2022    LEFT BREAST LUMPECTOMY WITH LEFT BREAST SENTINEL NODE BIOPSY WITH BLUE DYE AND PORT A CATH INSERTION (URGENT) performed by Roger Lovelace MD at [address]    HX CHOLECYSTECTOMY      HX GYN      c section    HX GYN      d&c    HX WISDOM TEETH EXTRACTION Bilateral        Social History     Tobacco Use    Smoking status: Never    Smokeless tobacco: Never   Substance Use Topics    Alcohol use: Yes     Comment: social once per month        Family History   Problem Relation Age of Onset    Hypertension Mother     Heart Disease Mother     Heart Disease Father     Hypertension Father     Breast Cancer Maternal Aunt     Breast Cancer Other     Breast Cancer Maternal Aunt         Allergies   Allergen Reactions    Nuts [Tree Nut] Rash and Itching     Throat itching        Prior to Admission medications    Medication Sig Start Date End Date Taking? Authorizing Provider   pantoprazole (PROTONIX) 40 mg tablet Take 1 Tablet by mouth two (2) times a day. 7/29/22   Tracie Drake MD   clarithromycin (BIAXIN) 500 mg tablet Take 1 Tablet by mouth two (2) times a day for 27 doses. 7/28/22 8/11/22  Abril Dewitt MD   amoxicillin-clavulanate (AUGMENTIN) 1,000-62.5 mg ER tablet Take 1 Tablet by mouth every twelve (12) hours for 27 doses. 7/28/22 8/11/22  Abril Dewitt MD   dexAMETHasone (DECADRON) 4 mg tablet Take 8mg (2 tablets) by mouth twice daily on the day before and day after chemotherapy. 5/4/22   Norma Eid MD   lisinopril-hydroCHLOROthiazide (PRINZIDE, ZESTORETIC) 20-12.5 mg per tablet Take 1 Tablet by mouth daily. Provider, Historical   lidocaine-prilocaine (EMLA) topical cream Apply thin layer to port 30-60 minutes prior to infusion 3/29/22   Milagro Olguin NP   ondansetron hcl (ZOFRAN) 8 mg tablet Take 1 Tablet by mouth every eight (8) hours as needed for Nausea or Vomiting.   Patient not taking: Reported on 7/20/2022 3/29/22   Stacy DORSEY NP   prochlorperazine (Compazine) 10 mg tablet Take 1 Tablet by mouth every six (6) hours as needed for Nausea or Vomiting. Patient not taking: Reported on 7/20/2022 3/29/22   Ale DORSEY NP   albuterol-ipratropium (DUO-NEB) 2.5 mg-0.5 mg/3 ml nebu 3 mL by Nebulization route every six (6) hours as needed for Wheezing. Provider, Historical   albuterol (PROVENTIL HFA, VENTOLIN HFA, PROAIR HFA) 90 mcg/actuation inhaler Take  by inhalation every six (6) hours as needed for Wheezing. Provider, Historical   metFORMIN (GLUMETZA ER) 500 mg TG24 24 hour tablet Take 1 Tablet by mouth every morning. Provider, Historical   fluticasone propion-salmeteroL (ADVAIR/WIXELA) 100-50 mcg/dose diskus inhaler Take 1 Puff by inhalation every twelve (12) hours. Provider, Historical   amLODIPine (NORVASC) 10 mg tablet Take 10 mg by mouth every morning.     Provider, Historical         Current Facility-Administered Medications:     albuterol (PROVENTIL HFA, VENTOLIN HFA, PROAIR HFA) inhaler 2 Puff, 2 Puff, Inhalation, Q6H PRN, Carlos Enrique Escobedo MD    albuterol-ipratropium (DUO-NEB) 2.5 MG-0.5 MG/3 ML, 3 mL, Nebulization, Q6H PRN, Megha Escobedo MD    amLODIPine (NORVASC) tablet 10 mg, 10 mg, Oral, 7am, Mgeha Escobedo MD    budesonide-formoterol (SYMBICORT) 80-4.5 mcg inhaler, 2 Puff, Inhalation, BID RT, Celestino Langford MD, 2 Puff at 08/12/22 0831    hydrALAZINE (APRESOLINE) tablet 25 mg, 25 mg, Oral, QID, Carlos Enrique Escobedo MD    melatonin tablet 3 mg, 3 mg, Oral, QHS, Megha Escobedo MD    meclizine (ANTIVERT) tablet 25 mg, 25 mg, Oral, Q6H PRN, Celestino Langford MD    traZODone (DESYREL) tablet 25 mg, 25 mg, Oral, QHS PRN, Celestino Langford MD    insulin lispro (HUMALOG) injection, , SubCUTAneous, James Rolls, Carlos Enrique Gerardo MD    glucose chewable tablet 16 g, 4 Tablet, Oral, PRN, Celestino Langford MD    glucagon (GLUCAGEN) injection 1 mg, 1 mg, IntraMUSCular, PRN, Celestino Langford MD    dextrose 10% infusion 0-250 mL, 0-250 mL, IntraVENous, PRN, Celestino Langford MD    sodium chloride (NS) flush 5-40 mL, 5-40 mL, IntraVENous, Q8H, Megha Escobedo MD, 5 mL at 08/12/22 0553    sodium chloride (NS) flush 5-40 mL, 5-40 mL, IntraVENous, PRN, August Escobedo MD    acetaminophen (TYLENOL) tablet 650 mg, 650 mg, Oral, Q6H PRN, 650 mg at 08/12/22 0920 **OR** acetaminophen (TYLENOL) suppository 650 mg, 650 mg, Rectal, Q6H PRN, August Escobedo MD    ondansetron (ZOFRAN ODT) tablet 4 mg, 4 mg, Oral, Q8H PRN **OR** ondansetron (ZOFRAN) injection 4 mg, 4 mg, IntraVENous, Q6H PRN, Carlos Enrique Escobedo MD    enoxaparin (LOVENOX) injection 30 mg, 30 mg, SubCUTAneous, DAILY, August Escobedo MD, 30 mg at 08/12/22 0834    senna (SENOKOT) tablet 8.6 mg, 1 Tablet, Oral, DAILY PRN, August Escobedo MD    pantoprazole (PROTONIX) tablet 40 mg, 40 mg, Oral, ACB&D, Esha Newton MD, 40 mg at 08/12/22 0900    gabapentin (NEURONTIN) capsule 100 mg, 100 mg, Oral, TID, Dirk Noe PA-C      Review of Systems:  A comprehensive review of systems was negative except for that written in the HPI.        Objective:      VITALS:    Vital signs reviewed; most recent are:    Visit Vitals  BP (!) 98/54   Pulse 82   Temp 98.8 °F (37.1 °C)   Resp 19   Ht 5' 2\" (1.575 m)   Wt 149.7 kg (330 lb)   SpO2 97%   Breastfeeding No   BMI 60.36 kg/m²     SpO2 Readings from Last 6 Encounters:   08/12/22 97%   07/29/22 95%   06/17/22 95%   06/15/22 95%   06/15/22 95%   05/25/22 96%    O2 Flow Rate (L/min): 2 l/min     Intake/Output Summary (Last 24 hours) at 8/12/2022 1154  Last data filed at 8/12/2022 0933  Gross per 24 hour   Intake 300 ml   Output 30 ml   Net 270 ml            Exam:   Physical Exam:General appearance: alert, cooperative, no distress, appears stated age, morbidly obese  Head: Normocephalic, without obvious abnormality, atraumatic, syndromic facies-cushingoid  Eyes: negative  Neck: supple, symmetrical, trachea midline, no adenopathy, thyroid: not enlarged, symmetric, no tenderness/mass/nodules, and very thick neck, cannot assess for JVD  Lungs: clear to auscultation bilaterally  Heart: regular rate and rhythm, no S3 or S4  Abdomen: soft, non-tender. Bowel sounds normal. No masses,  no organomegaly, obese, no dependent abdominal wall edema  Extremities: atraumatic, no cyanosis has edema but it appears to be lymphedema  Neurologic: Grossly normal      LABS:  Recent Labs     08/12/22  0938 08/12/22  0653 08/11/22  2236 08/11/22  1600   NA  --  136 131* 134*   K  --  4.1 4.1 4.2   CL  --  101 98 98   CO2  --  29 26 29   BUN  --  38* 38* 35*   CREA  --  4.69* 4.43* 4.11*   CA  --  9.1 9.0 9.3   ALB 2.3* 2.4*  --   --    PHOS  --   --  3.3  --      Recent Labs     08/11/22  1600   WBC 9.8   HGB 8.3*   HCT 28.7*   *     Lab Results   Component Value Date/Time    Glucose (POC) 131 (H) 08/12/2022 11:43 AM    Glucose (POC) 106 08/12/2022 08:12 AM     No results for input(s): PH, PCO2, PO2, HCO3, FIO2 in the last 72 hours. No results for input(s): INR, INREXT in the last 72 hours. No results for input(s): SAROJ, KU, CLU, CREAU in the last 72 hours. No lab exists for component: PROU    Assessment:   Renal Specific Problems  KIAN  Obesity  Probably volume depleted with BNP of 200 in the setting of acute kidney injury. Plan:     Obtain/ Order: labs/cultures/radiology/procedures:  urine lytes and urine creatinine, spot urine protein and creatinine ratio, and eosionphils and renal panel and CPK    Cortisol level in a.m.       Therapeutic:    Hold BP meds  Agree with cessation of all diuretics  Agree with saline infusion that patient recieved    Risk of deterioration: medium      Total time spent with patient: 30 Minutes                  Discussed:   patient PA Zhou           ___________________________________________________    Attending Physician: Lor Bauman MD

## 2022-08-12 NOTE — ED TRIAGE NOTES
Patient send over from [facility] for abnormal lab results, labs drawn at 1600 today showed a BUN of 35 Creatinine of 4.11

## 2022-08-12 NOTE — PROGRESS NOTES
PA was at bedside' gave her the reading of the pt's bp; 96/48; PA told me to hold her bp meds for this morning

## 2022-08-12 NOTE — PROGRESS NOTES
Transfer summary from [facility] copied below for transition of care purposes. Sent to ED for renal failure. * Final Report *  Transfer Summary     Patient:   Cathleen Whiting             MRN: [id number]            FIN: [id number]               Age:   65 years     Sex:  Female     :  1960   Associated Diagnoses:   None   Author:   Vincenzo Mcgee MD, Bethanie Palacios      Admission Date/Time:  22 19:05    Discharge Date and Time: 22     History of Present Illness (per admission H&P):   This is a 58-year-old female admitted from [facility] who was found to have a gastric ulcer perforation. She underwent exploratory laparotomy with repair of the gastric ulcer on . She did test positive for H. pylori, but etiology of the gastric ulcer was also suspected to be related to chronic NSAID use. Her postoperative course was complicated by acute blood loss anemia, acute kidney injury, and anasarca with hypoalbuminemia. She was being diuresed with Bumex IV throughout her hospitalization. For treatment of the H. pylori infection she is on combination oral antibiotics and proton pump inhibitor. She has a history of morbid obesity with a BMI of 55. Of note she also has history of breast cancer, with recent lumpectomy 2022 and chemotherapy. Today, she reports pain in the incisional area of the abdomen that is worse with activity and better with rest, oral medications help somewhat. She reports edema in the hands and legs that has been improving but still not resolved. She does endorse shortness of breath, history of asthma and Advair works very well for her whereas substitutes often do not. She has had diarrhea associated with her antibiotic regimen, discussed addition of Florastor and Banatrol supplements. She reports significant generalized weakness, denies focal weakness. She denies spasms or sensory changes. She is continent of bowel and bladder.   She has been tolerating an oral diet without dysphagia. Hospital Course:  She had been progressing well throughout her rehab course. On the day of transfer she noted reduced urine output (random bladder scans show fairly empty bladder) and new bilateral hearing impairment, she has been on clarithromycin for almost 2 weeks as a part of treatment for H. pylori. There was concern for clarithromycin-induced ototoxicity and so this was stopped. Labs showed a creatinine of 4.11 and BUN of 35. Differential includes overdiuresis (was on Bumex for anasarca) vs med-induced nephrotoxicity. She is being transferred to [facility] ED for further management. Per physiatry documentation:   Impression and Plan/Medical Assessment:     Gastric ulcer perforation s/p ex-lap and repair 7/20  Postop pain   Acute blood loss anemia   Acute hypoxic respiratory failure   KIAN   Anasarca   RLE cellulitis  Hypoalbuminemia   Antibiotic-associated diarrhea   DM2 with hyperglycemia   HTN   Asthma   Breast cancer, recent lumpectomy 2/2022 and chemo  Morbid obesity - BMI 55 linked to diabetes  KIAN stage 3   Possible macrolide-induced ototoxicity/nephrotoxicity    Plan:     Tylenol, oxycodone, avoid NSAIDs   Gabapentin - held d/t dizziness   Pulmonary consult - asthma/hypoxia   Amoxacillin/Clarithromycin (end 8/12) - h. pylori   Doxycycline x 7days - cellulitis   Florastor   Bumex   BP meds   Metformin   Breo ellipta  PPI   Pulm toilet, IS   Bowel regimen     DVT ppx: lovenox   Medical comorbidities: hospitalist consult       Per hospitalist documentation:  Impression/Plan:    dizziness  After starting on gabapentin  Discussed with the physiatry service and will DC gabapentin and monitor    Complaining of hearing loss  Likely secondary to the antibiotics  Discontinue clarithromycin  Outpatient ENT follow-up      Hypokalemia  Repletion ordered    Bilateral thigh pain right appears to be new   lower extremity Dopplers negative    Acute kidney injury  Patient currently on Bumex 2 mg.   We will repeat the labs today  Hold off on metformin    Diabetes  Blood [id number]  on metformin - will hold for now  Accu-Cheks and sliding scale insulin    Hypertension  Blood pressure low 100s to 110s over 50s to 60s  Currently on amlodipine and lisinopril  DC Norvasc  As needed's    Acute blood loss anemia  Postoperatively  H&H improved    Cellulitis  Improved  Completed antibiotics to complete course    Bowel perforation  Secondary to gastric ulcer perforation status post repair 7/20      H pylori infection  Currently on Augmentin and clarithromycin  completed 14 days of therapy  will dc abx      Generalized weakness  PT/OT/strengthening program as per rehab MD    Reflux  Continue PPI/sucralfate    Breast cancer status postlumpectomy  Chemotherapy as outpatient    Follow with oncology     Anasarca  Hypoalbuminemia  Continue diuretics decrease the dose with mild acute kidney injury    Acute hypoxic respiratory failure  Component of volume/underlying asthma  Diuretics/inhalers  O2 via nasal cannula wean to keep sats greater than 90%  7/31 chest x-ray cardiomegaly/minimal congestive heart failure - repeat cxr shows improvement  Pulmonary following  started back on 2l of o2  may need sleep study    Dysfunctional uterine bleeding  Needs to follow with gynecology outpatient    Urinary frequency  Already on antibiotics  urine cx are negative    DVT prophylaxis  Subcu Lovenox     Functional Status at Discharge:      ADL Status  [id number] Eating: Independent - 06 (08/11/22)  [id number] Oral Hygiene: Independent - 06 (08/11/22)  OM1192 Upper Body Dressing: Independent - 06 (08/11/22)  LH2525 Lower Body Dressing: Independent - 06 (08/11/22)  [id number] Toileting Hygiene: Independent - 06 (08/11/22)  EU6052 Toilet Transfer: Independent - 06 (08/11/22)  [id number] Shower, Bathe Self: Independent - 06 (08/11/22)  Tub/Shower Transfer- OT: Ind (08/11/22)      Mobility Functional Status  GR6010 Lying to Sitting Side of Bed Goal: Partial/Moderate assistance - 03 (08/07/22)  ID3692 Sit to Stand: Independent - 06 (08/10/22)  XX5869 Chair,Bed to Chair Transfer: Supervision or touching assistance - 04 (08/07/22)  VA6100 Walk 10 Feet: Independent - 06 (08/10/22)  [id number] Walk 50 Feet with Two Turns: Independent - 06 (08/10/22)  NW0308 Walk 150 Feet: Not attempted due to medical condition or safety concerns - 88 (08/10/22)      Speech/Language & Cognition Functional Status  Comprehension Mode Functional Status:  Auditory, Visual (07/29/22)  [id number] Understanding Verbal Content: Understands -4 (08/11/22)  [id number] Expression of Ideas and Wants: Expresses w/o difficulty & with clear speech - 4 (08/11/22)  Expression Mode Functional Status: Vocal, Nonvocal (07/29/22)  Attention Functional Status - OT: Usually Independent (08/09/22)  Memory Functional Status - OT IP: Sometimes Independent (08/09/22)  Safety Awareness/Insight - OT: Sometimes Independent (08/09/22)  Simple Problem Solving Func Status OT: Sometimes Independent (08/09/22)  Complex Problem Solving Func Status OT: Sometimes Independent (08/09/22)       Discharge Medications:       Home Medications (6) Active  Advair Diskus 100 mcg-50 mcg inhalation powder 1 puff, INH, BID RT  albuterol CFC free 90 mcg/inh inhalation aerosol 1 puff, PRN, INH, q6hr RT  lisinopril 20 mg oral tablet 20 mg = 1 tab, Oral, Daily  metFORMIN 500 mg oral tablet, extended release 500 mg = 1 tab, Oral, Daily  pantoprazole 40 mg oral delayed release tablet 40 mg = 1 tab, Oral, BIDAC  sucralfate 1 g oral tablet 1 gm = 1 tab, Oral, ACHS      Vital Signs (last 24 hrs)_____  Last Charted___________  Temp Oral     97.8 DegF  (AUG 11 20:07)  Heart Rate Peripheral   89 bpm  (AUG 11 20:07)  Resp Rate         18 br/min  (AUG 11 20:07)  SBP      107 mmHg  (AUG 11 20:07)  DBP      62 mmHg  (AUG 11 20:07)  SpO2      95 %  (AUG 11 20:07)          Labs (Last four charted values)  WBC                  6.8 (AUG 05)   Hgb                  L 8.7 (AUG 05)   Hct                  L 29.1 (AUG 05)   Plt                  410 (AUG 05)   Na                   141 (AUG 08) 142 (AUG 05)   K                    L 3.4 (AUG 08) L 3.1 (AUG 05)   CO2                  24 (AUG 08) H 32 (AUG 05)   Cl                   97 (AUG 08) 97 (AUG 05)  Cr                   H 1.49 (AUG 08) H 1.03 (AUG 05)   BUN                  22 (AUG 08) 14 (AUG 05)   Glucose              H 118 (AUG 08) H 113 (AUG 05)   Ca                   9.6 (AUG 08) 9.7 (AUG 05)       Diet -- 07/29/22 19:07:00 EDT, Texture: Level 7 - Regular, Liquid Consistency: Level 0 - Thin, Restrictions: Consistent Carbohydrate, Special Instructions: add SHINE, likes eggs, milk, coffee daily at bfast, Latvian dressing on salads, dislikes mixed vegetables      Disposition:   [facility] ED      Signature Line  Electronically Authenticated on  08/11/2022 08:28 PM  ____________________________________________________  Christine Lauren MD Previously Declined (within the last year)

## 2022-08-12 NOTE — ED PROVIDER NOTES
[id number]  EMERGENCY DEPARTMENT ENCOUNTER NOTE        Date: 8/11/2022  Patient Name: Oumou Dobbs      History of Presenting Illness     Chief Complaint   Patient presents with    Abnormal Lab Results       History Provided By: Patient    HPI: Oumou Dobbs, 65 y.o. female with multiple comorbidities as below who presents to the ED transferred from [facility] for acute kidney injury. Patient was discharged to rehab facility after an admission for perforated gastric ulcer status post ex lap and reportedly the patient has been well. She had generalized anasarca in which she was being diuresed with Bumex. Today, routine blood work at the rehab facility was done which shows that the patient has highly elevated creatinine from her baseline. Currently, patient is not reporting any symptoms. She feels her normal self aside from complaints of has not urinated in a while. No chest pain, shortness of breath, abdominal pain, nausea, vomiting, generalized weakness or any new complaints. She reports currently she feels her normal self. Presenting issues of high severity, no known trigger, aggravating relieving factors with no associated additional symptoms. There are no other complaints, changes, or physical findings at this time. PCP: Herber Arreaga NP    Current Outpatient Medications   Medication Sig Dispense Refill    pantoprazole (PROTONIX) 40 mg tablet Take 1 Tablet by mouth two (2) times a day. 60 Tablet 0    dexAMETHasone (DECADRON) 4 mg tablet Take 8mg (2 tablets) by mouth twice daily on the day before and day after chemotherapy. 50 Tablet 1    lisinopril-hydroCHLOROthiazide (PRINZIDE, ZESTORETIC) 20-12.5 mg per tablet Take 1 Tablet by mouth daily.       lidocaine-prilocaine (EMLA) topical cream Apply thin layer to port 30-60 minutes prior to infusion 30 g 0    ondansetron hcl (ZOFRAN) 8 mg tablet Take 1 Tablet by mouth every eight (8) hours as needed for Nausea or Vomiting. (Patient not taking: Reported on 7/20/2022) 30 Tablet 3    prochlorperazine (Compazine) 10 mg tablet Take 1 Tablet by mouth every six (6) hours as needed for Nausea or Vomiting. (Patient not taking: Reported on 7/20/2022) 30 Tablet 2    albuterol-ipratropium (DUO-NEB) 2.5 mg-0.5 mg/3 ml nebu 3 mL by Nebulization route every six (6) hours as needed for Wheezing.  albuterol (PROVENTIL HFA, VENTOLIN HFA, PROAIR HFA) 90 mcg/actuation inhaler Take  by inhalation every six (6) hours as needed for Wheezing.  metFORMIN (GLUMETZA ER) 500 mg TG24 24 hour tablet Take 1 Tablet by mouth every morning.  fluticasone propion-salmeteroL (ADVAIR/WIXELA) 100-50 mcg/dose diskus inhaler Take 1 Puff by inhalation every twelve (12) hours.  amLODIPine (NORVASC) 10 mg tablet Take 10 mg by mouth every morning.          Past History     Past Medical History:  Past Medical History:   Diagnosis Date    Arrhythmia     heart murmur    Arthritis     Right knee, DJD left knee    Asthma     Cancer ([facility] [address]     COVID-19 01/29/2021    Diabetes ([facility] [address]     Pre diabetes    Hypertension     Morbid obesity ([facility] [address]     Neuropathy     Patient reports numbness and tingling in her legs and feet from her back    Sleep apnea     was ordered a CPAP years ago, but never used       Past Surgical History:  Past Surgical History:   Procedure Laterality Date    HX BREAST LUMPECTOMY Left 3/2/2022    LEFT BREAST LUMPECTOMY WITH LEFT BREAST SENTINEL NODE BIOPSY WITH BLUE DYE AND PORT A CATH INSERTION (URGENT) performed by Nils Wood MD at [address]    HX CHOLECYSTECTOMY      HX GYN      c section    HX GYN      d&c    HX WISDOM TEETH EXTRACTION Bilateral        Family History:  Family History   Problem Relation Age of Onset    Hypertension Mother     Heart Disease Mother     Heart Disease Father     Hypertension Father     Breast Cancer Maternal Aunt     Breast Cancer Other     Breast Cancer Maternal Aunt        Social History:  Social History     Tobacco Use    Smoking status: Never    Smokeless tobacco: Never   Substance Use Topics    Alcohol use: Yes     Comment: social once per month    Drug use: Yes     Frequency: 1.0 times per week     Types: Marijuana     Comment: once every 3 weeks       Allergies: Allergies   Allergen Reactions    Nuts [Tree Nut] Rash and Itching     Throat itching         Review of Systems     Review of Systems    A 10 point review of system was performed and was negative except as noted above in HPI    Physical Exam     Physical Exam  Vitals and nursing note reviewed. Constitutional:       General: She is not in acute distress. Appearance: She is well-developed. She is obese. She is not diaphoretic. HENT:      Head: Normocephalic and atraumatic. Eyes:      Extraocular Movements: Extraocular movements intact. Conjunctiva/sclera: Conjunctivae normal.   Cardiovascular:      Rate and Rhythm: Normal rate and regular rhythm. Heart sounds: Normal heart sounds. Pulmonary:      Effort: Pulmonary effort is normal.      Breath sounds: Normal breath sounds. Abdominal:      Palpations: Abdomen is soft. Tenderness: There is no abdominal tenderness. Musculoskeletal:      Cervical back: Neck supple. Right lower leg: No tenderness. Edema present. Left lower leg: No tenderness. Edema present. Neurological:      General: No focal deficit present. Mental Status: She is alert and oriented to person, place, and time.        Lab and Diagnostic Study Results     Labs -     Recent Results (from the past 12 hour(s))   METABOLIC PANEL, BASIC    Collection Time: 08/11/22  4:00 PM   Result Value Ref Range    Sodium 134 (L) 136 - 145 mmol/L    Potassium 4.2 3.5 - 5.1 mmol/L    Chloride 98 97 - 108 mmol/L    CO2 29 21 - 32 mmol/L    Anion gap 7 5 - 15 mmol/L    Glucose 132 (H) 65 - 100 mg/dL    BUN 35 (H) 6 - 20 mg/dL    Creatinine 4.11 (H) 0.55 - 1.02 mg/dL    BUN/Creatinine ratio 9 (L) 12 - 20      GFR est AA 13 (L) >60 ml/min/1.73m2    GFR est non-AA 11 (L) >60 ml/min/1.73m2    Calcium 9.3 8.5 - 10.1 mg/dL   CBC WITH AUTOMATED DIFF    Collection Time: 08/11/22  4:00 PM   Result Value Ref Range    WBC 9.8 3.6 - 11.0 K/uL    RBC 2.94 (L) 3.80 - 5.20 M/uL    HGB 8.3 (L) 11.5 - 16.0 g/dL    HCT 28.7 (L) 35.0 - 47.0 %    MCV 97.6 80.0 - 99.0 FL    MCH 28.2 26.0 - 34.0 PG    MCHC 28.9 (L) 30.0 - 36.5 g/dL    RDW 18.7 (H) 11.5 - 14.5 %    PLATELET 180 (H) 697 - 400 K/uL    MPV 8.5 (L) 8.9 - 12.9 FL    NRBC 0.2 (H) 0.0  WBC    ABSOLUTE NRBC 0.02 (H) 0.00 - 0.01 K/uL    NEUTROPHILS 71 32 - 75 %    LYMPHOCYTES 14 12 - 49 %    MONOCYTES 10 5 - 13 %    EOSINOPHILS 1 0 - 7 %    BASOPHILS 2 (H) 0 - 1 %    MYELOCYTES 2 (H) 0 %    NRBC 1.0  WBC    IMMATURE GRANULOCYTES 0 %    ABS. NEUTROPHILS 7.0 1.8 - 8.0 K/UL    ABS. LYMPHOCYTES 1.4 0.8 - 3.5 K/UL    ABS. MONOCYTES 1.0 0.0 - 1.0 K/UL    ABS. EOSINOPHILS 0.1 0.0 - 0.4 K/UL    ABS. BASOPHILS 0.2 (H) 0.0 - 0.1 K/UL    ABSOLUTE NRBC 0.10 K/uL    ABS. IMM.  GRANS. 0.0 K/UL    DF Manual      RBC COMMENTS Hypochromia  2+       METABOLIC PANEL, BASIC    Collection Time: 08/11/22 10:36 PM   Result Value Ref Range    Sodium 131 (L) 136 - 145 mmol/L    Potassium 4.1 3.5 - 5.1 mmol/L    Chloride 98 97 - 108 mmol/L    CO2 26 21 - 32 mmol/L    Anion gap 7 5 - 15 mmol/L    Glucose 120 (H) 65 - 100 mg/dL    BUN 38 (H) 6 - 20 mg/dL    Creatinine 4.43 (H) 0.55 - 1.02 mg/dL    BUN/Creatinine ratio 9 (L) 12 - 20      GFR est AA 12 (L) >60 ml/min/1.73m2    GFR est non-AA 10 (L) >60 ml/min/1.73m2    Calcium 9.0 8.5 - 10.1 mg/dL   PHOSPHORUS    Collection Time: 08/11/22 10:36 PM   Result Value Ref Range    Phosphorus 3.3 2.6 - 4.7 mg/dL   URINALYSIS W/ REFLEX CULTURE    Collection Time: 08/11/22 10:36 PM    Specimen: Urine   Result Value Ref Range    Color Yellow      Appearance Hazy (A) Clear      Specific gravity 1.020 1.003 - 1.030      pH (UA) 5.0 5.0 - 8.0      Protein Trace (A) Negative mg/dL    Glucose Normal (A) Negative mg/dL    Ketone Negative Negative mg/dL    Bilirubin 3 (A) Negative      Blood Negative Negative      Urobilinogen Normal 0.1 - 1.0 EU/dL    Nitrites Negative Negative      Leukocyte Esterase Negative Negative      WBC 5-10 0 - 4 /hpf    RBC 0-5 0 - 5 /hpf    Bacteria Negative Negative /hpf    UA:UC IF INDICATED Culture not indicated by UA result Culture not indicated by UA result      Mucus 1+ (A) Negative /lpf    Hyaline cast 5-10 0 - 5 /lpf   LACTIC ACID    Collection Time: 08/11/22 10:36 PM   Result Value Ref Range    Lactic acid 2.8 (HH) 0.4 - 2.0 mmol/L   CK    Collection Time: 08/11/22 10:36 PM   Result Value Ref Range    CK 89 26 - 192 U/L   NT-PRO BNP    Collection Time: 08/11/22 10:36 PM   Result Value Ref Range    NT pro-BNP 2,047 (H) <125 pg/mL       Radiologic Studies -   [unfilled]  CT Results  (Last 48 hours)                 08/11/22 2341  CT ABD PELV WO CONT Final result    Impression:  1. Decompressed urinary bladder with a Solitario catheter in place. No   hydronephrosis. 2. Anterior abdominal mesenteric stranding may be postsurgical. There is a low   density collection adjacent to the greater curvature of the stomach measuring   11.9 x 9.0 cm, suspicious for a contained gastric perforation. 3. Small left pleural effusion with adjacent atelectasis versus infection. Narrative:  EXAM:  CT ABD PELV WO CONT       INDICATION: Urinary retention. COMPARISON: Upper GI 7/25/2022. CT 7/12/2022. TECHNIQUE: Helical CT of the abdomen  and pelvis  without contrast. Coronal and   sagittal reformats are performed. CT dose reduction was achieved through use of   a standardized protocol tailored for this examination and automatic exposure   control for dose modulation.        FINDINGS:    Solid organ evaluation is limited without contrast.        The visualized lung bases demonstrate a small left pleural effusion and left   lower lobe opacity. The heart size is normal. There is no pericardial or right   pleural effusion. There is no renal, ureteral, or bladder calculus. The kidneys are symmetric   without hydronephrosis. There is no perinephric fluid or fat stranding. The liver, spleen, pancreas, and right adrenal gland are normal. There is a   stable left adrenal nodule measuring 3.4 cm. The gall bladder is surgically   absent without intra- or extra-hepatic biliary dilatation. Midline abdominal skin staples are noted. There is mild mesenteric stranding in   the upper anterior abdomen. There is a low density collection in the left upper   quadrant abutting the greater curvature of the stomach measuring 11.9 x 9.0 cm. There are no dilated bowel loops. The appendix is normal.         There are no enlarged lymph nodes. There is no free air. The aorta tapers   without aneurysm. The urinary bladder is nondistended with a Solitario catheter in place. There is no   pelvic mass. The bony structures are age-appropriate. CXR Results  (Last 48 hours)      None            Medical Decision Making and ED Course   - I am the first and primary provider for this patient AND AM THE PRIMARY PROVIDER OF RECORD. - I reviewed the vital signs, available nursing notes, past medical history, past surgical history, family history and social history. - Initial assessment performed. The patients presenting problems have been discussed, and the staff are in agreement with the care plan formulated and outlined with them. I have encouraged them to ask questions as they arise throughout their visit. Vital Signs-Reviewed the patient's vital signs.     Patient Vitals for the past 24 hrs:   Temp Pulse Resp BP SpO2   08/12/22 0109 -- 86 15 (!) 164/80 100 %   08/12/22 0054 -- 87 17 (!) 193/75 100 %   08/12/22 0024 -- 89 27 (!) 183/71 100 %   08/11/22 2345 -- 90 20 (!) 182/78 100 %   08/11/22 2254 -- -- -- -- 100 %   08/11/22 2232 -- -- -- (!) 152/77 --   08/11/22 2227 100 °F (37.8 °C) -- 20 (!) 181/68 97 %   08/11/22 2224 -- 98 -- -- (!) 81 %       Records Reviewed: Nursing Notes and Old Medical Records    Provider Notes (Medical Decision Making):     ED Course as of 08/12/22 0127   u Aug 11, 2022   2300 This patient presents transferred from rehab facility for acute kidney injury. She has been aggressively diuresed due to generalized anasarca. Patient currently is not reporting any complaints. Bladder scan was obtained which showed no urine. Patient had a urinary catheter placed which shows 30 cc mL only. I suspect this is secondary to aggressive diuresis. However, we will look for other causes such as obstructive etiology in her abdomen given her recent surgery. [AA]   2345 Creatinine(!): 4.43 [AA]   2345 Potassium: 4.1 [AA]   2345 NT pro-BNP(!): 2,047 [AA]   2345 Phosphorus: 3.3 [AA]   2345 CK: 89 [AA]   2350 Lactic acid(!!): 2.8 [AA]   Fri Aug 12, 2022   0011 CT ABD PELV WO CONT  IMPRESSION  1. Decompressed urinary bladder with a Solitario catheter in place. No hydronephrosis. 2. Anterior abdominal mesenteric stranding may be postsurgical. There is a low density collection adjacent to the greater curvature of the stomach measuring 11.9 x 9.0 cm, suspicious for a contained gastric perforation. 3. Small left pleural effusion with adjacent atelectasis versus infection. [AA]   0011 Commenting on the CT read with having contained gastric perforation. Unclear to me if this is secondary to postsurgical changes. Patient does not have any tenderness, nausea and or vomiting. [AA]   [id number] Discussed with the general surgeon on-call Dr. Jose Carlos Zimmerman. Based on the patient's lack of symptoms and that she is tolerating p.o., likely to be postsurgical changes and nothing to be done at this point. [AA]   0100 Work-up in the ED is complete.   Her presentation appears to be consistent with acute kidney injury likely secondary to aggressive diuresis. No obstructive etiology on her CT scan. Solitario passing easily. I will admit the patient to the hospital for further management. [AA]      ED Course User Index  [AA] Esteban Gracia MD       Diagnosis     Clinical Impression:   1. KIAN (acute kidney injury) ([facility] [address]    2. Hypertension, unspecified type    3. Anasarca    4. Dehydration    5. Anemia, unspecified type      Disposition     Disposition: Condition stable    Admitted        Attestations: Gio Fleming MD    Please note that this dictation was completed with dynaTrace software, the computer voice recognition software. Quite often unanticipated grammatical, syntax, homophones, and other interpretive errors are inadvertently transcribed by the computer software. Please disregard these errors. Please excuse any errors that have escaped final proofreading. Thank you.

## 2022-08-12 NOTE — PROGRESS NOTES
Reason for Readmission:  KIAN/no urination in 24hrs            RUR Score/Risk Level:   24%      PCP: First and Last name:  mahesh atkinson   Name of Practice:    Are you a current patient: Yes/No: yes   Approximate date of last visit: febuary 2022   Can you participate in a virtual visit with your PCP:     Is a Care Conference indicated:   Not at this time      Did you attend your follow up appointment (s): If not, why not:  No, was at rehab/IRF         Resources/supports as identified by patient/family:     Patient daughter lives with patient and assist as needed. Top Challenges facing patient (as identified by patient/family and CM): Finances/Medication cost?   na    Transportation   self/daughter     Support system or lack thereof? Daughter/family    Living arrangements? Daughter lives with patient          Self-care/ADLs/Cognition? Needs some assistance currently with ADLs          Current Advanced Directive/Advance Care Plan:  full code           Plan for utilizing home health:  home health likely needed              Transition of Care Plan:    Based on readmission, the patient's previous Plan of Care   has been evaluated and/or modified. The current Transition of Care Plan is:           Patient readmission to hospital from [facility] and rehab. Patient had not urinated in about 24hrs and was sent over to us for KIAN. Per patient the plan was for her to discharge home today from [facility] with home health and DME. Per patient DME was delivered to [facility] across the street and they where working on home health for her. Reached out to [facility] liaison and confirmed equipment was at facility waiting to be picked up. Stated home health was setup but then they declined because the patient did not have a physician available 24/7. If patient needs PT/OT we will see about trying to set some up for her.

## 2022-08-13 LAB
ALBUMIN SERPL-MCNC: 2.5 G/DL (ref 3.5–5)
ANION GAP SERPL CALC-SCNC: 7 MMOL/L (ref 5–15)
BUN SERPL-MCNC: 46 MG/DL (ref 6–20)
BUN/CREAT SERPL: 9 (ref 12–20)
CA-I BLD-MCNC: 9.1 MG/DL (ref 8.5–10.1)
CHLORIDE SERPL-SCNC: 99 MMOL/L (ref 97–108)
CK SERPL-CCNC: 56 U/L (ref 26–192)
CO2 SERPL-SCNC: 29 MMOL/L (ref 21–32)
CORTIS AM PEAK SERPL-MCNC: 15.7 UG/DL (ref 4.3–22.45)
CREAT SERPL-MCNC: 5.29 MG/DL (ref 0.55–1.02)
ERYTHROCYTE [DISTWIDTH] IN BLOOD BY AUTOMATED COUNT: 19.1 % (ref 11.5–14.5)
GLUCOSE BLD STRIP.AUTO-MCNC: 119 MG/DL (ref 65–117)
GLUCOSE BLD STRIP.AUTO-MCNC: 133 MG/DL (ref 65–117)
GLUCOSE BLD STRIP.AUTO-MCNC: 135 MG/DL (ref 65–117)
GLUCOSE BLD STRIP.AUTO-MCNC: 139 MG/DL (ref 65–117)
GLUCOSE SERPL-MCNC: 118 MG/DL (ref 65–100)
HCT VFR BLD AUTO: 26.3 % (ref 35–47)
HGB BLD-MCNC: 7.9 G/DL (ref 11.5–16)
MAGNESIUM SERPL-MCNC: 1.3 MG/DL (ref 1.6–2.4)
MCH RBC QN AUTO: 29 PG (ref 26–34)
MCHC RBC AUTO-ENTMCNC: 30 G/DL (ref 30–36.5)
MCV RBC AUTO: 96.7 FL (ref 80–99)
NRBC # BLD: 0 K/UL (ref 0–0.01)
NRBC BLD-RTO: 0 PER 100 WBC
PERFORMED BY, TECHID: ABNORMAL
PHOSPHATE SERPL-MCNC: 4.6 MG/DL (ref 2.6–4.7)
PLATELET # BLD AUTO: 392 K/UL (ref 150–400)
PMV BLD AUTO: 8.5 FL (ref 8.9–12.9)
POTASSIUM SERPL-SCNC: 4.4 MMOL/L (ref 3.5–5.1)
RBC # BLD AUTO: 2.72 M/UL (ref 3.8–5.2)
SODIUM SERPL-SCNC: 135 MMOL/L (ref 136–145)
WBC # BLD AUTO: 10.1 K/UL (ref 3.6–11)

## 2022-08-13 PROCEDURE — 74011250636 HC RX REV CODE- 250/636: Performed by: STUDENT IN AN ORGANIZED HEALTH CARE EDUCATION/TRAINING PROGRAM

## 2022-08-13 PROCEDURE — 74011000250 HC RX REV CODE- 250: Performed by: INTERNAL MEDICINE

## 2022-08-13 PROCEDURE — 85027 COMPLETE CBC AUTOMATED: CPT

## 2022-08-13 PROCEDURE — 74011250636 HC RX REV CODE- 250/636: Performed by: INTERNAL MEDICINE

## 2022-08-13 PROCEDURE — 80069 RENAL FUNCTION PANEL: CPT

## 2022-08-13 PROCEDURE — 97161 PT EVAL LOW COMPLEX 20 MIN: CPT

## 2022-08-13 PROCEDURE — 97165 OT EVAL LOW COMPLEX 30 MIN: CPT

## 2022-08-13 PROCEDURE — 36415 COLL VENOUS BLD VENIPUNCTURE: CPT

## 2022-08-13 PROCEDURE — 82550 ASSAY OF CK (CPK): CPT

## 2022-08-13 PROCEDURE — 83735 ASSAY OF MAGNESIUM: CPT

## 2022-08-13 PROCEDURE — 97116 GAIT TRAINING THERAPY: CPT

## 2022-08-13 PROCEDURE — 97530 THERAPEUTIC ACTIVITIES: CPT

## 2022-08-13 PROCEDURE — 94640 AIRWAY INHALATION TREATMENT: CPT

## 2022-08-13 PROCEDURE — 82962 GLUCOSE BLOOD TEST: CPT

## 2022-08-13 PROCEDURE — 82533 TOTAL CORTISOL: CPT

## 2022-08-13 PROCEDURE — 65270000029 HC RM PRIVATE

## 2022-08-13 PROCEDURE — 74011250637 HC RX REV CODE- 250/637: Performed by: INTERNAL MEDICINE

## 2022-08-13 PROCEDURE — 74011250637 HC RX REV CODE- 250/637: Performed by: STUDENT IN AN ORGANIZED HEALTH CARE EDUCATION/TRAINING PROGRAM

## 2022-08-13 RX ORDER — MAGNESIUM SULFATE HEPTAHYDRATE 40 MG/ML
2 INJECTION, SOLUTION INTRAVENOUS ONCE
Status: COMPLETED | OUTPATIENT
Start: 2022-08-13 | End: 2022-08-13

## 2022-08-13 RX ORDER — MORPHINE SULFATE 2 MG/ML
1 INJECTION, SOLUTION INTRAMUSCULAR; INTRAVENOUS ONCE
Status: COMPLETED | OUTPATIENT
Start: 2022-08-13 | End: 2022-08-13

## 2022-08-13 RX ADMIN — ENOXAPARIN SODIUM 30 MG: 100 INJECTION SUBCUTANEOUS at 08:01

## 2022-08-13 RX ADMIN — SODIUM CHLORIDE, PRESERVATIVE FREE 10 ML: 5 INJECTION INTRAVENOUS at 21:59

## 2022-08-13 RX ADMIN — SODIUM CHLORIDE, PRESERVATIVE FREE 10 ML: 5 INJECTION INTRAVENOUS at 05:35

## 2022-08-13 RX ADMIN — ACETAMINOPHEN 650 MG: 325 TABLET, FILM COATED ORAL at 23:23

## 2022-08-13 RX ADMIN — GABAPENTIN 100 MG: 100 CAPSULE ORAL at 08:01

## 2022-08-13 RX ADMIN — ACETAMINOPHEN 650 MG: 325 TABLET, FILM COATED ORAL at 05:34

## 2022-08-13 RX ADMIN — MELATONIN TAB 3 MG 3 MG: 3 TAB at 21:59

## 2022-08-13 RX ADMIN — GABAPENTIN 100 MG: 100 CAPSULE ORAL at 15:21

## 2022-08-13 RX ADMIN — SODIUM CHLORIDE, PRESERVATIVE FREE 5 ML: 5 INJECTION INTRAVENOUS at 15:21

## 2022-08-13 RX ADMIN — PANTOPRAZOLE SODIUM 40 MG: 40 TABLET, DELAYED RELEASE ORAL at 07:12

## 2022-08-13 RX ADMIN — PANTOPRAZOLE SODIUM 40 MG: 40 TABLET, DELAYED RELEASE ORAL at 16:16

## 2022-08-13 RX ADMIN — BUDESONIDE AND FORMOTEROL FUMARATE DIHYDRATE 2 PUFF: 80; 4.5 AEROSOL RESPIRATORY (INHALATION) at 20:07

## 2022-08-13 RX ADMIN — MAGNESIUM SULFATE HEPTAHYDRATE 2 G: 40 INJECTION, SOLUTION INTRAVENOUS at 10:58

## 2022-08-13 RX ADMIN — MORPHINE SULFATE 1 MG: 2 INJECTION, SOLUTION INTRAMUSCULAR; INTRAVENOUS at 07:12

## 2022-08-13 RX ADMIN — BUDESONIDE AND FORMOTEROL FUMARATE DIHYDRATE 2 PUFF: 80; 4.5 AEROSOL RESPIRATORY (INHALATION) at 08:55

## 2022-08-13 RX ADMIN — ACETAMINOPHEN 650 MG: 325 TABLET, FILM COATED ORAL at 15:21

## 2022-08-13 RX ADMIN — GABAPENTIN 100 MG: 100 CAPSULE ORAL at 21:59

## 2022-08-13 NOTE — PROGRESS NOTES
Spoke with provider about restarting the patient's bp meds; provider wants to hold bp meds and to hold off for giving fluids for right now

## 2022-08-13 NOTE — PROGRESS NOTES
Hospitalist Progress Note    Subjective:   Daily Progress Note: 8/13/2022 11:07 AM    Hospital Course:  Julio Cesar Pinto is a 71-year-old female with PMHx of diabetes mellitus, hypertension, asthma and recent gastric ulcer perforation who presented to the ED from [facility] rehab facility due to KIAN. Of note, she started to have shortness of breath secondary to pulmonary edema and generalized swelling during last hospitalization for gastric perforation. She has no history of CHF and ECHO at that time showed normal systolic and diastolic function. She was given diuretics with respiratory improvement. However, since admitted to rehab, her generalized swelling continued to worsen. She was put on 2mg bumax daily without improvement. Overnight 08/10/22, after urination, she reports feeling some pain in her thighs and given tylenol and gabapentin with some relief. No antibiotics at that time. However, since then she has not had any urination for >24  hours. Creatinine reportedly at 4. She had normal creatinine on last admission, which progressively worsened to 1.5 in rehab prior to this. She otherwise denies chest pain or dyspnea, however reports having orthopnea. In the ED, hypoxic with SpO2 81% and placed on 4 L with improvement. KIAN with creatinine of 4.43. LA 2.8, Pro-BNP 2,047. Chest X-ray showed mild pulmonary edema, improved since prior. Straight cath in ED with little urine output, bladder scan with little post-void residual. She was given 1 L IV fluid bolus in the ED. Nephrology consult. Patient has chronic lower extremity edema that appears to be lymphedema. Management with IV fluids at this time. Subjective:    Patient seen and examined at bedside. She is doing well this morning. Denies any chest pain, SOB, cough, abd pain. Lower extremity paraesthesias improved.      Current Facility-Administered Medications   Medication Dose Route Frequency    magnesium sulfate 2 g/50 ml IVPB (premix or compounded) 2 g IntraVENous ONCE    albuterol (PROVENTIL HFA, VENTOLIN HFA, PROAIR HFA) inhaler 2 Puff  2 Puff Inhalation Q6H PRN    albuterol-ipratropium (DUO-NEB) 2.5 MG-0.5 MG/3 ML  3 mL Nebulization Q6H PRN    budesonide-formoterol (SYMBICORT) 80-4.5 mcg inhaler  2 Puff Inhalation BID RT    melatonin tablet 3 mg  3 mg Oral QHS    meclizine (ANTIVERT) tablet 25 mg  25 mg Oral Q6H PRN    traZODone (DESYREL) tablet 25 mg  25 mg Oral QHS PRN    insulin lispro (HUMALOG) injection   SubCUTAneous TIDAC    glucose chewable tablet 16 g  4 Tablet Oral PRN    glucagon (GLUCAGEN) injection 1 mg  1 mg IntraMUSCular PRN    dextrose 10% infusion 0-250 mL  0-250 mL IntraVENous PRN    sodium chloride (NS) flush 5-40 mL  5-40 mL IntraVENous Q8H    sodium chloride (NS) flush 5-40 mL  5-40 mL IntraVENous PRN    acetaminophen (TYLENOL) tablet 650 mg  650 mg Oral Q6H PRN    Or    acetaminophen (TYLENOL) suppository 650 mg  650 mg Rectal Q6H PRN    ondansetron (ZOFRAN ODT) tablet 4 mg  4 mg Oral Q8H PRN    Or    ondansetron (ZOFRAN) injection 4 mg  4 mg IntraVENous Q6H PRN    enoxaparin (LOVENOX) injection 30 mg  30 mg SubCUTAneous DAILY    senna (SENOKOT) tablet 8.6 mg  1 Tablet Oral DAILY PRN    pantoprazole (PROTONIX) tablet 40 mg  40 mg Oral ACB&D    gabapentin (NEURONTIN) capsule 100 mg  100 mg Oral TID        Review of Systems  Constitutional: No fevers, No chills, No sweats, No fatigue, No Weakness  Eyes: No redness  Ears, nose, mouth, throat, and face: No nasal congestion, No sore throat, No voice change  Respiratory: No Shortness of Breath, No cough, No wheezing  Cardiovascular: No chest pain, No palpitations, No extremity edema  Gastrointestinal: No nausea, No vomiting, No diarrhea, No abdominal pain  Genitourinary: No frequency, + dysuria, + dark urine, No hematuria  Integument/breast: No skin lesion(s)   Neurological: No Confusion, No headaches, No dizziness      Objective:     Visit Vitals  BP (!) 123/54   Pulse 87   Temp 99.1 °F (37.3 °C)   Resp 19   Ht 5' 2\" (1.575 m)   Wt 149.7 kg (330 lb)   LMP 2021 (Approximate)   SpO2 92%   Breastfeeding No   BMI 60.36 kg/m²    O2 Flow Rate (L/min): 1 l/min O2 Device: Nasal cannula    Temp (24hrs), Av.6 °F (37 °C), Min:98.3 °F (36.8 °C), Max:99.1 °F (37.3 °C)      701 - 1900  In: -   Out: 225 [Urine:225]  1901 -  07  In: 625 [P.O.:625]  Out: 130 [Urine:130]    PHYSICAL EXAM:  Constitutional: Obese female. No acute distress  Skin: Generalized pallor. Extremities and face reveal no rashes. HEENT: Sclerae anicteric. PERRL. No oral ulcers. The neck is supple and no masses. Cardiovascular: Regular rate and rhythm. +S1/S2. No murmur or gallop. Respiratory:  Symmetric chest wall expansion. On 4 L nasal cannula. Faint wheezing. No rhonchi or rales. GI: Abdomen nondistended, soft, and nontender. Normal active bowel sounds. Rectal: Deferred   Musculoskeletal: No pitting edema of the lower legs. Able to move all ext  Neurological:  Patient is alert and oriented x3. Cranial nerves II-XII grossly intact  Psychiatric: Mood appears appropriate       Data Review    Recent Results (from the past 24 hour(s))   GLUCOSE, POC    Collection Time: 22 11:43 AM   Result Value Ref Range    Glucose (POC) 131 (H) 65 - 117 mg/dL    Performed by [address] [place] [address], RANDOM    Collection Time: 22 12:00 PM   Result Value Ref Range    Protein, urine random 92 (H) 0.0 - 11.9 mg/dL   POTASSIUM, UR, RANDOM    Collection Time: 22 12:00 PM   Result Value Ref Range    Potassium urine, random 33 mmol/L   EOSINOPHILS, URINE    Collection Time: 22 12:00 PM   Result Value Ref Range    Eosinophils,urine Negative    PROTEIN/CREATININE RATIO, URINE    Collection Time: 22 12:00 PM   Result Value Ref Range    Protein, urine random 93 (H) 0.0 - 11.9 mg/dL    Creatinine, urine 289.00 mg/dL    Protein/Creat.  urine Ratio 0.3     SODIUM, UR, RANDOM    Collection Time: 08/12/22 12:00 PM   Result Value Ref Range    Sodium,urine random 14 mmol/L   GLUCOSE, POC    Collection Time: 08/12/22  4:24 PM   Result Value Ref Range    Glucose (POC) 123 (H) 65 - 117 mg/dL    Performed by Petty Quiles, POC    Collection Time: 08/12/22  8:19 PM   Result Value Ref Range    Glucose (POC) 128 (H) 65 - 117 mg/dL    Performed by Rafal Anand    CBC W/O DIFF    Collection Time: 08/13/22  6:40 AM   Result Value Ref Range    WBC 10.1 3.6 - 11.0 K/uL    RBC 2.72 (L) 3.80 - 5.20 M/uL    HGB 7.9 (L) 11.5 - 16.0 g/dL    HCT 26.3 (L) 35.0 - 47.0 %    MCV 96.7 80.0 - 99.0 FL    MCH 29.0 26.0 - 34.0 PG    MCHC 30.0 30.0 - 36.5 g/dL    RDW 19.1 (H) 11.5 - 14.5 %    PLATELET 938 752 - 563 K/uL    MPV 8.5 (L) 8.9 - 12.9 FL    NRBC 0.0 0.0  WBC    ABSOLUTE NRBC 0.00 0.00 - 0.01 K/uL   RENAL FUNCTION PANEL    Collection Time: 08/13/22  6:40 AM   Result Value Ref Range    Sodium 135 (L) 136 - 145 mmol/L    Potassium 4.4 3.5 - 5.1 mmol/L    Chloride 99 97 - 108 mmol/L    CO2 29 21 - 32 mmol/L    Anion gap 7 5 - 15 mmol/L    Glucose 118 (H) 65 - 100 mg/dL    BUN 46 (H) 6 - 20 mg/dL    Creatinine 5.29 (H) 0.55 - 1.02 mg/dL    BUN/Creatinine ratio 9 (L) 12 - 20      GFR est AA 10 (L) >60 ml/min/1.73m2    GFR est non-AA 8 (L) >60 ml/min/1.73m2    Calcium 9.1 8.5 - 10.1 mg/dL    Phosphorus 4.6 2.6 - 4.7 mg/dL    Albumin 2.5 (L) 3.5 - 5.0 g/dL   CK    Collection Time: 08/13/22  6:40 AM   Result Value Ref Range    CK 56 26 - 192 U/L   MAGNESIUM    Collection Time: 08/13/22  6:40 AM   Result Value Ref Range    Magnesium 1.3 (L) 1.6 - 2.4 mg/dL   GLUCOSE, POC    Collection Time: 08/13/22  7:18 AM   Result Value Ref Range    Glucose (POC) 119 (H) 65 - 117 mg/dL    Performed by Татьяна Leal, POC    Collection Time: 08/13/22 10:58 AM   Result Value Ref Range    Glucose (POC) 135 (H) 65 - 117 mg/dL    Performed by [place] Pac        XR CHEST PORT   Final Result   Mild central pulmonary vascular congestion, decreased since   7/21/2022, without overt pulmonary edema. CT ABD PELV WO CONT   Final Result   1. Decompressed urinary bladder with a Solitario catheter in place. No   hydronephrosis. 2. Anterior abdominal mesenteric stranding may be postsurgical. There is a low   density collection adjacent to the greater curvature of the stomach measuring   11.9 x 9.0 cm, suspicious for a contained gastric perforation. 3. Small left pleural effusion with adjacent atelectasis versus infection. Active Problems:    KIAN (acute kidney injury) ([facility] [address] (7/20/2022)      Assessment/Plan:     1. KIAN  - Etiology unclear  - Ddx: SE from diuretics vs poor kidney perfusion secondary to fluid retention vs intrinsic kidney disease.  - CT abd negative for hydronephrosis  - S/p 1 L bolus of NS in ED  - Urine studies for FeURea and miroalbumin/creatinine ratio ordered  - Consult nephrology  - Continue IV fluids      2. Anasarca  - Unsure cause. Happened before KIAN, but no definite diagnosis of CHF, CKD or cirrhosis. - Albumin was low on last admission, will recheck. Patient denies decreased oral intake. - Will check for albuminuria.  - High protein nutrition supplement. 3. Hypertension  - Resume amlodipine, hydralazine. Hold nephrotoxic medications. 4. Diabetes  5. Neuropathy  - Hold metformin  - POC + correctional insulin  - Start low-dose gabapentin     6. Asthma  7. Hx PARMINDER - refused CPAP years ago  - Stable. Continue home medications. 8. Hx breast cancer  - Completed chemotherapy. She was supposed to start radiation 2 weeks ago but delay s/t hospitalization/rehab. DVT Prophylaxis: Lovenox  Code Status: Full  POA:    Discharge Barriers:    - Nephrology clearance   - Improvement in renal functions     Care Plan discussed with: patient and nursing     Total time spent with patient: >35 minutes.

## 2022-08-13 NOTE — PROGRESS NOTES
Problem: Self Care Deficits Care Plan (Adult)  Goal: *Acute Goals and Plan of Care (Insert Text)  Description: Pt stated goal \"I want to sit by the window\"  Pt will be MI sup<->sit in prep for EOB ADL's  Pt will be MI  LB dressing EOB level with borrowed AE  Pt will be MI  sit<-> prep for toilet transfer  Pt will be MI  toilet transfer with LRAD  Pt will be MI  toileting/cloth mgmt LRAD  Pt will be MI  grooming standing sink  Pt will be MI bathing sitting/standing sink LRAD  Pt will be MI yohannes UE HEP in prep for self care tasks    Outcome: Not Met     OCCUPATIONAL THERAPY EVALUATION  Patient: Peyman Rodriguez (50 y.o. female)  Date: 8/13/2022  Primary Diagnosis: KIAN (acute kidney injury) ([facility] [address] [N17.9]       Precautions: falls       ASSESSMENT  Pt is 64 y/o female came to [facility] from IRF with KIAN, SOB and adm 8/11/2022 for KIAN, anasarca, . Pt has hx of DM, HTN, asthma, recent gastric ulcer perforation, cholecystectomy, morbid obesity, neuropathy, sleep apnea, neuropathy. Pt received semi supine in bed A&Ox 4 and agreeable for OT/PT eval/tx. Per pt report, pt lives with daughter (works during the day)  in 1 story mobile home with 5 steps yohannes rails to enter and is independent for self care and functional transfers/mobility prior to hospitalization leaving to IRF stay. Pt currently presents with decreased balance, decreased activity tolerance, generalized weakness and increased need for assist with self care (total A LB Dressing semi supine in bed, SBA oral/facial/hand hygiene sitting EOB with increased time) and functional transfers/mobility (SBA sup->sit and scooting EOB, min A sit<->Stand and BSC transfer simulated with RW and gait belt as pt with increased fatigue and SOB after ambulating around bed). Pt would benefit from skilled OT services while at [facility] in order to increase safety and independence with self care and functional transfers/mobility. Recommend discharge to IRF when medically appropriate. Other factors to consider for discharge: time since onset, severity of deficits        PLAN :  Recommendations and Planned Interventions: self care training, functional mobility training, therapeutic exercise, balance training, therapeutic activities, endurance activities, patient education, and home safety training    Frequency/Duration: Patient will be followed by occupational therapy 3-5x/week to address goals. Recommendation for discharge: (in order for the patient to meet his/her long term goals)  [address] at this time however pending progress may progress at which time will update D/C recommendation    This discharge recommendation:  Has been made in collaboration with the attending provider and/or case management    IF patient discharges home will need the following DME: TBD       SUBJECTIVE:   Patient stated this is more comfortable.     OBJECTIVE DATA SUMMARY:   HISTORY:   Past Medical History:   Diagnosis Date    Arrhythmia     heart murmur    Arthritis     Right knee, DJD left knee    Asthma     Cancer ([facility] [address]     COVID-19 01/29/2021    Diabetes ([facility] [address]     Pre diabetes    Hypertension     Morbid obesity ([facility] [address]     Neuropathy     Patient reports numbness and tingling in her legs and feet from her back    Sleep apnea     was ordered a CPAP years ago, but never used     Past Surgical History:   Procedure Laterality Date    HX BREAST LUMPECTOMY Left 3/2/2022    LEFT BREAST LUMPECTOMY WITH LEFT BREAST SENTINEL NODE BIOPSY WITH BLUE DYE AND PORT A CATH INSERTION (URGENT) performed by Bee Livingston MD at [address] [place] [address]    HX CHOLECYSTECTOMY      HX GYN      c section    HX GYN      d&c    HX WISDOM TEETH EXTRACTION Bilateral        Expanded or extensive additional review of patient history:     Home Situation  Home Environment: Trailer/mobile home  # Steps to Enter: 5  Rails to Enter: Yes  Hand Rails : Bilateral  One/Two Story Residence: One story  Living Alone: No  Support Systems: Child(shaun) (lives with dtr, dtr works during day)  Patient Expects to be Discharged to[de-identified] Home with home health  Current DME Used/Available at Home:  (pt statitng IRF ordered RW and shower chair for home)  Tub or Shower Type: Tub/Shower combination    PLOF: Pt independent for ADLS/IADLS, independent with mobility prior to admission. Hand dominance: Right    EXAMINATION OF PERFORMANCE DEFICITS:  Cognitive/Behavioral Status:  Neurologic State: Alert  Orientation Level: Oriented X4  Cognition: Follows commands; Appropriate decision making     Hearing: Auditory  Auditory Impairment: None    Range of Motion:  AROM: Generally decreased, functional     Strength:  Strength: Generally decreased, functional    Balance:  Sitting: Intact  Standing: Impaired;Pull to stand  Standing - Static: Fair;Constant support  Standing - Dynamic : Fair;Constant support    Functional Mobility and Transfers for ADLs:  Bed Mobility:  Supine to Sit: Stand-by assistance  Scooting: Stand-by assistance    Transfers:  Sit to Stand: Minimum assistance  Stand to Sit: Minimum assistance  Bed to Chair: Minimum assistance  Assistive Device : Gait Belt;Walker, rolling    ADL Assessment:     Oral Facial Hygiene/Grooming: Stand-by assistance;Setup    Lower Body Dressing: Total assistance (semi supine in bed (was using AE at IRF))    ADL Intervention and task modifications:     Grooming  Grooming Assistance: Stand-by assistance;Set-up  Position Performed: Seated edge of bed  Washing Face: Stand-by assistance  Washing Hands: Stand-by assistance  Brushing Teeth: Stand-by assistance    Lower Body Dressing Assistance  Socks: Total assistance (dependent)  Position Performed:  (semi supine in bed)    [facility] \"6 Clicks\"                                                       Daily Activity Inpatient Short Form  How much help from another person does the patient currently need. .. Total; A Lot A Little None   1.   Putting on and taking off regular lower body clothing? (If no AE present) []  1 [x]  2 []  3 []  4   2. Bathing (including washing, rinsing, drying)? []  1 [x]  2 []  3 []  4   3. Toileting, which includes using toilet, bedpan or urinal? [] 1 []  2 [x]  3 []  4   4. Putting on and taking off regular upper body clothing? []  1 []  2 [x]  3 []  4   5. Taking care of personal grooming such as brushing teeth? []  1 []  2 [x]  3 []  4   6. Eating meals? []  1 []  2 []  3 [x]  4   © 2007, Trustees of [facility] [employer], under license to [employer]. All rights reserved     Score: 17/24     Interpretation of Tool:  Represents clinically-significant functional categories (i.e. Activities of daily living). Percentage of Impairment CH    0%   CI    1-19% CJ    20-39% CK    40-59% CL    60-79% CM    80-99% CN     100%   [facility]  Score 6-24 24 23 20-22 15-19 10-14 7-9 6        Occupational Therapy Evaluation Charge Determination   History Examination Decision-Making   LOW Complexity : Brief history review  MEDIUM Complexity : 3-5 performance deficits relating to physical, cognitive , or psychosocial skils that result in activity limitations and / or participation restrictions MEDIUM Complexity : Patient may present with comorbidities that affect occupational performnce. Miniml to moderate modification of tasks or assistance (eg, physical or verbal ) with assesment(s) is necessary to enable patient to complete evaluation       Based on the above components, the patient evaluation is determined to be of the following complexity level: LOW   Pain Rating:  3/10 lower back and ankle pain with activity(was 8/10 prior to activity)    Activity Tolerance:   Good and requires rest breaks  Please refer to the flowsheet for vital signs taken during this treatment.     After treatment patient left in no apparent distress:    Sitting in chair and Call bell within reach    COMMUNICATION/EDUCATION:   The patients plan of care was discussed with: Physical therapist and Registered nurse. PT/OT sessions occurred together for increased safety of pt and clinician. Home safety education was provided and the patient/caregiver indicated understanding. and Patient/family have participated as able in goal setting and plan of care. This patients plan of care is appropriate for delegation to [facility].     Thank you for this referral.  Corazon Simon  Time Calculation: 33 mins

## 2022-08-13 NOTE — PROGRESS NOTES
Patient resting quietly. Alert and oriented. Patient still not having much output via urinary catheter.

## 2022-08-13 NOTE — PROGRESS NOTES
[facility]     Renal Daily Progress Note:     Admission Date: 2022     Subjective:  Feels better, creatinine up but FeNa<1%  No shortness of breath or chest pain. No abdominal pain. Able to ambulate to bathroom without difficulty. Still with lower extremity edema (lymphedema)    Review of Systems  Pertinent items are noted in HPI.     Objective:     Visit Vitals  BP (!) 123/54   Pulse 87   Temp 99.1 °F (37.3 °C)   Resp 19   Ht 5' 2\" (1.575 m)   Wt 149.7 kg (330 lb)   LMP 2021 (Approximate)   SpO2 92%   Breastfeeding No   BMI 60.36 kg/m²     Temp (24hrs), Av.6 °F (37 °C), Min:98.3 °F (36.8 °C), Max:99.1 °F (37.3 °C)        Intake/Output Summary (Last 24 hours) at 2022 1521  Last data filed at 2022 1508  Gross per 24 hour   Intake 900 ml   Output 325 ml   Net 575 ml     Current Facility-Administered Medications   Medication Dose Route Frequency    albuterol (PROVENTIL HFA, VENTOLIN HFA, PROAIR HFA) inhaler 2 Puff  2 Puff Inhalation Q6H PRN    albuterol-ipratropium (DUO-NEB) 2.5 MG-0.5 MG/3 ML  3 mL Nebulization Q6H PRN    budesonide-formoterol (SYMBICORT) 80-4.5 mcg inhaler  2 Puff Inhalation BID RT    melatonin tablet 3 mg  3 mg Oral QHS    meclizine (ANTIVERT) tablet 25 mg  25 mg Oral Q6H PRN    traZODone (DESYREL) tablet 25 mg  25 mg Oral QHS PRN    insulin lispro (HUMALOG) injection   SubCUTAneous TIDAC    glucose chewable tablet 16 g  4 Tablet Oral PRN    glucagon (GLUCAGEN) injection 1 mg  1 mg IntraMUSCular PRN    dextrose 10% infusion 0-250 mL  0-250 mL IntraVENous PRN    sodium chloride (NS) flush 5-40 mL  5-40 mL IntraVENous Q8H    sodium chloride (NS) flush 5-40 mL  5-40 mL IntraVENous PRN    acetaminophen (TYLENOL) tablet 650 mg  650 mg Oral Q6H PRN    Or    acetaminophen (TYLENOL) suppository 650 mg  650 mg Rectal Q6H PRN    ondansetron (ZOFRAN ODT) tablet 4 mg  4 mg Oral Q8H PRN    Or    ondansetron (ZOFRAN) injection 4 mg  4 mg IntraVENous Q6H PRN    enoxaparin (LOVENOX) injection 30 mg  30 mg SubCUTAneous DAILY    senna (SENOKOT) tablet 8.6 mg  1 Tablet Oral DAILY PRN    pantoprazole (PROTONIX) tablet 40 mg  40 mg Oral ACB&D    gabapentin (NEURONTIN) capsule 100 mg  100 mg Oral TID       Physical Exam:  alert, cooperative, no distress, appears stated age, morbidly obese  Head: Normocephalic, without obvious abnormality, atraumatic, syndromic facies-cushingoid  Eyes: negative  Neck: supple, symmetrical, trachea midline, no adenopathy, thyroid: not enlarged, symmetric, no tenderness/mass/nodules, and very thick neck, cannot assess for JVD  Lungs: clear to auscultation bilaterally  Heart: regular rate and rhythm, no S3 or S4  Abdomen: soft, non-tender.  Bowel sounds normal. No masses,  no organomegaly, obese, no dependent abdominal wall edema  Extremities: atraumatic, no cyanosis has edema but it appears to be lymphedema  Neurologic: Grossly normal     Data Review:     LABS:  Recent Labs     08/13/22  0640 08/12/22  0938 08/12/22  0653 08/11/22  2236   *  --  136 131*   K 4.4  --  4.1 4.1   CL 99  --  101 98   CO2 29  --  29 26   BUN 46*  --  38* 38*   CREA 5.29*  --  4.69* 4.43*   CA 9.1  --  9.1 9.0   ALB 2.5* 2.3* 2.4*  --    PHOS 4.6  --   --  3.3   MG 1.3*  --   --   --      Recent Labs     08/13/22  0640 08/11/22  1600   WBC 10.1 9.8   HGB 7.9* 8.3*   HCT 26.3* 28.7*    448*     Recent Labs     08/12/22  1200   SAROJ 15   KU 35   CREAU 289.00     8-13-22:Cortisol, a.m 15.7          Assessment:   Renal Specific Problems  KIAN probably related to volume depletion  Obesity  Probably volume depleted with BNP of 200 in the setting of acute kidney injury  Lower extremity edema which probably is lymphedema and as such is not amenable to diuresis  Obstructive sleep apnea  Adult onset diabetes mellitus  History of hypertension      Plan:     Obtain/ Order: labs/cultures/radiology/procedures:  renal panel        Therapeutic:    Push p.o. fluids, if creatinine up further then will start IV  Discussed with SIMRAN Mejia and patient        Jaye Gomez MD    [phone]

## 2022-08-14 LAB
ALBUMIN SERPL-MCNC: 2.3 G/DL (ref 3.5–5)
ANION GAP SERPL CALC-SCNC: 7 MMOL/L (ref 5–15)
BUN SERPL-MCNC: 63 MG/DL (ref 6–20)
BUN/CREAT SERPL: 12 (ref 12–20)
CA-I BLD-MCNC: 9.5 MG/DL (ref 8.5–10.1)
CHLORIDE SERPL-SCNC: 100 MMOL/L (ref 97–108)
CO2 SERPL-SCNC: 29 MMOL/L (ref 21–32)
CREAT SERPL-MCNC: 5.14 MG/DL (ref 0.55–1.02)
GLUCOSE BLD STRIP.AUTO-MCNC: 113 MG/DL (ref 65–117)
GLUCOSE BLD STRIP.AUTO-MCNC: 145 MG/DL (ref 65–117)
GLUCOSE BLD STRIP.AUTO-MCNC: 148 MG/DL (ref 65–117)
GLUCOSE SERPL-MCNC: 113 MG/DL (ref 65–100)
MAGNESIUM SERPL-MCNC: 1.8 MG/DL (ref 1.6–2.4)
PERFORMED BY, TECHID: ABNORMAL
PERFORMED BY, TECHID: ABNORMAL
PERFORMED BY, TECHID: NORMAL
PHOSPHATE SERPL-MCNC: 5 MG/DL (ref 2.6–4.7)
POTASSIUM SERPL-SCNC: 4.5 MMOL/L (ref 3.5–5.1)
SODIUM SERPL-SCNC: 136 MMOL/L (ref 136–145)

## 2022-08-14 PROCEDURE — 74011250637 HC RX REV CODE- 250/637: Performed by: INTERNAL MEDICINE

## 2022-08-14 PROCEDURE — 36415 COLL VENOUS BLD VENIPUNCTURE: CPT

## 2022-08-14 PROCEDURE — 83735 ASSAY OF MAGNESIUM: CPT

## 2022-08-14 PROCEDURE — 82962 GLUCOSE BLOOD TEST: CPT

## 2022-08-14 PROCEDURE — 94640 AIRWAY INHALATION TREATMENT: CPT

## 2022-08-14 PROCEDURE — 74011250636 HC RX REV CODE- 250/636: Performed by: INTERNAL MEDICINE

## 2022-08-14 PROCEDURE — 80069 RENAL FUNCTION PANEL: CPT

## 2022-08-14 PROCEDURE — 74011250637 HC RX REV CODE- 250/637: Performed by: STUDENT IN AN ORGANIZED HEALTH CARE EDUCATION/TRAINING PROGRAM

## 2022-08-14 PROCEDURE — 65270000029 HC RM PRIVATE

## 2022-08-14 PROCEDURE — 74011000250 HC RX REV CODE- 250: Performed by: INTERNAL MEDICINE

## 2022-08-14 RX ORDER — GABAPENTIN 100 MG/1
100 CAPSULE ORAL ONCE
Status: COMPLETED | OUTPATIENT
Start: 2022-08-14 | End: 2022-08-14

## 2022-08-14 RX ORDER — GABAPENTIN 300 MG/1
300 CAPSULE ORAL 2 TIMES DAILY
Status: DISCONTINUED | OUTPATIENT
Start: 2022-08-14 | End: 2022-08-16 | Stop reason: HOSPADM

## 2022-08-14 RX ORDER — GABAPENTIN 300 MG/1
300 CAPSULE ORAL 3 TIMES DAILY
Status: DISCONTINUED | OUTPATIENT
Start: 2022-08-14 | End: 2022-08-14

## 2022-08-14 RX ORDER — DOCUSATE SODIUM 100 MG/1
100 CAPSULE, LIQUID FILLED ORAL DAILY
Status: DISCONTINUED | OUTPATIENT
Start: 2022-08-14 | End: 2022-08-16 | Stop reason: HOSPADM

## 2022-08-14 RX ADMIN — ACETAMINOPHEN 650 MG: 325 TABLET, FILM COATED ORAL at 18:38

## 2022-08-14 RX ADMIN — GABAPENTIN 100 MG: 100 CAPSULE ORAL at 16:07

## 2022-08-14 RX ADMIN — SODIUM CHLORIDE, PRESERVATIVE FREE 10 ML: 5 INJECTION INTRAVENOUS at 21:21

## 2022-08-14 RX ADMIN — GABAPENTIN 100 MG: 100 CAPSULE ORAL at 02:28

## 2022-08-14 RX ADMIN — GABAPENTIN 300 MG: 300 CAPSULE ORAL at 21:21

## 2022-08-14 RX ADMIN — SODIUM CHLORIDE, PRESERVATIVE FREE 10 ML: 5 INJECTION INTRAVENOUS at 14:00

## 2022-08-14 RX ADMIN — PANTOPRAZOLE SODIUM 40 MG: 40 TABLET, DELAYED RELEASE ORAL at 08:01

## 2022-08-14 RX ADMIN — MELATONIN TAB 3 MG 3 MG: 3 TAB at 21:21

## 2022-08-14 RX ADMIN — DOCUSATE SODIUM 100 MG: 100 CAPSULE, LIQUID FILLED ORAL at 11:40

## 2022-08-14 RX ADMIN — SODIUM CHLORIDE, PRESERVATIVE FREE 10 ML: 5 INJECTION INTRAVENOUS at 05:19

## 2022-08-14 RX ADMIN — PANTOPRAZOLE SODIUM 40 MG: 40 TABLET, DELAYED RELEASE ORAL at 16:07

## 2022-08-14 RX ADMIN — GABAPENTIN 100 MG: 100 CAPSULE ORAL at 08:01

## 2022-08-14 RX ADMIN — ENOXAPARIN SODIUM 30 MG: 100 INJECTION SUBCUTANEOUS at 08:00

## 2022-08-14 RX ADMIN — BUDESONIDE AND FORMOTEROL FUMARATE DIHYDRATE 2 PUFF: 80; 4.5 AEROSOL RESPIRATORY (INHALATION) at 08:15

## 2022-08-14 RX ADMIN — BUDESONIDE AND FORMOTEROL FUMARATE DIHYDRATE 2 PUFF: 80; 4.5 AEROSOL RESPIRATORY (INHALATION) at 20:33

## 2022-08-14 NOTE — PROGRESS NOTES
Hospitalist Progress Note    Subjective:   Daily Progress Note: 8/14/2022 11:07 AM    Hospital Course:  Lulú Delvalle is a 61-year-old female with PMHx of diabetes mellitus, hypertension, asthma and recent gastric ulcer perforation who presented to the ED from [facility] rehab facility due to KIAN. Of note, she started to have shortness of breath secondary to pulmonary edema and generalized swelling during last hospitalization for gastric perforation. She has no history of CHF and ECHO at that time showed normal systolic and diastolic function. She was given diuretics with respiratory improvement. However, since admitted to rehab, her generalized swelling continued to worsen. She was put on 2mg bumax daily without improvement. Overnight 08/10/22, after urination, she reports feeling some pain in her thighs and given tylenol and gabapentin with some relief. No antibiotics at that time. However, since then she has not had any urination for >24  hours. Creatinine reportedly at 4. She had normal creatinine on last admission, which progressively worsened to 1.5 in rehab prior to this. She otherwise denies chest pain or dyspnea, however reports having orthopnea. In the ED, hypoxic with SpO2 81% and placed on 4 L with improvement. KIAN with creatinine of 4.43. LA 2.8, Pro-BNP 2,047. Chest X-ray showed mild pulmonary edema, improved since prior. Straight cath in ED with little urine output, bladder scan with little post-void residual. She was given 1 L IV fluid bolus in the ED. Nephrology consult. Patient has chronic lower extremity edema that appears to be lymphedema. Management with IV and oral fluids at this time. Hold diuretics. Creatinine somewhat improving. Weaned to room air. Subjective:    Patient seen and examined at bedside. She is doing well this morning. States she is straining with bowel movements. Start stool softener.     Current Facility-Administered Medications   Medication Dose Route Frequency docusate sodium (COLACE) capsule 100 mg  100 mg Oral DAILY    albuterol (PROVENTIL HFA, VENTOLIN HFA, PROAIR HFA) inhaler 2 Puff  2 Puff Inhalation Q6H PRN    albuterol-ipratropium (DUO-NEB) 2.5 MG-0.5 MG/3 ML  3 mL Nebulization Q6H PRN    budesonide-formoterol (SYMBICORT) 80-4.5 mcg inhaler  2 Puff Inhalation BID RT    melatonin tablet 3 mg  3 mg Oral QHS    meclizine (ANTIVERT) tablet 25 mg  25 mg Oral Q6H PRN    traZODone (DESYREL) tablet 25 mg  25 mg Oral QHS PRN    insulin lispro (HUMALOG) injection   SubCUTAneous TIDAC    glucose chewable tablet 16 g  4 Tablet Oral PRN    glucagon (GLUCAGEN) injection 1 mg  1 mg IntraMUSCular PRN    dextrose 10% infusion 0-250 mL  0-250 mL IntraVENous PRN    sodium chloride (NS) flush 5-40 mL  5-40 mL IntraVENous Q8H    sodium chloride (NS) flush 5-40 mL  5-40 mL IntraVENous PRN    acetaminophen (TYLENOL) tablet 650 mg  650 mg Oral Q6H PRN    Or    acetaminophen (TYLENOL) suppository 650 mg  650 mg Rectal Q6H PRN    ondansetron (ZOFRAN ODT) tablet 4 mg  4 mg Oral Q8H PRN    Or    ondansetron (ZOFRAN) injection 4 mg  4 mg IntraVENous Q6H PRN    enoxaparin (LOVENOX) injection 30 mg  30 mg SubCUTAneous DAILY    senna (SENOKOT) tablet 8.6 mg  1 Tablet Oral DAILY PRN    pantoprazole (PROTONIX) tablet 40 mg  40 mg Oral ACB&D    gabapentin (NEURONTIN) capsule 100 mg  100 mg Oral TID        Review of Systems  Constitutional: No fevers, No chills, No sweats, No fatigue, No Weakness  Eyes: No redness  Ears, nose, mouth, throat, and face: No nasal congestion, No sore throat, No voice change  Respiratory: No Shortness of Breath, No cough, No wheezing  Cardiovascular: No chest pain, No palpitations, No extremity edema  Gastrointestinal: No nausea, No vomiting, No diarrhea, No abdominal pain  Genitourinary: No frequency, + dysuria, + dark urine, No hematuria  Integument/breast: No skin lesion(s)   Neurological: No Confusion, No headaches, No dizziness      Objective:     Visit Vitals  BP (!) 132/55   Pulse 84   Temp 98 °F (36.7 °C)   Resp 20   Ht 5' 2\" (1.575 m)   Wt 149.7 kg (330 lb)   LMP 2021 (Approximate)   SpO2 96%   Breastfeeding No   BMI 60.36 kg/m²    O2 Flow Rate (L/min): 1 l/min O2 Device: None (Room air)    Temp (24hrs), Av.1 °F (36.7 °C), Min:98 °F (36.7 °C), Max:98.3 °F (36.8 °C)      No intake/output data recorded.  1901 -  0700  In: 18 [P.O.:575]  Out: 750 [Urine:750]    PHYSICAL EXAM:  Constitutional: Obese female. No acute distress  Skin: Generalized pallor. Extremities and face reveal no rashes. HEENT: Sclerae anicteric. PERRL. No oral ulcers. The neck is supple and no masses. Cardiovascular: Regular rate and rhythm. +S1/S2. No murmur or gallop. Respiratory:  Symmetric chest wall expansion. On 4 L nasal cannula. Faint wheezing. No rhonchi or rales. GI: Abdomen nondistended, soft, and nontender. Normal active bowel sounds. Rectal: Deferred   Musculoskeletal: No pitting edema of the lower legs. Able to move all ext  Neurological:  Patient is alert and oriented x3.  Cranial nerves II-XII grossly intact  Psychiatric: Mood appears appropriate       Data Review    Recent Results (from the past 24 hour(s))   GLUCOSE, POC    Collection Time: 22 10:58 AM   Result Value Ref Range    Glucose (POC) 135 (H) 65 - 117 mg/dL    Performed by Petty Quiles, POC    Collection Time: 22  4:33 PM   Result Value Ref Range    Glucose (POC) 139 (H) 65 - 117 mg/dL    Performed by Eron Blanc    GLUCOSE, POC    Collection Time: 22  8:19 PM   Result Value Ref Range    Glucose (POC) 133 (H) 65 - 117 mg/dL    Performed by Cecil Walker    RENAL FUNCTION PANEL    Collection Time: 22  6:48 AM   Result Value Ref Range    Sodium 136 136 - 145 mmol/L    Potassium 4.5 3.5 - 5.1 mmol/L    Chloride 100 97 - 108 mmol/L    CO2 29 21 - 32 mmol/L    Anion gap 7 5 - 15 mmol/L    Glucose 113 (H) 65 - 100 mg/dL    BUN 63 (H) 6 - 20 mg/dL    Creatinine 5.14 (H) 0.55 - 1.02 mg/dL    BUN/Creatinine ratio 12 12 - 20      GFR est AA 10 (L) >60 ml/min/1.73m2    GFR est non-AA 9 (L) >60 ml/min/1.73m2    Calcium 9.5 8.5 - 10.1 mg/dL    Phosphorus 5.0 (H) 2.6 - 4.7 mg/dL    Albumin 2.3 (L) 3.5 - 5.0 g/dL   MAGNESIUM    Collection Time: 08/14/22  6:48 AM   Result Value Ref Range    Magnesium 1.8 1.6 - 2.4 mg/dL   GLUCOSE, POC    Collection Time: 08/14/22  7:53 AM   Result Value Ref Range    Glucose (POC) 113 65 - 117 mg/dL    Performed by Payam Bah        XR CHEST PORT   Final Result   Mild central pulmonary vascular congestion, decreased since   7/21/2022, without overt pulmonary edema. CT ABD PELV WO CONT   Final Result   1. Decompressed urinary bladder with a Solitario catheter in place. No   hydronephrosis. 2. Anterior abdominal mesenteric stranding may be postsurgical. There is a low   density collection adjacent to the greater curvature of the stomach measuring   11.9 x 9.0 cm, suspicious for a contained gastric perforation. 3. Small left pleural effusion with adjacent atelectasis versus infection. Active Problems:    KIAN (acute kidney injury) ([facility] [address] (7/20/2022)      Assessment/Plan:     1. KIAN  - Etiology unclear  - Ddx: SE from diuretics vs poor kidney perfusion secondary to fluid retention vs intrinsic kidney disease.  - CT abd negative for hydronephrosis  - S/p 1 L bolus of NS in ED  - Urine studies for FeURea and miroalbumin/creatinine ratio ordered  - Consult nephrology  - Continue to encourage oral fluid intake  - Creatinine somewhat improving. Appears to be lymphedema, not true peripheral edema after discussion with nephrology. Consider d/c diuretics at discharge. 2. Anasarca  - Unsure cause. Happened before KIAN, but no definite diagnosis of CHF, CKD or cirrhosis. - High protein nutrition supplement. 3. Hypertension  - Resume amlodipine, hydralazine. Hold nephrotoxic medications. 4. Diabetes  5.  Neuropathy  - Hold metformin  - POC + correctional insulin  - Start low-dose gabapentin     6. Asthma  7. Hx PARMINDER - refused CPAP years ago  - Stable. Continue home medications. 8. Hx breast cancer  - Completed chemotherapy. She was supposed to start radiation 2 weeks ago but delay s/t hospitalization/rehab. DVT Prophylaxis: Lovenox  Code Status: Full  POA:    Discharge Barriers:    - Nephrology clearance   - Improvement in renal functions     Care Plan discussed with: patient and nursing     Total time spent with patient: >35 minutes.

## 2022-08-14 NOTE — PROGRESS NOTES
[facility]     Renal Daily Progress Note:     Admission Date: 2022     Subjective:  Feels better, creatinine up but FeNa<1%  FEELS BETTER, Creatinine peaked and now trending down  No shortness of breath or chest pain. No abdominal pain. Able to ambulate to bathroom without difficulty. Still with lower extremity edema (lymphedema)    Review of Systems  Pertinent items are noted in HPI.     Objective:     Visit Vitals  BP (!) 132/55   Pulse 84   Temp 98 °F (36.7 °C)   Resp 20   Ht 5' 2\" (1.575 m)   Wt 149.7 kg (330 lb)   LMP 2021 (Approximate)   SpO2 96%   Breastfeeding No   BMI 60.36 kg/m²     Temp (24hrs), Av.1 °F (36.7 °C), Min:98 °F (36.7 °C), Max:98.3 °F (36.8 °C)        Intake/Output Summary (Last 24 hours) at 2022 1236  Last data filed at 2022 0756  Gross per 24 hour   Intake 725 ml   Output 525 ml   Net 200 ml       Current Facility-Administered Medications   Medication Dose Route Frequency    docusate sodium (COLACE) capsule 100 mg  100 mg Oral DAILY    albuterol (PROVENTIL HFA, VENTOLIN HFA, PROAIR HFA) inhaler 2 Puff  2 Puff Inhalation Q6H PRN    albuterol-ipratropium (DUO-NEB) 2.5 MG-0.5 MG/3 ML  3 mL Nebulization Q6H PRN    budesonide-formoterol (SYMBICORT) 80-4.5 mcg inhaler  2 Puff Inhalation BID RT    melatonin tablet 3 mg  3 mg Oral QHS    meclizine (ANTIVERT) tablet 25 mg  25 mg Oral Q6H PRN    traZODone (DESYREL) tablet 25 mg  25 mg Oral QHS PRN    insulin lispro (HUMALOG) injection   SubCUTAneous TIDAC    glucose chewable tablet 16 g  4 Tablet Oral PRN    glucagon (GLUCAGEN) injection 1 mg  1 mg IntraMUSCular PRN    dextrose 10% infusion 0-250 mL  0-250 mL IntraVENous PRN    sodium chloride (NS) flush 5-40 mL  5-40 mL IntraVENous Q8H    sodium chloride (NS) flush 5-40 mL  5-40 mL IntraVENous PRN    acetaminophen (TYLENOL) tablet 650 mg  650 mg Oral Q6H PRN    Or    acetaminophen (TYLENOL) suppository 650 mg  650 mg Rectal Q6H PRN    ondansetron (ZOFRAN ODT) tablet 4 mg  4 mg Oral Q8H PRN    Or    ondansetron (ZOFRAN) injection 4 mg  4 mg IntraVENous Q6H PRN    enoxaparin (LOVENOX) injection 30 mg  30 mg SubCUTAneous DAILY    senna (SENOKOT) tablet 8.6 mg  1 Tablet Oral DAILY PRN    pantoprazole (PROTONIX) tablet 40 mg  40 mg Oral ACB&D    gabapentin (NEURONTIN) capsule 100 mg  100 mg Oral TID       Physical Exam:  alert, cooperative, no distress, appears stated age, morbidly obese  Head: Normocephalic, without obvious abnormality, atraumatic, syndromic facies-cushingoid  Eyes: negative  Neck: supple, symmetrical, trachea midline, no adenopathy, thyroid: not enlarged, symmetric, no tenderness/mass/nodules, and very thick neck, cannot assess for JVD  Lungs: clear to auscultation bilaterally  Heart: regular rate and rhythm, no S3 or S4  Abdomen: soft, non-tender. Bowel sounds normal. No masses,  no organomegaly, obese, no dependent abdominal wall edema  Extremities: atraumatic, no cyanosis has edema but it appears to be lymphedema  Neurologic: Grossly normal     Data Review:     LABS:  Recent Labs     08/14/22  0648 08/13/22  0640 08/12/22  0938 08/12/22  0653 08/11/22 2236 08/11/22 2236    135*  --  136  --  131*   K 4.5 4.4  --  4.1  --  4.1    99  --  101  --  98   CO2 29 29  --  29  --  26   BUN 63* 46*  --  38*  --  38*   CREA 5.14* 5.29*  --  4.69*  --  4.43*   CA 9.5 9.1  --  9.1  --  9.0   ALB 2.3* 2.5* 2.3* 2.4*   < >  --    PHOS 5.0* 4.6  --   --   --  3.3   MG 1.8 1.3*  --   --   --   --     < > = values in this interval not displayed. Recent Labs     08/13/22  0640 08/11/22  1600   WBC 10.1 9.8   HGB 7.9* 8.3*   HCT 26.3* 28.7*    448*       Recent Labs     08/12/22  1200   SAROJ 15   KU 33   CREAU 289.00       8-13-22:Cortisol, a.m 15.7     CT Abd/pelvis 8-11-22  FINDINGS:  Solid organ evaluation is limited without contrast.     The visualized lung bases demonstrate a small left pleural effusion and left  lower lobe opacity.  The heart size is normal. There is no pericardial or right  pleural effusion. There is no renal, ureteral, or bladder calculus. The kidneys are symmetric  without hydronephrosis. There is no perinephric fluid or fat stranding. The liver, spleen, pancreas, and right adrenal gland are normal. There is a  stable left adrenal nodule measuring 3.4 cm. The gall bladder is surgically  absent without intra- or extra-hepatic biliary dilatation. Midline abdominal skin staples are noted. There is mild mesenteric stranding in  the upper anterior abdomen. There is a low density collection in the left upper  quadrant abutting the greater curvature of the stomach measuring 11.9 x 9.0 cm. There are no dilated bowel loops. The appendix is normal.       There are no enlarged lymph nodes. There is no free air. The aorta tapers  without aneurysm. The urinary bladder is nondistended with a Solitario catheter in place. There is no  pelvic mass. The bony structures are age-appropriate. IMPRESSION  1. Decompressed urinary bladder with a Solitario catheter in place. No  hydronephrosis. 2. Anterior abdominal mesenteric stranding may be postsurgical. There is a low  density collection adjacent to the greater curvature of the stomach measuring  11.9 x 9.0 cm, suspicious for a contained gastric perforation. 3. Small left pleural effusion with adjacent atelectasis versus infection.         Assessment:   Renal Specific Problems  KIAN probably related to volume depletion  Obesity  Probably volume depleted with BNP of 200 in the setting of acute kidney injury  Lower extremity edema which probably is lymphedema and as such is not amenable to diuresis  Obstructive sleep apnea  Adult onset diabetes mellitus  History of hypertension      Plan:     Obtain/ Order: labs/cultures/radiology/procedures:  renal panel        Therapeutic:    Push p.o. fluids, if creatinine up further then will start IV  No diuretics for now  Discussed with PA Zhou and patient        Tabatha Couch MD    [phone]

## 2022-08-14 NOTE — PROGRESS NOTES
Patient is alert and oriented and able to reposition self. Patient understands the potential of skin breakdown and is currently on a wedge to offload pressure from her bottom.

## 2022-08-15 LAB
ALBUMIN SERPL-MCNC: 2.5 G/DL (ref 3.5–5)
ANION GAP SERPL CALC-SCNC: 5 MMOL/L (ref 5–15)
BUN SERPL-MCNC: 56 MG/DL (ref 6–20)
BUN/CREAT SERPL: 22 (ref 12–20)
CA-I BLD-MCNC: 10.4 MG/DL (ref 8.5–10.1)
CHLORIDE SERPL-SCNC: 103 MMOL/L (ref 97–108)
CO2 SERPL-SCNC: 31 MMOL/L (ref 21–32)
CREAT SERPL-MCNC: 2.59 MG/DL (ref 0.55–1.02)
ERYTHROCYTE [DISTWIDTH] IN BLOOD BY AUTOMATED COUNT: 19.6 % (ref 11.5–14.5)
GLUCOSE BLD STRIP.AUTO-MCNC: 111 MG/DL (ref 65–117)
GLUCOSE BLD STRIP.AUTO-MCNC: 135 MG/DL (ref 65–117)
GLUCOSE BLD STRIP.AUTO-MCNC: 138 MG/DL (ref 65–117)
GLUCOSE BLD STRIP.AUTO-MCNC: 144 MG/DL (ref 65–117)
GLUCOSE BLD STRIP.AUTO-MCNC: 393 MG/DL (ref 65–117)
GLUCOSE SERPL-MCNC: 119 MG/DL (ref 65–100)
HCT VFR BLD AUTO: 28.8 % (ref 35–47)
HGB BLD-MCNC: 8.7 G/DL (ref 11.5–16)
MCH RBC QN AUTO: 29.2 PG (ref 26–34)
MCHC RBC AUTO-ENTMCNC: 30.2 G/DL (ref 30–36.5)
MCV RBC AUTO: 96.6 FL (ref 80–99)
NRBC # BLD: 0 K/UL (ref 0–0.01)
NRBC BLD-RTO: 0 PER 100 WBC
PERFORMED BY, TECHID: ABNORMAL
PERFORMED BY, TECHID: NORMAL
PHOSPHATE SERPL-MCNC: 3.7 MG/DL (ref 2.6–4.7)
PLATELET # BLD AUTO: 398 K/UL (ref 150–400)
PMV BLD AUTO: 8.7 FL (ref 8.9–12.9)
POTASSIUM SERPL-SCNC: 4.8 MMOL/L (ref 3.5–5.1)
RBC # BLD AUTO: 2.98 M/UL (ref 3.8–5.2)
SODIUM SERPL-SCNC: 139 MMOL/L (ref 136–145)
WBC # BLD AUTO: 10.4 K/UL (ref 3.6–11)

## 2022-08-15 PROCEDURE — 82962 GLUCOSE BLOOD TEST: CPT

## 2022-08-15 PROCEDURE — 97116 GAIT TRAINING THERAPY: CPT

## 2022-08-15 PROCEDURE — 65270000029 HC RM PRIVATE

## 2022-08-15 PROCEDURE — 74011000250 HC RX REV CODE- 250: Performed by: INTERNAL MEDICINE

## 2022-08-15 PROCEDURE — 80069 RENAL FUNCTION PANEL: CPT

## 2022-08-15 PROCEDURE — 36415 COLL VENOUS BLD VENIPUNCTURE: CPT

## 2022-08-15 PROCEDURE — 97110 THERAPEUTIC EXERCISES: CPT

## 2022-08-15 PROCEDURE — 94640 AIRWAY INHALATION TREATMENT: CPT

## 2022-08-15 PROCEDURE — 74011250637 HC RX REV CODE- 250/637: Performed by: STUDENT IN AN ORGANIZED HEALTH CARE EDUCATION/TRAINING PROGRAM

## 2022-08-15 PROCEDURE — 74011250637 HC RX REV CODE- 250/637: Performed by: INTERNAL MEDICINE

## 2022-08-15 PROCEDURE — 74011250636 HC RX REV CODE- 250/636: Performed by: INTERNAL MEDICINE

## 2022-08-15 PROCEDURE — 85027 COMPLETE CBC AUTOMATED: CPT

## 2022-08-15 PROCEDURE — 97530 THERAPEUTIC ACTIVITIES: CPT

## 2022-08-15 RX ORDER — ENOXAPARIN SODIUM 100 MG/ML
40 INJECTION SUBCUTANEOUS DAILY
Status: DISCONTINUED | OUTPATIENT
Start: 2022-08-16 | End: 2022-08-16 | Stop reason: HOSPADM

## 2022-08-15 RX ADMIN — MELATONIN TAB 3 MG 3 MG: 3 TAB at 20:34

## 2022-08-15 RX ADMIN — SODIUM CHLORIDE, PRESERVATIVE FREE 10 ML: 5 INJECTION INTRAVENOUS at 05:13

## 2022-08-15 RX ADMIN — DOCUSATE SODIUM 100 MG: 100 CAPSULE, LIQUID FILLED ORAL at 09:36

## 2022-08-15 RX ADMIN — GABAPENTIN 300 MG: 300 CAPSULE ORAL at 09:36

## 2022-08-15 RX ADMIN — PANTOPRAZOLE SODIUM 40 MG: 40 TABLET, DELAYED RELEASE ORAL at 16:57

## 2022-08-15 RX ADMIN — ENOXAPARIN SODIUM 30 MG: 100 INJECTION SUBCUTANEOUS at 09:42

## 2022-08-15 RX ADMIN — GABAPENTIN 300 MG: 300 CAPSULE ORAL at 20:33

## 2022-08-15 RX ADMIN — PANTOPRAZOLE SODIUM 40 MG: 40 TABLET, DELAYED RELEASE ORAL at 09:36

## 2022-08-15 RX ADMIN — ACETAMINOPHEN 650 MG: 325 TABLET, FILM COATED ORAL at 13:30

## 2022-08-15 RX ADMIN — BUDESONIDE AND FORMOTEROL FUMARATE DIHYDRATE 2 PUFF: 80; 4.5 AEROSOL RESPIRATORY (INHALATION) at 07:31

## 2022-08-15 RX ADMIN — SODIUM CHLORIDE, PRESERVATIVE FREE 10 ML: 5 INJECTION INTRAVENOUS at 14:00

## 2022-08-15 RX ADMIN — SODIUM CHLORIDE, PRESERVATIVE FREE 10 ML: 5 INJECTION INTRAVENOUS at 20:40

## 2022-08-15 RX ADMIN — BUDESONIDE AND FORMOTEROL FUMARATE DIHYDRATE 2 PUFF: 80; 4.5 AEROSOL RESPIRATORY (INHALATION) at 20:56

## 2022-08-15 NOTE — PROGRESS NOTES
PHYSICAL THERAPY TREATMENT  Patient: Fernando Negron (60 y.o. female)  Date: 8/15/2022  Diagnosis: [facility] [N17.9] <principal problem not specified>      Precautions:    Chart, physical therapy assessment, plan of care and goals were reviewed. ASSESSMENT  Patient continues with skilled PT services and is progressing towards goals. Pt sitting in recliner chair upon arrival and agreeable to therapy. Pre-treatment 90% O2 sat on room air, HR 86, /57. Pt feeling lightheaded when standing up from the chair so she was educated on PLB. Dizziness subsided and pt able to ambulate. Sit > stand with RW Min A x2, due to low surface of the chair. Pt ambulated with RW 20ft and CGA to the door and to the other side of the bed. Stand > sit CGA to the EOB where pts O2 sats from 88-90%, 3 minutes post-treatment sats got to 92%, HR 98. Current Level of Function Impacting Discharge (mobility/balance): Min A x2/ CGA for mobility    Other factors to consider for discharge: PLOF, safety, decreased mobility, O2 sats could be a problematic factor         PLAN :  Patient continues to benefit from skilled intervention to address the above impairments. Continue treatment per established plan of care. to address goals. Recommendation for discharge: (in order for the patient to meet his/her long term goals)  To Be Determined IRF vs HHPT pending progress    This discharge recommendation:  Has been made in collaboration with the attending provider and/or case management    IF patient discharges home will need the following DME: rolling walker and to be determined (TBD)       SUBJECTIVE:   Patient stated I'm feeling dizzy.     OBJECTIVE DATA SUMMARY:   Critical Behavior:  Neurologic State: Alert  Orientation Level: Oriented X4  Cognition: Follows commands, Appropriate for age attention/concentration, Appropriate decision making     Functional Mobility Training:  Bed Mobility:  Rolling: Stand-by assistance  Supine to Sit: Stand-by assistance  Sit to Supine: Minimum assistance;Assist x1 (For LE)  Scooting: Stand-by assistance    Transfers:  Sit to Stand: Minimum assistance;Assist x2  Stand to Sit: Contact guard assistance    Balance:  Sitting: Intact; Without support  Standing: Impaired; Without support  Standing - Static: Fair;Constant support  Standing - Dynamic : Fair;Constant support    Ambulation/Gait Training:  Distance (ft): 20 Feet (ft)  Assistive Device: Walker, rolling;Gait belt  Ambulation - Level of Assistance: Contact guard assistance    Speed/[place]: Slow  Step Length: Left shortened;Right shortened      Pain Ratin/10 Bilateral thighs    Activity Tolerance:   Fair  Please refer to the flowsheet for vital signs taken during this treatment. After treatment patient left in no apparent distress:   Bed returned to lowest position, Supine in bed, Call bell within reach, Bed / chair alarm activated, and Side rails x 3    COMMUNICATION/COLLABORATION:   The patients plan of care was discussed with: Registered nurse.      SAGE Buenrostro   Time Calculation: 28 mins         Problem: Mobility Impaired (Adult and Pediatric)  Goal: *Acute Goals and Plan of Care (Insert Text)  Description: I with LE HEP x7 days  Mod I with bed mob x7 days  SBA with all transfers x7 days  Amb 50-75ft with RW and SBAx1 x7 days    Pt stated goal: to get better  Outcome: Progressing Towards Goal

## 2022-08-15 NOTE — PROGRESS NOTES
Hospitalist Progress Note    Subjective:   Daily Progress Note: 8/15/2022 11:07 AM    Hospital Course:  Fernando Negron is a 28-year-old female with PMHx of diabetes mellitus, hypertension, asthma and recent gastric ulcer perforation who presented to the ED from [facility] facility due to KIAN. Of note, she started to have shortness of breath secondary to pulmonary edema and generalized swelling during last hospitalization for gastric perforation. She has no history of CHF and ECHO at that time showed normal systolic and diastolic function. She was given diuretics with respiratory improvement. However, since admitted to rehab, her generalized swelling continued to worsen. She was put on 2mg bumax daily without improvement. Overnight 08/10/22, after urination, she reports feeling some pain in her thighs and given tylenol and gabapentin with some relief. No antibiotics at that time. However, since then she has not had any urination for >24  hours. Creatinine reportedly at 4. She had normal creatinine on last admission, which progressively worsened to 1.5 in rehab prior to this. She otherwise denies chest pain or dyspnea, however reports having orthopnea. In the ED, hypoxic with SpO2 81% and placed on 4 L with improvement. KIAN with creatinine of 4.43. LA 2.8, Pro-BNP 2,047. Chest X-ray showed mild pulmonary edema, improved since prior. Straight cath in ED with little urine output, bladder scan with little post-void residual. She was given 1 L IV fluid bolus in the ED. Nephrology consult. Patient has chronic lower extremity edema that appears to be lymphedema. Management with IV and oral fluids at this time. Hold diuretics. Peak creatinine 5.29, somewhat improving after discontinuation of diuretics. Weaned to room air. Subjective:    Patient seen and examined at bedside. Patient asking if richter catheter could be discontinued. Explained to her that it is for accurate I/O.  She has no other concerns at this time.     Current Facility-Administered Medications   Medication Dose Route Frequency    docusate sodium (COLACE) capsule 100 mg  100 mg Oral DAILY    gabapentin (NEURONTIN) capsule 300 mg  300 mg Oral BID    albuterol (PROVENTIL HFA, VENTOLIN HFA, PROAIR HFA) inhaler 2 Puff  2 Puff Inhalation Q6H PRN    albuterol-ipratropium (DUO-NEB) 2.5 MG-0.5 MG/3 ML  3 mL Nebulization Q6H PRN    budesonide-formoterol (SYMBICORT) 80-4.5 mcg inhaler  2 Puff Inhalation BID RT    melatonin tablet 3 mg  3 mg Oral QHS    meclizine (ANTIVERT) tablet 25 mg  25 mg Oral Q6H PRN    traZODone (DESYREL) tablet 25 mg  25 mg Oral QHS PRN    insulin lispro (HUMALOG) injection   SubCUTAneous TIDAC    glucose chewable tablet 16 g  4 Tablet Oral PRN    glucagon (GLUCAGEN) injection 1 mg  1 mg IntraMUSCular PRN    dextrose 10% infusion 0-250 mL  0-250 mL IntraVENous PRN    sodium chloride (NS) flush 5-40 mL  5-40 mL IntraVENous Q8H    sodium chloride (NS) flush 5-40 mL  5-40 mL IntraVENous PRN    acetaminophen (TYLENOL) tablet 650 mg  650 mg Oral Q6H PRN    Or    acetaminophen (TYLENOL) suppository 650 mg  650 mg Rectal Q6H PRN    ondansetron (ZOFRAN ODT) tablet 4 mg  4 mg Oral Q8H PRN    Or    ondansetron (ZOFRAN) injection 4 mg  4 mg IntraVENous Q6H PRN    enoxaparin (LOVENOX) injection 30 mg  30 mg SubCUTAneous DAILY    senna (SENOKOT) tablet 8.6 mg  1 Tablet Oral DAILY PRN    pantoprazole (PROTONIX) tablet 40 mg  40 mg Oral ACB&D        Review of Systems  Constitutional: No fevers, No chills, No sweats, No fatigue, No Weakness  Eyes: No redness  Ears, nose, mouth, throat, and face: No nasal congestion, No sore throat, No voice change  Respiratory: No Shortness of Breath, No cough, No wheezing  Cardiovascular: No chest pain, No palpitations, No extremity edema  Gastrointestinal: No nausea, No vomiting, No diarrhea, No abdominal pain  Genitourinary: No frequency, + dysuria, + dark urine, No hematuria  Integument/breast: No skin lesion(s) Neurological: No Confusion, No headaches, No dizziness      Objective:     Visit Vitals  /68 (BP 1 Location: Left upper arm, BP Patient Position: At rest;Lying)   Pulse 68   Temp 98.1 °F (36.7 °C)   Resp 21   Ht 5' 2\" (1.575 m)   Wt 149.7 kg (330 lb)   LMP 2021 (Approximate)   SpO2 100%   Breastfeeding No   BMI 60.36 kg/m²    O2 Flow Rate (L/min): 1 l/min O2 Device: None (Room air)    Temp (24hrs), Av.1 °F (36.7 °C), Min:98 °F (36.7 °C), Max:98.1 °F (36.7 °C)      No intake/output data recorded.  1901 - 08/15 0700  In: 925 [P.O.:925]  Out: 1425 [Urine:1425]    PHYSICAL EXAM:  Constitutional: Obese female. No acute distress  Skin: Generalized pallor. Extremities and face reveal no rashes. HEENT: Sclerae anicteric. PERRL. No oral ulcers. The neck is supple and no masses. Cardiovascular: Regular rate and rhythm. +S1/S2. No murmur or gallop. Respiratory:  Symmetric chest wall expansion. No, wheezing, rhonchi or rales. On room air. GI: Abdomen nondistended, soft, and nontender. Normal active bowel sounds. Rectal: Deferred   Musculoskeletal: No pitting edema of the lower legs. Able to move all ext  Neurological:  Patient is alert and oriented x3. Cranial nerves II-XII grossly intact  Psychiatric: Mood appears appropriate       Data Review    Recent Results (from the past 24 hour(s))   GLUCOSE, POC    Collection Time: 22 11:42 AM   Result Value Ref Range    Glucose (POC) 145 (H) 65 - 117 mg/dL    Performed by Cori Eldridge, POC    Collection Time: 22  4:30 PM   Result Value Ref Range    Glucose (POC) 148 (H) 65 - 117 mg/dL    Performed by Claudean Bouillon        XR CHEST PORT   Final Result   Mild central pulmonary vascular congestion, decreased since   2022, without overt pulmonary edema. CT ABD PELV WO CONT   Final Result   1. Decompressed urinary bladder with a Solitario catheter in place. No   hydronephrosis.       2. Anterior abdominal mesenteric stranding may be postsurgical. There is a low   density collection adjacent to the greater curvature of the stomach measuring   11.9 x 9.0 cm, suspicious for a contained gastric perforation. 3. Small left pleural effusion with adjacent atelectasis versus infection. Active Problems:    KIAN (acute kidney injury) ([facility] [address] (7/20/2022)      Assessment/Plan:     1. KIAN  - Etiology appears to be s/t diuretics   - CT abd negative for hydronephrosis  - S/p 1 L bolus of NS in ED  - Urine studies for FeURea and miroalbumin/creatinine ratio ordered  - Consult nephrology  - Continue to encourage oral fluid intake  - Creatinine somewhat improving. Appears to be lymphedema, not true peripheral edema after discussion with nephrology. Will d/c diuretics at discharge. 2. Anasarca  - Unsure cause. Happened before KIAN, but no definite diagnosis of CHF, CKD or cirrhosis. - High protein nutrition supplement. 3. Hypertension  - Resume amlodipine, hydralazine. Hold nephrotoxic medications. 4. Diabetes  5. Neuropathy  - Hold metformin  - POC + correctional insulin  - Start on gabapentin     6. Asthma  7. Hx PARMINDER - refused CPAP years ago  - Stable. Continue home medications. 8. Hx breast cancer  - Completed chemotherapy. She was supposed to start radiation 2 weeks ago but delay s/t hospitalization/rehab. DVT Prophylaxis: Lovenox  Code Status: Full  POA:    Discharge Barriers:    - Nephrology clearance   - Improvement in renal functions    - Return to IRF at discharge    Care Plan discussed with: patient and nursing     Total time spent with patient: >35 minutes.

## 2022-08-15 NOTE — PROGRESS NOTES
Problem: Self Care Deficits Care Plan (Adult)  Goal: *Acute Goals and Plan of Care (Insert Text)  Description: Pt stated goal \"I want to sit by the window\"  Pt will be MI sup<->sit in prep for EOB ADL's  Pt will be MI  LB dressing EOB level with borrowed AE  Pt will be MI  sit<-> prep for toilet transfer  Pt will be MI  toilet transfer with LRAD  Pt will be MI  toileting/cloth mgmt LRAD  Pt will be MI  grooming standing sink  Pt will be MI bathing sitting/standing sink LRAD  Pt will be MI yohannes UE HEP in prep for self care tasks    Outcome: Progressing Towards Goal   OCCUPATIONAL THERAPY TREATMENT  Patient: Peyman Rodriguez (89 y.o. female)  Date: 8/15/2022  Diagnosis: KIAN (acute kidney injury) ([facility] [address] [N17.9] <principal problem not specified>      Precautions: FALL  Chart, occupational therapy assessment, plan of care, and goals were reviewed. ASSESSMENT  Patient continues with skilled OT services and is progressing towards goals. Patient received semi supine in bed upon CASTAÑEDA'S arrival and agreeable to work with therapist. Patient required SBA for bed mobility, supine to sit EOB, and scooting using bed rail with bed modified with additional time d/t large body habitus, pain in yohannes thighs and generalized weakness. STS using RW/gait belt with min A and v's for correct hand placement for safety, pt ambulated bed>sink with min A and slow gait. Standing at sink, pt required GCA for simple grooming (washed hands) with no LOB noted. Upon ambulation to chair, pt needed seated rest break d/t dizziness as nursing brought chair. Pt's dizziness improved and completed chair >recliner with min A. SpO2 decreasing to 88% and increasing to 91% with PLB technique, HR 97. Nursing notified. Pt reported dizziness much improved. Seated, patient educated on and completed bilateral UE HEP to increase strength/endurance needed for ADL'S and functional transfers, see grid below.   Pt left resting in chair with call bell and all needs met. Patient would benefit from continued skilled Occupational services while at [facility] in order to increase safety and independence with self care and functional tranfers/mobility. Recommend at discharge to IRF when medically appropriate. Current Level of Function Impacting Discharge (ADLs): CGA/set-up simple grooming    Other factors to consider for discharge: Time of onset, medical prognosis/diagnosis, severity of deficits, PLOF,  pain and family support          PLAN :  Patient continues to benefit from skilled intervention to address the above impairments. Continue treatment per established plan of care. to address goals. Recommend with staff: chair level grooming    Recommend next OT session: LB dressing    Recommendation for discharge: (in order for the patient to meet his/her long term goals)  [address] [facility]     This discharge recommendation:  Has been made in collaboration with the attending provider and/or case management    IF patient discharges home will need the following DME: TBD       SUBJECTIVE:   Patient stated It's better.     OBJECTIVE DATA SUMMARY:   Cognitive/Behavioral Status:  Neurologic State: Alert  Orientation Level: Oriented X4  Cognition: Follows commands             Functional Mobility and Transfers for ADLs:  Bed Mobility:  Rolling: Stand-by assistance  Supine to Sit: Stand-by assistance  Scooting: Stand-by assistance    Transfers:  Sit to Stand: Minimum assistance  Functional Transfers  Bathroom Mobility: Minimum assistance       Balance:  Sitting: Intact; Without support  Standing: Impaired  Standing - Static: Fair;Constant support  Standing - Dynamic : Fair;Constant support    ADL Intervention:       Grooming  Grooming Assistance: Contact guard assistance  Position Performed: Standing  Washing Hands: Contact guard assistance                 Therapeutic Exercises:   Exercise Sets Reps AROM AAROM PROM Self PROM Comments   Chest press 3 12 [x] [] [] [] Seated EOB, vc's to pace self for optimal benefits, needs rest breaks   Elbow flex/ext 3 12 [x] [] [] []         Pain:  7/10 thigh area    Activity Tolerance:   Fair, requires rest breaks, and observed SOB with activity  Please refer to the flowsheet for vital signs taken during this treatment. After treatment patient left in no apparent distress:   Bed returned to lowest position, Sitting in chair and Call bell within reach    COMMUNICATION/COLLABORATION:   The patients plan of care was discussed with: Registered nurse.      MADDY Aguero  Time Calculation: 25 mins

## 2022-08-15 NOTE — PROGRESS NOTES
Patient is alert and oriented and can reposition self. Currently using pillows and wedges to offload pressure on buttocks and sacrum area and heels.

## 2022-08-16 VITALS
RESPIRATION RATE: 12 BRPM | HEART RATE: 86 BPM | DIASTOLIC BLOOD PRESSURE: 65 MMHG | HEIGHT: 62 IN | TEMPERATURE: 99.6 F | OXYGEN SATURATION: 98 % | BODY MASS INDEX: 53.92 KG/M2 | SYSTOLIC BLOOD PRESSURE: 146 MMHG | WEIGHT: 293 LBS

## 2022-08-16 LAB
ALBUMIN SERPL-MCNC: 2.4 G/DL (ref 3.5–5)
ANION GAP SERPL CALC-SCNC: 3 MMOL/L (ref 5–15)
BUN SERPL-MCNC: 44 MG/DL (ref 6–20)
BUN/CREAT SERPL: 31 (ref 12–20)
CA-I BLD-MCNC: 9.9 MG/DL (ref 8.5–10.1)
CHLORIDE SERPL-SCNC: 107 MMOL/L (ref 97–108)
CO2 SERPL-SCNC: 30 MMOL/L (ref 21–32)
CREAT SERPL-MCNC: 1.43 MG/DL (ref 0.55–1.02)
GLUCOSE BLD STRIP.AUTO-MCNC: 116 MG/DL (ref 65–117)
GLUCOSE BLD STRIP.AUTO-MCNC: 139 MG/DL (ref 65–117)
GLUCOSE BLD STRIP.AUTO-MCNC: 151 MG/DL (ref 65–117)
GLUCOSE SERPL-MCNC: 120 MG/DL (ref 65–100)
PERFORMED BY, TECHID: ABNORMAL
PERFORMED BY, TECHID: ABNORMAL
PERFORMED BY, TECHID: NORMAL
PHOSPHATE SERPL-MCNC: 2.8 MG/DL (ref 2.6–4.7)
POTASSIUM SERPL-SCNC: 4.9 MMOL/L (ref 3.5–5.1)
SODIUM SERPL-SCNC: 140 MMOL/L (ref 136–145)

## 2022-08-16 PROCEDURE — 94640 AIRWAY INHALATION TREATMENT: CPT

## 2022-08-16 PROCEDURE — 74011636637 HC RX REV CODE- 636/637: Performed by: INTERNAL MEDICINE

## 2022-08-16 PROCEDURE — 97535 SELF CARE MNGMENT TRAINING: CPT

## 2022-08-16 PROCEDURE — 74011250637 HC RX REV CODE- 250/637: Performed by: INTERNAL MEDICINE

## 2022-08-16 PROCEDURE — 77010033678 HC OXYGEN DAILY

## 2022-08-16 PROCEDURE — 74011250636 HC RX REV CODE- 250/636: Performed by: INTERNAL MEDICINE

## 2022-08-16 PROCEDURE — 36415 COLL VENOUS BLD VENIPUNCTURE: CPT

## 2022-08-16 PROCEDURE — 80069 RENAL FUNCTION PANEL: CPT

## 2022-08-16 PROCEDURE — 82962 GLUCOSE BLOOD TEST: CPT

## 2022-08-16 PROCEDURE — 74011000250 HC RX REV CODE- 250: Performed by: INTERNAL MEDICINE

## 2022-08-16 PROCEDURE — 74011250637 HC RX REV CODE- 250/637: Performed by: STUDENT IN AN ORGANIZED HEALTH CARE EDUCATION/TRAINING PROGRAM

## 2022-08-16 PROCEDURE — 94761 N-INVAS EAR/PLS OXIMETRY MLT: CPT

## 2022-08-16 RX ORDER — ALOGLIPTIN 6.25 MG/1
25 TABLET, FILM COATED ORAL DAILY
Status: DISCONTINUED | OUTPATIENT
Start: 2022-08-17 | End: 2022-08-16 | Stop reason: HOSPADM

## 2022-08-16 RX ORDER — ALOGLIPTIN 25 MG/1
12.5 TABLET, FILM COATED ORAL DAILY
Qty: 30 TABLET | Refills: 0 | Status: SHIPPED | OUTPATIENT
Start: 2022-08-17

## 2022-08-16 RX ADMIN — DOCUSATE SODIUM 100 MG: 100 CAPSULE, LIQUID FILLED ORAL at 08:57

## 2022-08-16 RX ADMIN — SENNOSIDES 8.6 MG: 8.6 TABLET, COATED ORAL at 09:09

## 2022-08-16 RX ADMIN — SODIUM CHLORIDE, PRESERVATIVE FREE 10 ML: 5 INJECTION INTRAVENOUS at 14:00

## 2022-08-16 RX ADMIN — GABAPENTIN 300 MG: 300 CAPSULE ORAL at 08:57

## 2022-08-16 RX ADMIN — ACETAMINOPHEN 650 MG: 325 TABLET, FILM COATED ORAL at 12:19

## 2022-08-16 RX ADMIN — BUDESONIDE AND FORMOTEROL FUMARATE DIHYDRATE 2 PUFF: 80; 4.5 AEROSOL RESPIRATORY (INHALATION) at 07:34

## 2022-08-16 RX ADMIN — PANTOPRAZOLE SODIUM 40 MG: 40 TABLET, DELAYED RELEASE ORAL at 08:57

## 2022-08-16 RX ADMIN — SODIUM CHLORIDE, PRESERVATIVE FREE 5 ML: 5 INJECTION INTRAVENOUS at 06:16

## 2022-08-16 RX ADMIN — PANTOPRAZOLE SODIUM 40 MG: 40 TABLET, DELAYED RELEASE ORAL at 17:07

## 2022-08-16 RX ADMIN — ENOXAPARIN SODIUM 40 MG: 100 INJECTION SUBCUTANEOUS at 08:57

## 2022-08-16 RX ADMIN — INSULIN LISPRO 2 UNITS: 100 INJECTION, SOLUTION INTRAVENOUS; SUBCUTANEOUS at 09:08

## 2022-08-16 NOTE — PROGRESS NOTES
Problem: Self Care Deficits Care Plan (Adult)  Goal: *Acute Goals and Plan of Care (Insert Text)  Description: Pt stated goal \"I want to sit by the window\"  Pt will be MI sup<->sit in prep for EOB ADL's  Pt will be MI  LB dressing EOB level with borrowed AE  Pt will be MI  sit<-> prep for toilet transfer  Pt will be MI  toilet transfer with LRAD  Pt will be MI  toileting/cloth mgmt LRAD  Pt will be MI  grooming standing sink  Pt will be MI bathing sitting/standing sink LRAD  Pt will be MI yohannes UE HEP in prep for self care tasks    Outcome: Progressing Towards Goal   OCCUPATIONAL THERAPY TREATMENT  Patient: Oumou Dobbs (54 y.o. female)  Date: 8/16/2022  Diagnosis: KIAN (acute kidney injury) ([facility] [address] [N17.9] <principal problem not specified>      Precautions:  FALL  Chart, occupational therapy assessment, plan of care, and goals were reviewed. ASSESSMENT  Patient continues with skilled OT services and is progressing towards goals. Patient received semi supine in bed upon CASTAÑEDA'S arrival, on 2L O2 via NC and agreeable to work with therapist. Patient required SBA for bed mobility, supine to sit EOB, and scooting using bed rail with bed modified with additional time d/t large body habitus, pain in yohannes thighs and generalized weakness. STS using RW/gait belt with CGA and v's for correct hand placement for safety, pt ambulated bed>toilet tf with min A and vc's to use grab bar for safety. TA for toileting hygiene in standing with CGA for steadying. SpO2 90% with activity and increasing to 96% with PLB with min vc's for proper technique. Pt completed chair >recliner with min A d/t decreased activity tolerance and generalized weakness. Seated in chair, pt set-up for eating lunch with assist to open containers. Pt reported no dizziness this visit. Pt left resting in chair with call bell and all needs met.  Patient would benefit from continued skilled Occupational services while at [facility] in order to increase safety and independence with self care and functional tranfers/mobility. Recommend at discharge to IRF when medically appropriate    Current Level of Function Impacting Discharge (ADLs): TA toileting hygiene and set-up eating    Other factors to consider for discharge: Time of onset, medical prognosis/diagnosis, severity of deficits, SpO2 level, PLOF, home environment, and family support          PLAN :  Patient continues to benefit from skilled intervention to address the above impairments. Continue treatment per established plan of care. to address goals. Recommend with staff: chair level grooming    Recommend next OT session: HEP/Sink level grooming    Recommendation for discharge: (in order for the patient to meet his/her long term goals)  [facility]     This discharge recommendation:  Has been made in collaboration with the attending provider and/or case management    IF patient discharges home will need the following DME: TBD       SUBJECTIVE:   Patient stated I've been waiting to go to the bathroom.     OBJECTIVE DATA SUMMARY:   Cognitive/Behavioral Status:  Neurologic State: Alert  Orientation Level: Oriented X4  Cognition: Follows commands; Appropriate for age attention/concentration             Functional Mobility and Transfers for ADLs:  Bed Mobility:  Rolling: Stand-by assistance  Supine to Sit: Stand-by assistance  Scooting: Stand-by assistance    Transfers:  Sit to Stand: Contact guard assistance; Additional time  Functional Transfers  Toilet Transfer : Minimum assistance; Additional time       Balance:  Sitting: Intact  Standing: Impaired; With support  Standing - Static: Constant support; Fair  Standing - Dynamic : Fair;Constant support    ADL Intervention:  Feeding  Feeding Assistance: Set-up  Container Management: Set-up         Toileting  Toileting Assistance: Total assistance(dependent)  Bladder Hygiene:  Total assistance (dependent)     Pain:  6/10 thighs    Activity Tolerance: Fair and requires rest breaks  Please refer to the flowsheet for vital signs taken during this treatment. After treatment patient left in no apparent distress:   Bed returned to lowest position, Sitting in chair and Call bell within reach    COMMUNICATION/COLLABORATION:   The patients plan of care was discussed with: Registered nurse.      MADDY Terrell  Time Calculation: 24 mins

## 2022-08-16 NOTE — DISCHARGE SUMMARY
Hospitalist Discharge Summary     Patient ID:    Arie Roque  [id number]  42 y.o.  1960    Admit date: 8/11/2022    Discharge date : 8/16/2022      Final Diagnoses: Active Problems:    KIAN (acute kidney injury) ([facility] [address] (7/20/2022)      Reason for Hospitalization:  Arie Roque is a 80-year-old female with PMHx of diabetes mellitus, hypertension, asthma and recent gastric ulcer perforation who presented to the ED from [facility] due to KIAN. Of note, she started to have shortness of breath secondary to pulmonary edema and generalized swelling during last hospitalization for gastric perforation. She has no history of CHF and ECHO at that time showed normal systolic and diastolic function. She was given diuretics with respiratory improvement. However, since admitted to rehab, her generalized swelling continued to worsen. She was put on 2mg bumax daily without improvement. Overnight 08/10/22, after urination, she reports feeling some pain in her thighs and given tylenol and gabapentin with some relief. No antibiotics at that time. However, since then she has not had any urination for >24  hours. Creatinine reportedly at 4. She had normal creatinine on last admission, which progressively worsened to 1.5 in rehab prior to this. She otherwise denies chest pain or dyspnea, however reports having orthopnea. In the ED, hypoxic with SpO2 81% and placed on 4 L with improvement. KIAN with creatinine of 4.43. LA 2.8, Pro-BNP 2,047. Chest X-ray showed mild pulmonary edema, improved since prior. Straight cath in ED with little urine output, bladder scan with little post-void residual. She was given 1 L IV fluid bolus in the ED. Nephrology consult. Patient has chronic lower extremity edema that appears to be lymphedema. Management with IV and oral fluids at this time. Hold diuretics. Peak creatinine 5.29, somewhat improving after discontinuation of diuretics.  Weaned to room air.    Hospital Course:   1. KIAN - near reosolution  - Etiology appears to be s/t diuretics   - CT abd negative for hydronephrosis  - S/p 1 L bolus of NS in ED  - Seen by Dr. Todd Gould  - Continue to encourage oral fluid intake     2. Anasarca  - Unsure cause. Happened before KIAN, but no definite diagnosis of CHF, CKD or cirrhosis. - High protein nutrition supplement     3. Hypertension; stable  - ACE stopped; may need to be resumed  - Norvasc stopped due to lymphedema     4. Diabetes  5. Neuropathy  - Held metformin; last accu-chek 116  - Januvia Rx ordered     6. Asthma    7. Hx PARMINDER - refused CPAP years ago  - Hypoxia noted; DC on 2L O2 NC     8. Hx breast cancer  - F/up with Onco    Discharge Medications:   Current Discharge Medication List        START taking these medications    Details   alogliptin (NESINA) 25 mg tablet Take 0.5 Tablets by mouth in the morning. Qty: 30 Tablet, Refills: 0  Start date: 8/17/2022           CONTINUE these medications which have NOT CHANGED    Details   pantoprazole (PROTONIX) 40 mg tablet Take 1 Tablet by mouth two (2) times a day. Qty: 60 Tablet, Refills: 0      dexAMETHasone (DECADRON) 4 mg tablet Take 8mg (2 tablets) by mouth twice daily on the day before and day after chemotherapy. Qty: 50 Tablet, Refills: 1    Associated Diagnoses: Malignant neoplasm of lower-inner quadrant of left breast in female, estrogen receptor negative (HCC)      lidocaine-prilocaine (EMLA) topical cream Apply thin layer to port 30-60 minutes prior to infusion  Qty: 30 g, Refills: 0    Associated Diagnoses: Malignant neoplasm of lower-inner quadrant of left breast in female, estrogen receptor negative (HCC)      albuterol (PROVENTIL HFA, VENTOLIN HFA, PROAIR HFA) 90 mcg/actuation inhaler Take  by inhalation every six (6) hours as needed for Wheezing. fluticasone propion-salmeteroL (ADVAIR/WIXELA) 100-50 mcg/dose diskus inhaler Take 1 Puff by inhalation every twelve (12) hours. STOP taking these medications       clarithromycin (BIAXIN) 500 mg tablet Comments:   Reason for Stopping:         amoxicillin-clavulanate (AUGMENTIN) 1,000-62.5 mg ER tablet Comments:   Reason for Stopping:         lisinopril-hydroCHLOROthiazide (PRINZIDE, ZESTORETIC) 20-12.5 mg per tablet Comments:   Reason for Stopping:         ondansetron hcl (ZOFRAN) 8 mg tablet Comments:   Reason for Stopping:         prochlorperazine (Compazine) 10 mg tablet Comments:   Reason for Stopping:         albuterol-ipratropium (DUO-NEB) 2.5 mg-0.5 mg/3 ml nebu Comments:   Reason for Stopping:         metFORMIN (GLUMETZA ER) 500 mg TG24 24 hour tablet Comments:   Reason for Stopping:         amLODIPine (NORVASC) 10 mg tablet Comments:   Reason for Stopping: Follow up Care:    1. Amy Mendoza NP in 1-2 weeks. Follow-up Information       Follow up With Specialties Details Why Contact Info    Amy Mendoza NP                  Patient Follow Up Instructions: Activity: PT/OT per Home Health  Diet:  Cardiac Diet and Diabetic Diet    Condition at Discharge:  Stable  __________________________________________________________________    Disposition  [facility]  ____________________________________________________________________    Code Status:  Full Code  ___________________________________________________________________    Discharge Exam:  Patient seen and examined by me on discharge day. Pertinent Findings:  Gen:    Not in distress. Coherent. Morbid Obesity  Chest: Clear lungs  CVS:   Regular rhythm.   Chronic edema w/ venous stasis changes  Abd:  Soft, not distended, not tender  Neuro:  No focal deficit    CONSULTATIONS: Nephrology    Significant Diagnostic Studies:   Recent Results (from the past 24 hour(s))   GLUCOSE, POC    Collection Time: 08/15/22  3:59 PM   Result Value Ref Range    Glucose (POC) 135 (H) 65 - 117 mg/dL    Performed by Petty Quiles, POC    Collection Time: 08/15/22  7:43 PM   Result Value Ref Range    Glucose (POC) 393 (H) 65 - 117 mg/dL    Performed by Matilda Wright    GLUCOSE, POC    Collection Time: 08/15/22  9:30 PM   Result Value Ref Range    Glucose (POC) 138 (H) 65 - 117 mg/dL    Performed by Marco Pandya    GLUCOSE, POC    Collection Time: 08/16/22  8:57 AM   Result Value Ref Range    Glucose (POC) 151 (H) 65 - 117 mg/dL    Performed by Tiffanie Cuevas    RENAL FUNCTION PANEL    Collection Time: 08/16/22 10:40 AM   Result Value Ref Range    Sodium 140 136 - 145 mmol/L    Potassium 4.9 3.5 - 5.1 mmol/L    Chloride 107 97 - 108 mmol/L    CO2 30 21 - 32 mmol/L    Anion gap 3 (L) 5 - 15 mmol/L    Glucose 120 (H) 65 - 100 mg/dL    BUN 44 (H) 6 - 20 mg/dL    Creatinine 1.43 (H) 0.55 - 1.02 mg/dL    BUN/Creatinine ratio 31 (H) 12 - 20      GFR est AA 45 (L) >60 ml/min/1.73m2    GFR est non-AA 37 (L) >60 ml/min/1.73m2    Calcium 9.9 8.5 - 10.1 mg/dL    Phosphorus 2.8 2.6 - 4.7 mg/dL    Albumin 2.4 (L) 3.5 - 5.0 g/dL   GLUCOSE, POC    Collection Time: 08/16/22 11:04 AM   Result Value Ref Range    Glucose (POC) 116 65 - 117 mg/dL    Performed by Peggy Santiago      XR CHEST PORT   Final Result   Mild central pulmonary vascular congestion, decreased since   7/21/2022, without overt pulmonary edema. CT ABD PELV WO CONT   Final Result   1. Decompressed urinary bladder with a Solitario catheter in place. No   hydronephrosis. 2. Anterior abdominal mesenteric stranding may be postsurgical. There is a low   density collection adjacent to the greater curvature of the stomach measuring   11.9 x 9.0 cm, suspicious for a contained gastric perforation. 3. Small left pleural effusion with adjacent atelectasis versus infection.            Total DC time: 40 mins      Signed:  Mohan Aguirre MD  8/16/2022  1:12 PM

## 2022-08-16 NOTE — PROGRESS NOTES
Spo2 on room air at rest 90%  Spo2 on room air with activity 87%.   Spo2 on oxygen at 1 lpm nc with activity 89%  Spo2 on oxygen at 2 lpm nc with activity 92%

## 2022-08-16 NOTE — PROGRESS NOTES
I have reviewed discharge instructions with the patient and daughter. The patient and daughter verbalized understanding. AVS given and oxygen for home health at bedside.

## 2022-08-16 NOTE — PROGRESS NOTES
Problem: Falls - Risk of  Goal: *Absence of Falls  Description: Document Chito Castaneda Fall Risk and appropriate interventions in the flowsheet.   Outcome: Progressing Towards Goal  Note: Fall Risk Interventions:  Mobility Interventions: Bed/chair exit alarm         Medication Interventions: Bed/chair exit alarm, Teach patient to arise slowly    Elimination Interventions: Bed/chair exit alarm, Call light in reach              Problem: Patient Education: Go to Patient Education Activity  Goal: Patient/Family Education  Outcome: Progressing Towards Goal

## 2022-08-16 NOTE — PROGRESS NOTES
[facility]     Renal Daily Progress Note:     Admission Date: 2022     Subjective:    FEELS BETTER, Creatinine peaked and now trending down  No shortness of breath or chest pain. No abdominal pain. Able to ambulate to bathroom without difficulty. Still with lower extremity edema (lymphedema). Eating well w/o N/V. Good urine output via richter    Review of Systems  Pertinent items are noted in HPI.     Objective:     Visit Vitals  BP (!) 132/59   Pulse 90   Temp 98.9 °F (37.2 °C)   Resp 20   Ht 5' 2\" (1.575 m)   Wt 149.7 kg (330 lb)   LMP 2021 (Approximate)   SpO2 91%   Breastfeeding No   BMI 60.36 kg/m²     Temp (24hrs), Av.5 °F (36.9 °C), Min:98.1 °F (36.7 °C), Max:98.9 °F (37.2 °C)        Intake/Output Summary (Last 24 hours) at 8/15/2022 2119  Last data filed at 8/15/2022 1655  Gross per 24 hour   Intake --   Output 1200 ml   Net -1200 ml       Current Facility-Administered Medications   Medication Dose Route Frequency    [START ON 2022] enoxaparin (LOVENOX) injection 40 mg  40 mg SubCUTAneous DAILY    docusate sodium (COLACE) capsule 100 mg  100 mg Oral DAILY    gabapentin (NEURONTIN) capsule 300 mg  300 mg Oral BID    albuterol (PROVENTIL HFA, VENTOLIN HFA, PROAIR HFA) inhaler 2 Puff  2 Puff Inhalation Q6H PRN    albuterol-ipratropium (DUO-NEB) 2.5 MG-0.5 MG/3 ML  3 mL Nebulization Q6H PRN    budesonide-formoterol (SYMBICORT) 80-4.5 mcg inhaler  2 Puff Inhalation BID RT    melatonin tablet 3 mg  3 mg Oral QHS    meclizine (ANTIVERT) tablet 25 mg  25 mg Oral Q6H PRN    traZODone (DESYREL) tablet 25 mg  25 mg Oral QHS PRN    insulin lispro (HUMALOG) injection   SubCUTAneous TIDAC    glucose chewable tablet 16 g  4 Tablet Oral PRN    glucagon (GLUCAGEN) injection 1 mg  1 mg IntraMUSCular PRN    dextrose 10% infusion 0-250 mL  0-250 mL IntraVENous PRN    sodium chloride (NS) flush 5-40 mL  5-40 mL IntraVENous Q8H    sodium chloride (NS) flush 5-40 mL  5-40 mL IntraVENous PRN acetaminophen (TYLENOL) tablet 650 mg  650 mg Oral Q6H PRN    Or    acetaminophen (TYLENOL) suppository 650 mg  650 mg Rectal Q6H PRN    ondansetron (ZOFRAN ODT) tablet 4 mg  4 mg Oral Q8H PRN    Or    ondansetron (ZOFRAN) injection 4 mg  4 mg IntraVENous Q6H PRN    senna (SENOKOT) tablet 8.6 mg  1 Tablet Oral DAILY PRN    pantoprazole (PROTONIX) tablet 40 mg  40 mg Oral ACB&D       Physical Exam:  alert, cooperative, no distress, appears stated age, morbidly obese  Head: Normocephalic, without obvious abnormality, atraumatic, syndromic facies-cushingoid  Eyes: negative  Neck: supple, symmetrical, trachea midline, no adenopathy, thyroid: not enlarged, symmetric, no tenderness/mass/nodules, and very thick neck, cannot assess for JVD  Lungs: clear to auscultation bilaterally  Heart: regular rate and rhythm, no S3 or S4  Abdomen: soft, non-tender. Bowel sounds normal. No masses,  no organomegaly, obese, no dependent abdominal wall edema  Extremities: atraumatic, no cyanosis has edema but it appears to be lymphedema  Neurologic: Grossly normal     Data Review:     LABS:  Recent Labs     08/15/22  0922 08/14/22  0648 08/13/22  0640    136 135*   K 4.8 4.5 4.4    100 99   CO2 31 29 29   BUN 56* 63* 46*   CREA 2.59* 5.14* 5.29*   CA 10.4* 9.5 9.1   ALB 2.5* 2.3* 2.5*   PHOS 3.7 5.0* 4.6   MG  --  1.8 1.3*       Recent Labs     08/15/22  0922 08/13/22  0640   WBC 10.4 10.1   HGB 8.7* 7.9*   HCT 28.8* 26.3*    392       No results for input(s): SAROJ, KU, CLU, CREAU in the last 72 hours. No lab exists for component: PROU    8-13-22:Cortisol, a.m 15.7     CT Abd/pelvis 8-11-22  FINDINGS:  Solid organ evaluation is limited without contrast.     The visualized lung bases demonstrate a small left pleural effusion and left  lower lobe opacity. The heart size is normal. There is no pericardial or right  pleural effusion. There is no renal, ureteral, or bladder calculus.  The kidneys are symmetric  without hydronephrosis. There is no perinephric fluid or fat stranding. The liver, spleen, pancreas, and right adrenal gland are normal. There is a  stable left adrenal nodule measuring 3.4 cm. The gall bladder is surgically  absent without intra- or extra-hepatic biliary dilatation. Midline abdominal skin staples are noted. There is mild mesenteric stranding in  the upper anterior abdomen. There is a low density collection in the left upper  quadrant abutting the greater curvature of the stomach measuring 11.9 x 9.0 cm. There are no dilated bowel loops. The appendix is normal.       There are no enlarged lymph nodes. There is no free air. The aorta tapers  without aneurysm. The urinary bladder is nondistended with a Solitario catheter in place. There is no  pelvic mass. The bony structures are age-appropriate. IMPRESSION  1. Decompressed urinary bladder with a Solitario catheter in place. No  hydronephrosis. 2. Anterior abdominal mesenteric stranding may be postsurgical. There is a low  density collection adjacent to the greater curvature of the stomach measuring  11.9 x 9.0 cm, suspicious for a contained gastric perforation. 3. Small left pleural effusion with adjacent atelectasis versus infection.         Assessment:   Renal Specific Problems  KIAN probably related to volume depletion--resolving  Obesity  Probably volume depleted with BNP of 200 in the setting of acute kidney injury  Lower extremity edema which probably is lymphedema and as such is not amenable to diuresis  Obstructive sleep apnea  Adult onset diabetes mellitus  History of hypertension      Plan:     Obtain/ Order: labs/cultures/radiology/procedures:  renal panel        Therapeutic:    Push p.o. fluids,   No diuretics for now but will likely need reintroduction in future  D/c rober  Discussed with patient        Naima Mack MD    [phone]

## 2022-08-16 NOTE — PROGRESS NOTES
DC Plan: [address] [place] [address] ([facility]: pending. Pt needs to see her new PCP prior to home health starting)       [facility] for home O2       Transportation: Daughter    Prior to hospitalization, pt was at [facility] rehab and was getting ready to be discharged from them. [facility] set pt up with [address] [place] [address] and ordered the following DME: Rolling walker, bedside commode, and tub bench. The RW and bedside commode was ordered from [facility]. The tub bench was ordered from [facility].     PT is recommending IRF vs HH. Cm met with pt at the bedside to f/up on her plan of care. Cm informed pt of PT's recommendation IRF vs HH. Pt indicated she wants to return home. Pt is agreeable with Cm sending a referral to [facility] since that was the company [facility] rehab arranged for the pt. Pt reports [facility] is 20 minutes from her house. CM discussed ordering home O2. Choice letter signed. Pt indicated her daughter will provide her transportation home. Referrals made via [employer]. Cm received a message via [employer] from [facility] indicating pt needs to establish a PCP. Cm was informed pt chose Aston Lynn NP as her PCP. Cm received signed oxygen order from attending. Order uploaded via [employer] to [facility].     Cm met with pt at the bedside. Cm informed pt that the doctor has cleared her for discharge today. Cm updated pt on home health. Cm explained to pt she will need to see her new PCP - Samanta alfaro before home health can provide services. Pt voiced understanding. Cm updated pt on her home O2. Pt plans to call her daughter to let her know she will be discharging home today. [facility]'s response on a time for O2 delivery. [facility] is unable to provide a time for delivery. They are waiting for their 's to return. CM met with pt at the bedside.  Cm updated her on her oxygen. Cm informed pt once her oxygen is delivered to the bedside she can discharge. Pt voiced understanding. Cm asked pt if she called her daughter. Pt confirmed she did. Pt's daughter will head to the hospital after work. CM updated pt's nurse. Discharge plan of care/case management plan validated with provider's discharge order.

## 2022-08-16 NOTE — PROGRESS NOTES
[facility]     Renal Daily Progress Note:     Admission Date: 2022     Subjective:    FEELS BETTER, Creatinine peaked and now trending down, to 1.43    No shortness of breath or chest pain. No abdominal pain. Able to ambulate to bathroom without difficulty. Still with lower extremity edema (lymphedema). Eating well w/o N/V. Good urine output without richter    Review of Systems  Pertinent items are noted in HPI.     Objective:     Visit Vitals  BP (!) 139/59 (BP 1 Location: Left upper arm, BP Patient Position: At rest;Lying)   Pulse 91   Temp 97.8 °F (36.6 °C)   Resp 18   Ht 5' 2\" (1.575 m)   Wt 149.7 kg (330 lb)   LMP 2021 (Approximate)   SpO2 93%   Breastfeeding No   BMI 60.36 kg/m²     Temp (24hrs), Av.4 °F (36.9 °C), Min:97.8 °F (36.6 °C), Max:98.9 °F (37.2 °C)        Intake/Output Summary (Last 24 hours) at 2022 1527  Last data filed at 2022 1214  Gross per 24 hour   Intake 240 ml   Output 2000 ml   Net -1760 ml       Current Facility-Administered Medications   Medication Dose Route Frequency    [START ON 2022] alogliptin (NESINA) tablet 25 mg  25 mg Oral DAILY    enoxaparin (LOVENOX) injection 40 mg  40 mg SubCUTAneous DAILY    docusate sodium (COLACE) capsule 100 mg  100 mg Oral DAILY    gabapentin (NEURONTIN) capsule 300 mg  300 mg Oral BID    albuterol (PROVENTIL HFA, VENTOLIN HFA, PROAIR HFA) inhaler 2 Puff  2 Puff Inhalation Q6H PRN    albuterol-ipratropium (DUO-NEB) 2.5 MG-0.5 MG/3 ML  3 mL Nebulization Q6H PRN    budesonide-formoterol (SYMBICORT) 80-4.5 mcg inhaler  2 Puff Inhalation BID RT    melatonin tablet 3 mg  3 mg Oral QHS    meclizine (ANTIVERT) tablet 25 mg  25 mg Oral Q6H PRN    traZODone (DESYREL) tablet 25 mg  25 mg Oral QHS PRN    insulin lispro (HUMALOG) injection   SubCUTAneous TIDAC    glucose chewable tablet 16 g  4 Tablet Oral PRN    glucagon (GLUCAGEN) injection 1 mg  1 mg IntraMUSCular PRN    dextrose 10% infusion 0-250 mL  0-250 mL IntraVENous PRN    sodium chloride (NS) flush 5-40 mL  5-40 mL IntraVENous Q8H    sodium chloride (NS) flush 5-40 mL  5-40 mL IntraVENous PRN    acetaminophen (TYLENOL) tablet 650 mg  650 mg Oral Q6H PRN    Or    acetaminophen (TYLENOL) suppository 650 mg  650 mg Rectal Q6H PRN    ondansetron (ZOFRAN ODT) tablet 4 mg  4 mg Oral Q8H PRN    Or    ondansetron (ZOFRAN) injection 4 mg  4 mg IntraVENous Q6H PRN    senna (SENOKOT) tablet 8.6 mg  1 Tablet Oral DAILY PRN    pantoprazole (PROTONIX) tablet 40 mg  40 mg Oral ACB&D       Physical Exam:  alert, cooperative, no distress, appears stated age, morbidly obese  Head: Normocephalic, without obvious abnormality, atraumatic, syndromic facies-cushingoid  Eyes: negative  Neck: supple, symmetrical, trachea midline, no adenopathy, thyroid: not enlarged, symmetric, no tenderness/mass/nodules, and very thick neck, cannot assess for JVD  Lungs: clear to auscultation bilaterally  Heart: regular rate and rhythm, no S3 or S4  Abdomen: soft, non-tender. Bowel sounds normal. No masses,  no organomegaly, obese, no dependent abdominal wall edema  Extremities: atraumatic, no cyanosis has edema but it appears to be lymphedema  Neurologic: Grossly normal     Data Review:     LABS:  Recent Labs     08/16/22  1040 08/15/22  0922 08/14/22  0648    139 136   K 4.9 4.8 4.5    103 100   CO2 30 31 29   BUN 44* 56* 63*   CREA 1.43* 2.59* 5.14*   CA 9.9 10.4* 9.5   ALB 2.4* 2.5* 2.3*   PHOS 2.8 3.7 5.0*   MG  --   --  1.8       Recent Labs     08/15/22  0922   WBC 10.4   HGB 8.7*   HCT 28.8*          No results for input(s): SAROJ, KU, CLU, CREAU in the last 72 hours. No lab exists for component: PROU    8-13-22:Cortisol, a.m 15.7     CT Abd/pelvis 8-11-22  FINDINGS:  Solid organ evaluation is limited without contrast.     The visualized lung bases demonstrate a small left pleural effusion and left  lower lobe opacity.  The heart size is normal. There is no pericardial or right  pleural effusion. There is no renal, ureteral, or bladder calculus. The kidneys are symmetric  without hydronephrosis. There is no perinephric fluid or fat stranding. The liver, spleen, pancreas, and right adrenal gland are normal. There is a  stable left adrenal nodule measuring 3.4 cm. The gall bladder is surgically  absent without intra- or extra-hepatic biliary dilatation. Midline abdominal skin staples are noted. There is mild mesenteric stranding in  the upper anterior abdomen. There is a low density collection in the left upper  quadrant abutting the greater curvature of the stomach measuring 11.9 x 9.0 cm. There are no dilated bowel loops. The appendix is normal.       There are no enlarged lymph nodes. There is no free air. The aorta tapers  without aneurysm. The urinary bladder is nondistended with a Solitario catheter in place. There is no  pelvic mass. The bony structures are age-appropriate. IMPRESSION  1. Decompressed urinary bladder with a Solitario catheter in place. No  hydronephrosis. 2. Anterior abdominal mesenteric stranding may be postsurgical. There is a low  density collection adjacent to the greater curvature of the stomach measuring  11.9 x 9.0 cm, suspicious for a contained gastric perforation. 3. Small left pleural effusion with adjacent atelectasis versus infection.         Assessment:   Renal Specific Problems  KIAN probably related to volume depletion--resolving  Obesity  Probably volume depleted with BNP of 200 in the setting of acute kidney injury  Lower extremity edema which probably is lymphedema and as such is not amenable to diuresis  Obstructive sleep apnea  Adult onset diabetes mellitus  History of hypertension      Plan:     Obtain/ Order: labs/cultures/radiology/procedures:  renal panel        Therapeutic:    Push p.o. fluids,   No diuretics for now but will likely need reintroduction in future  Avoid Mitch Okabena for d/c from my standpoint  Discussed with patient        Scott Lerner MD    [phone]

## 2022-08-18 ENCOUNTER — OFFICE VISIT (OUTPATIENT)
Dept: SURGERY | Age: 62
End: 2022-08-18
Payer: MEDICAID

## 2022-08-18 VITALS
TEMPERATURE: 98.2 F | SYSTOLIC BLOOD PRESSURE: 137 MMHG | HEIGHT: 62 IN | OXYGEN SATURATION: 94 % | WEIGHT: 293 LBS | HEART RATE: 103 BPM | RESPIRATION RATE: 22 BRPM | BODY MASS INDEX: 53.92 KG/M2 | DIASTOLIC BLOOD PRESSURE: 80 MMHG

## 2022-08-18 DIAGNOSIS — Z09 POSTOPERATIVE EXAMINATION: Primary | ICD-10-CM

## 2022-08-18 PROCEDURE — 99024 POSTOP FOLLOW-UP VISIT: CPT | Performed by: SURGERY

## 2022-08-18 NOTE — PROGRESS NOTES
1. Have you been to the ER, urgent care clinic since your last visit? Hospitalized since your last visit? Yes [facility] ER 8/16 KIAN    2. Have you seen or consulted any other health care providers outside of the [address] [place] [address] since your last visit? Include any pap smears or colon screening.  No

## 2022-08-18 NOTE — PROGRESS NOTES
[facility]      Clinic Note - Follow up    Subjective     Killian Hurd returns for scheduled follow up today. She is post exploratory laparotomy and repair of perforated gastric ulcer. She was recently discharged after admission for KIAN. She has had some dyspnea, but that is improving. With regards to surgery, she is doing well. She denies abdominal pain. She denies fever or chills. She denies constipation or obstipation. Her appetite is good. Objective     Visit Vitals  /80 (BP 1 Location: Right upper arm, BP Patient Position: Sitting, BP Cuff Size: Large adult)   Pulse (!) 103   Temp 98.2 °F (36.8 °C) (Oral)   Resp 22   Ht 5' 2\" (1.575 m)   Wt 320 lb (145.2 kg)   LMP 01/28/2021 (Approximate)   SpO2 94%   BMI 58.53 kg/m²         PE  GEN - Awake, alert, communicating appropriately. NAD  Pulm - CTAB, increased WOB  CV - RRR  Abd - soft, NT, ND. Incision intact, staples in place  Ext - warm, well perfused, no edema. All other systems negative unless indicated above. Assessment     Killian Hurd is a 65 y. o.yr old female who is post exploratory laparotomy and repair of perforated gastric ulcer. She is recovering well, without evidence of surgical complications. Plan     The staples were removed. Further wound care instructions were given. She is released from all restrictions with regards to activity, bathing and swimming at this point. I did let her know that there would not be any issues with starting radiation treatments or chemotherapy for her breast cancer from my perspective. She may follow-up with me on an as-needed basis. 15 mins of time was spent with the patient of which > 50% of the time involved face-to-face counseling of the patient regarding the proposed treatment plan.       Corey Ann MD  8/18/2022    CC: Diwght Alfredo NP

## 2022-09-01 ENCOUNTER — APPOINTMENT (OUTPATIENT)
Dept: CT IMAGING | Age: 62
DRG: 248 | End: 2022-09-01
Attending: FAMILY MEDICINE
Payer: MEDICAID

## 2022-09-01 ENCOUNTER — TELEPHONE (OUTPATIENT)
Dept: ONCOLOGY | Age: 62
End: 2022-09-01

## 2022-09-01 ENCOUNTER — HOSPITAL ENCOUNTER (INPATIENT)
Age: 62
LOS: 12 days | Discharge: HOME | DRG: 248 | End: 2022-09-13
Attending: STUDENT IN AN ORGANIZED HEALTH CARE EDUCATION/TRAINING PROGRAM | Admitting: HOSPITALIST
Payer: MEDICAID

## 2022-09-01 DIAGNOSIS — J90 PLEURAL EFFUSION: Primary | ICD-10-CM

## 2022-09-01 LAB
ALBUMIN SERPL-MCNC: 2.6 G/DL (ref 3.5–5)
ALBUMIN/GLOB SERPL: 0.6 {RATIO} (ref 1.1–2.2)
ALP SERPL-CCNC: 84 U/L (ref 45–117)
ALT SERPL-CCNC: 16 U/L (ref 12–78)
ANION GAP SERPL CALC-SCNC: 7 MMOL/L (ref 5–15)
AST SERPL-CCNC: 17 U/L (ref 15–37)
ATRIAL RATE: 106 BPM
BASOPHILS # BLD: 0 K/UL (ref 0–0.1)
BASOPHILS NFR BLD: 0 % (ref 0–1)
BILIRUB SERPL-MCNC: 0.3 MG/DL (ref 0.2–1)
BNP SERPL-MCNC: 345 PG/ML
BUN SERPL-MCNC: 5 MG/DL (ref 6–20)
BUN/CREAT SERPL: 8 (ref 12–20)
CALCIUM SERPL-MCNC: 9.9 MG/DL (ref 8.5–10.1)
CALCULATED P AXIS, ECG09: 52 DEGREES
CALCULATED R AXIS, ECG10: 20 DEGREES
CALCULATED T AXIS, ECG11: 34 DEGREES
CHLORIDE SERPL-SCNC: 108 MMOL/L (ref 97–108)
CO2 SERPL-SCNC: 28 MMOL/L (ref 21–32)
CREAT SERPL-MCNC: 0.62 MG/DL (ref 0.55–1.02)
DIAGNOSIS, 93000: NORMAL
DIFFERENTIAL METHOD BLD: ABNORMAL
EOSINOPHIL # BLD: 0.4 K/UL (ref 0–0.4)
EOSINOPHIL NFR BLD: 4 % (ref 0–7)
ERYTHROCYTE [DISTWIDTH] IN BLOOD BY AUTOMATED COUNT: 17.6 % (ref 11.5–14.5)
GLOBULIN SER CALC-MCNC: 4.6 G/DL (ref 2–4)
GLUCOSE BLD STRIP.AUTO-MCNC: 112 MG/DL (ref 65–117)
GLUCOSE SERPL-MCNC: 133 MG/DL (ref 65–100)
HCT VFR BLD AUTO: 32.3 % (ref 35–47)
HGB BLD-MCNC: 9.4 G/DL (ref 11.5–16)
IMM GRANULOCYTES # BLD AUTO: 0 K/UL (ref 0–0.04)
IMM GRANULOCYTES NFR BLD AUTO: 0 % (ref 0–0.5)
LYMPHOCYTES # BLD: 0.6 K/UL (ref 0.8–3.5)
LYMPHOCYTES NFR BLD: 6 % (ref 12–49)
MCH RBC QN AUTO: 28.3 PG (ref 26–34)
MCHC RBC AUTO-ENTMCNC: 29.1 G/DL (ref 30–36.5)
MCV RBC AUTO: 97.3 FL (ref 80–99)
MONOCYTES # BLD: 0.9 K/UL (ref 0–1)
MONOCYTES NFR BLD: 9 % (ref 5–13)
NEUTS SEG # BLD: 7.6 K/UL (ref 1.8–8)
NEUTS SEG NFR BLD: 81 % (ref 32–75)
NRBC # BLD: 0 K/UL (ref 0–0.01)
NRBC BLD-RTO: 0 PER 100 WBC
P-R INTERVAL, ECG05: 156 MS
PLATELET # BLD AUTO: 328 K/UL (ref 150–400)
PMV BLD AUTO: 8.8 FL (ref 8.9–12.9)
POTASSIUM SERPL-SCNC: 3.4 MMOL/L (ref 3.5–5.1)
PROT SERPL-MCNC: 7.2 G/DL (ref 6.4–8.2)
Q-T INTERVAL, ECG07: 320 MS
QRS DURATION, ECG06: 76 MS
QTC CALCULATION (BEZET), ECG08: 425 MS
RBC # BLD AUTO: 3.32 M/UL (ref 3.8–5.2)
RBC MORPH BLD: ABNORMAL
SERVICE CMNT-IMP: NORMAL
SODIUM SERPL-SCNC: 143 MMOL/L (ref 136–145)
TROPONIN-HIGH SENSITIVITY: 15 NG/L (ref 0–51)
TROPONIN-HIGH SENSITIVITY: 17 NG/L (ref 0–51)
VENTRICULAR RATE, ECG03: 106 BPM
WBC # BLD AUTO: 9.5 K/UL (ref 3.6–11)

## 2022-09-01 PROCEDURE — G0378 HOSPITAL OBSERVATION PER HR: HCPCS

## 2022-09-01 PROCEDURE — 99285 EMERGENCY DEPT VISIT HI MDM: CPT

## 2022-09-01 PROCEDURE — 94640 AIRWAY INHALATION TREATMENT: CPT

## 2022-09-01 PROCEDURE — 80053 COMPREHEN METABOLIC PANEL: CPT

## 2022-09-01 PROCEDURE — 96374 THER/PROPH/DIAG INJ IV PUSH: CPT

## 2022-09-01 PROCEDURE — 71275 CT ANGIOGRAPHY CHEST: CPT

## 2022-09-01 PROCEDURE — 74011000636 HC RX REV CODE- 636

## 2022-09-01 PROCEDURE — 74011250636 HC RX REV CODE- 250/636: Performed by: INTERNAL MEDICINE

## 2022-09-01 PROCEDURE — 74011000250 HC RX REV CODE- 250: Performed by: INTERNAL MEDICINE

## 2022-09-01 PROCEDURE — 65270000029 HC RM PRIVATE

## 2022-09-01 PROCEDURE — 36415 COLL VENOUS BLD VENIPUNCTURE: CPT

## 2022-09-01 PROCEDURE — 85025 COMPLETE CBC W/AUTO DIFF WBC: CPT

## 2022-09-01 PROCEDURE — 96372 THER/PROPH/DIAG INJ SC/IM: CPT

## 2022-09-01 PROCEDURE — 93005 ELECTROCARDIOGRAM TRACING: CPT

## 2022-09-01 PROCEDURE — 83880 ASSAY OF NATRIURETIC PEPTIDE: CPT

## 2022-09-01 PROCEDURE — 82962 GLUCOSE BLOOD TEST: CPT

## 2022-09-01 PROCEDURE — 84484 ASSAY OF TROPONIN QUANT: CPT

## 2022-09-01 PROCEDURE — 96375 TX/PRO/DX INJ NEW DRUG ADDON: CPT

## 2022-09-01 RX ORDER — BUMETANIDE 0.25 MG/ML
1 INJECTION INTRAMUSCULAR; INTRAVENOUS 2 TIMES DAILY
Status: DISCONTINUED | OUTPATIENT
Start: 2022-09-01 | End: 2022-09-02

## 2022-09-01 RX ORDER — ARFORMOTEROL TARTRATE 15 UG/2ML
15 SOLUTION RESPIRATORY (INHALATION)
Status: DISCONTINUED | OUTPATIENT
Start: 2022-09-01 | End: 2022-09-13 | Stop reason: HOSPADM

## 2022-09-01 RX ORDER — INSULIN LISPRO 100 [IU]/ML
INJECTION, SOLUTION INTRAVENOUS; SUBCUTANEOUS
Status: DISCONTINUED | OUTPATIENT
Start: 2022-09-01 | End: 2022-09-13 | Stop reason: HOSPADM

## 2022-09-01 RX ORDER — ALBUTEROL SULFATE 0.83 MG/ML
2.5 SOLUTION RESPIRATORY (INHALATION)
Status: DISCONTINUED | OUTPATIENT
Start: 2022-09-01 | End: 2022-09-13 | Stop reason: HOSPADM

## 2022-09-01 RX ORDER — MAGNESIUM SULFATE 100 %
4 CRYSTALS MISCELLANEOUS AS NEEDED
Status: DISCONTINUED | OUTPATIENT
Start: 2022-09-01 | End: 2022-09-13 | Stop reason: HOSPADM

## 2022-09-01 RX ORDER — SODIUM CHLORIDE 0.9 % (FLUSH) 0.9 %
5-40 SYRINGE (ML) INJECTION AS NEEDED
Status: DISCONTINUED | OUTPATIENT
Start: 2022-09-01 | End: 2022-09-13 | Stop reason: HOSPADM

## 2022-09-01 RX ORDER — ACETAMINOPHEN 325 MG/1
650 TABLET ORAL
Status: DISCONTINUED | OUTPATIENT
Start: 2022-09-01 | End: 2022-09-13 | Stop reason: HOSPADM

## 2022-09-01 RX ORDER — PANTOPRAZOLE SODIUM 40 MG/1
40 TABLET, DELAYED RELEASE ORAL 2 TIMES DAILY
Status: DISCONTINUED | OUTPATIENT
Start: 2022-09-02 | End: 2022-09-13 | Stop reason: HOSPADM

## 2022-09-01 RX ORDER — ONDANSETRON 2 MG/ML
4 INJECTION INTRAMUSCULAR; INTRAVENOUS
Status: DISCONTINUED | OUTPATIENT
Start: 2022-09-01 | End: 2022-09-13 | Stop reason: HOSPADM

## 2022-09-01 RX ORDER — POLYETHYLENE GLYCOL 3350 17 G/17G
17 POWDER, FOR SOLUTION ORAL DAILY PRN
Status: DISCONTINUED | OUTPATIENT
Start: 2022-09-01 | End: 2022-09-13 | Stop reason: HOSPADM

## 2022-09-01 RX ORDER — DEXTROSE MONOHYDRATE 100 MG/ML
0-250 INJECTION, SOLUTION INTRAVENOUS AS NEEDED
Status: DISCONTINUED | OUTPATIENT
Start: 2022-09-01 | End: 2022-09-13 | Stop reason: HOSPADM

## 2022-09-01 RX ORDER — ENOXAPARIN SODIUM 100 MG/ML
30 INJECTION SUBCUTANEOUS EVERY 12 HOURS
Status: DISCONTINUED | OUTPATIENT
Start: 2022-09-01 | End: 2022-09-13 | Stop reason: HOSPADM

## 2022-09-01 RX ORDER — ONDANSETRON 4 MG/1
4 TABLET, ORALLY DISINTEGRATING ORAL
Status: DISCONTINUED | OUTPATIENT
Start: 2022-09-01 | End: 2022-09-13 | Stop reason: HOSPADM

## 2022-09-01 RX ORDER — LIDOCAINE AND PRILOCAINE 25; 25 MG/G; MG/G
CREAM TOPICAL AS NEEDED
Status: DISCONTINUED | OUTPATIENT
Start: 2022-09-01 | End: 2022-09-13 | Stop reason: HOSPADM

## 2022-09-01 RX ORDER — ACETAMINOPHEN 650 MG/1
650 SUPPOSITORY RECTAL
Status: DISCONTINUED | OUTPATIENT
Start: 2022-09-01 | End: 2022-09-13 | Stop reason: HOSPADM

## 2022-09-01 RX ORDER — SODIUM CHLORIDE 0.9 % (FLUSH) 0.9 %
5-40 SYRINGE (ML) INJECTION EVERY 8 HOURS
Status: DISCONTINUED | OUTPATIENT
Start: 2022-09-01 | End: 2022-09-13 | Stop reason: HOSPADM

## 2022-09-01 RX ADMIN — BUMETANIDE 1 MG: 0.25 INJECTION INTRAMUSCULAR; INTRAVENOUS at 22:16

## 2022-09-01 RX ADMIN — IOPAMIDOL 100 ML: 755 INJECTION, SOLUTION INTRAVENOUS at 17:21

## 2022-09-01 RX ADMIN — ARFORMOTEROL TARTRATE 15 MCG: 15 SOLUTION RESPIRATORY (INHALATION) at 22:14

## 2022-09-01 RX ADMIN — ENOXAPARIN SODIUM 30 MG: 100 INJECTION SUBCUTANEOUS at 21:13

## 2022-09-01 NOTE — ED TRIAGE NOTES
Pt complains of upper abdominal/chest pain since today. Swelling in hands and feet for 3 weeks. SOB present.

## 2022-09-01 NOTE — ED PROVIDER NOTES
Patient is a 28-year-old female with past medical history of breast cancer, currently on chemotherapy, asthma, diabetes, hypertension, obesity presenting for evaluation of chest pain and shortness of breath. Reports that her symptoms began this morning. Feels like it is difficult to get enough air in. Denies any radiation of the pain, but reports pain is localized under her left breast.  Notes that over the past few weeks, she has had a significant increase in her lower extremity edema. Also reports that her hands are swollen. Was previously on Lasix, but after her last admission this medication was discontinued. Had a home health nurse see her today, who advised her to come to the emergency room for further evaluation. Denies cough or fevers.            Past Medical History:   Diagnosis Date    Arrhythmia     heart murmur    Arthritis     Right knee, DJD left knee    Asthma     Cancer ([address]     COVID-19 01/29/2021    Diabetes [address] [address]     Pre diabetes    Hypertension     Morbid obesity ([address]     Neuropathy     Patient reports numbness and tingling in her legs and feet from her back    Sleep apnea     was ordered a CPAP years ago, but never used       Past Surgical History:   Procedure Laterality Date    HX BREAST LUMPECTOMY Left 3/2/2022    LEFT BREAST LUMPECTOMY WITH LEFT BREAST SENTINEL NODE BIOPSY WITH BLUE DYE AND PORT A CATH INSERTION (URGENT) performed by Roger Lovelace MD at [facility]    HX CHOLECYSTECTOMY      HX GYN      c section    HX GYN      d&c    HX WISDOM TEETH EXTRACTION Bilateral          Family History:   Problem Relation Age of Onset    Hypertension Mother     Heart Disease Mother     Heart Disease Father     Hypertension Father     Breast Cancer Maternal Aunt     Breast Cancer Other     Breast Cancer Maternal Aunt        Social History     Socioeconomic History    Marital status: SINGLE     Spouse name: Not on file    Number of children: Not on file    Years of education: Not on file    Highest education level: Not on file   Occupational History    Not on file   Tobacco Use    Smoking status: Never    Smokeless tobacco: Never   Vaping Use    Vaping Use: Never used   Substance and Sexual Activity    Alcohol use: Yes     Comment: social once per month    Drug use: Yes     Frequency: 1.0 times per week     Types: Marijuana     Comment: once every 3 weeks    Sexual activity: Not Currently   Other Topics Concern    Not on file   Social History Narrative    Not on file     Social Determinants of Health     Financial Resource Strain: Not on file   Food Insecurity: Not on file   Transportation Needs: Not on file   Physical Activity: Not on file   Stress: Not on file   Social Connections: Not on file   Intimate Partner Violence: Not on file   Housing Stability: Not on file         ALLERGIES: Nuts [tree nut]    Review of Systems   Constitutional:  Negative for fever and unexpected weight change. HENT:  Negative for congestion. Respiratory:  Positive for shortness of breath. Negative for cough, chest tightness and wheezing. Cardiovascular:  Positive for chest pain and leg swelling. Gastrointestinal:  Negative for nausea and vomiting. Endocrine: Negative for polyuria. Genitourinary:  Negative for dysuria and flank pain. Musculoskeletal:  Negative for back pain. Skin:  Negative for pallor. Allergic/Immunologic: Negative for immunocompromised state. Neurological:  Negative for dizziness and headaches. Hematological:  Negative for adenopathy. Psychiatric/Behavioral:  Negative for agitation. Vitals:    09/01/22 1552   BP: (!) 146/64   Pulse: 100   Resp: 16   Temp: 98.3 °F (36.8 °C)   SpO2: 96%   Weight: 145.2 kg (320 lb)   Height: 5' 2\" (1.575 m)            Physical Exam  Vitals and nursing note reviewed. Constitutional:       Appearance: She is well-developed. She is obese. HENT:      Head: Atraumatic. Eyes:      Pupils: Pupils are equal, round, and reactive to light. Cardiovascular:      Rate and Rhythm: Regular rhythm. Tachycardia present. Pulses: Normal pulses. Heart sounds: Normal heart sounds. Pulmonary:      Effort: Tachypnea present. Breath sounds: Decreased breath sounds present. Chest:      Chest wall: No tenderness. Musculoskeletal:      Right lower leg: Edema present. Left lower leg: Edema present. Skin:     General: Skin is warm and dry. Capillary Refill: Capillary refill takes less than 2 seconds. Neurological:      General: No focal deficit present. Mental Status: She is alert and oriented to person, place, and time. Psychiatric:         Mood and Affect: Mood normal.         Behavior: Behavior normal.        MDM  Number of Diagnoses or Management Options  Pleural effusion  Diagnosis management comments: Patient presenting with complaints of shortness of breath, lower extremity edema, chest pain. Legs are significantly edematous on exam, but seems to be not acute as of today. She appears uncomfortable and is slightly tachypneic on exam.  Differential diagnosis includes pleural effusion, community-acquired pneumonia, fluid overload/CHF exacerbation, PE. Will obtain labs including troponin, proBNP. Will obtain CT of chest for further evaluation and to rule out PE.    1939 -CT revealing moderate left-sided pleural effusion with left lower lobe atelectasis. Patient visibly uncomfortable on exam and has history of active cancer. Will admit patient to hospitalist will for possible IR drainage.        Amount and/or Complexity of Data Reviewed  Clinical lab tests: ordered and reviewed  Tests in the radiology section of CPT®: ordered and reviewed  Decide to obtain previous medical records or to obtain history from someone other than the patient: yes  Discuss the patient with other providers: yes (Dr. Sekou Boyd, ED Attending)      ED Course as of 09/01/22 1939   Thu Sep 01, 2022   1604 EKG interpretation:   Rhythm: sinus tachycardia; and regular . Rate (approx.): 106; Axis: normal; Intervals: normal ; ST/T wave: non-specific changes; EKG documented and interpreted by Trupti Ferrell MD, Emergency Medicine.   [AL]      ED Course User Index  [AL] Rodger Haywood MD       Procedures                       Perfect Serve Consult for Admission  7:37 PM    ED Room Number: ER07/07  Patient Name and age:  Gayle Anglin 65 y.o.  female  Working Diagnosis:   1. Pleural effusion        COVID-19 Suspicion:  no  Sepsis present:  no  Reassessment needed: no  Code Status:  Full Code  Readmission: no  Isolation Requirements:  no  Recommended Level of Care:  telemetry  Department:  [address] [place] [address] ED - [phone]    Other:  65year old female with pmh of breast cancer (currently on chemo) presenting for evaluation of left sided chest pain and exertional shortness of breath. CT revealing left sided pleural effusion, left lower lobe atelectasis.

## 2022-09-01 NOTE — TELEPHONE ENCOUNTER
Patient called. She stated that she is short of breath, swollen all over, and having pain in her left breast, on the underside. Patient is not having actual chest pain, just her breast is hurting. Patient is having swelling in all limbs as well as her face. Patient is having trouble breathing and taking deep breaths. Even while speaking to her on the phone, it was difficult for her to complete a sentence without her sounding out of breath. Patient is having chills. She did have a 99.9 fever previously but took tylenol and it has gone down to 97.9. Patient stated that the nurses from the [facility] that were visiting her told her that she should go to the ED. Patient called to see what we thought. I instructed the patient to head to the ED to get assessed because I was worried about how she sounded. Patient is going to have her daughter drive her to [place] immediately.  Informed our NP and will route this message to MD.

## 2022-09-02 ENCOUNTER — APPOINTMENT (OUTPATIENT)
Dept: GENERAL RADIOLOGY | Age: 62
DRG: 248 | End: 2022-09-02
Attending: HOSPITALIST
Payer: MEDICAID

## 2022-09-02 LAB
ANION GAP SERPL CALC-SCNC: 5 MMOL/L (ref 5–15)
BUN SERPL-MCNC: 5 MG/DL (ref 6–20)
BUN/CREAT SERPL: 10 (ref 12–20)
CALCIUM SERPL-MCNC: 8.9 MG/DL (ref 8.5–10.1)
CHLORIDE SERPL-SCNC: 112 MMOL/L (ref 97–108)
CO2 SERPL-SCNC: 28 MMOL/L (ref 21–32)
CREAT SERPL-MCNC: 0.49 MG/DL (ref 0.55–1.02)
GLUCOSE BLD STRIP.AUTO-MCNC: 122 MG/DL (ref 65–117)
GLUCOSE BLD STRIP.AUTO-MCNC: 128 MG/DL (ref 65–117)
GLUCOSE BLD STRIP.AUTO-MCNC: 129 MG/DL (ref 65–117)
GLUCOSE BLD STRIP.AUTO-MCNC: 130 MG/DL (ref 65–117)
GLUCOSE SERPL-MCNC: 119 MG/DL (ref 65–100)
POTASSIUM SERPL-SCNC: 3.7 MMOL/L (ref 3.5–5.1)
SERVICE CMNT-IMP: ABNORMAL
SODIUM SERPL-SCNC: 145 MMOL/L (ref 136–145)

## 2022-09-02 PROCEDURE — G0378 HOSPITAL OBSERVATION PER HR: HCPCS

## 2022-09-02 PROCEDURE — 94640 AIRWAY INHALATION TREATMENT: CPT

## 2022-09-02 PROCEDURE — 94761 N-INVAS EAR/PLS OXIMETRY MLT: CPT

## 2022-09-02 PROCEDURE — 65270000029 HC RM PRIVATE

## 2022-09-02 PROCEDURE — 80048 BASIC METABOLIC PNL TOTAL CA: CPT

## 2022-09-02 PROCEDURE — 82962 GLUCOSE BLOOD TEST: CPT

## 2022-09-02 PROCEDURE — 71045 X-RAY EXAM CHEST 1 VIEW: CPT

## 2022-09-02 PROCEDURE — P9047 ALBUMIN (HUMAN), 25%, 50ML: HCPCS | Performed by: HOSPITALIST

## 2022-09-02 PROCEDURE — 94664 DEMO&/EVAL PT USE INHALER: CPT

## 2022-09-02 PROCEDURE — 74011250636 HC RX REV CODE- 250/636: Performed by: INTERNAL MEDICINE

## 2022-09-02 PROCEDURE — 74011250637 HC RX REV CODE- 250/637: Performed by: INTERNAL MEDICINE

## 2022-09-02 PROCEDURE — 36415 COLL VENOUS BLD VENIPUNCTURE: CPT

## 2022-09-02 PROCEDURE — 74011250636 HC RX REV CODE- 250/636: Performed by: HOSPITALIST

## 2022-09-02 PROCEDURE — 74011000250 HC RX REV CODE- 250: Performed by: INTERNAL MEDICINE

## 2022-09-02 RX ORDER — FUROSEMIDE 10 MG/ML
40 INJECTION INTRAMUSCULAR; INTRAVENOUS 2 TIMES DAILY
Status: DISCONTINUED | OUTPATIENT
Start: 2022-09-02 | End: 2022-09-03

## 2022-09-02 RX ORDER — ALBUMIN HUMAN 250 G/1000ML
25 SOLUTION INTRAVENOUS EVERY 6 HOURS
Status: COMPLETED | OUTPATIENT
Start: 2022-09-02 | End: 2022-09-04

## 2022-09-02 RX ORDER — FUROSEMIDE 10 MG/ML
60 INJECTION INTRAMUSCULAR; INTRAVENOUS 2 TIMES DAILY
Status: DISCONTINUED | OUTPATIENT
Start: 2022-09-02 | End: 2022-09-02

## 2022-09-02 RX ADMIN — ENOXAPARIN SODIUM 30 MG: 100 INJECTION SUBCUTANEOUS at 21:48

## 2022-09-02 RX ADMIN — ALBUMIN (HUMAN) 25 G: 0.25 INJECTION, SOLUTION INTRAVENOUS at 17:35

## 2022-09-02 RX ADMIN — ARFORMOTEROL TARTRATE 15 MCG: 15 SOLUTION RESPIRATORY (INHALATION) at 20:00

## 2022-09-02 RX ADMIN — ENOXAPARIN SODIUM 30 MG: 100 INJECTION SUBCUTANEOUS at 09:08

## 2022-09-02 RX ADMIN — Medication 10 ML: at 13:35

## 2022-09-02 RX ADMIN — PANTOPRAZOLE SODIUM 40 MG: 40 TABLET, DELAYED RELEASE ORAL at 17:36

## 2022-09-02 RX ADMIN — FUROSEMIDE 40 MG: 10 INJECTION, SOLUTION INTRAMUSCULAR; INTRAVENOUS at 17:35

## 2022-09-02 RX ADMIN — Medication 10 ML: at 21:49

## 2022-09-02 RX ADMIN — ONDANSETRON 4 MG: 2 INJECTION INTRAMUSCULAR; INTRAVENOUS at 13:35

## 2022-09-02 RX ADMIN — PANTOPRAZOLE SODIUM 40 MG: 40 TABLET, DELAYED RELEASE ORAL at 09:08

## 2022-09-02 RX ADMIN — BUMETANIDE 1 MG: 0.25 INJECTION INTRAMUSCULAR; INTRAVENOUS at 09:08

## 2022-09-02 RX ADMIN — SODIUM CHLORIDE, PRESERVATIVE FREE 10 ML: 5 INJECTION INTRAVENOUS at 21:53

## 2022-09-02 RX ADMIN — Medication 10 ML: at 09:08

## 2022-09-02 RX ADMIN — ARFORMOTEROL TARTRATE 15 MCG: 15 SOLUTION RESPIRATORY (INHALATION) at 07:24

## 2022-09-02 NOTE — H&P
Hospitalist Admission Note    NAME: Oumou Dobbs   :  1960   MRN:  [id number]     Date/Time:  2022 8:52 PM    Patient PCP: Herber Arreaga NP  ________________________________________________________________________    Given the patient's current clinical presentation, I have a high level of concern for decompensation if discharged from the emergency department. Complex decision making was performed, which includes reviewing the patient's available past medical records, laboratory results, and x-ray films. My assessment of this patient's clinical condition and my plan of care is as follows. Assessment / Plan:    L pleural effusion w/ hx Breast CA and Hfw/PEF:    The patient comes in w/ L pleural effusion and weight gain w/ hx L Breast Cancer s/p chemo and lumpectomy  VS - /65  WBC normal, Hg 9.4, Plt 328k  BUN 5, Cr 0.62    Chest CT - moderate L pleural effusion w/ cardiomegaly. Large chronic collection in the left upper quadrant of the abdomen, which is  increased mildly in size  Echo (22) EF 55-60%    Obs patient and cont to monitor w/ diuresis  Start diuresis w/ Bumex 1mg bid  Low salt diet, I/O, daily weights and fluid restriction      2.  L Breast Cancer:    She has completed her chemo infusion and is following w/ oncology for XRT  Cont to monitor    3. Chest Pain:    Most likely secondary to L pleural effusion and generalized edema  hsTrop neg x2  Cont to monitor and re-evaluate w/ diuresis    4. DM 2:    ISS w/ BG checks    5. Asthma:    Cont w/ Albuteron prn and Advair 100/50 inh bid    6. GERD:    Cont w/ Protonix 40mg bid    Full Code  Surrogate Decision Hardy Elizalde (daughter)  [phone]    I have personally reviewed the radiographs, laboratory data in Epic and decisions and statements above are based partially on this personal interpretation.     Code Status: Full Code  DVT Prophylaxis: Lovenox  GI Prophylaxis: not indicated       Subjective:   CHIEF COMPLAINT: \"I am swolle\"    HISTORY OF PRESENT ILLNESS:     The patient is a 66 y/o F w/ PMH L breast cancer s/p lumpectomy and chemotherapy who is planned to start XRT but this was delayed due to perforated ulcer. She comes in today due to generalized swelling which has slowly become worse in the past one week. She also reports of left sided chest pain which is rated 7/10 and dull w/o any radiating symptoms. It is aggravated w/ deep inspiration. Associated symptoms include non-productive cough, wheezing, unknown weight gain and lower extremity edema. She denies any palpitations, fever, chills or night sweats.         Past Medical History:   Diagnosis Date    Arrhythmia     heart murmur    Arthritis     Right knee, DJD left knee    Asthma     Cancer [address] [address]     COVID-19 01/29/2021    Diabetes ([facility] [address]     Pre diabetes    Hypertension     Morbid obesity ([facility] [address]     Neuropathy     Patient reports numbness and tingling in her legs and feet from her back    Sleep apnea     was ordered a CPAP years ago, but never used      Past Surgical History:   Procedure Laterality Date    HX BREAST LUMPECTOMY Left 3/2/2022    LEFT BREAST LUMPECTOMY WITH LEFT BREAST SENTINEL NODE BIOPSY WITH BLUE DYE AND PORT A CATH INSERTION (URGENT) performed by Pierre Hansen MD at [address] [facility] [address]    HX CHOLECYSTECTOMY      HX GYN      c section    HX GYN      d&c    HX WISDOM TEETH EXTRACTION Bilateral      Social History     Tobacco Use    Smoking status: Never    Smokeless tobacco: Never   Substance Use Topics    Alcohol use: Yes     Comment: social once per month      Family History   Problem Relation Age of Onset    Hypertension Mother     Heart Disease Mother     Heart Disease Father     Hypertension Father     Breast Cancer Maternal Aunt     Breast Cancer Other     Breast Cancer Maternal Aunt         Allergies   Allergen Reactions    Nuts [Tree Nut] Rash and Itching     Throat itching        Prior to Admission medications    Medication Sig Start Date End Date Taking? Authorizing Provider   alogliptin (NESINA) 25 mg tablet Take 0.5 Tablets by mouth in the morning. 8/17/22   Marta Frankel MD   pantoprazole (PROTONIX) 40 mg tablet Take 1 Tablet by mouth two (2) times a day. Patient not taking: Reported on 8/18/2022 7/29/22   Edvin Mandujano MD   dexAMETHasone (DECADRON) 4 mg tablet Take 8mg (2 tablets) by mouth twice daily on the day before and day after chemotherapy. Patient not taking: Reported on 8/18/2022 5/4/22   Clementine Cooper MD   lidocaine-prilocaine (EMLA) topical cream Apply thin layer to port 30-60 minutes prior to infusion 3/29/22   Milagro Olguin NP   albuterol (PROVENTIL HFA, VENTOLIN HFA, PROAIR HFA) 90 mcg/actuation inhaler Take  by inhalation every six (6) hours as needed for Wheezing. Provider, Historical   fluticasone propion-salmeteroL (ADVAIR/WIXELA) 100-50 mcg/dose diskus inhaler Take 1 Puff by inhalation every twelve (12) hours.     Provider, Historical     REVIEW OF SYSTEMS:  See HPI for details  General: negative for fever, chills, sweats, weakness, weight loss  Eyes: negative for blurred vision, eye pain, loss of vision, diplopia  Ear Nose and Throat: negative for rhinorrhea, pharyngitis, otalgia, tinnitus, speech or swallowing difficulties  Respiratory:  negative for pleuritic pain, +cough, sputum production, +wheezing, SOB, MANLEY  Cardiology:  + chest pain, palpitations, orthopnea, PND, +edema, syncope   Gastrointestinal: negative for abdominal pain, N/V, dysphagia, change in bowel habits, bleeding  Genitourinary: negative for frequency, urgency, dysuria, hematuria, incontinence  Muskuloskeletal : negative for arthralgia, myalgia  Hematology: negative for easy bruising, bleeding, lymphadenopathy  Dermatological: negative for rash, ulceration, mole change, new lesion  Endocrine: negative for hot flashes or polydipsia  Neurological: negative for headache, dizziness, confusion, focal weakness, paresthesia, memory loss, gait disturbance  Psychological: negative for anxiety, depression, agitation      Objective:   VITALS:    Visit Vitals  BP (!) 188/65   Pulse 100   Temp 98.3 °F (36.8 °C)   Resp 16   Ht 5' 2\" (1.575 m)   Wt 145.2 kg (320 lb)   SpO2 96%   BMI 58.53 kg/m²     PHYSICAL EXAM:    Physical Exam:    Gen: Well-developed, well-nourished, in no acute distress  HEENT:  Pink conjunctivae, PERRL, hearing intact to voice, moist mucous membranes  Neck: Supple, without masses, thyroid non-tender  Resp: No accessory muscle use, clear breath sounds without wheezes rales or rhonchi  Card: No murmurs, normal S1, S2 without thrills, bruits or peripheral edema  Abd:  Soft, non-tender, non-distended, normoactive bowel sounds are present, no palpable organomegaly and no detectable hernias  Lymph:  No cervical or inguinal adenopathy  Musc: No cyanosis or clubbing  Skin: No rashes or ulcers, skin turgor is good  Neuro:  Cranial nerves are grossly intact, no focal motor weakness, follows commands appropriately  Psych:  Good insight, oriented to person, place and time, alert          _______________________________________________________________________  Care Plan discussed with:    Comments   Patient  Discussed with patient in room.  POC outlined and Questions answered    Family      RN x    Care Manager                    Consultant:  evgeny GERONIMO MD   _______________________________________________________________________  Recommended Disposition:   Home with Family    HH/PT/OT/RN    SNF/LTC    LALI    ________________________________________________________________________  TOTAL TIME:          Comments   >50% of visit spent in counseling and coordination of care  Chart reviewed  Discussion with patient and/or family and questions answered     ________________________________________________________________________  Signed: Breanna Cummings MD        Procedures: see electronic medical records for all procedures/Xrays and details which were not copied into this note but were reviewed prior to creation of Plan. LAB DATA REVIEWED:    Recent Results (from the past 24 hour(s))   CBC WITH AUTOMATED DIFF    Collection Time: 09/01/22  4:37 PM   Result Value Ref Range    WBC 9.5 3.6 - 11.0 K/uL    RBC 3.32 (L) 3.80 - 5.20 M/uL    HGB 9.4 (L) 11.5 - 16.0 g/dL    HCT 32.3 (L) 35.0 - 47.0 %    MCV 97.3 80.0 - 99.0 FL    MCH 28.3 26.0 - 34.0 PG    MCHC 29.1 (L) 30.0 - 36.5 g/dL    RDW 17.6 (H) 11.5 - 14.5 %    PLATELET 535 411 - 932 K/uL    MPV 8.8 (L) 8.9 - 12.9 FL    NRBC 0.0 0  WBC    ABSOLUTE NRBC 0.00 0.00 - 0.01 K/uL    NEUTROPHILS 81 (H) 32 - 75 %    LYMPHOCYTES 6 (L) 12 - 49 %    MONOCYTES 9 5 - 13 %    EOSINOPHILS 4 0 - 7 %    BASOPHILS 0 0 - 1 %    IMMATURE GRANULOCYTES 0 0.0 - 0.5 %    ABS. NEUTROPHILS 7.6 1.8 - 8.0 K/UL    ABS. LYMPHOCYTES 0.6 (L) 0.8 - 3.5 K/UL    ABS. MONOCYTES 0.9 0.0 - 1.0 K/UL    ABS. EOSINOPHILS 0.4 0.0 - 0.4 K/UL    ABS. BASOPHILS 0.0 0.0 - 0.1 K/UL    ABS. IMM. GRANS. 0.0 0.00 - 0.04 K/UL    DF SMEAR SCANNED      RBC COMMENTS SPHEROCYTES  PRESENT        RBC COMMENTS OVALOCYTES  PRESENT        RBC COMMENTS ANISOCYTOSIS  1+       METABOLIC PANEL, COMPREHENSIVE    Collection Time: 09/01/22  4:37 PM   Result Value Ref Range    Sodium 143 136 - 145 mmol/L    Potassium 3.4 (L) 3.5 - 5.1 mmol/L    Chloride 108 97 - 108 mmol/L    CO2 28 21 - 32 mmol/L    Anion gap 7 5 - 15 mmol/L    Glucose 133 (H) 65 - 100 mg/dL    BUN 5 (L) 6 - 20 MG/DL    Creatinine 0.62 0.55 - 1.02 MG/DL    BUN/Creatinine ratio 8 (L) 12 - 20      GFR est AA >60 >60 ml/min/1.73m2    GFR est non-AA >60 >60 ml/min/1.73m2    Calcium 9.9 8.5 - 10.1 MG/DL    Bilirubin, total 0.3 0.2 - 1.0 MG/DL    ALT (SGPT) 16 12 - 78 U/L    AST (SGOT) 17 15 - 37 U/L    Alk.  phosphatase 84 45 - 117 U/L    Protein, total 7.2 6.4 - 8.2 g/dL    Albumin 2.6 (L) 3.5 - 5.0 g/dL    Globulin 4.6 (H) 2.0 - 4.0 g/dL    A-G Ratio 0.6 (L) 1.1 - 2.2     TROPONIN-HIGH SENSITIVITY    Collection Time: 09/01/22  4:37 PM   Result Value Ref Range    Troponin-High Sensitivity 15 0 - 51 ng/L   NT-PRO BNP    Collection Time: 09/01/22  4:37 PM   Result Value Ref Range    NT pro- (H) <125 PG/ML   TROPONIN-HIGH SENSITIVITY    Collection Time: 09/01/22  6:49 PM   Result Value Ref Range    Troponin-High Sensitivity 17 0 - 51 ng/L

## 2022-09-02 NOTE — ED NOTES
Verbal shift change report given to [employer] (oncoming nurse) by Raisa Contreras (offgoing nurse). Report included the following information SBAR, ED Summary, Procedure Summary and MAR.

## 2022-09-02 NOTE — PROGRESS NOTES
Pharmacy Dosing Services:     Lovenox dose adjusted from 40 mg SC daily to 30 mg SC Q12h per Protocol.  - CrCl > 30 ml/min, Weight 145 kg    Thank you,  Reno Halsted, PharmD

## 2022-09-02 NOTE — PROGRESS NOTES
Care Management Readmission Assessment        NAME:   La Nena Mckeon   :     1960   MRN:     [id number]             RUR Score/Risk Level:       RUR:  N/A OBS  Risk Level:  N/A    Assessment: In person with patient    Reason for Readmission:  Ms. Liane Infante is a 65 y.o. female with history that includes arthritis, DM, and HTN  who was emergently admitted for:  L pleural effusion. Patient Active Problem List   Diagnosis Code    Breast cancer, left ([facility] C50.912    Encounter for antineoplastic chemotherapy Z51.11    Prevention of chemotherapy-induced neutropenia Z76.89    Bowel perforation (HCC) K63.1    HTN (hypertension) I10    DM (diabetes mellitus) ([facility] E11.9    Leukocytosis D72.829    KINA (acute kidney injury) ([facility] N17.9    Morbid obesity (HCC) E66.01    Hypoalbuminemia E88.09    Pleural effusion, left J90       Has any pertinent information changed since previous Care Management Assessment? Living arrangements:   Pt lives in a single story house with her daughter Carry Claudio). There are about 5 steps to enter. ADLs:   Pt was independent with ADLs prior to July. Since July, family has been assisting with ADLs. DME:    RW, bedside commode, tub bench, and O2   [facility]:   [facility]. Pt denies any problems obtaining/taking medications. Insurer:  Payor: Gerardo Jacobs / Plan: [facility] [employer]UN[employer] PLAN JONATHAN CCCP / Product Type: Managed Care Medicaid /     Medicare Outpatient Observation Notice (MOON)/ [place] Outpatient Observation Notice (Theador Mert) provided to patient/representative with verbal explanation of the notice. Time allotted for questions regarding the notice. Patient /representative provided a completed copy of the MOON/VOON notice. Copy placed on bedside chart.     PCP: Jaylene Feliz NP   Name of Practice:   [facility]   Current patient:  Establishing care   Approximate date of last visit: Appt scheduled for 9/3- Pt will re-schedule   Access to virtual PCP visits: Unknown    Is a Care Conference indicated:   No    Did you attend your follow up appointment(s):   No  If not, why not:  Appt had not occurred yet    Resources/supports as identified by patient/family:  Pt has supportive family. Top Challenges facing patient (as identified by patient/family and CM): Finances/Medication cost?  None identified  Transportation? None identified       Support system or lack thereof? None identified     Living arrangements? None identified         Self-care/ADLs/Cognition? None identified       Advance Directive:  Full Code, does not have an advance directive. Plan for utilizing home health:   [facility] arranged [place] through [employer] in [place]. [facility] unable to open case until she has been established with PCP. Transition of Care Plan:      Home with outpatient follow-up    Additional information:  Pt admitted as OBS on 9/1/22 for L pleural effusion. CM met with Pt to complete initial assessment. Pt lives at home with her daughter, Parker Garcia. Pt was recently discharged from [facility]. [facility] arranged [place] services through [facility] but they were unable to open due to Pt not having a PCP. Plan to open Pt after PCP has been established. Pt had an appt to establish PCP (Pete Milan) tomorrow but MD does not believe she will be released from the hospital by tomorrow. Pt has home O2 [facility]. Pt reports she has not used it and asked Shanika to come pick it up. Pt has a rolling walker, bedside commode, and tub bench at home. Prior to July, Pt was independent with ADLs. Pt reports family has been assisting with ADLs since July. Pt reports that she ambulates OK with the rolling walker. Pt denies any recent falls. Pt is not employed. Pt reports she has not driven recently. Pt also has recent hx of IPR at [place]. Discharge plan is for Pt to return home. Pt is aware that she needs to reschedule PCP appt.  CM attempted to call PCP to reschedule appt. PCP states family will need to call for appt. CM informed PCP that [facility] could not be started until PCP was established so CM requested PCP schedule Pt for 1st available appt after discharge. PCP voiced understanding. CM updated Pt. Family will transport Pt home.   _____________________________________  Bonifacio Johnston, [address] Management  9/2/2022   1:26 PM     Readmission Assessment  Number of days since last admission?: 8-30 days  Previous disposition: Home with Home Health LONG TERM [facility]UTE [facility]- can't start until Pt establishes care with PCP)  Who is being interviewed?: Patient  What was the patient's/caregiver's perception as to why they think they needed to return back to the hospital?: Other (Comment) (chest pain)  Did you visit your Primary Care Physician after you left the hospital, before you returned this time?: No  Why weren't you able to visit your PCP?: Other (Comment) (No- PCP. Had an appt to establish care tomorrow (9/3))  Did you see a specialist, such as Cardiac, Pulmonary, Orthopedic Physician, etc. after you left the hospital?: No  Who advised the patient to return to the hospital?: Self-referral  Does the patient report anything that got in the way of taking their medications?: No  In our efforts to provide the best possible care to you and others like you, can you think of anything that we could have done to help you after you left the hospital the first time, so that you might not have needed to return so soon?: Other (Comment) (no)        Care Management Interventions  PCP Verified by CM: Yes (Establishing care with Loretta Cuba)  Mode of Transport at Discharge:  Other (see comment) (family)  Transition of Care Consult (CM Consult): Discharge Planning  MyChart Signup: No  Discharge Durable Medical Equipment: No  Physical Therapy Consult: No  Occupational Therapy Consult: No  Speech Therapy Consult: No  Support Systems: Child(shaun)  Confirm Follow Up Transport: Family  Discharge Location  Patient Expects to be Discharged to[de-identified] Home with outpatient services

## 2022-09-02 NOTE — PROGRESS NOTES
Hospitalist Progress Note              Monica Ramirez MD.                                                             Cell: [phone]                               NAME:  Theo Lennox  :  1960  MRN:  [id number]  Date of Service:  2022    Summary: 65 y.o. female with past medical history of left breast cancer s/p lumpectomy and chemotherapy. She presented to the ER with the c/o generalized swelling which has progressively worsened. She also reports left sided chest pain which is rated 7/10 and dull w/o any radiating symptoms. It is aggravated w/ deep inspiration. Associated symptoms include non-productive cough, wheezing, unknown weight gain and lower extremity edema. CTA chest performed shows no evidence of pulmonary embolism, moderate left pleural effusion and large chronic collection in left upper quadrant of the abdomen. Assessment/Plan:  Left pleural effusion  Chronic collection in left upper quadrant of the abdomen  - Suspect due to malignant pleural effusion  - We will check CXR  - We will likely perform thoracentesis if effusion persistent on CXR  - check pleural fluid analysis including cytology, cell count, LDH    Anasarca: Likely due to hypoalbuminemia  Echo: EF 60- 65%.  LVEF normal  Start Albumin 25% of 25 g every 6 hrs  Lasix 40 mg BID  Check urine protein to r/o proteinuria  Strict I's and O's    Left Breast cancer s/p lumpectomy and chemotherapy  Outpatient follow up with oncology  Scheduled for radiation therapy on     Hypertension  DM type 2  - BP stable  - Diabetic diet, ISS as per protocol    Anemia: Check iron panel and ferritin  Transfuse prn Hb < 7    Recent perforated gastric ulcer: Continue protonix    Obesity with BMI 58.53 kg/m2    PARMINDER     Code status: Full  DVT prophylaxsis: Lovenox  Dispo:pending clinical improvement       Interval History/Subjective:  F/up for generalized body swelling   No acute overnight event  No SOB at rest. No chest pain  +ve nausea  Feels swelling is improving    Review of Systems:  A comprehensive review of systems was negative except for that written in the HPI. Objective:     VITALS:   Last 24hrs VS reviewed since prior progress note. Most recent are:  Visit Vitals  BP (!) 128/50   Pulse 94   Temp 98 °F (36.7 °C)   Resp 18   Ht 5' 2\" (1.575 m)   Wt 145.2 kg (320 lb)   SpO2 99%   BMI 58.53 kg/m²       Intake/Output Summary (Last 24 hours) at 9/2/2022 1524  Last data filed at 9/2/2022 1414  Gross per 24 hour   Intake --   Output 600 ml   Net -600 ml        PHYSICAL EXAM:  General: No acute distress, cooperative, pleasant> Obese  EENT: EOMI. Anicteric sclerae. Oral mucous moist, oropharynx benign  Resp: Reduce BS at the left lung field. No wheezing/rhonchi/rales. No accessory muscle use  CV: Regular rhythm, normal rate, no murmurs, gallops, rubs  GI: Soft, non distended, non tender. normoactive bowel sounds, no hepatosplenomegaly Extremities: 3+ edema, warm, 2+ pulses throughout  Neurologic: Moves all extremities. AAOx3, CN II-XII grossly intact  Psych: Good insight. Not anxious nor agitated. Skin: Good Turgor, no rashes or ulcers. Right chest paris catheter    Lab Data Personally Reviewed: (see below)     Medications list Personally Reviewed:  x YES  NO     _______________________________________________________________________  Care Plan discussed with:  Patient/Family and Nurse    Total NON critical care TIME:  30 minutes    Jose Alberto Flores MD     Procedures: see electronic medical records for all procedures/Xrays and details which were not copied into this note but were reviewed prior to creation of Plan.       LABS:  Recent Labs     09/01/22  1637   WBC 9.5   HGB 9.4*   HCT 32.3*        Recent Labs     09/02/22  0009 09/01/22  1637    143   K 3.7 3.4*   * 108   CO2 28 28   BUN 5* 5*   CREA 0.49* 0.62   * 133*   CA 8.9 9.9     Recent Labs     09/01/22  1635 ALT 16   AP 84   TBILI 0.3   TP 7.2   ALB 2.6*   GLOB 4.6*     No results for input(s): INR, PTP, APTT, INREXT in the last 72 hours. No results for input(s): FE, TIBC, PSAT, FERR in the last 72 hours. Lab Results   Component Value Date/Time    Folate 12.8 07/24/2022 04:40 AM      No results for input(s): PH, PCO2, PO2 in the last 72 hours. No results for input(s): CPK, CKNDX, TROIQ in the last 72 hours.     No lab exists for component: CPKMB  No results found for: CHOL, CHOLX, CHLST, CHOLV, HDL, HDLP, LDL, LDLC, DLDLP, TGLX, TRIGL, TRIGP, CHHD, CHHDX  Lab Results   Component Value Date/Time    Glucose (POC) 130 (H) 09/02/2022 01:33 PM    Glucose (POC) 129 (H) 09/02/2022 06:52 AM    Glucose (POC) 112 09/01/2022 09:56 PM    Glucose (POC) 139 (H) 08/16/2022 05:05 PM    Glucose (POC) 116 08/16/2022 11:04 AM     Lab Results   Component Value Date/Time    Color Yellow 08/11/2022 10:36 PM    Appearance Hazy (A) 08/11/2022 10:36 PM    Specific gravity 1.020 08/11/2022 10:36 PM    Specific gravity >1.030 (H) 07/21/2022 01:41 AM    pH (UA) 5.0 08/11/2022 10:36 PM    Protein Trace (A) 08/11/2022 10:36 PM    Glucose Normal (A) 08/11/2022 10:36 PM    Ketone Negative 08/11/2022 10:36 PM    Bilirubin 3 (A) 08/11/2022 10:36 PM    Urobilinogen Normal 08/11/2022 10:36 PM    Nitrites Negative 08/11/2022 10:36 PM    Leukocyte Esterase Negative 08/11/2022 10:36 PM    Epithelial cells FEW 07/21/2022 01:41 AM    Bacteria Negative 08/11/2022 10:36 PM    WBC 5-10 08/11/2022 10:36 PM    RBC 0-5 08/11/2022 10:36 PM

## 2022-09-02 NOTE — PROGRESS NOTES
Received patient  from the ED via wheel chair. Patient alert and oriented x 4. Oriented to  room and unit. Denies SOB and pain at this time. Will continue to monitor paatient respiratory status and pain management.

## 2022-09-02 NOTE — PROGRESS NOTES
Skin check with Mike Dos Santos RN. No open areas, dry rough skin bilataeral lower extremeties. Some redness top of buttocks other skin is intact.

## 2022-09-02 NOTE — ED NOTES
TRANSFER - OUT REPORT:    Verbal report given to [facility] (name) on Arie Roque  being transferred to 5th floor (unit) for routine progression of care       Report consisted of patients Situation, Background, Assessment and   Recommendations(SBAR). Information from the following report(s) SBAR, Kardex, and ED Summary was reviewed with the receiving nurse. Lines:   Venous Access Device R side Port-a-cath 04/13/22 (Active)   Central Line Being Utilized Yes 09/02/22 0800   Criteria for Appropriate Use Other (comment) 09/02/22 0800   Site Assessment Clean, dry, & intact 09/02/22 0800   Dressing Status Clean;Dry; Intact 09/02/22 0800   Dressing Type Transparent 09/02/22 0800   Access Needle Length (Medial Site) 1 inch 09/02/22 0800       Peripheral IV 09/01/22 Right Antecubital (Active)   Site Assessment Clean, dry, & intact 09/02/22 0800   Phlebitis Assessment 0 09/02/22 0800   Infiltration Assessment 0 09/02/22 0800   Dressing Status Clean, dry, & intact 09/02/22 0800   Dressing Type Transparent 09/02/22 0800   Hub Color/Line Status Pink 09/02/22 0800        Opportunity for questions and clarification was provided.       Patient transported with:   O2 @ 2 liters

## 2022-09-03 ENCOUNTER — APPOINTMENT (OUTPATIENT)
Dept: CT IMAGING | Age: 62
DRG: 248 | End: 2022-09-03
Attending: HOSPITALIST
Payer: MEDICAID

## 2022-09-03 LAB
ANION GAP SERPL CALC-SCNC: 6 MMOL/L (ref 5–15)
BUN SERPL-MCNC: 5 MG/DL (ref 6–20)
BUN/CREAT SERPL: 7 (ref 12–20)
CALCIUM SERPL-MCNC: 9.4 MG/DL (ref 8.5–10.1)
CHLORIDE SERPL-SCNC: 105 MMOL/L (ref 97–108)
CO2 SERPL-SCNC: 33 MMOL/L (ref 21–32)
CREAT SERPL-MCNC: 0.72 MG/DL (ref 0.55–1.02)
GLUCOSE BLD STRIP.AUTO-MCNC: 115 MG/DL (ref 65–117)
GLUCOSE BLD STRIP.AUTO-MCNC: 117 MG/DL (ref 65–117)
GLUCOSE BLD STRIP.AUTO-MCNC: 117 MG/DL (ref 65–117)
GLUCOSE BLD STRIP.AUTO-MCNC: 132 MG/DL (ref 65–117)
GLUCOSE SERPL-MCNC: 105 MG/DL (ref 65–100)
MAGNESIUM SERPL-MCNC: 1.4 MG/DL (ref 1.6–2.4)
POTASSIUM SERPL-SCNC: 3.4 MMOL/L (ref 3.5–5.1)
SERVICE CMNT-IMP: ABNORMAL
SERVICE CMNT-IMP: NORMAL
SODIUM SERPL-SCNC: 144 MMOL/L (ref 136–145)

## 2022-09-03 PROCEDURE — 74011000250 HC RX REV CODE- 250: Performed by: INTERNAL MEDICINE

## 2022-09-03 PROCEDURE — 77010033678 HC OXYGEN DAILY

## 2022-09-03 PROCEDURE — 89050 BODY FLUID CELL COUNT: CPT

## 2022-09-03 PROCEDURE — 74011250636 HC RX REV CODE- 250/636: Performed by: HOSPITALIST

## 2022-09-03 PROCEDURE — 94761 N-INVAS EAR/PLS OXIMETRY MLT: CPT

## 2022-09-03 PROCEDURE — G0378 HOSPITAL OBSERVATION PER HR: HCPCS

## 2022-09-03 PROCEDURE — 80048 BASIC METABOLIC PNL TOTAL CA: CPT

## 2022-09-03 PROCEDURE — 74011000636 HC RX REV CODE- 636: Performed by: HOSPITALIST

## 2022-09-03 PROCEDURE — 88305 TISSUE EXAM BY PATHOLOGIST: CPT

## 2022-09-03 PROCEDURE — 83615 LACTATE (LD) (LDH) ENZYME: CPT

## 2022-09-03 PROCEDURE — 74177 CT ABD & PELVIS W/CONTRAST: CPT

## 2022-09-03 PROCEDURE — 36415 COLL VENOUS BLD VENIPUNCTURE: CPT

## 2022-09-03 PROCEDURE — 74011250637 HC RX REV CODE- 250/637: Performed by: INTERNAL MEDICINE

## 2022-09-03 PROCEDURE — 94640 AIRWAY INHALATION TREATMENT: CPT

## 2022-09-03 PROCEDURE — P9047 ALBUMIN (HUMAN), 25%, 50ML: HCPCS | Performed by: HOSPITALIST

## 2022-09-03 PROCEDURE — 65270000029 HC RM PRIVATE

## 2022-09-03 PROCEDURE — 88112 CYTOPATH CELL ENHANCE TECH: CPT

## 2022-09-03 PROCEDURE — 74011250636 HC RX REV CODE- 250/636: Performed by: INTERNAL MEDICINE

## 2022-09-03 PROCEDURE — 82962 GLUCOSE BLOOD TEST: CPT

## 2022-09-03 PROCEDURE — 83735 ASSAY OF MAGNESIUM: CPT

## 2022-09-03 PROCEDURE — 84157 ASSAY OF PROTEIN OTHER: CPT

## 2022-09-03 RX ORDER — FUROSEMIDE 10 MG/ML
40 INJECTION INTRAMUSCULAR; INTRAVENOUS DAILY
Status: DISCONTINUED | OUTPATIENT
Start: 2022-09-04 | End: 2022-09-05

## 2022-09-03 RX ORDER — POTASSIUM CHLORIDE 750 MG/1
40 TABLET, FILM COATED, EXTENDED RELEASE ORAL DAILY
Status: DISCONTINUED | OUTPATIENT
Start: 2022-09-04 | End: 2022-09-13 | Stop reason: HOSPADM

## 2022-09-03 RX ADMIN — ALBUMIN (HUMAN) 25 G: 0.25 INJECTION, SOLUTION INTRAVENOUS at 12:18

## 2022-09-03 RX ADMIN — ENOXAPARIN SODIUM 30 MG: 100 INJECTION SUBCUTANEOUS at 21:04

## 2022-09-03 RX ADMIN — Medication 10 ML: at 14:00

## 2022-09-03 RX ADMIN — PANTOPRAZOLE SODIUM 40 MG: 40 TABLET, DELAYED RELEASE ORAL at 08:36

## 2022-09-03 RX ADMIN — Medication 10 ML: at 21:08

## 2022-09-03 RX ADMIN — ARFORMOTEROL TARTRATE 15 MCG: 15 SOLUTION RESPIRATORY (INHALATION) at 20:56

## 2022-09-03 RX ADMIN — ENOXAPARIN SODIUM 30 MG: 100 INJECTION SUBCUTANEOUS at 08:36

## 2022-09-03 RX ADMIN — ALBUMIN (HUMAN) 25 G: 0.25 INJECTION, SOLUTION INTRAVENOUS at 06:10

## 2022-09-03 RX ADMIN — DIATRIZOATE MEGLUMINE AND DIATRIZOATE SODIUM 30 ML: 660; 100 LIQUID ORAL; RECTAL at 15:50

## 2022-09-03 RX ADMIN — ONDANSETRON 4 MG: 2 INJECTION INTRAMUSCULAR; INTRAVENOUS at 11:03

## 2022-09-03 RX ADMIN — ALBUMIN (HUMAN) 25 G: 0.25 INJECTION, SOLUTION INTRAVENOUS at 01:00

## 2022-09-03 RX ADMIN — FUROSEMIDE 40 MG: 10 INJECTION, SOLUTION INTRAMUSCULAR; INTRAVENOUS at 08:36

## 2022-09-03 RX ADMIN — ARFORMOTEROL TARTRATE 15 MCG: 15 SOLUTION RESPIRATORY (INHALATION) at 07:43

## 2022-09-03 RX ADMIN — IOPAMIDOL 100 ML: 755 INJECTION, SOLUTION INTRAVENOUS at 17:57

## 2022-09-03 RX ADMIN — PANTOPRAZOLE SODIUM 40 MG: 40 TABLET, DELAYED RELEASE ORAL at 17:29

## 2022-09-03 RX ADMIN — ALBUMIN (HUMAN) 25 G: 0.25 INJECTION, SOLUTION INTRAVENOUS at 17:29

## 2022-09-03 RX ADMIN — SODIUM CHLORIDE, PRESERVATIVE FREE 10 ML: 5 INJECTION INTRAVENOUS at 21:08

## 2022-09-03 RX ADMIN — Medication 10 ML: at 06:11

## 2022-09-03 NOTE — PROGRESS NOTES
Problem: Pain  Goal: *Control of Pain  Outcome: Progressing Towards Goal     Problem: Impaired Skin Integrity/Pressure Injury Treatment  Goal: *Improvement of Existing Pressure Injury  Outcome: Progressing Towards Goal  Goal: *Prevention of pressure injury  Outcome: Progressing Towards Goal  Note: Pressure Injury Interventions:                 Mobility Interventions: HOB 30 degrees or less    Nutrition Interventions: Document food/fluid/supplement intake    Friction and Shear Interventions: HOB 30 degrees or less                Problem: Patient Education: Go to Patient Education Activity  Goal: Patient/Family Education  Outcome: Progressing Towards Goal

## 2022-09-03 NOTE — ROUTINE PROCESS
Bedside and Verbal shift change report given to [facility] (oncoming nurse) by Edinson Mcmahan (offgoing nurse). Report included the following information SBAR and Kardex.

## 2022-09-03 NOTE — PROGRESS NOTES
Hospitalist Progress Note              Geoff Shoemaker MD.                                                             Cell: [phone]                               NAME:  Vamsi Taylor  :  1960  MRN:  [id number]  Date of Service:  9/3/2022    Summary: 65 y.o. female with past medical history of left breast cancer s/p lumpectomy and chemotherapy. She presented to the ER with the c/o generalized swelling which has progressively worsened. She also reports left sided chest pain which is rated 7/10 and dull w/o any radiating symptoms. It is aggravated w/ deep inspiration. Associated symptoms include non-productive cough, wheezing, unknown weight gain and lower extremity edema. CTA chest performed shows no evidence of pulmonary embolism, moderate left pleural effusion and large chronic collection in left upper quadrant of the abdomen. Assessment/Plan:  Left pleural effusion  Chronic collection in left upper quadrant of the abdomen  CT showed Large chronic collection in the left upper quadrant of the abdomen, which is  increased mildly in size  - Suspect due to previous Perforation of Peptic ulcer will consult GS as she is admitted for epigastric pain . Mildly tender in Epigastric area   - IR consulted for Thoracentesis. Orders put in for fluid study. On Lasix  - check pleural fluid analysis including cytology, cell count, LDH,protein    Anasarca: Likely due to hypoalbuminemia  Echo: EF 60- 65%.  LVEF normal  Start Albumin 25% of 25 g every 6 hrs    Check urine protein to r/o proteinuria  Strict I's and O's    Left Breast cancer s/p lumpectomy and chemotherapy  Diagnosed last year had finished Chemo waiting for RT  Scheduled for radiation therapy on     Hypertension  DM type 2  - BP stable  - Diabetic diet, ISS as per protocol    Anemia: Check iron panel and ferritin  Transfuse prn Hb < 7    Recent perforated gastric ulcer: Continue protonix    Obesity with BMI 58.53 kg/m2    PARMINDER     Code status: Full  DVT prophylaxsis: Lovenox  Dispo:pending clinical improvement       Interval History/Subjective:  F/up for generalized body swelling   No acute overnight event  No SOB at rest. No chest pain  +ve nausea  Feels swelling is improving    Review of Systems:  A comprehensive review of systems was negative except for that written in the HPI. Objective:     VITALS:   Last 24hrs VS reviewed since prior progress note. Most recent are:  Visit Vitals  BP (!) 140/64 (BP 1 Location: Right lower arm, BP Patient Position: At rest)   Pulse 84   Temp 98.1 °F (36.7 °C)   Resp 16   Ht 5' 2\" (1.575 m)   Wt 145.2 kg (320 lb)   SpO2 97%   Breastfeeding No   BMI 58.53 kg/m²       Intake/Output Summary (Last 24 hours) at 9/3/2022 1402  Last data filed at 9/3/2022 0610  Gross per 24 hour   Intake 400 ml   Output 400 ml   Net 0 ml          PHYSICAL EXAM:  General: No acute distress, cooperative, pleasant> Obese  EENT: EOMI. Anicteric sclerae. Oral mucous moist, oropharynx benign  Resp: Reduce BS at the left lung field. No wheezing/rhonchi/rales. No accessory muscle use  CV: Regular rhythm, normal rate, no murmurs, gallops, rubs  GI: Soft, non distended, non tender. normoactive bowel sounds, no hepatosplenomegaly Extremities: 3+ edema, warm, 2+ pulses throughout  Neurologic: Moves all extremities. AAOx3, CN II-XII grossly intact  Psych: Good insight. Not anxious nor agitated. Skin: Good Turgor, no rashes or ulcers.  Right chest paris catheter    Lab Data Personally Reviewed: (see below)     Medications list Personally Reviewed:  x YES  NO     _______________________________________________________________________  Care Plan discussed with:  Patient/Family and Nurse    Total NON critical care TIME:  30 minutes    March MD Franki     Procedures: see electronic medical records for all procedures/Xrays and details which were not copied into this note but were reviewed prior to creation of Plan. LABS:  Recent Labs     09/01/22  1637   WBC 9.5   HGB 9.4*   HCT 32.3*          Recent Labs     09/03/22  0311 09/02/22  0009 09/01/22  1637    145 143   K 3.4* 3.7 3.4*    112* 108   CO2 33* 28 28   BUN 5* 5* 5*   CREA 0.72 0.49* 0.62   * 119* 133*   CA 9.4 8.9 9.9   MG 1.4*  --   --        Recent Labs     09/01/22  1637   ALT 16   AP 84   TBILI 0.3   TP 7.2   ALB 2.6*   GLOB 4.6*       No results for input(s): INR, PTP, APTT, INREXT, INREXT in the last 72 hours. No results for input(s): FE, TIBC, PSAT, FERR in the last 72 hours. Lab Results   Component Value Date/Time    Folate 12.8 07/24/2022 04:40 AM        No results for input(s): PH, PCO2, PO2 in the last 72 hours. No results for input(s): CPK, CKNDX, TROIQ in the last 72 hours.     No lab exists for component: CPKMB  No results found for: CHOL, CHOLX, CHLST, CHOLV, HDL, HDLP, LDL, LDLC, DLDLP, TGLX, TRIGL, TRIGP, CHHD, CHHDX  Lab Results   Component Value Date/Time    Glucose (POC) 132 (H) 09/03/2022 11:35 AM    Glucose (POC) 115 09/03/2022 07:14 AM    Glucose (POC) 122 (H) 09/02/2022 09:47 PM    Glucose (POC) 128 (H) 09/02/2022 04:27 PM    Glucose (POC) 130 (H) 09/02/2022 01:33 PM     Lab Results   Component Value Date/Time    Color Yellow 08/11/2022 10:36 PM    Appearance Hazy (A) 08/11/2022 10:36 PM    Specific gravity 1.020 08/11/2022 10:36 PM    Specific gravity >1.030 (H) 07/21/2022 01:41 AM    pH (UA) 5.0 08/11/2022 10:36 PM    Protein Trace (A) 08/11/2022 10:36 PM    Glucose Normal (A) 08/11/2022 10:36 PM    Ketone Negative 08/11/2022 10:36 PM    Bilirubin 3 (A) 08/11/2022 10:36 PM    Urobilinogen Normal 08/11/2022 10:36 PM    Nitrites Negative 08/11/2022 10:36 PM    Leukocyte Esterase Negative 08/11/2022 10:36 PM    Epithelial cells FEW 07/21/2022 01:41 AM    Bacteria Negative 08/11/2022 10:36 PM    WBC 5-10 08/11/2022 10:36 PM    RBC 0-5 08/11/2022 10:36 PM

## 2022-09-04 ENCOUNTER — APPOINTMENT (OUTPATIENT)
Dept: GENERAL RADIOLOGY | Age: 62
DRG: 248 | End: 2022-09-04
Attending: HOSPITALIST
Payer: MEDICAID

## 2022-09-04 ENCOUNTER — APPOINTMENT (OUTPATIENT)
Dept: ULTRASOUND IMAGING | Age: 62
DRG: 248 | End: 2022-09-04
Attending: HOSPITALIST
Payer: MEDICAID

## 2022-09-04 PROBLEM — J90 PLEURAL EFFUSION: Status: ACTIVE | Noted: 2022-09-04

## 2022-09-04 LAB
ANION GAP SERPL CALC-SCNC: 2 MMOL/L (ref 5–15)
APPEARANCE FLD: ABNORMAL
BUN SERPL-MCNC: 5 MG/DL (ref 6–20)
BUN/CREAT SERPL: 8 (ref 12–20)
CALCIUM SERPL-MCNC: 9.6 MG/DL (ref 8.5–10.1)
CHLORIDE SERPL-SCNC: 105 MMOL/L (ref 97–108)
CO2 SERPL-SCNC: 34 MMOL/L (ref 21–32)
COLOR FLD: ABNORMAL
COMMENT, HOLDF: NORMAL
CREAT SERPL-MCNC: 0.59 MG/DL (ref 0.55–1.02)
ERYTHROCYTE [DISTWIDTH] IN BLOOD BY AUTOMATED COUNT: 17.4 % (ref 11.5–14.5)
GLUCOSE BLD STRIP.AUTO-MCNC: 127 MG/DL (ref 65–117)
GLUCOSE BLD STRIP.AUTO-MCNC: 131 MG/DL (ref 65–117)
GLUCOSE BLD STRIP.AUTO-MCNC: 132 MG/DL (ref 65–117)
GLUCOSE BLD STRIP.AUTO-MCNC: 143 MG/DL (ref 65–117)
GLUCOSE SERPL-MCNC: 111 MG/DL (ref 65–100)
HCT VFR BLD AUTO: 29.3 % (ref 35–47)
HGB BLD-MCNC: 8.3 G/DL (ref 11.5–16)
LDH FLD L TO P-CCNC: >4000 U/L
MCH RBC QN AUTO: 28 PG (ref 26–34)
MCHC RBC AUTO-ENTMCNC: 28.3 G/DL (ref 30–36.5)
MCV RBC AUTO: 99 FL (ref 80–99)
NEUTROPHILS NFR FLD: 0 %
NRBC # BLD: 0 K/UL (ref 0–0.01)
NRBC BLD-RTO: 0 PER 100 WBC
NUC CELL # FLD: ABNORMAL /CU MM
PLATELET # BLD AUTO: 322 K/UL (ref 150–400)
PMV BLD AUTO: 8.8 FL (ref 8.9–12.9)
POTASSIUM SERPL-SCNC: 3.3 MMOL/L (ref 3.5–5.1)
PROT FLD-MCNC: 2.3 G/DL
RBC # BLD AUTO: 2.96 M/UL (ref 3.8–5.2)
RBC # FLD: >100 /CU MM
SAMPLES BEING HELD,HOLD: NORMAL
SERVICE CMNT-IMP: ABNORMAL
SODIUM SERPL-SCNC: 141 MMOL/L (ref 136–145)
SPECIMEN SOURCE FLD: ABNORMAL
SPECIMEN SOURCE FLD: NORMAL
SPECIMEN SOURCE FLD: NORMAL
TOTAL CELLS COUNTED SPEC: 0
WBC # BLD AUTO: 7.7 K/UL (ref 3.6–11)

## 2022-09-04 PROCEDURE — 77030010546 HC BG URIN DRNG URES -B

## 2022-09-04 PROCEDURE — 94640 AIRWAY INHALATION TREATMENT: CPT

## 2022-09-04 PROCEDURE — 85027 COMPLETE CBC AUTOMATED: CPT

## 2022-09-04 PROCEDURE — 10030 IMG GID FLU COLL DRG SFT TIS: CPT

## 2022-09-04 PROCEDURE — 77030002996 HC SUT SLK J&J -A

## 2022-09-04 PROCEDURE — 0W9H30Z DRAINAGE OF RETROPERITONEUM WITH DRAINAGE DEVICE, PERCUTANEOUS APPROACH: ICD-10-PCS | Performed by: STUDENT IN AN ORGANIZED HEALTH CARE EDUCATION/TRAINING PROGRAM

## 2022-09-04 PROCEDURE — 2709999900 HC NON-CHARGEABLE SUPPLY

## 2022-09-04 PROCEDURE — 74011000250 HC RX REV CODE- 250: Performed by: HOSPITALIST

## 2022-09-04 PROCEDURE — 99221 1ST HOSP IP/OBS SF/LOW 40: CPT | Performed by: SURGERY

## 2022-09-04 PROCEDURE — 94761 N-INVAS EAR/PLS OXIMETRY MLT: CPT

## 2022-09-04 PROCEDURE — 65270000029 HC RM PRIVATE

## 2022-09-04 PROCEDURE — 74011250637 HC RX REV CODE- 250/637: Performed by: HOSPITALIST

## 2022-09-04 PROCEDURE — 36415 COLL VENOUS BLD VENIPUNCTURE: CPT

## 2022-09-04 PROCEDURE — P9047 ALBUMIN (HUMAN), 25%, 50ML: HCPCS | Performed by: HOSPITALIST

## 2022-09-04 PROCEDURE — 74011000250 HC RX REV CODE- 250: Performed by: INTERNAL MEDICINE

## 2022-09-04 PROCEDURE — 74011250636 HC RX REV CODE- 250/636: Performed by: HOSPITALIST

## 2022-09-04 PROCEDURE — 80048 BASIC METABOLIC PNL TOTAL CA: CPT

## 2022-09-04 PROCEDURE — 74011250637 HC RX REV CODE- 250/637: Performed by: INTERNAL MEDICINE

## 2022-09-04 PROCEDURE — 74018 RADEX ABDOMEN 1 VIEW: CPT

## 2022-09-04 PROCEDURE — G0378 HOSPITAL OBSERVATION PER HR: HCPCS

## 2022-09-04 PROCEDURE — C1729 CATH, DRAINAGE: HCPCS

## 2022-09-04 PROCEDURE — 74011250636 HC RX REV CODE- 250/636: Performed by: INTERNAL MEDICINE

## 2022-09-04 PROCEDURE — 71045 X-RAY EXAM CHEST 1 VIEW: CPT

## 2022-09-04 PROCEDURE — 77010033678 HC OXYGEN DAILY

## 2022-09-04 PROCEDURE — 82962 GLUCOSE BLOOD TEST: CPT

## 2022-09-04 RX ORDER — LIDOCAINE HYDROCHLORIDE 10 MG/ML
20 INJECTION, SOLUTION EPIDURAL; INFILTRATION; INTRACAUDAL; PERINEURAL ONCE
Status: COMPLETED | OUTPATIENT
Start: 2022-09-04 | End: 2022-09-04

## 2022-09-04 RX ORDER — HYDROMORPHONE HYDROCHLORIDE 2 MG/ML
2 INJECTION, SOLUTION INTRAMUSCULAR; INTRAVENOUS; SUBCUTANEOUS
Status: DISCONTINUED | OUTPATIENT
Start: 2022-09-04 | End: 2022-09-13 | Stop reason: HOSPADM

## 2022-09-04 RX ORDER — LIDOCAINE HYDROCHLORIDE 10 MG/ML
10 INJECTION, SOLUTION EPIDURAL; INFILTRATION; INTRACAUDAL; PERINEURAL
Status: DISCONTINUED | OUTPATIENT
Start: 2022-09-04 | End: 2022-09-04

## 2022-09-04 RX ORDER — ONDANSETRON 2 MG/ML
4 INJECTION INTRAMUSCULAR; INTRAVENOUS
Status: DISCONTINUED | OUTPATIENT
Start: 2022-09-04 | End: 2022-09-04

## 2022-09-04 RX ORDER — POTASSIUM CHLORIDE 750 MG/1
40 TABLET, FILM COATED, EXTENDED RELEASE ORAL
Status: COMPLETED | OUTPATIENT
Start: 2022-09-04 | End: 2022-09-04

## 2022-09-04 RX ORDER — LANOLIN ALCOHOL/MO/W.PET/CERES
400 CREAM (GRAM) TOPICAL 2 TIMES DAILY
Status: COMPLETED | OUTPATIENT
Start: 2022-09-04 | End: 2022-09-05

## 2022-09-04 RX ORDER — HYDROMORPHONE HYDROCHLORIDE 1 MG/ML
1 INJECTION, SOLUTION INTRAMUSCULAR; INTRAVENOUS; SUBCUTANEOUS
Status: DISCONTINUED | OUTPATIENT
Start: 2022-09-04 | End: 2022-09-04

## 2022-09-04 RX ADMIN — Medication 400 MG: at 09:16

## 2022-09-04 RX ADMIN — Medication 10 ML: at 14:19

## 2022-09-04 RX ADMIN — ALBUMIN (HUMAN) 25 G: 0.25 INJECTION, SOLUTION INTRAVENOUS at 00:22

## 2022-09-04 RX ADMIN — Medication 400 MG: at 17:56

## 2022-09-04 RX ADMIN — Medication 10 ML: at 04:52

## 2022-09-04 RX ADMIN — POTASSIUM CHLORIDE 40 MEQ: 750 TABLET, FILM COATED, EXTENDED RELEASE ORAL at 09:16

## 2022-09-04 RX ADMIN — Medication 10 ML: at 20:18

## 2022-09-04 RX ADMIN — ONDANSETRON 4 MG: 2 INJECTION INTRAMUSCULAR; INTRAVENOUS at 13:22

## 2022-09-04 RX ADMIN — PANTOPRAZOLE SODIUM 40 MG: 40 TABLET, DELAYED RELEASE ORAL at 17:56

## 2022-09-04 RX ADMIN — LIDOCAINE HYDROCHLORIDE 20 ML: 10 INJECTION, SOLUTION EPIDURAL; INFILTRATION; INTRACAUDAL; PERINEURAL at 13:00

## 2022-09-04 RX ADMIN — HYDROMORPHONE HYDROCHLORIDE 2 MG: 2 INJECTION, SOLUTION INTRAMUSCULAR; INTRAVENOUS; SUBCUTANEOUS at 15:33

## 2022-09-04 RX ADMIN — POTASSIUM CHLORIDE 40 MEQ: 750 TABLET, FILM COATED, EXTENDED RELEASE ORAL at 14:18

## 2022-09-04 RX ADMIN — HYDROMORPHONE HYDROCHLORIDE 2 MG: 2 INJECTION, SOLUTION INTRAMUSCULAR; INTRAVENOUS; SUBCUTANEOUS at 20:17

## 2022-09-04 RX ADMIN — SODIUM CHLORIDE, PRESERVATIVE FREE 10 ML: 5 INJECTION INTRAVENOUS at 04:52

## 2022-09-04 RX ADMIN — HYDROMORPHONE HYDROCHLORIDE 1 MG: 1 INJECTION, SOLUTION INTRAMUSCULAR; INTRAVENOUS; SUBCUTANEOUS at 13:22

## 2022-09-04 RX ADMIN — ENOXAPARIN SODIUM 30 MG: 100 INJECTION SUBCUTANEOUS at 09:15

## 2022-09-04 RX ADMIN — ENOXAPARIN SODIUM 30 MG: 100 INJECTION SUBCUTANEOUS at 20:17

## 2022-09-04 RX ADMIN — SODIUM CHLORIDE, PRESERVATIVE FREE 10 ML: 5 INJECTION INTRAVENOUS at 23:32

## 2022-09-04 RX ADMIN — PANTOPRAZOLE SODIUM 40 MG: 40 TABLET, DELAYED RELEASE ORAL at 09:16

## 2022-09-04 RX ADMIN — FUROSEMIDE 40 MG: 10 INJECTION, SOLUTION INTRAMUSCULAR; INTRAVENOUS at 09:15

## 2022-09-04 RX ADMIN — ONDANSETRON 4 MG: 2 INJECTION INTRAMUSCULAR; INTRAVENOUS at 23:31

## 2022-09-04 RX ADMIN — ARFORMOTEROL TARTRATE 15 MCG: 15 SOLUTION RESPIRATORY (INHALATION) at 07:31

## 2022-09-04 RX ADMIN — HYDROMORPHONE HYDROCHLORIDE 2 MG: 2 INJECTION, SOLUTION INTRAMUSCULAR; INTRAVENOUS; SUBCUTANEOUS at 23:32

## 2022-09-04 RX ADMIN — ARFORMOTEROL TARTRATE 15 MCG: 15 SOLUTION RESPIRATORY (INHALATION) at 20:24

## 2022-09-04 RX ADMIN — ONDANSETRON 4 MG: 4 TABLET, ORALLY DISINTEGRATING ORAL at 17:56

## 2022-09-04 NOTE — PROGRESS NOTES
Problem: Pain  Goal: *Control of Pain  Outcome: Progressing Towards Goal     Problem: Patient Education: Go to Patient Education Activity  Goal: Patient/Family Education  Outcome: Progressing Towards Goal     Problem: Impaired Skin Integrity/Pressure Injury Treatment  Goal: *Improvement of Existing Pressure Injury  Outcome: Progressing Towards Goal  Goal: *Prevention of pressure injury  Outcome: Progressing Towards Goal  Note: Pressure Injury Interventions:                 Mobility Interventions: HOB 30 degrees or less, Pressure redistribution bed/mattress (bed type)    Nutrition Interventions: Document food/fluid/supplement intake    Friction and Shear Interventions: HOB 30 degrees or less, Minimize layers                Problem: Patient Education: Go to Patient Education Activity  Goal: Patient/Family Education  Outcome: Progressing Towards Goal

## 2022-09-04 NOTE — PROGRESS NOTES
Hospitalist Progress Note              Gerri Grey MD.                                                             Cell: [phone]                               NAME:  Ann Fatima  :  1960  MRN:  [id number]  Date of Service:  2022    Summary: 65 y.o. female with past medical history of left breast cancer s/p lumpectomy and chemotherapy. She presented to the ER with the c/o generalized swelling which has progressively worsened. She also reports left sided chest pain which is rated 7/10 and dull w/o any radiating symptoms. It is aggravated w/ deep inspiration. Associated symptoms include non-productive cough, wheezing, unknown weight gain and lower extremity edema. CTA chest performed shows no evidence of pulmonary embolism, moderate left pleural effusion and large chronic collection in left upper quadrant of the abdomen. Assessment/Plan:  Left pleural effusion:  On minimal Oxygen by NC  Still waiting for IR to drain   IR consulted for Thoracentesis. Orders put in for fluid study. On Lasix  - check pleural fluid analysis including cytology, cell count, LDH,protein    Chronic collection in left upper quadrant of the abdomen  CT showed Large chronic collection in the left upper quadrant of the abdomen, which is  increased mildly in size    Ct repeated with Oral contrast on 9/3 shows     Increased size of a LEFT upper abdominal fluid collection now measuring 17.5  cm AP by 9.6 cm transverse. Findings likely represent a large seroma or less  likely abscess. No evidence of gastric or bowel leak. Persistent small LEFT pleural effusion and atelectasis. - Suspect due to previous Perforation of Peptic ulcer GS  was consulted yesterday as she is admitted for epigastric pain . Mildly tender in Epigastric area . Labs not suggestive of abscess as has no fever or Leukocytosis. Anasarca: Likely due to hypoalbuminemia  Echo: EF 60- 65%.  LVEF normal  Start Albumin 25% of 25 g every 6 hrs  Check urine protein to r/o proteinuria  Strict I's and O's    Left Breast cancer s/p lumpectomy and chemotherapy  Diagnosed last year had finished Chemo waiting for RT  Scheduled for radiation therapy on 08/7    Hypokalemia:  Due to Lasix supplement K. Hypertension  DM type 2  - BP stable  - Diabetic diet, ISS as per protocol    Anemia: Check iron panel and ferritin probably due to cancer   Transfuse prn Hb < 7    Recent perforated gastric ulcer: Continue protonix    Obesity with BMI 58.53 kg/m2    PARMINDER     Code status: Full  DVT prophylaxsis: Lovenox  Dispo:pending clinical improvement       Interval History/Subjective:  F/up for generalized body swelling   No acute overnight event  No SOB at rest. No chest pain  Pain control is better    Review of Systems:  A comprehensive review of systems was negative except for that written in the HPI. Objective:     VITALS:   Last 24hrs VS reviewed since prior progress note. Most recent are:  Visit Vitals  BP (!) 150/75 (BP 1 Location: Right upper arm, BP Patient Position: At rest)   Pulse 87   Temp 98.4 °F (36.9 °C)   Resp 17   Ht 5' 2\" (1.575 m)   Wt 142.2 kg (313 lb 7.9 oz)   SpO2 96%   Breastfeeding No   BMI 57.34 kg/m²       Intake/Output Summary (Last 24 hours) at 9/4/2022 0724  Last data filed at 9/4/2022 0329  Gross per 24 hour   Intake 100 ml   Output --   Net 100 ml          PHYSICAL EXAM:  General: No acute distress, cooperative, pleasant> Obese  EENT: EOMI. Anicteric sclerae. Oral mucous moist, oropharynx benign  Resp: Reduce BS at the left lung field. No wheezing/rhonchi/rales. No accessory muscle use  CV: Regular rhythm, normal rate, no murmurs, gallops, rubs  GI: Soft, non distended, non tender. normoactive bowel sounds, no hepatosplenomegaly Extremities: 3+ edema, warm, 2+ pulses throughout  Neurologic: Moves all extremities. AAOx3, CN II-XII grossly intact  Psych: Good insight.  Not anxious nor agitated. Skin: Good Turgor, no rashes or ulcers. Right chest paris catheter    Lab Data Personally Reviewed: (see below)     Medications list Personally Reviewed:  x YES  NO     _______________________________________________________________________  Care Plan discussed with:  Patient/Family and Nurse    Total NON critical care TIME:  30 minutes    Lisa Doan MD     Procedures: see electronic medical records for all procedures/Xrays and details which were not copied into this note but were reviewed prior to creation of Plan. LABS:  Recent Labs     09/04/22 0444 09/01/22  1637   WBC 7.7 9.5   HGB 8.3* 9.4*   HCT 29.3* 32.3*    328       Recent Labs     09/04/22  0444 09/03/22  0311 09/02/22  0009    144 145   K 3.3* 3.4* 3.7    105 112*   CO2 34* 33* 28   BUN 5* 5* 5*   CREA 0.59 0.72 0.49*   * 105* 119*   CA 9.6 9.4 8.9   MG  --  1.4*  --        Recent Labs     09/01/22  1637   ALT 16   AP 84   TBILI 0.3   TP 7.2   ALB 2.6*   GLOB 4.6*       No results for input(s): INR, PTP, APTT, INREXT, INREXT in the last 72 hours. No results for input(s): FE, TIBC, PSAT, FERR in the last 72 hours. Lab Results   Component Value Date/Time    Folate 12.8 07/24/2022 04:40 AM        No results for input(s): PH, PCO2, PO2 in the last 72 hours. No results for input(s): CPK, CKNDX, TROIQ in the last 72 hours.     No lab exists for component: CPKMB  No results found for: CHOL, CHOLX, CHLST, CHOLV, HDL, HDLP, LDL, LDLC, DLDLP, TGLX, TRIGL, TRIGP, CHHD, [facility]  Lab Results   Component Value Date/Time    Glucose (POC) 117 09/03/2022 09:07 PM    Glucose (POC) 117 09/03/2022 04:38 PM    Glucose (POC) 132 (H) 09/03/2022 11:35 AM    Glucose (POC) 115 09/03/2022 07:14 AM    Glucose (POC) 122 (H) 09/02/2022 09:47 PM     Lab Results   Component Value Date/Time    Color Yellow 08/11/2022 10:36 PM    Appearance Hazy (A) 08/11/2022 10:36 PM    Specific gravity 1.020 08/11/2022 10:36 PM    Specific gravity >1.030 (H) 07/21/2022 01:41 AM    pH (UA) 5.0 08/11/2022 10:36 PM    Protein Trace (A) 08/11/2022 10:36 PM    Glucose Normal (A) 08/11/2022 10:36 PM    Ketone Negative 08/11/2022 10:36 PM    Bilirubin 3 (A) 08/11/2022 10:36 PM    Urobilinogen Normal 08/11/2022 10:36 PM    Nitrites Negative 08/11/2022 10:36 PM    Leukocyte Esterase Negative 08/11/2022 10:36 PM    Epithelial cells FEW 07/21/2022 01:41 AM    Bacteria Negative 08/11/2022 10:36 PM    WBC 5-10 08/11/2022 10:36 PM    RBC 0-5 08/11/2022 10:36 PM

## 2022-09-04 NOTE — CONSULTS
[facility] Consultation for: abdominal fluid collection    Requesting Physician: Dr. Mahendra Patel    History of Present Illness:      Valencia Redman is a 65 y.o. female who is known to me after repair of perforated gastric ulcer on 7/20/22. She was subsequently discharged, and admitted at other facility for KIAN. After that discharge, she presented here with one day history of increasing left upper quadrant abdominal pain. CT showed left pleural effusion and upper abdominal fluid collection. She denies fever, chills, nausea, emesis, decreased appetite, constipation or diarrhea.     Past Medical History:   Diagnosis Date    Arrhythmia     heart murmur    Arthritis     Right knee, DJD left knee    Asthma     Cancer ([facility] [address]     COVID-19 01/29/2021    Diabetes ([facility] [address]     Pre diabetes    Hypertension     Morbid obesity ([facility] [address]     Neuropathy     Patient reports numbness and tingling in her legs and feet from her back    Sleep apnea     was ordered a CPAP years ago, but never used       Past Surgical History:   Procedure Laterality Date    HX BREAST LUMPECTOMY Left 3/2/2022    LEFT BREAST LUMPECTOMY WITH LEFT BREAST SENTINEL NODE BIOPSY WITH BLUE DYE AND PORT A CATH INSERTION (URGENT) performed by Aurora Puentes MD at [facility]    HX CHOLECYSTECTOMY      HX GYN      c section    HX GYN      d&c    HX WISDOM TEETH EXTRACTION Bilateral        Social History     Socioeconomic History    Marital status: SINGLE     Spouse name: Not on file    Number of children: Not on file    Years of education: Not on file    Highest education level: Not on file   Occupational History    Not on file   Tobacco Use    Smoking status: Never    Smokeless tobacco: Never   Vaping Use    Vaping Use: Never used   Substance and Sexual Activity    Alcohol use: Yes     Comment: social once per month    Drug use: Yes     Frequency: 1.0 times per week     Types: Marijuana     Comment: once every 3 weeks    Sexual activity: Not Currently   Other Topics Concern    Not on file   Social History Narrative    Not on file     Social Determinants of Health     Financial Resource Strain: Not on file   Food Insecurity: Not on file   Transportation Needs: Not on file   Physical Activity: Not on file   Stress: Not on file   Social Connections: Not on file   Intimate Partner Violence: Not on file   Housing Stability: Not on file       Family History   Problem Relation Age of Onset    Hypertension Mother     Heart Disease Mother     Heart Disease Father     Hypertension Father     Breast Cancer Maternal Aunt     Breast Cancer Other     Breast Cancer Maternal Aunt          Current Facility-Administered Medications:     magnesium oxide (MAG-OX) tablet 400 mg, 400 mg, Oral, BID, Cisco Serna MD, 400 mg at 09/04/22 0916    lidocaine (PF) (XYLOCAINE) 10 mg/mL (1 %) injection 10 mL, 10 mL, SubCUTAneous, RAD ONCE, Cisco Serna MD    potassium chloride SR (KLOR-CON 10) tablet 40 mEq, 40 mEq, Oral, DAILY, Cisco Serna MD    furosemide (LASIX) injection 40 mg, 40 mg, IntraVENous, DAILY, Cisco Serna MD, 40 mg at 09/04/22 0915    sodium chloride (NS) flush 5-40 mL, 5-40 mL, IntraVENous, Q8H, Sánchez Louie MD, 10 mL at 09/04/22 0452    sodium chloride (NS) flush 5-40 mL, 5-40 mL, IntraVENous, PRN, Jeff Lyon MD, 10 mL at 09/04/22 0452    acetaminophen (TYLENOL) tablet 650 mg, 650 mg, Oral, Q6H PRN **OR** acetaminophen (TYLENOL) suppository 650 mg, 650 mg, Rectal, Q6H PRN, Jeff Lyon MD    polyethylene glycol (MIRALAX) packet 17 g, 17 g, Oral, DAILY PRN, Jeff Lyon MD    ondansetron (ZOFRAN ODT) tablet 4 mg, 4 mg, Oral, Q8H PRN **OR** ondansetron (ZOFRAN) injection 4 mg, 4 mg, IntraVENous, Q6H PRN, Jeff Lyon MD, 4 mg at 09/03/22 1103    enoxaparin (LOVENOX) injection 30 mg, 30 mg, SubCUTAneous, Q12H, Jeff Lyon MD, 30 mg at 09/04/22 0915    albuterol (PROVENTIL VENTOLIN) nebulizer solution 2.5 mg, 2.5 mg, Nebulization, Q4H PRN, Jeff Lyon MD arformoteroL (BROVANA) neb solution 15 mcg, 15 mcg, Nebulization, BID RT, Bennett Samuels MD, 15 mcg at 09/04/22 0731    lidocaine-prilocaine (EMLA) 2.5-2.5 % cream, , Topical, PRN, Bennett Samuels MD    pantoprazole (PROTONIX) tablet 40 mg, 40 mg, Oral, BID, Bennett Samuels MD, 40 mg at 09/04/22 0916    insulin lispro (HUMALOG) injection, , SubCUTAneous, AC&HS, Bennett Samuels MD    glucose chewable tablet 16 g, 4 Tablet, Oral, PRN, Bennett Samuels MD    glucagon (GLUCAGEN) injection 1 mg, 1 mg, IntraMUSCular, PRN, Bennett Samuels MD    dextrose 10% infusion 0-250 mL, 0-250 mL, IntraVENous, PRN, Bennett Samuels MD    Allergies   Allergen Reactions    Nuts [employer] Nut] Rash and Itching     Throat itching       ROS   Constitutional: negative  Ears, Nose, Mouth, Throat, and Face: negative  Respiratory: negative  Cardiovascular: negative  Gastrointestinal:  as above  Genitourinary:negative  Integument/Breast: negative  Hematologic/Lymphatic: negative  Behavioral/Psychiatric: negative  Allergic/Immunologic: negative      Physical Exam:     Visit Vitals  BP (!) 185/81 (BP 1 Location: Right lower arm, BP Patient Position: At rest)   Pulse 86   Temp 99.1 °F (37.3 °C)   Resp 16   Ht 5' 2\" (1.575 m)   Wt 308 lb 10.3 oz (140 kg)   SpO2 96%   Breastfeeding No   BMI 56.45 kg/m²       General - alert and oriented, no apparent distress, well developed  HEENT - NC/AT, no scleral icterus  Pulm - CTAB, normal inspiratory effort  CV - RRR, no M/R/G  Abd - soft, ND, incision healed, TTP LUQ without guarding  Ext - warm, well perfused, no edema  Skin - supple and no rashes  Psychiatric - normal affect, good mood    Labs  Recent Results (from the past 24 hour(s))   GLUCOSE, POC    Collection Time: 09/03/22 11:35 AM   Result Value Ref Range    Glucose (POC) 132 (H) 65 - 117 mg/dL    Performed by [address] [place] [address], POC    Collection Time: 09/03/22  4:38 PM   Result Value Ref Range    Glucose (POC) 117 65 - 117 mg/dL    Performed by Emmy Samuels GLUCOSE, POC    Collection Time: 09/03/22  9:07 PM   Result Value Ref Range    Glucose (POC) 117 65 - 117 mg/dL    Performed by Hunter Anh    METABOLIC PANEL, BASIC    Collection Time: 09/04/22  4:44 AM   Result Value Ref Range    Sodium 141 136 - 145 mmol/L    Potassium 3.3 (L) 3.5 - 5.1 mmol/L    Chloride 105 97 - 108 mmol/L    CO2 34 (H) 21 - 32 mmol/L    Anion gap 2 (L) 5 - 15 mmol/L    Glucose 111 (H) 65 - 100 mg/dL    BUN 5 (L) 6 - 20 MG/DL    Creatinine 0.59 0.55 - 1.02 MG/DL    BUN/Creatinine ratio 8 (L) 12 - 20      GFR est AA >60 >60 ml/min/1.73m2    GFR est non-AA >60 >60 ml/min/1.73m2    Calcium 9.6 8.5 - 10.1 MG/DL   CBC W/O DIFF    Collection Time: 09/04/22  4:44 AM   Result Value Ref Range    WBC 7.7 3.6 - 11.0 K/uL    RBC 2.96 (L) 3.80 - 5.20 M/uL    HGB 8.3 (L) 11.5 - 16.0 g/dL    HCT 29.3 (L) 35.0 - 47.0 %    MCV 99.0 80.0 - 99.0 FL    MCH 28.0 26.0 - 34.0 PG    MCHC 28.3 (L) 30.0 - 36.5 g/dL    RDW 17.4 (H) 11.5 - 14.5 %    PLATELET 599 398 - 017 K/uL    MPV 8.8 (L) 8.9 - 12.9 FL    NRBC 0.0 0  WBC    ABSOLUTE NRBC 0.00 0.00 - 0.01 K/uL   GLUCOSE, POC    Collection Time: 09/04/22  8:20 AM   Result Value Ref Range    Glucose (POC) 127 (H) 65 - 117 mg/dL    Performed by Alma Martir            Imaging  CT A/P:  FINDINGS:   LOWER THORAX: There is a persistent, small left-sided pleural effusion with  associated LEFT lower lobe atelectasis. The heart size is normal. There is no  pericardial effusion. LIVER: No mass. BILIARY TREE: Status post cholecystectomy. CBD is not dilated. SPLEEN: Again seen is a large homogeneous fluid collection within the LEFT upper  quadrant of the abdomen. It measures 17.5 cm AP by 9.6 cm transverse on series  2, slice 16 which is increased in size from the prior CT. The fluid collection  is inseparable from the upper pole of the spleen and from the LEFT hepatic lobe. It abuts the greater curvature of the stomach.  No enhancing or solid components  are identified. PANCREAS: No mass or ductal dilatation. ADRENALS: Again seen is a 3.4 cm nodule of the LEFT adrenal gland. KIDNEYS: No mass, calculus, or hydronephrosis. STOMACH: Unremarkable. SMALL BOWEL: No dilatation or wall thickening. COLON: No dilatation or wall thickening. APPENDIX: Normal  PERITONEUM: No ascites or pneumoperitoneum. Again seen are mild inflammatory  changes in the omentum. There is no extraluminal oral contrast.  RETROPERITONEUM: No lymphadenopathy or aortic aneurysm. REPRODUCTIVE ORGANS: No adnexal mass. URINARY BLADDER: No mass or calculus. BONES: No destructive bone lesion. ABDOMINAL WALL: Postsurgical changes. ADDITIONAL COMMENTS: N/A     IMPRESSION     1. Increased size of a LEFT upper abdominal fluid collection now measuring 17.5  cm AP by 9.6 cm transverse. Findings likely represent a large seroma or less  likely abscess. 2. No evidence of gastric or bowel leak. 3. Persistent small LEFT pleural effusion and atelectasis. I have personally reviewed all of the pertinent images     Assessment:     Mayi Spear is a 65 y.o. female who is post recent ex lap and repair perforated gastric ulcer, presenting with abdominal pain, and findings of left pleural effusion and left upper quadrant abdominal fluid collection. This appears to be most consistent with seroma. There is no evidence of leak, and no leukocytosis etc to suggest abscess. The pleural effusion is likely reactive    Recommendations:     1. Recommend CT guided drainage by IR if amenable. NPO for now in case this can be done today. No plan for surgical intervention unless collection not able to be drained. Will follow. 40 mins of time was spent with the patient of which > 50% of the time involved face-to-face counseling of the patient regarding the proposed treatment plan.     Kathy Galloway MD

## 2022-09-04 NOTE — PROGRESS NOTES
Bedside and Verbal shift change report given to Suzanne Gamble (oncoming nurse) by Juanita Cummings RN (offgoing nurse). Report included the following information SBAR and Kardex.

## 2022-09-04 NOTE — ROUTINE PROCESS
Bedside and Verbal shift change report given to CHRISTIAN Menchaca(oncoming nurse) by CHRISTIAN Kamara(offgoing nurse). Report included the following information SBAR and Kardex.

## 2022-09-05 LAB
ALBUMIN SERPL-MCNC: 3.2 G/DL (ref 3.5–5)
ALBUMIN/GLOB SERPL: 0.8 {RATIO} (ref 1.1–2.2)
ALP SERPL-CCNC: 66 U/L (ref 45–117)
ALT SERPL-CCNC: 16 U/L (ref 12–78)
ANION GAP SERPL CALC-SCNC: 4 MMOL/L (ref 5–15)
AST SERPL-CCNC: 15 U/L (ref 15–37)
BASOPHILS # BLD: 0.1 K/UL (ref 0–0.1)
BASOPHILS NFR BLD: 1 % (ref 0–1)
BILIRUB SERPL-MCNC: 0.4 MG/DL (ref 0.2–1)
BLASTS NFR BLD MANUAL: 0 %
BUN SERPL-MCNC: 7 MG/DL (ref 6–20)
BUN/CREAT SERPL: 9 (ref 12–20)
CALCIUM SERPL-MCNC: 10 MG/DL (ref 8.5–10.1)
CHLORIDE SERPL-SCNC: 103 MMOL/L (ref 97–108)
CO2 SERPL-SCNC: 35 MMOL/L (ref 21–32)
CREAT SERPL-MCNC: 0.82 MG/DL (ref 0.55–1.02)
DIFFERENTIAL METHOD BLD: ABNORMAL
EOSINOPHIL # BLD: 0 K/UL (ref 0–0.4)
EOSINOPHIL NFR BLD: 0 % (ref 0–7)
ERYTHROCYTE [DISTWIDTH] IN BLOOD BY AUTOMATED COUNT: 17.1 % (ref 11.5–14.5)
GLOBULIN SER CALC-MCNC: 4.2 G/DL (ref 2–4)
GLUCOSE BLD STRIP.AUTO-MCNC: 125 MG/DL (ref 65–117)
GLUCOSE BLD STRIP.AUTO-MCNC: 127 MG/DL (ref 65–117)
GLUCOSE BLD STRIP.AUTO-MCNC: 131 MG/DL (ref 65–117)
GLUCOSE BLD STRIP.AUTO-MCNC: 132 MG/DL (ref 65–117)
GLUCOSE SERPL-MCNC: 131 MG/DL (ref 65–100)
HCT VFR BLD AUTO: 32.5 % (ref 35–47)
HGB BLD-MCNC: 9 G/DL (ref 11.5–16)
IMM GRANULOCYTES # BLD AUTO: 0 K/UL
IMM GRANULOCYTES NFR BLD AUTO: 0 %
LDH SERPL L TO P-CCNC: 210 U/L (ref 81–246)
LYMPHOCYTES # BLD: 0.8 K/UL (ref 0.8–3.5)
LYMPHOCYTES NFR BLD: 8 % (ref 12–49)
MAGNESIUM SERPL-MCNC: 1.7 MG/DL (ref 1.6–2.4)
MCH RBC QN AUTO: 28.2 PG (ref 26–34)
MCHC RBC AUTO-ENTMCNC: 27.7 G/DL (ref 30–36.5)
MCV RBC AUTO: 101.9 FL (ref 80–99)
METAMYELOCYTES NFR BLD MANUAL: 0 %
MONOCYTES # BLD: 0.9 K/UL (ref 0–1)
MONOCYTES NFR BLD: 9 % (ref 5–13)
MYELOCYTES NFR BLD MANUAL: 0 %
NEUTS BAND NFR BLD MANUAL: 2 % (ref 0–6)
NEUTS SEG # BLD: 8.4 K/UL (ref 1.8–8)
NEUTS SEG NFR BLD: 80 % (ref 32–75)
NRBC # BLD: 0 K/UL (ref 0–0.01)
NRBC BLD-RTO: 0 PER 100 WBC
OTHER CELLS NFR BLD MANUAL: 0 %
PHOSPHATE SERPL-MCNC: 3.8 MG/DL (ref 2.6–4.7)
PLATELET # BLD AUTO: 366 K/UL (ref 150–400)
PMV BLD AUTO: 8.9 FL (ref 8.9–12.9)
POTASSIUM SERPL-SCNC: 4.6 MMOL/L (ref 3.5–5.1)
PROMYELOCYTES NFR BLD MANUAL: 0 %
PROT SERPL-MCNC: 7.4 G/DL (ref 6.4–8.2)
RBC # BLD AUTO: 3.19 M/UL (ref 3.8–5.2)
RBC MORPH BLD: ABNORMAL
SERVICE CMNT-IMP: ABNORMAL
SODIUM SERPL-SCNC: 142 MMOL/L (ref 136–145)
WBC # BLD AUTO: 10.2 K/UL (ref 3.6–11)

## 2022-09-05 PROCEDURE — 74011250637 HC RX REV CODE- 250/637: Performed by: HOSPITALIST

## 2022-09-05 PROCEDURE — 83615 LACTATE (LD) (LDH) ENZYME: CPT

## 2022-09-05 PROCEDURE — 74011250637 HC RX REV CODE- 250/637: Performed by: INTERNAL MEDICINE

## 2022-09-05 PROCEDURE — 74011250637 HC RX REV CODE- 250/637: Performed by: SURGERY

## 2022-09-05 PROCEDURE — 83735 ASSAY OF MAGNESIUM: CPT

## 2022-09-05 PROCEDURE — 77010033678 HC OXYGEN DAILY

## 2022-09-05 PROCEDURE — 36415 COLL VENOUS BLD VENIPUNCTURE: CPT

## 2022-09-05 PROCEDURE — 84100 ASSAY OF PHOSPHORUS: CPT

## 2022-09-05 PROCEDURE — 74011000250 HC RX REV CODE- 250: Performed by: INTERNAL MEDICINE

## 2022-09-05 PROCEDURE — 74011250636 HC RX REV CODE- 250/636: Performed by: INTERNAL MEDICINE

## 2022-09-05 PROCEDURE — 94761 N-INVAS EAR/PLS OXIMETRY MLT: CPT

## 2022-09-05 PROCEDURE — 84478 ASSAY OF TRIGLYCERIDES: CPT

## 2022-09-05 PROCEDURE — 82962 GLUCOSE BLOOD TEST: CPT

## 2022-09-05 PROCEDURE — 74011250636 HC RX REV CODE- 250/636: Performed by: HOSPITALIST

## 2022-09-05 PROCEDURE — 94640 AIRWAY INHALATION TREATMENT: CPT

## 2022-09-05 PROCEDURE — 74011000258 HC RX REV CODE- 258: Performed by: INTERNAL MEDICINE

## 2022-09-05 PROCEDURE — 85027 COMPLETE CBC AUTOMATED: CPT

## 2022-09-05 PROCEDURE — 65270000029 HC RM PRIVATE

## 2022-09-05 PROCEDURE — 80053 COMPREHEN METABOLIC PANEL: CPT

## 2022-09-05 PROCEDURE — 87205 SMEAR GRAM STAIN: CPT

## 2022-09-05 RX ORDER — OXYCODONE HYDROCHLORIDE 5 MG/1
5 TABLET ORAL
Status: DISCONTINUED | OUTPATIENT
Start: 2022-09-05 | End: 2022-09-13 | Stop reason: HOSPADM

## 2022-09-05 RX ORDER — BUMETANIDE 0.25 MG/ML
1 INJECTION INTRAMUSCULAR; INTRAVENOUS DAILY
Status: DISCONTINUED | OUTPATIENT
Start: 2022-09-06 | End: 2022-09-06

## 2022-09-05 RX ORDER — OXYCODONE HYDROCHLORIDE 5 MG/1
10 TABLET ORAL
Status: DISCONTINUED | OUTPATIENT
Start: 2022-09-05 | End: 2022-09-13 | Stop reason: HOSPADM

## 2022-09-05 RX ADMIN — ONDANSETRON 4 MG: 4 TABLET, ORALLY DISINTEGRATING ORAL at 16:34

## 2022-09-05 RX ADMIN — Medication 400 MG: at 08:17

## 2022-09-05 RX ADMIN — PANTOPRAZOLE SODIUM 40 MG: 40 TABLET, DELAYED RELEASE ORAL at 08:17

## 2022-09-05 RX ADMIN — POTASSIUM CHLORIDE 40 MEQ: 750 TABLET, FILM COATED, EXTENDED RELEASE ORAL at 12:53

## 2022-09-05 RX ADMIN — OXYCODONE 5 MG: 5 TABLET ORAL at 21:50

## 2022-09-05 RX ADMIN — HYDROMORPHONE HYDROCHLORIDE 2 MG: 2 INJECTION, SOLUTION INTRAMUSCULAR; INTRAVENOUS; SUBCUTANEOUS at 10:33

## 2022-09-05 RX ADMIN — HYDROMORPHONE HYDROCHLORIDE 2 MG: 2 INJECTION, SOLUTION INTRAMUSCULAR; INTRAVENOUS; SUBCUTANEOUS at 20:44

## 2022-09-05 RX ADMIN — ARFORMOTEROL TARTRATE 15 MCG: 15 SOLUTION RESPIRATORY (INHALATION) at 20:11

## 2022-09-05 RX ADMIN — PIPERACILLIN AND TAZOBACTAM 4.5 G: 4; .5 INJECTION, POWDER, FOR SOLUTION INTRAVENOUS at 13:00

## 2022-09-05 RX ADMIN — Medication 400 MG: at 17:11

## 2022-09-05 RX ADMIN — ENOXAPARIN SODIUM 30 MG: 100 INJECTION SUBCUTANEOUS at 20:44

## 2022-09-05 RX ADMIN — ONDANSETRON 4 MG: 2 INJECTION INTRAMUSCULAR; INTRAVENOUS at 10:33

## 2022-09-05 RX ADMIN — PIPERACILLIN AND TAZOBACTAM 3.38 G: 3; .375 INJECTION, POWDER, FOR SOLUTION INTRAVENOUS at 19:21

## 2022-09-05 RX ADMIN — ENOXAPARIN SODIUM 30 MG: 100 INJECTION SUBCUTANEOUS at 08:17

## 2022-09-05 RX ADMIN — Medication 10 ML: at 05:53

## 2022-09-05 RX ADMIN — Medication 10 ML: at 21:51

## 2022-09-05 RX ADMIN — FUROSEMIDE 40 MG: 10 INJECTION, SOLUTION INTRAMUSCULAR; INTRAVENOUS at 08:17

## 2022-09-05 RX ADMIN — PANTOPRAZOLE SODIUM 40 MG: 40 TABLET, DELAYED RELEASE ORAL at 17:11

## 2022-09-05 RX ADMIN — ARFORMOTEROL TARTRATE 15 MCG: 15 SOLUTION RESPIRATORY (INHALATION) at 07:43

## 2022-09-05 RX ADMIN — Medication 10 ML: at 14:00

## 2022-09-05 RX ADMIN — SODIUM CHLORIDE, PRESERVATIVE FREE 10 ML: 5 INJECTION INTRAVENOUS at 05:53

## 2022-09-05 RX ADMIN — SODIUM CHLORIDE, PRESERVATIVE FREE 10 ML: 5 INJECTION INTRAVENOUS at 20:44

## 2022-09-05 NOTE — ROUTINE PROCESS
Bedside and Verbal shift change report given to [id number] Ra Roman (oncoming nurse) by Heidi Whitney (offgoing nurse). Report included the following information SBAR and Kardex.

## 2022-09-05 NOTE — PROGRESS NOTES
Interventional Radiology  Procedure Note        9/5/2022 9:41 AM    Patient: Killian Hurd     Diagnosis: Left upper quadrant collection, probable abscess     Procedure(s): Left upper quadrant collection abdominal drain     Specimens removed: 500-600 ml thick greenish fluid     Informed Consent: Obtained    Sedation: Local only    Complications: None    Assessment:  Approximately 500-600 ml of thick viscous green fluid removed during procedure, felt to have maximally decompressed left upper quadrant collection. Likely abscess.        Plan:  - please flush left abdominal catheter 3 times daily with normal saline  - maintain accordion charge at all times  - follow up sample analysis obtained during procedure       -------------------------------  Janine Vicente MD  Vascular and Interventional Radiology

## 2022-09-05 NOTE — PROGRESS NOTES
Problem: Pain  Goal: *Control of Pain  Outcome: Progressing Towards Goal     Problem: Patient Education: Go to Patient Education Activity  Goal: Patient/Family Education  Outcome: Progressing Towards Goal     Problem: Impaired Skin Integrity/Pressure Injury Treatment  Goal: *Improvement of Existing Pressure Injury  Outcome: Progressing Towards Goal  Goal: *Prevention of pressure injury  Outcome: Progressing Towards Goal  Note: Pressure Injury Interventions:                 Mobility Interventions: HOB 30 degrees or less, Pressure redistribution bed/mattress (bed type)    Nutrition Interventions: Document food/fluid/supplement intake    Friction and Shear Interventions: HOB 30 degrees or less, Minimize layers                Problem: Patient Education: Go to Patient Education Activity  Goal: Patient/Family Education  Outcome: Progressing Towards Goal     Problem: Falls - Risk of  Goal: *Absence of Falls  Outcome: Progressing Towards Goal  Note: Fall Risk Interventions:  Mobility Interventions: Patient to call before getting OOB, Communicate number of staff needed for ambulation/transfer         Medication Interventions: Patient to call before getting OOB, Teach patient to arise slowly    Elimination Interventions: Call light in reach, Stay With Me (per policy)    History of Falls Interventions: Door open when patient unattended         Problem: Patient Education: Go to Patient Education Activity  Goal: Patient/Family Education  Outcome: Progressing Towards Goal

## 2022-09-05 NOTE — PROGRESS NOTES
Abdifatah Guerin [facility] [address]  [address] [facility] [employer], [address] [place] [address]  [phone]      Hospitalist Progress Note      NAME: Casey Fraser   :  1960  MRM:  [id number]    Date/Time of service: 2022  12:30 PM       Subjective:     Chief Complaint:  Patient was personally seen and examined by me during this time period. Chart reviewed. Still with generalized abd pain, poor PO intake       Objective:       Vitals:       Last 24hrs VS reviewed since prior progress note.  Most recent are:    Visit Vitals  BP (!) 153/81 (BP 1 Location: Right upper arm)   Pulse 95   Temp 97.7 °F (36.5 °C)   Resp 20   Ht 5' 2\" (1.575 m)   Wt 143.8 kg (317 lb 0.3 oz)   SpO2 98%   Breastfeeding No   BMI 57.98 kg/m²     SpO2 Readings from Last 6 Encounters:   22 98%   22 94%   22 98%   22 95%   22 95%   06/15/22 95%    O2 Flow Rate (L/min): 2 l/min     Intake/Output Summary (Last 24 hours) at 2022 1230  Last data filed at 2022 1227  Gross per 24 hour   Intake 486 ml   Output 1701 ml   Net -1215 ml        Exam:     Physical Exam:    Gen:  Well-developed, well-nourished, morbidly obese, in no acute distress  HEENT:  Pink conjunctivae, PERRL, hearing intact to voice, moist mucous membranes  Neck:  Supple, without masses, thyroid non-tender  Resp:  No accessory muscle use, clear breath sounds without wheezes rales or rhonchi  Card:  No murmurs, normal S1, S2 without thrills, anasarca   Abd:  Soft, non-tender, non-distended, normoactive bowel sounds are present, has drain   Musc:  No cyanosis or clubbing  Skin:  bilateral LE venous stasis   Neuro:  Cranial nerves 3-12 are grossly intact, follows commands appropriately  Psych:  Good insight, oriented to person, place and time, alert    Medications Reviewed: (see below)    Lab Data Reviewed: (see below)    ______________________________________________________________________    Medications:     Current Facility-Administered Medications   Medication Dose Route Frequency    oxyCODONE IR (ROXICODONE) tablet 5 mg  5 mg Oral Q6H PRN    oxyCODONE IR (ROXICODONE) tablet 10 mg  10 mg Oral Q6H PRN    [START ON 9/6/2022] L.acidophilus-paracasei-S.thermophil-bifidobacter (RISAQUAD) 8 billion cell capsule  1 Capsule Oral DAILY    piperacillin-tazobactam (ZOSYN) 3.375 g in 0.9% sodium chloride (MBP/ADV) 100 mL MBP  3.375 g IntraVENous Q8H    piperacillin-tazobactam (ZOSYN) 4.5 g in 0.9% sodium chloride (MBP/ADV) 100 mL MBP  4.5 g IntraVENous ONCE    [START ON 9/6/2022] bumetanide (BUMEX) injection 1 mg  1 mg IntraVENous DAILY    magnesium oxide (MAG-OX) tablet 400 mg  400 mg Oral BID    HYDROmorphone (DILAUDID) injection 2 mg  2 mg IntraVENous Q4H PRN    potassium chloride SR (KLOR-CON 10) tablet 40 mEq  40 mEq Oral DAILY    sodium chloride (NS) flush 5-40 mL  5-40 mL IntraVENous Q8H    sodium chloride (NS) flush 5-40 mL  5-40 mL IntraVENous PRN    acetaminophen (TYLENOL) tablet 650 mg  650 mg Oral Q6H PRN    Or    acetaminophen (TYLENOL) suppository 650 mg  650 mg Rectal Q6H PRN    polyethylene glycol (MIRALAX) packet 17 g  17 g Oral DAILY PRN    ondansetron (ZOFRAN ODT) tablet 4 mg  4 mg Oral Q8H PRN    Or    ondansetron (ZOFRAN) injection 4 mg  4 mg IntraVENous Q6H PRN    enoxaparin (LOVENOX) injection 30 mg  30 mg SubCUTAneous Q12H    albuterol (PROVENTIL VENTOLIN) nebulizer solution 2.5 mg  2.5 mg Nebulization Q4H PRN    arformoteroL (BROVANA) neb solution 15 mcg  15 mcg Nebulization BID RT    lidocaine-prilocaine (EMLA) 2.5-2.5 % cream   Topical PRN    pantoprazole (PROTONIX) tablet 40 mg  40 mg Oral BID    insulin lispro (HUMALOG) injection   SubCUTAneous AC&HS    glucose chewable tablet 16 g  4 Tablet Oral PRN    glucagon (GLUCAGEN) injection 1 mg  1 mg IntraMUSCular PRN    dextrose 10% infusion 0-250 mL  0-250 mL IntraVENous PRN          Lab Review:     Recent Labs     09/05/22  0616 09/04/22  0444   WBC 10.2 7.7   HGB 9.0* 8.3*   HCT 32.5* 29.3*    322     Recent Labs     09/05/22  0616 09/04/22  0444 09/03/22  0311    141 144   K 4.6 3.3* 3.4*    105 105   CO2 35* 34* 33*   * 111* 105*   BUN 7 5* 5*   CREA 0.82 0.59 0.72   CA 10.0 9.6 9.4   MG 1.7  --  1.4*   PHOS 3.8  --   --    ALB 3.2*  --   --    TBILI 0.4  --   --    ALT 16  --   --      Lab Results   Component Value Date/Time    Glucose (POC) 127 (H) 09/05/2022 11:07 AM    Glucose (POC) 132 (H) 09/05/2022 07:08 AM    Glucose (POC) 132 (H) 09/04/2022 09:14 PM    Glucose (POC) 143 (H) 09/04/2022 04:48 PM    Glucose (POC) 131 (H) 09/04/2022 11:34 AM          Assessment / Plan:     66 yo hx of HTN, DM, breast CA, asthma, perforated gastric ulcer repair, presented w/ chest pain, abd fluid collection, L pleural effusion    1) Abd fluid collection/pain: s/p IR drainage on 09/03, concerning for abscess. Hx of recent gastric ulcer repair. Will send cultures. Empirically start on IV Zosyn. Rest of management per Gen surg    2) L pleural effusion: unclear etiology. Could be related to abd fluid collection and recent gastric ulcer repair. Will consult Pulm to eval    3) Anasarca: due to malnutrition. Last echo with EF 60-65%. Will start daily IV bumex. Monitor I/O, BMP    4) Severe pro-juan malnutrition: due to recent surgery. Will keep on clears for now.   Might need TPN if repeat surgery    5) DM type 2: cont SSI    6) HTN: BP stable, will monitor     7) Breast CA: s/p chemo    Total time spent with patient care: 45 Minutes **I personally saw and examined the patient during this time period**                 Care Plan discussed with: Patient, nursing, pulm     Discussed:  Care Plan    Prophylaxis:  Lovenox    Disposition:  SNF/LTC           ___________________________________________________    Attending Physician: Cee Hebert MD

## 2022-09-05 NOTE — PROGRESS NOTES
[facility]        Subjective     Drain placed yesterday  Pain, now referring to left shoulder  Tolerating diet    Objective     Patient Vitals for the past 24 hrs:   Temp Pulse Resp BP SpO2   09/05/22 0743 -- -- -- -- 98 %   09/05/22 0742 -- -- -- (!) 153/81 --   09/05/22 0739 97.7 °F (36.5 °C) 95 20 (!) 173/74 98 %   09/05/22 0553 -- 99 20 121/66 96 %   09/05/22 0348 98.4 °F (36.9 °C) (!) 101 14 (!) 174/91 94 %   09/04/22 2320 -- -- -- (!) 195/66 --   09/04/22 2314 98.1 °F (36.7 °C) 93 16 (!) 197/78 91 %   09/04/22 2025 -- -- -- -- 92 %   09/04/22 1941 97.9 °F (36.6 °C) 86 17 (!) 168/72 96 %   09/04/22 1549 98.1 °F (36.7 °C) 86 18 (!) 176/66 96 %   09/04/22 1410 98.4 °F (36.9 °C) 87 20 (!) 158/62 94 %       Date 09/04/22 0700 - 09/05/22 0659 09/05/22 0700 - 09/06/22 0659   Shift 3944-5464 0830-0933 24 Hour Total 8463-9000 4881-5378 24 Hour Total   INTAKE   P.O. 240 250 490 236  236     P.O. 240 250 490 236  236   I.V.(mL/kg/hr)  0(0) 0(0)        I.V.  0 0      Shift Total(mL/kg) 240(1.7) 250(1.8) 490(3.5) 236(1.6)  236(1.6)   OUTPUT   Urine(mL/kg/hr)    550  550     Urine Voided    550  550     Urine Occurrence(s) 1 x 3 x 4 x      Emesis/NG output           Emesis Occurrence(s) 0 x 0 x 0 x      Drains 1075 76 1151        Output (ml) (Drain [facility] Drainage 09/04/22 Left Other (comment)) 1075 76 1151      Stool           Stool Occurrence(s) 1 x 0 x 1 x      Shift Total(mL/kg) 1075(7.7) 76(0.5) 1151(8.2) 550(3.8)  550(3.8)   NET -835 174 -661 -314  -314   Weight (kg) 140 140 140 143.8 143.8 143.8       PE  GEN - Awake, alert, communicating appropriately.   NAD  Pulm - CTAB  CV - RRR  Abd - soft, ND, TTP LUQ, drain purulent      Labs  Recent Results (from the past 24 hour(s))   SAMPLES BEING HELD    Collection Time: 09/04/22  1:00 PM   Result Value Ref Range    SAMPLES BEING HELD  1CUP PLEURAL FLUID     COMMENT        Add-on orders for these samples will be processed based on acceptable specimen integrity and analyte stability, which may vary by analyte. GLUCOSE, POC    Collection Time: 09/04/22  4:48 PM   Result Value Ref Range    Glucose (POC) 143 (H) 65 - 117 mg/dL    Performed by Alma Mcmahan    GLUCOSE, POC    Collection Time: 09/04/22  9:14 PM   Result Value Ref Range    Glucose (POC) 132 (H) 65 - 117 mg/dL    Performed by Sunny Buckley (PCT)    CBC WITH MANUAL DIFF    Collection Time: 09/05/22  6:16 AM   Result Value Ref Range    WBC 10.2 3.6 - 11.0 K/uL    RBC 3.19 (L) 3.80 - 5.20 M/uL    HGB 9.0 (L) 11.5 - 16.0 g/dL    HCT 32.5 (L) 35.0 - 47.0 %    .9 (H) 80.0 - 99.0 FL    MCH 28.2 26.0 - 34.0 PG    MCHC 27.7 (L) 30.0 - 36.5 g/dL    RDW 17.1 (H) 11.5 - 14.5 %    PLATELET 743 090 - 911 K/uL    MPV 8.9 8.9 - 12.9 FL    NRBC 0.0 0  WBC    ABSOLUTE NRBC 0.00 0.00 - 0.01 K/uL    NEUTROPHILS 80 (H) 32 - 75 %    BAND NEUTROPHILS 2 0 - 6 %    LYMPHOCYTES 8 (L) 12 - 49 %    MONOCYTES 9 5 - 13 %    EOSINOPHILS 0 0 - 7 %    BASOPHILS 1 0 - 1 %    METAMYELOCYTES 0 0 %    MYELOCYTES 0 0 %    PROMYELOCYTES 0 0 %    BLASTS 0 0 %    OTHER CELL 0 0      IMMATURE GRANULOCYTES 0 %    ABS. NEUTROPHILS 8.4 (H) 1.8 - 8.0 K/UL    ABS. LYMPHOCYTES 0.8 0.8 - 3.5 K/UL    ABS. MONOCYTES 0.9 0.0 - 1.0 K/UL    ABS. EOSINOPHILS 0.0 0.0 - 0.4 K/UL    ABS. BASOPHILS 0.1 0.0 - 0.1 K/UL    ABS. IMM.  GRANS. 0.0 K/UL    DF MANUAL      RBC COMMENTS ANISOCYTOSIS  1+       METABOLIC PANEL, COMPREHENSIVE    Collection Time: 09/05/22  6:16 AM   Result Value Ref Range    Sodium 142 136 - 145 mmol/L    Potassium 4.6 3.5 - 5.1 mmol/L    Chloride 103 97 - 108 mmol/L    CO2 35 (H) 21 - 32 mmol/L    Anion gap 4 (L) 5 - 15 mmol/L    Glucose 131 (H) 65 - 100 mg/dL    BUN 7 6 - 20 MG/DL    Creatinine 0.82 0.55 - 1.02 MG/DL    BUN/Creatinine ratio 9 (L) 12 - 20      GFR est AA >60 >60 ml/min/1.73m2    GFR est non-AA >60 >60 ml/min/1.73m2    Calcium 10.0 8.5 - 10.1 MG/DL Bilirubin, total 0.4 0.2 - 1.0 MG/DL    ALT (SGPT) 16 12 - 78 U/L    AST (SGOT) 15 15 - 37 U/L    Alk. phosphatase 66 45 - 117 U/L    Protein, total 7.4 6.4 - 8.2 g/dL    Albumin 3.2 (L) 3.5 - 5.0 g/dL    Globulin 4.2 (H) 2.0 - 4.0 g/dL    A-G Ratio 0.8 (L) 1.1 - 2.2     MAGNESIUM    Collection Time: 09/05/22  6:16 AM   Result Value Ref Range    Magnesium 1.7 1.6 - 2.4 mg/dL   LD    Collection Time: 09/05/22  6:16 AM   Result Value Ref Range     81 - 246 U/L   PHOSPHORUS    Collection Time: 09/05/22  6:16 AM   Result Value Ref Range    Phosphorus 3.8 2.6 - 4.7 MG/DL   GLUCOSE, POC    Collection Time: 09/05/22  7:08 AM   Result Value Ref Range    Glucose (POC) 132 (H) 65 - 117 mg/dL    Performed by Lakhwinder Christie (PCT)    GLUCOSE, POC    Collection Time: 09/05/22 11:07 AM   Result Value Ref Range    Glucose (POC) 127 (H) 65 - 117 mg/dL    Performed by Ruth Beatty is a 65 y. o.yr old female with history of perforated gastric ulcer repair, readmitted with fluid collection  Post IR drain placement, output c/w abscess  High output initially, seems to be slowing    Plan     Await culture results  Monitor drain output  IS use, wean O2    20 mins of time was spent with the patient of which > 50% of the time involved face-to-face counseling of the patient regarding the proposed treatment plan.     Jami Anderson MD

## 2022-09-05 NOTE — CONSULTS
Name: [facility] [place]: [address]   : 1960 Admit Date: 2022   Phone: [phone]  Room: [facility]/   PCP: Gayathri Rodriguez NP  MRN: [id number]   Date: 2022  Code: Full Code          Chart and notes reviewed. Data reviewed. I review the patient's current medications in the medical record at each encounter. I have evaluated and examined the patient. HPI:    8:31 AM       History was obtained from patient. I was asked by Ethan Toussaint MD to see Roberto Lennox in consultation for a chief complaint of pleural effusion. History of Present Illness:  Ms. Thu Menendez is a 66 yo woman with a history of breast cancer (s/p lumpectomy, chemo), PARMINDER (does not wear CPAP), asthma, HFpEF and recent perforated gastric ulcer . Pulmonary is consulted for a small L pleural effusion. She presented with about one day of LUQ abdominal pain and found to have an upper abdominal fluid collection. Denies shortness of breath, cough, wheezing, n/v, diarrhea, change in appetite. Abdominal fluid collection was initially felt to be most consistent with a seroma and a drain was placed by IR yesterday and fluid appeared more consistent with a probable abscess.     Afebrile    Labs: WBC 10.2, Hgb 9.0, , cr 0.82,     Images reviewed:  CT abd/pelvis 9/3/2022: small L sided effusion with associated atelectasis; large homogenous fluid collection in the L upper quadrant (17.5 x 9.6cm) and increased in size from prior CT, no evidence of gastric or bowel leak    CXR 2022: some improvement in the effusion      Past Medical History:   Diagnosis Date    Arrhythmia     heart murmur    Arthritis     Right knee, DJD left knee    Asthma     Cancer ([address]     COVID-19 2021    Diabetes ([address]     Pre diabetes    Hypertension     Morbid obesity ([address]     Neuropathy     Patient reports numbness and tingling in her legs and feet from her back    Sleep apnea     was ordered a CPAP years ago, but never used       Past Surgical History:   Procedure Laterality Date    HX BREAST LUMPECTOMY Left 3/2/2022    LEFT BREAST LUMPECTOMY WITH LEFT BREAST SENTINEL NODE BIOPSY WITH BLUE DYE AND PORT A CATH INSERTION (URGENT) performed by Nils Wood MD at [address]    HX CHOLECYSTECTOMY      HX GYN      c section    HX GYN      d&c    HX WISDOM TEETH EXTRACTION Bilateral        Family History   Problem Relation Age of Onset    Hypertension Mother     Heart Disease Mother     Heart Disease Father     Hypertension Father     Breast Cancer Maternal Aunt     Breast Cancer Other     Breast Cancer Maternal Aunt        Social History     Tobacco Use    Smoking status: Never    Smokeless tobacco: Never   Substance Use Topics    Alcohol use: Yes     Comment: social once per month       Allergies   Allergen Reactions    Nuts [Tree Nut] Rash and Itching     Throat itching       Current Facility-Administered Medications   Medication Dose Route Frequency    magnesium oxide (MAG-OX) tablet 400 mg  400 mg Oral BID    HYDROmorphone (DILAUDID) injection 2 mg  2 mg IntraVENous Q4H PRN    potassium chloride SR (KLOR-CON 10) tablet 40 mEq  40 mEq Oral DAILY    furosemide (LASIX) injection 40 mg  40 mg IntraVENous DAILY    sodium chloride (NS) flush 5-40 mL  5-40 mL IntraVENous Q8H    sodium chloride (NS) flush 5-40 mL  5-40 mL IntraVENous PRN    acetaminophen (TYLENOL) tablet 650 mg  650 mg Oral Q6H PRN    Or    acetaminophen (TYLENOL) suppository 650 mg  650 mg Rectal Q6H PRN    polyethylene glycol (MIRALAX) packet 17 g  17 g Oral DAILY PRN    ondansetron (ZOFRAN ODT) tablet 4 mg  4 mg Oral Q8H PRN    Or    ondansetron (ZOFRAN) injection 4 mg  4 mg IntraVENous Q6H PRN    enoxaparin (LOVENOX) injection 30 mg  30 mg SubCUTAneous Q12H    albuterol (PROVENTIL VENTOLIN) nebulizer solution 2.5 mg  2.5 mg Nebulization Q4H PRN    arformoteroL (BROVANA) neb solution 15 mcg  15 mcg Nebulization BID RT    lidocaine-prilocaine (EMLA) 2.5-2.5 % cream   Topical PRN    pantoprazole (PROTONIX) tablet 40 mg  40 mg Oral BID    insulin lispro (HUMALOG) injection   SubCUTAneous AC&HS    glucose chewable tablet 16 g  4 Tablet Oral PRN    glucagon (GLUCAGEN) injection 1 mg  1 mg IntraMUSCular PRN    dextrose 10% infusion 0-250 mL  0-250 mL IntraVENous PRN         REVIEW OF SYSTEMS   12 point ROS negative except as stated in the HPI. Physical Exam:   Visit Vitals  BP (!) 153/81 (BP 1 Location: Right upper arm)   Pulse 95   Temp 97.7 °F (36.5 °C)   Resp 20   Ht 5' 2\" (1.575 m)   Wt 140 kg (308 lb 10.3 oz)   SpO2 98%   Breastfeeding No   BMI 56.45 kg/m²       General:  Alert, cooperative, no distress, appears stated age. Head:  Normocephalic, without obvious abnormality, atraumatic. Eyes:  Conjunctivae/corneas clear. Nose: Nares normal. Septum midline. Mucosa normal.    Throat: Lips, mucosa, and tongue normal.    Neck: Supple, symmetrical, trachea midline,    Lungs:   Clear to auscultation bilaterally. Chest wall:  No tenderness or deformity. Heart:  Regular rate and rhythm, S1, S2 normal, no murmur, click, rub or gallop. Abdomen:   Soft, nontender; Bowel sounds normal. Drain in place. Extremities: Extremities normal, atraumatic, no cyanosis or edema. Pulses: 2+ and symmetric all extremities.    Skin: Skin color, texture, turgor normal. No rashes or lesions       Neurologic: Grossly nonfocal       Lab Results   Component Value Date/Time    Sodium 142 09/05/2022 06:16 AM    Potassium 4.6 09/05/2022 06:16 AM    Chloride 103 09/05/2022 06:16 AM    CO2 35 (H) 09/05/2022 06:16 AM    BUN 7 09/05/2022 06:16 AM    Creatinine 0.82 09/05/2022 06:16 AM    Glucose 131 (H) 09/05/2022 06:16 AM    Calcium 10.0 09/05/2022 06:16 AM    Magnesium 1.7 09/05/2022 06:16 AM    Phosphorus 3.8 09/05/2022 06:16 AM    Lactic acid 1.8 08/12/2022 09:38 AM       Lab Results   Component Value Date/Time    WBC 10.2 09/05/2022 06:16 AM    HGB 9.0 (L) 09/05/2022 06:16 AM PLATELET 554 18/68/6365 06:16 AM    .9 (H) 09/05/2022 06:16 AM       Lab Results   Component Value Date/Time    INR 1.1 07/26/2022 01:22 AM    aPTT 24.5 07/26/2022 01:22 AM    Alk.  phosphatase 66 09/05/2022 06:16 AM    Protein, total 7.4 09/05/2022 06:16 AM    Albumin 3.2 (L) 09/05/2022 06:16 AM    Globulin 4.2 (H) 09/05/2022 06:16 AM       Lab Results   Component Value Date/Time    Iron 33 (L) 07/24/2022 04:40 AM    TIBC 174 (L) 07/24/2022 04:40 AM    Iron % saturation 19 (L) 07/24/2022 04:40 AM    Ferritin 621 (H) 07/24/2022 04:40 AM       No results found for: SR, CRP, EDGARDO, ANAIGG, RA, RPR, RPRT, VDRLT, VDRLS, TSH, TSHEXT     No results found for: PH, PHI, PCO2, PCO2I, PO2, PO2I, HCO3, HCO3I, FIO2, FIO2I    Lab Results   Component Value Date/Time    CK 56 08/13/2022 06:40 AM        Lab Results   Component Value Date/Time    Culture result: No significant growth, <10,000 CFU/mL 08/05/2022 12:00 PM       Lab Results   Component Value Date/Time    Hepatitis B surface Ag <0.10 04/13/2022 09:56 AM       Lab Results   Component Value Date/Time    CK 56 08/13/2022 06:40 AM    CK 89 08/11/2022 10:36 PM       Lab Results   Component Value Date/Time    Color Yellow 08/11/2022 10:36 PM    Appearance Hazy (A) 08/11/2022 10:36 PM    Specific gravity >1.030 (H) 07/21/2022 01:41 AM    pH (UA) 5.0 08/11/2022 10:36 PM    Protein Trace (A) 08/11/2022 10:36 PM    Glucose Normal (A) 08/11/2022 10:36 PM    Ketone Negative 08/11/2022 10:36 PM    Bilirubin 3 (A) 08/11/2022 10:36 PM    Blood Negative 08/11/2022 10:36 PM    Urobilinogen Normal 08/11/2022 10:36 PM    Nitrites Negative 08/11/2022 10:36 PM    Leukocyte Esterase Negative 08/11/2022 10:36 PM    WBC 5-10 08/11/2022 10:36 PM    RBC 0-5 08/11/2022 10:36 PM    Bacteria Negative 08/11/2022 10:36 PM       IMPRESSION  Pleural effusion  Intraabdominal fluid collection  Breast cancer  Recent perforated gastric ulcer s/p repair  PARMINDER, noncompliant with CPAP    PLAN  Goal sats 88% or higher, wean O2 as toelrated  6 MWT prior to discharge  Effusion is small and not easily amenable to drainage; agree with surgery that this is likely reactive to intraabdominal process  Will repeat a CXR in the morning to monitor, if increasing can reassess for ability to drain  Fluid studies from the abdomen appear to have been done under prior thoracentesis orders, was not documented to have had pleural fluid drained. Thank you for allowing us to participate in the care of this patient. We will be happy to follow along in his/her progress with you.     Arlen Menendez MD

## 2022-09-05 NOTE — PROGRESS NOTES
Bedside and Verbal shift change report given to [address] [place] [address] (oncoming nurse) by Milli Guillory RN (offgoing nurse). Report included the following information SBAR and Kardex.

## 2022-09-06 ENCOUNTER — APPOINTMENT (OUTPATIENT)
Dept: GENERAL RADIOLOGY | Age: 62
DRG: 248 | End: 2022-09-06
Attending: INTERNAL MEDICINE
Payer: MEDICAID

## 2022-09-06 LAB
ANION GAP SERPL CALC-SCNC: 6 MMOL/L (ref 5–15)
BUN SERPL-MCNC: 10 MG/DL (ref 6–20)
BUN/CREAT SERPL: 10 (ref 12–20)
CALCIUM SERPL-MCNC: 10.9 MG/DL (ref 8.5–10.1)
CHLORIDE SERPL-SCNC: 99 MMOL/L (ref 97–108)
CO2 SERPL-SCNC: 35 MMOL/L (ref 21–32)
CREAT SERPL-MCNC: 1.03 MG/DL (ref 0.55–1.02)
ERYTHROCYTE [DISTWIDTH] IN BLOOD BY AUTOMATED COUNT: 17.2 % (ref 11.5–14.5)
FERRITIN SERPL-MCNC: 128 NG/ML (ref 26–388)
GLUCOSE BLD STRIP.AUTO-MCNC: 107 MG/DL (ref 65–117)
GLUCOSE BLD STRIP.AUTO-MCNC: 111 MG/DL (ref 65–117)
GLUCOSE BLD STRIP.AUTO-MCNC: 138 MG/DL (ref 65–117)
GLUCOSE BLD STRIP.AUTO-MCNC: 138 MG/DL (ref 65–117)
GLUCOSE SERPL-MCNC: 124 MG/DL (ref 65–100)
HCT VFR BLD AUTO: 38.4 % (ref 35–47)
HGB BLD-MCNC: 10.7 G/DL (ref 11.5–16)
IRON SATN MFR SERPL: 13 % (ref 20–50)
IRON SERPL-MCNC: 41 UG/DL (ref 35–150)
MAGNESIUM SERPL-MCNC: 1.9 MG/DL (ref 1.6–2.4)
MCH RBC QN AUTO: 28.1 PG (ref 26–34)
MCHC RBC AUTO-ENTMCNC: 27.9 G/DL (ref 30–36.5)
MCV RBC AUTO: 100.8 FL (ref 80–99)
NRBC # BLD: 0 K/UL (ref 0–0.01)
NRBC BLD-RTO: 0 PER 100 WBC
PHOSPHATE SERPL-MCNC: 2.9 MG/DL (ref 2.6–4.7)
PLATELET # BLD AUTO: 247 K/UL (ref 150–400)
PMV BLD AUTO: 9.9 FL (ref 8.9–12.9)
POTASSIUM SERPL-SCNC: 4.5 MMOL/L (ref 3.5–5.1)
RBC # BLD AUTO: 3.81 M/UL (ref 3.8–5.2)
SERVICE CMNT-IMP: ABNORMAL
SERVICE CMNT-IMP: ABNORMAL
SERVICE CMNT-IMP: NORMAL
SERVICE CMNT-IMP: NORMAL
SODIUM SERPL-SCNC: 140 MMOL/L (ref 136–145)
SPECIMEN SOURCE FLD: NORMAL
TIBC SERPL-MCNC: 309 UG/DL (ref 250–450)
TRIGL FLD-MCNC: 36 MG/DL
WBC # BLD AUTO: 10 K/UL (ref 3.6–11)

## 2022-09-06 PROCEDURE — 82728 ASSAY OF FERRITIN: CPT

## 2022-09-06 PROCEDURE — 74011250637 HC RX REV CODE- 250/637: Performed by: INTERNAL MEDICINE

## 2022-09-06 PROCEDURE — 97530 THERAPEUTIC ACTIVITIES: CPT

## 2022-09-06 PROCEDURE — 2709999900 HC NON-CHARGEABLE SUPPLY

## 2022-09-06 PROCEDURE — 74011250637 HC RX REV CODE- 250/637: Performed by: HOSPITALIST

## 2022-09-06 PROCEDURE — 97165 OT EVAL LOW COMPLEX 30 MIN: CPT

## 2022-09-06 PROCEDURE — 77010033678 HC OXYGEN DAILY

## 2022-09-06 PROCEDURE — 74011250636 HC RX REV CODE- 250/636: Performed by: INTERNAL MEDICINE

## 2022-09-06 PROCEDURE — 74011000250 HC RX REV CODE- 250: Performed by: SURGERY

## 2022-09-06 PROCEDURE — 97535 SELF CARE MNGMENT TRAINING: CPT

## 2022-09-06 PROCEDURE — 94761 N-INVAS EAR/PLS OXIMETRY MLT: CPT

## 2022-09-06 PROCEDURE — 74011000250 HC RX REV CODE- 250: Performed by: INTERNAL MEDICINE

## 2022-09-06 PROCEDURE — 84100 ASSAY OF PHOSPHORUS: CPT

## 2022-09-06 PROCEDURE — 74011000258 HC RX REV CODE- 258: Performed by: INTERNAL MEDICINE

## 2022-09-06 PROCEDURE — 97116 GAIT TRAINING THERAPY: CPT

## 2022-09-06 PROCEDURE — 36415 COLL VENOUS BLD VENIPUNCTURE: CPT

## 2022-09-06 PROCEDURE — 94640 AIRWAY INHALATION TREATMENT: CPT

## 2022-09-06 PROCEDURE — 85027 COMPLETE CBC AUTOMATED: CPT

## 2022-09-06 PROCEDURE — 74011250637 HC RX REV CODE- 250/637: Performed by: SURGERY

## 2022-09-06 PROCEDURE — 83540 ASSAY OF IRON: CPT

## 2022-09-06 PROCEDURE — 71045 X-RAY EXAM CHEST 1 VIEW: CPT

## 2022-09-06 PROCEDURE — 80048 BASIC METABOLIC PNL TOTAL CA: CPT

## 2022-09-06 PROCEDURE — 83735 ASSAY OF MAGNESIUM: CPT

## 2022-09-06 PROCEDURE — 97161 PT EVAL LOW COMPLEX 20 MIN: CPT

## 2022-09-06 PROCEDURE — 99232 SBSQ HOSP IP/OBS MODERATE 35: CPT | Performed by: SURGERY

## 2022-09-06 PROCEDURE — 65270000029 HC RM PRIVATE

## 2022-09-06 PROCEDURE — 82962 GLUCOSE BLOOD TEST: CPT

## 2022-09-06 RX ORDER — AMLODIPINE BESYLATE 5 MG/1
10 TABLET ORAL DAILY
Status: DISCONTINUED | OUTPATIENT
Start: 2022-09-06 | End: 2022-09-13 | Stop reason: HOSPADM

## 2022-09-06 RX ORDER — FUROSEMIDE 10 MG/ML
20 INJECTION INTRAMUSCULAR; INTRAVENOUS 2 TIMES DAILY
Status: DISCONTINUED | OUTPATIENT
Start: 2022-09-06 | End: 2022-09-06

## 2022-09-06 RX ORDER — BUMETANIDE 0.25 MG/ML
1 INJECTION INTRAMUSCULAR; INTRAVENOUS DAILY
Status: DISCONTINUED | OUTPATIENT
Start: 2022-09-06 | End: 2022-09-13 | Stop reason: HOSPADM

## 2022-09-06 RX ADMIN — BUMETANIDE 1 MG: 0.25 INJECTION, SOLUTION INTRAMUSCULAR; INTRAVENOUS at 08:00

## 2022-09-06 RX ADMIN — ENOXAPARIN SODIUM 30 MG: 100 INJECTION SUBCUTANEOUS at 08:00

## 2022-09-06 RX ADMIN — Medication 1 CAPSULE: at 08:00

## 2022-09-06 RX ADMIN — OXYCODONE 10 MG: 5 TABLET ORAL at 07:59

## 2022-09-06 RX ADMIN — PANTOPRAZOLE SODIUM 40 MG: 40 TABLET, DELAYED RELEASE ORAL at 17:35

## 2022-09-06 RX ADMIN — AMLODIPINE BESYLATE 10 MG: 5 TABLET ORAL at 01:06

## 2022-09-06 RX ADMIN — PIPERACILLIN AND TAZOBACTAM 3.38 G: 3; .375 INJECTION, POWDER, FOR SOLUTION INTRAVENOUS at 02:40

## 2022-09-06 RX ADMIN — Medication 10 ML: at 02:40

## 2022-09-06 RX ADMIN — POTASSIUM CHLORIDE 40 MEQ: 750 TABLET, FILM COATED, EXTENDED RELEASE ORAL at 11:03

## 2022-09-06 RX ADMIN — ARFORMOTEROL TARTRATE 15 MCG: 15 SOLUTION RESPIRATORY (INHALATION) at 08:10

## 2022-09-06 RX ADMIN — PANTOPRAZOLE SODIUM 40 MG: 40 TABLET, DELAYED RELEASE ORAL at 08:00

## 2022-09-06 RX ADMIN — ENOXAPARIN SODIUM 30 MG: 100 INJECTION SUBCUTANEOUS at 20:16

## 2022-09-06 RX ADMIN — ARFORMOTEROL TARTRATE 15 MCG: 15 SOLUTION RESPIRATORY (INHALATION) at 20:25

## 2022-09-06 RX ADMIN — Medication 10 ML: at 14:00

## 2022-09-06 RX ADMIN — Medication 5 ML: at 21:33

## 2022-09-06 RX ADMIN — PIPERACILLIN AND TAZOBACTAM 3.38 G: 3; .375 INJECTION, POWDER, FOR SOLUTION INTRAVENOUS at 20:16

## 2022-09-06 RX ADMIN — BUMETANIDE 1 MG: 0.25 INJECTION, SOLUTION INTRAMUSCULAR; INTRAVENOUS at 11:03

## 2022-09-06 RX ADMIN — PIPERACILLIN AND TAZOBACTAM 3.38 G: 3; .375 INJECTION, POWDER, FOR SOLUTION INTRAVENOUS at 11:04

## 2022-09-06 NOTE — PROGRESS NOTES
Problem: Mobility Impaired (Adult and Pediatric)  Goal: *Acute Goals and Plan of Care (Insert Text)  Description: FUNCTIONAL STATUS PRIOR TO ADMISSION: Patient was modified independent using a rolling walker (short distances) for functional mobility . HOME SUPPORT PRIOR TO ADMISSION: The patient lived with daughter but did not require assist for mobility. Physical Therapy Goals  Initiated 9/6/2022  1. Patient will move from supine to sit and sit to supine  in bed with modified independence within 7 day(s). 2.  Patient will transfer from bed to chair and chair to bed with modified independence using the least restrictive device within 7 day(s). 3.  Patient will perform sit to stand with modified independence within 7 day(s). 4.  Patient will ambulate with modified independence for 50 feet with the least restrictive device within 7 day(s). 5.  Patient will ascend/descend 5 stairs with b/l handrail(s) with modified independence within 7 day(s). Outcome: Not Met     PHYSICAL THERAPY EVALUATION  Patient: Love Short (20 y.o. female)  Date: 9/6/2022  Primary Diagnosis: Pleural effusion, left [J90]  Pleural effusion [J90]       Precautions: falls      ASSESSMENT  This is a 66 y/o female with history of breast cancer (s/p lumpectomy, chemo), PARMINDER (does not wear CPAP), asthma, HFpEF and recent perforated gastric ulcer 7/20 who came to  [facility]  ED with c/o abdominal pain. Based on the objective data described below, currently pt on 2L O2 via NC, presents with decreased strength, -3/5 grossly in  b/l LE, balance deficits, generalized weakness, decreased activity tolerance and increased need for assist with functional mobility. Pt requires SBA for bed mobility, CGA for all functional transfers,  good  static  standing balance with support , is able to ambulate - 20' with RW, CGA with slow sinai, wide AIDA and  decreased step length b/l Le. SPO2 - 95% post ambulation ).      Current Level of Function Impacting Discharge (mobility/balance): Pt requires CGA for functional transfers/mobility    Functional Outcome Measure: The patient scored 17 on the Tinetti outcome measure which is indicative of high risk for falls. Other factors to consider for discharge: decline from functional baseline, time since onset     Patient will benefit from skilled therapy intervention to address the above noted impairments. PLAN :  Recommendations and Planned Interventions: bed mobility training, transfer training, gait training, therapeutic exercises, neuromuscular re-education, patient and family training/education, and therapeutic activities      Frequency/Duration: Patient will be followed by physical therapy:  5 times a week to address goals.     Recommendation for discharge: (in order for the patient to meet his/her long term goals)  Physical therapy at least 2 days/week in the home AND ensure assist and/or supervision for safety with functional mobility    This discharge recommendation:  Has been made in collaboration with the attending provider and/or case management    IF patient discharges home will need the following DME: patient owns DME required for discharge         SUBJECTIVE:   Patient stated  My daughter helps me with dressing and bathing    OBJECTIVE DATA SUMMARY:   HISTORY:    Past Medical History:   Diagnosis Date    Arrhythmia     heart murmur    Arthritis     Right knee, DJD left knee    Asthma     Cancer ([address]     COVID-19 01/29/2021    Diabetes ([facility]     Pre diabetes    Hypertension     Morbid obesity ([facility]     Neuropathy     Patient reports numbness and tingling in her legs and feet from her back    Sleep apnea     was ordered a CPAP years ago, but never used     Past Surgical History:   Procedure Laterality Date    HX BREAST LUMPECTOMY Left 3/2/2022    LEFT BREAST LUMPECTOMY WITH LEFT BREAST SENTINEL NODE BIOPSY WITH BLUE DYE AND PORT A CATH INSERTION (URGENT) performed by Melani Espinoza Shila Hebert MD at [address] HX CHOLECYSTECTOMY      HX GYN      c section    HX GYN      d&c    HX WISDOM TEETH EXTRACTION Bilateral        Personal factors and/or comorbidities impacting plan of care:     Home Situation  Home Environment: Private residence  # Steps to Enter: 5  Rails to Enter: (P) Yes  Hand Rails : (P) Bilateral  One/Two Story Residence: One story  Living Alone: No  Support Systems: Child(shaun)  Patient Expects to be Discharged to[de-identified] Home  Current DME Used/Available at Home: (P) Walker, rolling, Shower chair, Oxygen, portable, Commode, bedside    EXAMINATION/PRESENTATION/DECISION MAKING:   Critical Behavior:  Neurologic State: (P) Alert  Orientation Level: (P) Oriented to person, Oriented to place, Oriented to situation, Oriented to time  Cognition: Follows commands     Hearing: Auditory  Auditory Impairment: None  Skin:  intact where exposed    Range Of Motion:  AROM: Generally decreased, functional    Strength:    Strength: Generally decreased, functional      Tone & Sensation:   Tone: Normal    Sensation: Intact (to light touch)    Coordination:  Coordination: Generally decreased, functional (-3/5 grossly for b/l LE)    Functional Mobility:  Bed Mobility:  Rolling: Stand-by assistance  Supine to Sit: Stand-by assistance     Scooting: Stand-by assistance  Transfers:  Sit to Stand: Contact guard assistance  Stand to Sit: Contact guard assistance        Bed to Chair: Contact guard assistance    Balance:   Sitting: Intact; Without support  Standing: Impaired; With support  Standing - Static: Good;Constant support  Standing - Dynamic : Fair;Constant support  Ambulation/Gait Training:  Distance (ft): 20 Feet (ft)  Assistive Device: Walker, rolling;Gait belt  Ambulation - Level of Assistance: Contact guard assistance    Base of Support: Widened     Speed/[place]: Pace decreased (<100 feet/min)  Step Length: Right shortened;Left shortened      Functional Measure:  Tinetti test:    Sitting Balance: 1  Arises: 1  Attempts to Rise: 2  Immediate Standing Balance: 1  Standing Balance: 1  Nudged: 1  Eyes Closed: 1  Turn 360 Degrees - Continuous/Discontinuous: 1  Turn 360 Degrees - Steady/Unsteady: 1  Sitting Down: 1  Balance Score: 11 Balance total score  Indication of Gait: 1  R Step Length/Height: 0  L Step Length/Height: 0  R Foot Clearance: 1  L Foot Clearance: 1  Step Symmetry: 1  Step Continuity: 1  Path: 1  Trunk: 0  Walking Time: 0  Gait Score: 6 Gait total score  Total Score: 17/28 Overall total score         Tinetti Tool Score Risk of Falls  <19 = High Fall Risk  19-24 = Moderate Fall Risk  25-28 = Low Fall Risk  Tinetti ME. Performance-Oriented Assessment of Mobility Problems in Elderly Patients. [facility] 66; [id number].  (Scoring Description: PT Bulletin Feb. 10, 1993)    Older adults: Jose Lewis et al, 2009; n = 1000 [facility] elderly evaluated with ABC, SHANTA, ADL, and IADL)  · Mean SHANTA score for males aged 69-68 years = 26.21(3.40)  · Mean SHANTA score for females age 69-68 years = 25.16(4.30)  · Mean SHANTA score for males over 80 years = 23.29(6.02)  · Mean SHANTA score for females over 80 years = 17.20(8.32)           Physical Therapy Evaluation Charge Determination   History Examination Presentation Decision-Making   MEDIUM  Complexity : 1-2 comorbidities / personal factors will impact the outcome/ POC  MEDIUM Complexity : 3 Standardized tests and measures addressing body structure, function, activity limitation and / or participation in recreation  LOW Complexity : Stable, uncomplicated  Other outcome measures tinetti  HIGH       Based on the above components, the patient evaluation is determined to be of the following complexity level: LOW     Pain Ratin/10    Activity Tolerance:   Fair and requires rest breaks    After treatment patient left in no apparent distress:   Sitting in chair, Call bell within reach, and Bed / chair alarm activated    COMMUNICATION/EDUCATION:   The patients plan of care was discussed with: Occupational therapist and Registered nurse. Fall prevention education was provided and the patient/caregiver indicated understanding. and Patient/family agree to work toward stated goals and plan of care. PT/OT session occurred together for increased pt's safety and maximum functional outcome.     Thank you for this referral.  John Pineda   Time Calculation: 37 mins

## 2022-09-06 NOTE — PROGRESS NOTES
Physician Progress Note      Shirley Angela  [facility] #:                  [id number]  :                       1960  ADMIT DATE:       2022 3:54 PM  [address] [place] DATE:  RESPONDING  PROVIDER #:        Sanchez Rojas MD          QUERY TEXT:    Good morning  Pt admitted with Left pleural effusion and LUQ abscess and underwent Recent perforated gastric ulcer s/p repair .? If possible, please document in progress notes and discharge summary:    The medical record reflects the following:  Risk Factors: s/p recent repair gastric perforated ulcer,  LUQ abscess/fluid collection, obesity, DM2, GERD  Clinical Indicators:  presented to the ER with the c/o generalized swelling which has progressively worsened  CTA chest performed shows no evidence of pulmonary embolism, moderate left pleural effusion and large chronic collection in left upper quadrant of the abdomen. Progress note \"Suspect due to previous Perforation of Peptic ulcer  Treatment: Gen surgery consult, IR consult, abdominal drain placement, CT, CXR, daily labs    Thank you  Octavio Randall RN CDI  [phone]  Options provided:  -- LUQ abscess/fluid collection?is a?postoperative complication  -- LUQ abscess/fluid collection is not a postoperative complication, but is due to ##, . -- LUQ abscess/fluid collection is not a postoperative complication, but is due to other incidental risk factor, Please specify other incidental risk factor. -- Other - I will add my own diagnosis  -- Disagree - Not applicable / Not valid  -- Disagree - Clinically unable to determine / Unknown  -- Refer to Clinical Documentation Reviewer    PROVIDER RESPONSE TEXT:    Provider is clinically unable to determine a response to this query.     Query created by: Radha Hamm on 2022 12:57 PM      Electronically signed by:  Sanchez Rojas MD 2022 3:58 PM

## 2022-09-06 NOTE — PROGRESS NOTES
Problem: Pain  Goal: *Control of Pain  Outcome: Progressing Towards Goal     Problem: Impaired Skin Integrity/Pressure Injury Treatment  Goal: *Improvement of Existing Pressure Injury  Outcome: Progressing Towards Goal  Goal: *Prevention of pressure injury  Outcome: Progressing Towards Goal  Note: Pressure Injury Interventions:  Sensory Interventions: Assess changes in LOC              Mobility Interventions: HOB 30 degrees or less    Nutrition Interventions: Document food/fluid/supplement intake    Friction and Shear Interventions: HOB 30 degrees or less, Minimize layers                Problem: Patient Education: Go to Patient Education Activity  Goal: Patient/Family Education  Outcome: Progressing Towards Goal     Problem: Falls - Risk of  Goal: *Absence of Falls  Outcome: Progressing Towards Goal  Note: Fall Risk Interventions:  Mobility Interventions: Patient to call before getting OOB, PT Consult for mobility concerns         Medication Interventions: Patient to call before getting OOB, Teach patient to arise slowly    Elimination Interventions: Call light in reach    History of Falls Interventions: Door open when patient unattended         Problem: Patient Education: Go to Patient Education Activity  Goal: Patient/Family Education  Outcome: Progressing Towards Goal

## 2022-09-06 NOTE — PROGRESS NOTES
[facility]        Subjective     No acute events  Still some pain left upper abdomen and shoulder, slight improved    Objective     Patient Vitals for the past 24 hrs:   Temp Pulse Resp BP SpO2   09/06/22 0900 -- 93 -- 127/60 95 %   09/06/22 0840 99.9 °F (37.7 °C) 95 17 136/70 95 %   09/06/22 0532 99.3 °F (37.4 °C) 96 17 (!) 166/78 96 %   09/06/22 0003 98.7 °F (37.1 °C) 100 16 (!) 174/79 96 %   09/05/22 2038 99 °F (37.2 °C) 88 18 (!) 179/88 97 %   09/05/22 1539 98.6 °F (37 °C) 87 16 (!) 149/64 96 %   09/05/22 1254 98.9 °F (37.2 °C) 88 17 (!) 144/80 97 %       Date 09/05/22 0700 - 09/06/22 0659 09/06/22 0700 - 09/07/22 0659   Shift 2004-9059 6425-8326 24 Hour Total 0884-2793 5266-4149 24 Hour Total   INTAKE   P.O. 236 200 436        P.O. 236 200 436      I.V.(mL/kg/hr)  100(0.1) 100(0)        Volume (piperacillin-tazobactam (ZOSYN) 3.375 g in 0.9% sodium chloride (MBP/ADV) 100 mL MBP)  100 100      NG/GT    20  20     Intake (ml) (Drain [facility] Drainage 09/04/22 Left Other (comment))    20  20   Shift Total(mL/kg) 236(1.6) 300(2.1) 536(3.8) 20(0.1)  20(0.1)   OUTPUT   Urine(mL/kg/hr) 700(0.4)  700(0.2)        Urine Voided 700  700        Urine Occurrence(s)  3 x 3 x 1 x  1 x   Emesis/NG output           Emesis Occurrence(s)  0 x 0 x 0 x  0 x   Drains 10 10 20        Output (ml) (Drain [employer]ian Drainage 09/04/22 Left Other (comment)) 10 10 20      Stool           Stool Occurrence(s)  0 x 0 x 0 x  0 x   Shift Total(mL/kg) 710(4.9) 10(0.1) 720(5)      NET -474 290 -184 20  20   Weight (kg) 143.8 142.9 142.9 142.9 142.9 142.9       PE  GEN - Awake, alert, communicating appropriately. NAD  Pulm - CTAB  CV - RRR  Abd - soft, ND, TTP LUQ, no guarding, drain thin purulent    Labs  Recent Results (from the past 24 hour(s))   CULTURE, BODY FLUID W GRAM STAIN    Collection Time: 09/05/22 12:55 PM    Specimen: Abdominal Fluid;  Body Fluid   Result Value Ref Range    Special Requests: NO SPECIAL REQUESTS      GRAM STAIN OCCASIONAL WBCS SEEN      GRAM STAIN NO ORGANISMS SEEN      Culture result: PENDING    TRIGLYCERIDES, FLUID    Collection Time: 09/05/22 12:55 PM   Result Value Ref Range    Fluid Type: ABDOMEN     Triglyceride, body fld.  36 MG/DL   GLUCOSE, POC    Collection Time: 09/05/22  4:05 PM   Result Value Ref Range    Glucose (POC) 125 (H) 65 - 117 mg/dL    Performed by [address] [place] [address], POC    Collection Time: 09/05/22  9:45 PM   Result Value Ref Range    Glucose (POC) 131 (H) 65 - 117 mg/dL    Performed by Hunter Brown    FERRITIN    Collection Time: 09/06/22  2:37 AM   Result Value Ref Range    Ferritin 128 26 - 388 NG/ML   CBC W/O DIFF    Collection Time: 09/06/22  2:37 AM   Result Value Ref Range    WBC 10.0 3.6 - 11.0 K/uL    RBC 3.81 3.80 - 5.20 M/uL    HGB 10.7 (L) 11.5 - 16.0 g/dL    HCT 38.4 35.0 - 47.0 %    .8 (H) 80.0 - 99.0 FL    MCH 28.1 26.0 - 34.0 PG    MCHC 27.9 (L) 30.0 - 36.5 g/dL    RDW 17.2 (H) 11.5 - 14.5 %    PLATELET 152 954 - 270 K/uL    MPV 9.9 8.9 - 12.9 FL    NRBC 0.0 0  WBC    ABSOLUTE NRBC 0.00 0.00 - 7.00 K/uL   METABOLIC PANEL, BASIC    Collection Time: 09/06/22  2:37 AM   Result Value Ref Range    Sodium 140 136 - 145 mmol/L    Potassium 4.5 3.5 - 5.1 mmol/L    Chloride 99 97 - 108 mmol/L    CO2 35 (H) 21 - 32 mmol/L    Anion gap 6 5 - 15 mmol/L    Glucose 124 (H) 65 - 100 mg/dL    BUN 10 6 - 20 MG/DL    Creatinine 1.03 (H) 0.55 - 1.02 MG/DL    BUN/Creatinine ratio 10 (L) 12 - 20      GFR est AA >60 >60 ml/min/1.73m2    GFR est non-AA 54 (L) >60 ml/min/1.73m2    Calcium 10.9 (H) 8.5 - 10.1 MG/DL   PHOSPHORUS    Collection Time: 09/06/22  2:37 AM   Result Value Ref Range    Phosphorus 2.9 2.6 - 4.7 MG/DL   MAGNESIUM    Collection Time: 09/06/22  2:37 AM   Result Value Ref Range    Magnesium 1.9 1.6 - 2.4 mg/dL   IRON PROFILE    Collection Time: 09/06/22  2:37 AM   Result Value Ref Range    Iron 41 35 - 150 ug/dL    TIBC 309 250 - 450 ug/dL    Iron % saturation 13 (L) 20 - 50 %   GLUCOSE, POC    Collection Time: 09/06/22  7:26 AM   Result Value Ref Range    Glucose (POC) 138 (H) 65 - 117 mg/dL    Performed by Wilton Marquis is a 65 y. o.yr old female s/p recent repair gastric perforated ulcer, with LUQ abscess/fluid collection  Post drain  Output decreasing significantly    Plan     Keep drain in place for now, may be able to remove if patient remains in house for several days, or in my clinic  Continue antibiotics, await cultures    25 mins of time was spent with the patient of which > 50% of the time involved face-to-face counseling of the patient regarding the proposed treatment plan.     Sherrie Dennis MD

## 2022-09-06 NOTE — ROUTINE PROCESS
Bedside and Verbal shift change report given to [id number] Ra Roman (oncoming nurse) by Heidi Whitney (offgoing nurse). Report included the following information SBAR and Kardex.

## 2022-09-06 NOTE — PROGRESS NOTES
9/6/2022  Case Management Progress Note    11:56 AM  Patient is 65year old female admitted 9/4 with pleural effusion  Patient's RUR is 19% yellow/moderate risk for readmission  Covid test: none this admission  Chart reviewed--patient discussed at IDR rounds  Per chart PT is recommending home health, however patient had had home health set up in the past but they were not able to open patient due to her not having a PCP. Patient had a PCP appointment scheduled but then missed it due to being hospitalized, so still not established with PCP. Patient lives with her daughter and has a rolling walker, BC, shower chair at home. PCP office is aware per initial CM assessment that patient needs to be set up with [facility]. Will continue to follow and assist with discharge planning as clinical course continues.      Transition of Care Plan   Continue medical management/treatment  Home with family assistance, HH can  once PCP sees  Family will transport at discharge  Follow up outpatient as indicated  CM will continue to follow    MARCO A Lee

## 2022-09-06 NOTE — PROGRESS NOTES
[facility]  [address] [place], [facility] [address]  [phone]         Hospitalist Progress Note        NAME:  Roxi Fish   :  1960   MRN:  [id number]    Date/Time:  2022     Patient PCP:  Hermes Fernández NP    Emergency Contact:    Extended Emergency Contact Information  Primary Emergency Contact: Haley Phone: [phone]  Relation: Daughter  Secondary Emergency Contact: Stevie Coates Phone: [phone]  Relation: Unknown      Code: Full Code     Isolation Precautions: There are currently no Active Isolations        Subjective:     REASON FOR VISIT:  Recheck abdominal fluid collection and pleural effusion    HPI & INTERVAL HISTORY:     Ms. Juan Martinez is a 66 yo hx of HTN, DM, breast CA, asthma, perforated gastric ulcer repair, presented w/ chest pain, abd fluid collection, L pleural effusion    : Patient was seen and examined she was up in chair reports that her breathing is somewhat better today but still with some shortness of breath. Abdominal drain in place seen alongside surgeon we will continue to monitor patient. No acute events overnight.     ALLERGIES  Allergies   Allergen Reactions    Nuts [Tree Nut] Rash and Itching     Throat itching         ROS:  Gen:   Negative  Eyes:  negative  ENT:    negative  Resp:  shortness of breath  CVS:   negative  GI:       abdominal pain           Objective:      Visit Vitals  /70 (BP 1 Location: Right upper arm, BP Patient Position: At rest)   Pulse 95   Temp 99.9 °F (37.7 °C)   Resp 17   Ht 5' 2\" (1.575 m)   Wt 142.9 kg (315 lb 0.6 oz)   SpO2 95%   Breastfeeding No   BMI 57.62 kg/m²       Physical Exam:  General: alert, no distress, morbidly obese, and chronically ill appearing  Head: Normocephalic, without obvious abnormality, atraumatic  Eyes: PERRL, EOMI, anicteric sclerae, and conjuntiva clear  ENT: lips, mucosa, and tongue normal  Neck: normal, supple, and no tenderness  Lungs: normal respiratory effort and decreased breath sounds  Heart: S1, S2 normal, regular rate, and regular rhythm  Abd: not distended, soft, mild generalized tenderness to palpation, BS present and normactive abdominal drain in place with little drainage  Ext: no cyanosis and no edema  Skin: normal skin color, no rashes, and texture normal  Neuro:  alert, oriented x 3, no defects noted in general exam.  Psych: not anxious, cooperative, appropriate affect      Medications:  Current Facility-Administered Medications   Medication Dose Route Frequency    amLODIPine (NORVASC) tablet 10 mg  10 mg Oral DAILY    oxyCODONE IR (ROXICODONE) tablet 5 mg  5 mg Oral Q6H PRN    oxyCODONE IR (ROXICODONE) tablet 10 mg  10 mg Oral Q6H PRN    L.acidophilus-paracasei-S.thermophil-bifidobacter (RISAQUAD) 8 billion cell capsule  1 Capsule Oral DAILY    piperacillin-tazobactam (ZOSYN) 3.375 g in 0.9% sodium chloride (MBP/ADV) 100 mL MBP  3.375 g IntraVENous Q8H    bumetanide (BUMEX) injection 1 mg  1 mg IntraVENous DAILY    HYDROmorphone (DILAUDID) injection 2 mg  2 mg IntraVENous Q4H PRN    potassium chloride SR (KLOR-CON 10) tablet 40 mEq  40 mEq Oral DAILY    sodium chloride (NS) flush 5-40 mL  5-40 mL IntraVENous Q8H    sodium chloride (NS) flush 5-40 mL  5-40 mL IntraVENous PRN    acetaminophen (TYLENOL) tablet 650 mg  650 mg Oral Q6H PRN    Or    acetaminophen (TYLENOL) suppository 650 mg  650 mg Rectal Q6H PRN    polyethylene glycol (MIRALAX) packet 17 g  17 g Oral DAILY PRN    ondansetron (ZOFRAN ODT) tablet 4 mg  4 mg Oral Q8H PRN    Or    ondansetron (ZOFRAN) injection 4 mg  4 mg IntraVENous Q6H PRN    enoxaparin (LOVENOX) injection 30 mg  30 mg SubCUTAneous Q12H    albuterol (PROVENTIL VENTOLIN) nebulizer solution 2.5 mg  2.5 mg Nebulization Q4H PRN    arformoteroL (BROVANA) neb solution 15 mcg  15 mcg Nebulization BID RT    lidocaine-prilocaine (EMLA) 2.5-2.5 % cream   Topical PRN    pantoprazole (PROTONIX) tablet 40 mg  40 mg Oral BID    insulin lispro (HUMALOG) injection   SubCUTAneous AC&HS    glucose chewable tablet 16 g  4 Tablet Oral PRN    glucagon (GLUCAGEN) injection 1 mg  1 mg IntraMUSCular PRN    dextrose 10% infusion 0-250 mL  0-250 mL IntraVENous PRN        Labs:  Recent Labs     09/06/22  0237   WBC 10.0   HGB 10.7*   HCT 38.4          Recent Labs     09/06/22  0237 09/05/22  0616    142   K 4.5 4.6   CL 99 103   CO2 35* 35*   * 131*   BUN 10 7   CREA 1.03* 0.82   CA 10.9* 10.0   MG 1.9 1.7   PHOS 2.9 3.8   ALB  --  3.2*   TBILI  --  0.4   ALT  --  16       Radiology:  XR CHEST PORT    Result Date: 9/6/2022  Slightly decreased moderate left pleural effusion. I personally reviewed and interpreted the imaging studies and agree with official reading    The labs, imaging studies, and medications was reviewed by me on: September 6, 2022         Assessment/Plan:      64 yo hx of HTN, DM, breast CA, asthma, perforated gastric ulcer repair, presented w/ chest pain, abd fluid collection, L pleural effusion     Abd fluid collection/pain concerning for abscess with recent gastric ulcer repair:  IR drain inserted on 9/03  Cultures pending continue Zosyn  Management per Gen surgery     L pleural effusion: unclear etiology. Could be related to abd fluid collection and recent gastric ulcer repair. Pulmonary considers effusion too small to be amenable for drainage and agrees with surgery this is likely reactive intra-abdominal process     Anasarca: due to malnutrition. Last echo with EF 60-65%. Will start daily IV bumex. Monitor I/O, BMP     Severe protein-juan malnutrition: due to recent surgery. Will keep on clears for now. Might need TPN if repeat surgery     DM type 2:   Continue ISS     HTN:   BP stable, will monitor      Breast CA: s/p chemo    Body mass index is 57.62 kg/m².:  40 or above:   Morbid obesity      Risk of deterioration: high      Discussed:  Pt's condition, Imaging findings, Lab findings, Assessment, and Care Plan discussed with: Patient, RN, Specialist, and Care Manager    Prophylaxis:  Lovenox SQ    Probable disposition:  SNF      Total time: -35- minutes **I personally saw and examined the patient during this time period**      Date of service:    9/6/2022                ___________________________________________________    Admitting Physician: Hung Gillis MD

## 2022-09-06 NOTE — CONSULTS
Comprehensive Nutrition Assessment    Type and Reason for Visit: Initial, Consult    Nutrition Recommendations/Plan:   Advance diet as tolerated to regular, GI bland  Provide Ensure Clear once daily to increase kcal/protein intake (240 kcal, 52 g carbs, 8 g protein)   Provide Gelatein once daily to increase kcal/protein intake (80 kcal, <1 g carbs, 20 g protein)   Daily weights      Malnutrition Assessment:  Malnutrition Status:  Mild malnutrition (09/06/22 1424)    Context:  Acute illness     Findings of the 6 clinical characteristics of malnutrition:   Energy Intake:  Mild decrease in energy intake (specify) (~75% x 5 days)  Weight Loss:  No significant weight loss     Body Fat Loss:  No significant body fat loss,     Muscle Mass Loss:  No significant muscle mass loss,    Fluid Accumulation:  Mild,     Strength:  Not performed     Nutrition Assessment:    Pt is a 65year old female admitted with Pleural effusion, left [J90]  Pleural effusion [J90]. She  has a past medical history of Arrhythmia, Arthritis, Asthma, Cancer ([facility] [address] COVID-19 (01/29/2021), Diabetes ([facility] [address] Hypertension, Morbid obesity ([facility] [address] Neuropathy, and Sleep apnea. RD consulted for poor intake/appetite. Patient's #, endorses recent loss since previous admission. Per documentation, patient has lost 13# (4.0%) x 3 months, not clinically significant for timeframe. Patient was admitted with in late July with significant edema in all extremities - weight from 7/28/22 is from this admission, not accurate portrayal of UBW. Has accepted ONS in the past and voiced acceptance this admission. Noted tree nut allergy. No chewing/swallowing problems. C/o mild abd pain today. POD 2 CT guided drainage of LUQ abd fluid collection.     Wt Readings from Last 10 Encounters:   09/06/22 141.2 kg (311 lb 4.6 oz)   08/18/22 145.2 kg (320 lb)   08/11/22 149.7 kg (330 lb)   07/28/22 155.6 kg (343 lb 0.6 oz)   06/15/22 147.1 kg (324 lb 6.4 oz)   06/15/22 147 kg (324 lb)   05/25/22 146.8 kg (323 lb 9.6 oz)   05/25/22 146.5 kg (323 lb)   05/04/22 146.3 kg (322 lb 8 oz)   05/04/22 146.1 kg (322 lb)     Patient Vitals for the past 168 hrs:   % Diet Eaten   09/05/22 1227 26 - 50%   09/04/22 0827 76 - 100%       Nutrition Related Findings:      Wound Type: Multiple, Surgical incision  Last Bowel Movement Date: 09/04/22  Stool Appearance: Loose  Abdominal Assessment: Intact, Obese  Appetite: Good  Bowel Sounds: Active   Edema:LLE: 2+ (9/5/2022  8:32 PM)  RLE: 2+ (9/5/2022  8:32 PM)      Nutr. Labs:  Lab Results   Component Value Date/Time    GFR est AA >60 09/06/2022 02:37 AM    GFR est non-AA 54 (L) 09/06/2022 02:37 AM    Creatinine 1.03 (H) 09/06/2022 02:37 AM    BUN 10 09/06/2022 02:37 AM    Sodium 140 09/06/2022 02:37 AM    Potassium 4.5 09/06/2022 02:37 AM    Chloride 99 09/06/2022 02:37 AM    CO2 35 (H) 09/06/2022 02:37 AM       Lab Results   Component Value Date/Time    Glucose 124 (H) 09/06/2022 02:37 AM    Glucose (POC) 111 09/06/2022 11:10 AM       Lab Results   Component Value Date/Time    Hemoglobin A1c 5.7 (H) 07/21/2022 01:41 AM       Nutr. Meds:  Norvasc, bumex, Lovenox, humalog, risaquad, zofran PRN, Protonix, zosyn, miralax PRN, klor-con       Current Nutrition Intake & Therapies:  Average Meal Intake: 26-50%  Average Supplement Intake: None ordered  ADULT DIET Clear Liquid  ADULT ORAL NUTRITION SUPPLEMENT Lunch; Protein Modular  ADULT ORAL NUTRITION SUPPLEMENT Dinner; Clear Liquid    Anthropometric Measures:  Height: 5' 2\" (157.5 cm)  Ideal Body Weight (IBW): 110 lbs (50 kg)     Current Body Wt:  141.2 kg (311 lb 4.6 oz), 283 % IBW.  Standing scale  Current BMI (kg/m2): 56.9  Usual Body Weight: 137 kg (302 lb)  % Weight Change (Calculated): 3.1  Weight Adjustment: No adjustment                 BMI Category: Obese class 3 (BMI 40.0 or greater)    Estimated Daily Nutrient Needs:  Energy Requirements Based On: Formula  Weight Used for Energy Requirements: Current  Energy (kcal/day): 2010 (MSJ x 1.2 x 1.1 - 500)  Weight Used for Protein Requirements: Ideal  Protein (g/day): 100-125 (2.0-2.5 g/kg IBW)  Method Used for Fluid Requirements: 1 ml/kcal  Fluid (ml/day): 2010    Nutrition Diagnosis:   Inadequate oral intake related to inadequate protein-energy intake as evidenced by NPO or clear liquid status due to medical condition      Nutrition Interventions:   Food and/or Nutrient Delivery: Start oral nutrition supplement, Modify current diet  Nutrition Education/Counseling: No recommendations at this time  Coordination of Nutrition Care: Continue to monitor while inpatient, Interdisciplinary rounds  Plan of Care discussed with: IDR team    Goals:     Goals: by next RD assessment, PO intake 75% or greater       Nutrition Monitoring and Evaluation:   Behavioral-Environmental Outcomes: None identified  Food/Nutrient Intake Outcomes: Diet advancement/tolerance, Supplement intake  Physical Signs/Symptoms Outcomes: Biochemical data, Fluid status or edema, Weight, GI status    Discharge Planning:     Too soon to determine    Papo Palomino MS, RD  Contact: Ext: [phone], or via [employer]

## 2022-09-06 NOTE — ROUTINE PROCESS
Blood pressure on the 170's hx of hypertension. pt stated she is taking Norvasc 10 mg po daily. Perfect serve on call hospitalist for Norvasc.

## 2022-09-06 NOTE — PROGRESS NOTES
Name: [facility] [place]: [address]   : 1960 Admit Date: 2022   Phone: [phone]  Room: [facility]/01   PCP: Aidan Cotton NP  MRN: [id number]   Date: 2022  Code: Full Code          Chart and notes reviewed. Data reviewed. I review the patient's current medications in the medical record at each encounter. I have evaluated and examined the patient. History of Present Illness:  Ms. Devendra Levin is a 64 yo woman with a history of breast cancer (s/p lumpectomy, chemo), PARMINDER (does not wear CPAP), asthma, HFpEF and recent perforated gastric ulcer . Pulmonary is consulted for a small L pleural effusion. She presented with about one day of LUQ abdominal pain and found to have an upper abdominal fluid collection. Denies shortness of breath, cough, wheezing, n/v, diarrhea, change in appetite. Abdominal fluid collection was initially felt to be most consistent with a seroma and a drain was placed by IR yesterday and fluid appeared more consistent with a probable abscess. Afebrile    Labs: WBC 10.2, Hgb 9.0, , cr 0.82,     Images reviewed:  CT abd/pelvis 9/3/2022: small L sided effusion with associated atelectasis; large homogenous fluid collection in the L upper quadrant (17.5 x 9.6cm) and increased in size from prior CT, no evidence of gastric or bowel leak    CXR 2022: some improvement in the effusion    Interval history  Tmax 99.9 (though just had sips of coffee)  BP stable  Sats 95% on 2L  Creat 1.03 - trending up  20ml out from abd drain    CXR : Slightly decreased moderate left pleural effusion    ROS: States she feels \"lousy. \"  Has mild abd pain. No specific complaints about her breathing.       Past Medical History:   Diagnosis Date    Arrhythmia     heart murmur    Arthritis     Right knee, DJD left knee    Asthma     Cancer ([address]     COVID-19 2021    Diabetes ([address]     Pre diabetes    Hypertension     Morbid obesity ([address]     Neuropathy Patient reports numbness and tingling in her legs and feet from her back    Sleep apnea     was ordered a CPAP years ago, but never used       Past Surgical History:   Procedure Laterality Date    HX BREAST LUMPECTOMY Left 3/2/2022    LEFT BREAST LUMPECTOMY WITH LEFT BREAST SENTINEL NODE BIOPSY WITH BLUE DYE AND PORT A CATH INSERTION (URGENT) performed by Lavinia Bustamante MD at [address]    HX CHOLECYSTECTOMY      HX GYN      c section    HX GYN      d&c    HX WISDOM TEETH EXTRACTION Bilateral        Family History   Problem Relation Age of Onset    Hypertension Mother     Heart Disease Mother     Heart Disease Father     Hypertension Father     Breast Cancer Maternal Aunt     Breast Cancer Other     Breast Cancer Maternal Aunt        Social History     Tobacco Use    Smoking status: Never    Smokeless tobacco: Never   Substance Use Topics    Alcohol use: Yes     Comment: social once per month       Allergies   Allergen Reactions    Nuts [Tree Nut] Rash and Itching     Throat itching       Current Facility-Administered Medications   Medication Dose Route Frequency    amLODIPine (NORVASC) tablet 10 mg  10 mg Oral DAILY    oxyCODONE IR (ROXICODONE) tablet 5 mg  5 mg Oral Q6H PRN    oxyCODONE IR (ROXICODONE) tablet 10 mg  10 mg Oral Q6H PRN    L.acidophilus-paracasei-S.thermophil-bifidobacter (RISAQUAD) 8 billion cell capsule  1 Capsule Oral DAILY    piperacillin-tazobactam (ZOSYN) 3.375 g in 0.9% sodium chloride (MBP/ADV) 100 mL MBP  3.375 g IntraVENous Q8H    bumetanide (BUMEX) injection 1 mg  1 mg IntraVENous DAILY    HYDROmorphone (DILAUDID) injection 2 mg  2 mg IntraVENous Q4H PRN    potassium chloride SR (KLOR-CON 10) tablet 40 mEq  40 mEq Oral DAILY    sodium chloride (NS) flush 5-40 mL  5-40 mL IntraVENous Q8H    sodium chloride (NS) flush 5-40 mL  5-40 mL IntraVENous PRN    acetaminophen (TYLENOL) tablet 650 mg  650 mg Oral Q6H PRN    Or    acetaminophen (TYLENOL) suppository 650 mg  650 mg Rectal Q6H PRN    polyethylene glycol (MIRALAX) packet 17 g  17 g Oral DAILY PRN    ondansetron (ZOFRAN ODT) tablet 4 mg  4 mg Oral Q8H PRN    Or    ondansetron (ZOFRAN) injection 4 mg  4 mg IntraVENous Q6H PRN    enoxaparin (LOVENOX) injection 30 mg  30 mg SubCUTAneous Q12H    albuterol (PROVENTIL VENTOLIN) nebulizer solution 2.5 mg  2.5 mg Nebulization Q4H PRN    arformoteroL (BROVANA) neb solution 15 mcg  15 mcg Nebulization BID RT    lidocaine-prilocaine (EMLA) 2.5-2.5 % cream   Topical PRN    pantoprazole (PROTONIX) tablet 40 mg  40 mg Oral BID    insulin lispro (HUMALOG) injection   SubCUTAneous AC&HS    glucose chewable tablet 16 g  4 Tablet Oral PRN    glucagon (GLUCAGEN) injection 1 mg  1 mg IntraMUSCular PRN    dextrose 10% infusion 0-250 mL  0-250 mL IntraVENous PRN         REVIEW OF SYSTEMS   12 point ROS negative except as stated in the HPI. Physical Exam:   Visit Vitals  /70 (BP 1 Location: Right upper arm, BP Patient Position: At rest)   Pulse 95   Temp 99.9 °F (37.7 °C)   Resp 17   Ht 5' 2\" (1.575 m)   Wt 142.9 kg (315 lb 0.6 oz)   SpO2 95%   Breastfeeding No   BMI 57.62 kg/m²       General:  Alert, cooperative, no distress, appears stated age. Head:  Normocephalic, without obvious abnormality, atraumatic. Eyes:  Conjunctivae/corneas clear. Nose: Nares normal. Septum midline. Mucosa normal.    Throat: Lips, mucosa, and tongue normal.    Neck: Supple, symmetrical, trachea midline,    Lungs:   Clear to auscultation bilaterally, diminished over left base. Chest wall:  No tenderness or deformity. Heart:  Regular rate and rhythm, S1, S2 normal, no murmur, click, rub or gallop. Abdomen:   Soft, nontender; Bowel sounds normal. Drain in place. Extremities: Extremities normal, atraumatic, no cyanosis or edema. Pulses: 2+ and symmetric all extremities.    Skin: Skin color, texture, turgor normal. No rashes or lesions   Neurologic: Grossly nonfocal       Lab Results   Component Value Date/Time Sodium 140 09/06/2022 02:37 AM    Potassium 4.5 09/06/2022 02:37 AM    Chloride 99 09/06/2022 02:37 AM    CO2 35 (H) 09/06/2022 02:37 AM    BUN 10 09/06/2022 02:37 AM    Creatinine 1.03 (H) 09/06/2022 02:37 AM    Glucose 124 (H) 09/06/2022 02:37 AM    Calcium 10.9 (H) 09/06/2022 02:37 AM    Magnesium 1.9 09/06/2022 02:37 AM    Phosphorus 2.9 09/06/2022 02:37 AM    Lactic acid 1.8 08/12/2022 09:38 AM       Lab Results   Component Value Date/Time    WBC 10.0 09/06/2022 02:37 AM    HGB 10.7 (L) 09/06/2022 02:37 AM    PLATELET 731 23/84/9767 02:37 AM    .8 (H) 09/06/2022 02:37 AM       Lab Results   Component Value Date/Time    INR 1.1 07/26/2022 01:22 AM    aPTT 24.5 07/26/2022 01:22 AM    Alk.  phosphatase 66 09/05/2022 06:16 AM    Protein, total 7.4 09/05/2022 06:16 AM    Albumin 3.2 (L) 09/05/2022 06:16 AM    Globulin 4.2 (H) 09/05/2022 06:16 AM       Lab Results   Component Value Date/Time    Iron 33 (L) 07/24/2022 04:40 AM    TIBC 174 (L) 07/24/2022 04:40 AM    Iron % saturation 19 (L) 07/24/2022 04:40 AM    Ferritin 128 09/06/2022 02:37 AM       No results found for: SR, CRP, EDGARDO, ANAIGG, RA, RPR, RPRT, VDRLT, VDRLS, TSH, TSHEXT, TSHEXT     No results found for: PH, PHI, PCO2, PCO2I, PO2, PO2I, HCO3, HCO3I, FIO2, FIO2I    Lab Results   Component Value Date/Time    CK 56 08/13/2022 06:40 AM        Lab Results   Component Value Date/Time    Culture result: PENDING 09/05/2022 12:55 PM    Culture result: No significant growth, <10,000 CFU/mL 08/05/2022 12:00 PM       Lab Results   Component Value Date/Time    Hepatitis B surface Ag <0.10 04/13/2022 09:56 AM       Lab Results   Component Value Date/Time    CK 56 08/13/2022 06:40 AM    CK 89 08/11/2022 10:36 PM       Lab Results   Component Value Date/Time    Color Yellow 08/11/2022 10:36 PM    Appearance Hazy (A) 08/11/2022 10:36 PM    Specific gravity >1.030 (H) 07/21/2022 01:41 AM    pH (UA) 5.0 08/11/2022 10:36 PM    Protein Trace (A) 08/11/2022 10:36 PM Glucose Normal (A) 08/11/2022 10:36 PM    Ketone Negative 08/11/2022 10:36 PM    Bilirubin 3 (A) 08/11/2022 10:36 PM    Blood Negative 08/11/2022 10:36 PM    Urobilinogen Normal 08/11/2022 10:36 PM    Nitrites Negative 08/11/2022 10:36 PM    Leukocyte Esterase Negative 08/11/2022 10:36 PM    WBC 5-10 08/11/2022 10:36 PM    RBC 0-5 08/11/2022 10:36 PM    Bacteria Negative 08/11/2022 10:36 PM       IMPRESSION  Pleural effusion  Intraabdominal fluid collection  Breast cancer  Recent perforated gastric ulcer s/p repair  PARMINDER, noncompliant with CPAP    PLAN  Goal sats 88% or higher, wean O2 as toelrated; currently 95% on 2L  6 MWT prior to discharge  Effusion is small and not easily amenable to drainage; agree with surgery that this is likely reactive to intraabdominal process  Fluid studies from the abdomen appear to have been done under prior thoracentesis orders, was not documented to have had pleural fluid drained.   Abx per primary team/surgery; fu cultures  Diuresis per primary team; watch [address] [facility] [place]

## 2022-09-06 NOTE — PROGRESS NOTES
Problem: Self Care Deficits Care Plan (Adult)  Goal: *Acute Goals and Plan of Care (Insert Text)  Description: FUNCTIONAL STATUS PRIOR TO ADMISSION: Patient was modified independent using a rolling walker for functional mobility. Prior to July 2022, pt was independent with ADLs. She underwent perforated gastric ulcer repair in July and since then has required assistance from dtr for dressing and bathing. Pt has home O2 at home, but does not use it. HOME SUPPORT: The patient lived with daughter and required minimal assistance/contact guard assist for bathing and dressing, and some toileting. Occupational Therapy Goals  Initiated 9/6/2022  1. Patient will perform grooming with supervision/set-up within 7 day(s). 2.  Patient will perform lower body dressing with minimal assistance/contact guard assist using LH AE within 7 day(s). 3.  Patient will perform bathing with minimal assistance/contact guard assist within 7 day(s). 4.  Patient will perform toilet transfers with modified independence within 7 day(s). 5.  Patient will perform all aspects of toileting with minimal assistance/contact guard assist using AE within 7 day(s). 6.  Patient will participate in upper extremity therapeutic exercise/activities with modified independence for 10 minutes within 7 day(s). Outcome: Progressing Towards Goal     OCCUPATIONAL THERAPY EVALUATION  Patient: Mars Lanier (22 y.o. female)  Date: 9/6/2022  Primary Diagnosis: Pleural effusion, left [J90]  Pleural effusion [J90]       Precautions: Fall      ASSESSMENT  Based on the objective data described below, the patient presents with decreased activity tolerance, decreased strength, impaired standing balance, and decreased independence with ADLs and mobility following admission for upper abdominal pain and chest pain, found to have pleural effusion. Pt now with drain in place on L side and received on 2L via NC for supplemental O2 needs.  Pt is pleasant and offers good efforts throughout session, demonstrates fair strength and decreased reach for dunia area hygiene and distal LB dressing. Pt able to mobilize to bathroom for toileting this session with use of RW and will benefit from training with use of AE for toileting and dressing. SpO2 stable in 90s throughout session. Recommend follow up [facility] therapy at discharge. Current Level of Function Impacting Discharge (ADLs/self-care): up to max A for LB ADLs; mod/max A for toileting hygiene; CGA for ADL transfers; set up for seated UB grooming    Functional Outcome Measure: The patient scored Total: 55/100 on the Barthel Index outcome measure which is indicative of being moderately impaired in basic self-care. Other factors to consider for discharge: PMHx; supplemental O2 needs     Patient will benefit from skilled therapy intervention to address the above noted impairments. PLAN :  Recommendations and Planned Interventions: self care training, functional mobility training, therapeutic exercise, balance training, therapeutic activities, endurance activities, patient education, home safety training, and family training/education    Frequency/Duration: Patient will be followed by occupational therapy 5 times a week to address goals. Recommendation for discharge: (in order for the patient to meet his/her long term goals)  Occupational therapy at least 2 days/week in the home AND ensure assist and/or supervision for safety with ADLs and mobility    This discharge recommendation:  Has been made in collaboration with the attending provider and/or case management    IF patient discharges home will need the following DME: TBD on  AE for toileting and dressing       SUBJECTIVE:   Patient stated I can't quite reach.  -during toileting hygiene    OBJECTIVE DATA SUMMARY:   HISTORY:   Past Medical History:   Diagnosis Date    Arrhythmia     heart murmur    Arthritis     Right knee, DJD left knee    Asthma     Cancer ([facility]     COVID-19 01/29/2021    Diabetes ([facility]     Pre diabetes    Hypertension     [facility]     Neuropathy     Patient reports numbness and tingling in her legs and feet from her back    Sleep apnea     was ordered a CPAP years ago, but never used     Past Surgical History:   Procedure Laterality Date    HX BREAST LUMPECTOMY Left 3/2/2022    LEFT BREAST LUMPECTOMY WITH LEFT BREAST SENTINEL NODE BIOPSY WITH BLUE DYE AND PORT A CATH INSERTION (URGENT) performed by Irina Lynn MD at [facility]    HX CHOLECYSTECTOMY      HX GYN      c section    HX GYN      d&c    HX WISDOM TEETH EXTRACTION Bilateral        Expanded or extensive additional review of patient history:     Home Situation  Home Environment: Private residence  # Steps to Enter: 5  Rails to Enter: Yes  Hand Rails : Bilateral  One/Two Story Residence: One story  Living Alone: No  Support Systems: Child(shaun)  Patient Expects to be Discharged to[de-identified] Home  Current DME Used/Available at Home: Walker, rolling, Shower chair, Oxygen, portable, Commode, bedside    Hand dominance: Right    EXAMINATION OF PERFORMANCE DEFICITS:  Cognitive/Behavioral Status:  Neurologic State: Alert  Orientation Level: Oriented to person;Oriented to place;Oriented to situation;Oriented to time  Cognition: Follows commands  Perception: Appears intact  Perseveration: No perseveration noted  Safety/Judgement: Awareness of environment; Fall prevention;Good awareness of safety precautions; Insight into deficits;Home safety    Hearing: Auditory  Auditory Impairment: None    Range of Motion:  AROM: Generally decreased, functional    Strength:  Strength: Generally decreased, functional    Coordination:  Coordination: Generally decreased, functional (-3/5 grossly for b/l LE)  Fine Motor Skills-Upper: Left Intact; Right Intact    Gross Motor Skills-Upper: Left Intact; Right Intact    Tone & Sensation:  Tone: Normal  Sensation: Intact (to light touch)    Balance:  Sitting: Intact; Without support  Standing: Impaired; With support  Standing - Static: Good;Constant support  Standing - Dynamic : Fair;Constant support    Functional Mobility and Transfers for ADLs:  Bed Mobility:  Rolling: Stand-by assistance  Supine to Sit: Stand-by assistance  Scooting: Stand-by assistance    Transfers:  Sit to Stand: Contact guard assistance  Stand to Sit: Contact guard assistance  Bed to Chair: Contact guard assistance  Bathroom Mobility: Contact guard assistance  Toilet Transfer : Contact guard assistance    ADL Assessment:  Feeding: Independent    Oral Facial Hygiene/Grooming: Setup;Supervision (seated; infer up to CGA for standing tasks)    Bathing: Minimum assistance    Type of Bath: Bath Pack;Partial    Upper Body Dressing: Setup    Lower Body Dressing: Moderate assistance    Toileting: Moderate assistance      ADL Intervention and task modifications:  Lower Body Dressing Assistance  Socks: Total assistance (dependent)  Leg Crossed Method Used: No  Position Performed: Seated edge of bed  Cues: Don;Physical assistance    Toileting  Bladder Hygiene: Moderate assistance (decreased reach)  Clothing Management: Minimum assistance  Cues: Tactile cues provided;Verbal cues provided;Visual cues provided  Adaptive Equipment: Grab bars; Walker    Cognitive Retraining  Safety/Judgement: Awareness of environment; Fall prevention;Good awareness of safety precautions; Insight into deficits;Home safety    Functional Measure:    Barthel Index:  Bathin  Bladder: 10  Bowels: 10  Groomin  Dressin  Feeding: 10  Mobility: 0  Stairs: 0  Toilet Use: 5  Transfer (Bed to Chair and Back): 10  Total: 55/100      The Barthel ADL Index: Guidelines  1. The index should be used as a record of what a patient does, not as a record of what a patient could do. 2. The main aim is to establish degree of independence from any help, physical or verbal, however minor and for whatever reason.   3. The need for supervision renders the patient not independent. 4. A patient's performance should be established using the best available evidence. Asking the patient, friends/relatives and nurses are the usual sources, but direct observation and common sense are also important. However direct testing is not needed. 5. Usually the patient's performance over the preceding 24-48 hours is important, but occasionally longer periods will be relevant. 6. Middle categories imply that the patient supplies over 50 per cent of the effort. 7. Use of aids to be independent is allowed. Score Interpretation (from [address] [address])    Independent   60-79 Minimally independent   40-59 Partially dependent   20-39 Very dependent   <20 Totally dependent     -Fransisco Unger., Barthel, DAileenW. (1965). Functional evaluation: the Barthel Index. [facility] ([address] (1997). The Barthel activities of daily living index: self-reporting versus actual performance in the old (> or = 75 years). Journal of [address] [place] [address] 45(7), [facility], GINI, Dena Key, Sarina Myers. (1999). Measuring the change in disability after inpatient rehabilitation; comparison of the responsiveness of the Barthel Index and Functional [place] Measure. Journal of Neurology, Neurosurgery, and Psychiatry, 66(4), 360-357. Cristian Ley, N.J.A, [employer]CE[employer]ION Stuart, & Gill Saunders, M.A. (2004) Assessment of post-stroke quality of life in cost-effectiveness studies: The usefulness of the Barthel Index and the EuroQoL-5D.  Quality of Life Research, 15, 460-89     Occupational Therapy Evaluation Charge Determination   History Examination Decision-Making   LOW Complexity : Brief history review  HIGH Complexity : 5 or more performance deficits relating to physical, cognitive , or psychosocial skils that result in activity limitations and / or participation restrictions HIGH Complexity : Patient presents with comorbidities that affect occupational performance. Signifigant modification of tasks or assistance (eg, physical or verbal) with assessment (s) is necessary to enable patient to complete evaluation       Based on the above components, the patient evaluation is determined to be of the following complexity level: LOW   Pain Rating:  Pt reporting moderate pain    Activity Tolerance:   Fair    After treatment patient left in no apparent distress:    Sitting in chair, Call bell within reach, and Bed / chair alarm activated    COMMUNICATION/EDUCATION:   The patients plan of care was discussed with: Physical therapist and Registered nurse. Home safety education was provided and the patient/caregiver indicated understanding., Patient/family have participated as able in goal setting and plan of care. , and Patient/family agree to work toward stated goals and plan of care. This patients plan of care is appropriate for delegation to [facility].     Thank you for this referral.  Junette Denver, OT  Time Calculation: 37 mins

## 2022-09-07 LAB
ALBUMIN SERPL-MCNC: 2.6 G/DL (ref 3.5–5)
ALBUMIN/GLOB SERPL: 0.7 {RATIO} (ref 1.1–2.2)
ALP SERPL-CCNC: 53 U/L (ref 45–117)
ALT SERPL-CCNC: 15 U/L (ref 12–78)
ANION GAP SERPL CALC-SCNC: 4 MMOL/L (ref 5–15)
AST SERPL-CCNC: 12 U/L (ref 15–37)
BASOPHILS # BLD: 0 K/UL (ref 0–0.1)
BASOPHILS NFR BLD: 0 % (ref 0–1)
BILIRUB SERPL-MCNC: 0.4 MG/DL (ref 0.2–1)
BUN SERPL-MCNC: 9 MG/DL (ref 6–20)
BUN/CREAT SERPL: 12 (ref 12–20)
CALCIUM SERPL-MCNC: 9.6 MG/DL (ref 8.5–10.1)
CHLORIDE SERPL-SCNC: 103 MMOL/L (ref 97–108)
CO2 SERPL-SCNC: 35 MMOL/L (ref 21–32)
CREAT SERPL-MCNC: 0.75 MG/DL (ref 0.55–1.02)
DIFFERENTIAL METHOD BLD: ABNORMAL
EOSINOPHIL # BLD: 0.4 K/UL (ref 0–0.4)
EOSINOPHIL NFR BLD: 6 % (ref 0–7)
ERYTHROCYTE [DISTWIDTH] IN BLOOD BY AUTOMATED COUNT: 17 % (ref 11.5–14.5)
GLOBULIN SER CALC-MCNC: 3.7 G/DL (ref 2–4)
GLUCOSE BLD STRIP.AUTO-MCNC: 110 MG/DL (ref 65–117)
GLUCOSE BLD STRIP.AUTO-MCNC: 121 MG/DL (ref 65–117)
GLUCOSE BLD STRIP.AUTO-MCNC: 129 MG/DL (ref 65–117)
GLUCOSE BLD STRIP.AUTO-MCNC: 138 MG/DL (ref 65–117)
GLUCOSE SERPL-MCNC: 112 MG/DL (ref 65–100)
HCT VFR BLD AUTO: 28.5 % (ref 35–47)
HGB BLD-MCNC: 8 G/DL (ref 11.5–16)
IMM GRANULOCYTES # BLD AUTO: 0.1 K/UL (ref 0–0.04)
IMM GRANULOCYTES NFR BLD AUTO: 1 % (ref 0–0.5)
LYMPHOCYTES # BLD: 0.7 K/UL (ref 0.8–3.5)
LYMPHOCYTES NFR BLD: 10 % (ref 12–49)
MCH RBC QN AUTO: 27.7 PG (ref 26–34)
MCHC RBC AUTO-ENTMCNC: 28.1 G/DL (ref 30–36.5)
MCV RBC AUTO: 98.6 FL (ref 80–99)
MONOCYTES # BLD: 1 K/UL (ref 0–1)
MONOCYTES NFR BLD: 14 % (ref 5–13)
NEUTS SEG # BLD: 4.9 K/UL (ref 1.8–8)
NEUTS SEG NFR BLD: 69 % (ref 32–75)
NRBC # BLD: 0 K/UL (ref 0–0.01)
NRBC BLD-RTO: 0 PER 100 WBC
PLATELET # BLD AUTO: 294 K/UL (ref 150–400)
PMV BLD AUTO: 8.7 FL (ref 8.9–12.9)
POTASSIUM SERPL-SCNC: 3.8 MMOL/L (ref 3.5–5.1)
PROT SERPL-MCNC: 6.3 G/DL (ref 6.4–8.2)
RBC # BLD AUTO: 2.89 M/UL (ref 3.8–5.2)
RBC MORPH BLD: ABNORMAL
RBC MORPH BLD: ABNORMAL
SERVICE CMNT-IMP: ABNORMAL
SERVICE CMNT-IMP: NORMAL
SODIUM SERPL-SCNC: 142 MMOL/L (ref 136–145)
WBC # BLD AUTO: 7.1 K/UL (ref 3.6–11)

## 2022-09-07 PROCEDURE — 77030003560 HC NDL HUBR BARD -A

## 2022-09-07 PROCEDURE — 74011250637 HC RX REV CODE- 250/637: Performed by: HOSPITALIST

## 2022-09-07 PROCEDURE — 74011250636 HC RX REV CODE- 250/636: Performed by: INTERNAL MEDICINE

## 2022-09-07 PROCEDURE — 74011250637 HC RX REV CODE- 250/637: Performed by: SURGERY

## 2022-09-07 PROCEDURE — 99231 SBSQ HOSP IP/OBS SF/LOW 25: CPT | Performed by: SURGERY

## 2022-09-07 PROCEDURE — 77010033678 HC OXYGEN DAILY

## 2022-09-07 PROCEDURE — 85025 COMPLETE CBC W/AUTO DIFF WBC: CPT

## 2022-09-07 PROCEDURE — 80053 COMPREHEN METABOLIC PANEL: CPT

## 2022-09-07 PROCEDURE — 94640 AIRWAY INHALATION TREATMENT: CPT

## 2022-09-07 PROCEDURE — 82962 GLUCOSE BLOOD TEST: CPT

## 2022-09-07 PROCEDURE — 74011250637 HC RX REV CODE- 250/637: Performed by: INTERNAL MEDICINE

## 2022-09-07 PROCEDURE — 65270000029 HC RM PRIVATE

## 2022-09-07 PROCEDURE — 74011000250 HC RX REV CODE- 250: Performed by: INTERNAL MEDICINE

## 2022-09-07 PROCEDURE — 97535 SELF CARE MNGMENT TRAINING: CPT

## 2022-09-07 PROCEDURE — 74011000258 HC RX REV CODE- 258: Performed by: INTERNAL MEDICINE

## 2022-09-07 PROCEDURE — 94761 N-INVAS EAR/PLS OXIMETRY MLT: CPT

## 2022-09-07 PROCEDURE — 97530 THERAPEUTIC ACTIVITIES: CPT

## 2022-09-07 PROCEDURE — 74011000250 HC RX REV CODE- 250: Performed by: SURGERY

## 2022-09-07 PROCEDURE — 36415 COLL VENOUS BLD VENIPUNCTURE: CPT

## 2022-09-07 RX ORDER — LISINOPRIL 20 MG/1
20 TABLET ORAL DAILY
Status: DISCONTINUED | OUTPATIENT
Start: 2022-09-07 | End: 2022-09-13 | Stop reason: HOSPADM

## 2022-09-07 RX ORDER — HYDRALAZINE HYDROCHLORIDE 25 MG/1
25 TABLET, FILM COATED ORAL
Status: DISCONTINUED | OUTPATIENT
Start: 2022-09-07 | End: 2022-09-08

## 2022-09-07 RX ADMIN — OXYCODONE 10 MG: 5 TABLET ORAL at 04:53

## 2022-09-07 RX ADMIN — PANTOPRAZOLE SODIUM 40 MG: 40 TABLET, DELAYED RELEASE ORAL at 08:47

## 2022-09-07 RX ADMIN — ENOXAPARIN SODIUM 30 MG: 100 INJECTION SUBCUTANEOUS at 20:40

## 2022-09-07 RX ADMIN — ARFORMOTEROL TARTRATE 15 MCG: 15 SOLUTION RESPIRATORY (INHALATION) at 20:22

## 2022-09-07 RX ADMIN — ARFORMOTEROL TARTRATE 15 MCG: 15 SOLUTION RESPIRATORY (INHALATION) at 07:30

## 2022-09-07 RX ADMIN — HYDRALAZINE HYDROCHLORIDE 25 MG: 25 TABLET, FILM COATED ORAL at 08:47

## 2022-09-07 RX ADMIN — BUMETANIDE 1 MG: 0.25 INJECTION, SOLUTION INTRAMUSCULAR; INTRAVENOUS at 08:47

## 2022-09-07 RX ADMIN — PIPERACILLIN AND TAZOBACTAM 3.38 G: 3; .375 INJECTION, POWDER, FOR SOLUTION INTRAVENOUS at 02:57

## 2022-09-07 RX ADMIN — HYDRALAZINE HYDROCHLORIDE 25 MG: 25 TABLET, FILM COATED ORAL at 12:04

## 2022-09-07 RX ADMIN — PANTOPRAZOLE SODIUM 40 MG: 40 TABLET, DELAYED RELEASE ORAL at 18:25

## 2022-09-07 RX ADMIN — Medication 1 CAPSULE: at 08:47

## 2022-09-07 RX ADMIN — ENOXAPARIN SODIUM 30 MG: 100 INJECTION SUBCUTANEOUS at 08:48

## 2022-09-07 RX ADMIN — AMLODIPINE BESYLATE 10 MG: 5 TABLET ORAL at 08:49

## 2022-09-07 RX ADMIN — Medication 5 ML: at 06:07

## 2022-09-07 RX ADMIN — HYDRALAZINE HYDROCHLORIDE 25 MG: 25 TABLET, FILM COATED ORAL at 18:28

## 2022-09-07 RX ADMIN — Medication 10 ML: at 18:29

## 2022-09-07 RX ADMIN — PIPERACILLIN AND TAZOBACTAM 3.38 G: 3; .375 INJECTION, POWDER, FOR SOLUTION INTRAVENOUS at 11:11

## 2022-09-07 RX ADMIN — HYDRALAZINE HYDROCHLORIDE 50 MG: 25 TABLET, FILM COATED ORAL at 12:35

## 2022-09-07 RX ADMIN — POTASSIUM CHLORIDE 40 MEQ: 750 TABLET, FILM COATED, EXTENDED RELEASE ORAL at 11:11

## 2022-09-07 RX ADMIN — LISINOPRIL 20 MG: 20 TABLET ORAL at 18:25

## 2022-09-07 RX ADMIN — PIPERACILLIN AND TAZOBACTAM 3.38 G: 3; .375 INJECTION, POWDER, FOR SOLUTION INTRAVENOUS at 18:28

## 2022-09-07 NOTE — PROGRESS NOTES
Problem: Impaired Skin Integrity/Pressure Injury Treatment  Goal: *Improvement of Existing Pressure Injury  Outcome: Progressing Towards Goal  Goal: *Prevention of pressure injury  Description: Document Eliecer Scale and appropriate interventions in the flowsheet. Outcome: Progressing Towards Goal  Note: Pressure Injury Interventions:  Sensory Interventions: Assess changes in LOC, Check visual cues for pain    Moisture Interventions: Limit adult briefs, Moisture barrier, Assess need for specialty bed    Activity Interventions: Assess need for specialty bed    Mobility Interventions: Assess need for specialty bed, Float heels, HOB 30 degrees or less    Nutrition Interventions: Document food/fluid/supplement intake, Offer support with meals,snacks and hydration    Friction and Shear Interventions: Feet elevated on foot rest, HOB 30 degrees or less, Lift sheet, Minimize layers                Problem: Falls - Risk of  Goal: *Absence of Falls  Description: Document Sofia Fall Risk and appropriate interventions in the flowsheet.   Outcome: Progressing Towards Goal  Note: Fall Risk Interventions:  Mobility Interventions: Bed/chair exit alarm, Patient to call before getting OOB         Medication Interventions: Bed/chair exit alarm, Patient to call before getting OOB, Teach patient to arise slowly    Elimination Interventions: Bed/chair exit alarm, Call light in reach, Patient to call for help with toileting needs, Stay With Me (per policy)    History of Falls Interventions: Bed/chair exit alarm

## 2022-09-07 NOTE — PROGRESS NOTES
Bedside shift change report given to Hipolito Palacio (oncoming nurse) by Ulysses Stade (offgoing nurse). Report included the following information SBAR, Kardex, Intake/Output, MAR, and Recent Results.

## 2022-09-07 NOTE — PROGRESS NOTES
Name: [facility]: [address]   : 1960 Admit Date: 2022   Phone: [phone]  Room: 7/01   PCP: Delia Roman NP  MRN: [id number]   Date: 2022  Code: Full Code          Chart and notes reviewed. Data reviewed. I review the patient's current medications in the medical record at each encounter. I have evaluated and examined the patient. History of Present Illness:  Ms. Liborio Cogan is a 66 yo woman with a history of breast cancer (s/p lumpectomy, chemo), PARMINDER (does not wear CPAP), asthma, HFpEF and recent perforated gastric ulcer . Pulmonary is consulted for a small L pleural effusion. She presented with about one day of LUQ abdominal pain and found to have an upper abdominal fluid collection. Denies shortness of breath, cough, wheezing, n/v, diarrhea, change in appetite. Abdominal fluid collection was initially felt to be most consistent with a seroma and a drain was placed by IR yesterday and fluid appeared more consistent with a probable abscess. Afebrile    Labs: WBC 10.2, Hgb 9.0, , cr 0.82,     Images reviewed:  CT abd/pelvis 9/3/2022: small L sided effusion with associated atelectasis; large homogenous fluid collection in the L upper quadrant (17.5 x 9.6cm) and increased in size from prior CT, no evidence of gastric or bowel leak    CXR 2022: some improvement in the effusion    Interval history  Afebrile  BP elevated  Sats 95% on 2L  Creat 0.75 - better  No further drainage recorded from abd drain    CXR : Slightly decreased moderate left pleural effusion    ROS: States she feels [place]. \" Has some abd pain. No specific complaints about her breathing.       Past Medical History:   Diagnosis Date    Arrhythmia     heart murmur    Arthritis     Right knee, DJD left knee    Asthma     Cancer ([address]     COVID-19 2021    Diabetes ([address]     Pre diabetes    Hypertension     Morbid obesity ([address]     Neuropathy     Patient reports numbness and tingling in her legs and feet from her back    Sleep apnea     was ordered a CPAP years ago, but never used       Past Surgical History:   Procedure Laterality Date    HX BREAST LUMPECTOMY Left 3/2/2022    LEFT BREAST LUMPECTOMY WITH LEFT BREAST SENTINEL NODE BIOPSY WITH BLUE DYE AND PORT A CATH INSERTION (URGENT) performed by Zhen Pepe MD at [address] [facility] [address]    HX CHOLECYSTECTOMY      HX GYN      c section    HX GYN      d&c    HX WISDOM TEETH EXTRACTION Bilateral        Family History   Problem Relation Age of Onset    Hypertension Mother     Heart Disease Mother     Heart Disease Father     Hypertension Father     Breast Cancer Maternal Aunt     Breast Cancer Other     Breast Cancer Maternal Aunt        Social History     Tobacco Use    Smoking status: Never    Smokeless tobacco: Never   Substance Use Topics    Alcohol use: Yes     Comment: social once per month       Allergies   Allergen Reactions    Nuts [Tree Nut] Rash and Itching     Throat itching       Current Facility-Administered Medications   Medication Dose Route Frequency    hydrALAZINE (APRESOLINE) tablet 25 mg  25 mg Oral Q6H PRN    amLODIPine (NORVASC) tablet 10 mg  10 mg Oral DAILY    bumetanide (BUMEX) injection 1 mg  1 mg IntraVENous DAILY    oxyCODONE IR (ROXICODONE) tablet 5 mg  5 mg Oral Q6H PRN    oxyCODONE IR (ROXICODONE) tablet 10 mg  10 mg Oral Q6H PRN    L.acidophilus-paracasei-S.thermophil-bifidobacter (RISAQUAD) 8 billion cell capsule  1 Capsule Oral DAILY    piperacillin-tazobactam (ZOSYN) 3.375 g in 0.9% sodium chloride (MBP/ADV) 100 mL MBP  3.375 g IntraVENous Q8H    HYDROmorphone (DILAUDID) injection 2 mg  2 mg IntraVENous Q4H PRN    potassium chloride SR (KLOR-CON 10) tablet 40 mEq  40 mEq Oral DAILY    sodium chloride (NS) flush 5-40 mL  5-40 mL IntraVENous Q8H    sodium chloride (NS) flush 5-40 mL  5-40 mL IntraVENous PRN    acetaminophen (TYLENOL) tablet 650 mg  650 mg Oral Q6H PRN    Or    acetaminophen (TYLENOL) suppository 650 mg  650 mg Rectal Q6H PRN    polyethylene glycol (MIRALAX) packet 17 g  17 g Oral DAILY PRN    ondansetron (ZOFRAN ODT) tablet 4 mg  4 mg Oral Q8H PRN    Or    ondansetron (ZOFRAN) injection 4 mg  4 mg IntraVENous Q6H PRN    enoxaparin (LOVENOX) injection 30 mg  30 mg SubCUTAneous Q12H    albuterol (PROVENTIL VENTOLIN) nebulizer solution 2.5 mg  2.5 mg Nebulization Q4H PRN    arformoteroL (BROVANA) neb solution 15 mcg  15 mcg Nebulization BID RT    lidocaine-prilocaine (EMLA) 2.5-2.5 % cream   Topical PRN    pantoprazole (PROTONIX) tablet 40 mg  40 mg Oral BID    insulin lispro (HUMALOG) injection   SubCUTAneous AC&HS    glucose chewable tablet 16 g  4 Tablet Oral PRN    glucagon (GLUCAGEN) injection 1 mg  1 mg IntraMUSCular PRN    dextrose 10% infusion 0-250 mL  0-250 mL IntraVENous PRN         REVIEW OF SYSTEMS   12 point ROS negative except as stated in the HPI. Physical Exam:   Visit Vitals  BP (!) 176/64 (BP 1 Location: Right arm, BP Patient Position: At rest)   Pulse 90   Temp 99.4 °F (37.4 °C)   Resp 20   Ht 5' 2\" (1.575 m)   Wt 141.2 kg (311 lb 4.6 oz)   SpO2 93%   Breastfeeding No   BMI 56.94 kg/m²       General:  Alert, cooperative, no distress, appears stated age. Head:  Normocephalic, without obvious abnormality, atraumatic. Eyes:  Conjunctivae/corneas clear. Nose: Nares normal. Septum midline. Mucosa normal.    Throat: Lips, mucosa, and tongue normal.    Neck: Supple, symmetrical, trachea midline,    Lungs:   Clear to auscultation bilaterally, diminished over left base. Chest wall:  No tenderness or deformity. Heart:  Regular rate and rhythm, S1, S2 normal, no murmur, click, rub or gallop. Abdomen:   Soft, nontender; Bowel sounds normal. Drain in place. Extremities: Extremities normal, atraumatic, no cyanosis or edema. Pulses: 2+ and symmetric all extremities.    Skin: Skin color, texture, turgor normal. No rashes or lesions   Neurologic: Grossly nonfocal       Lab Results   Component Value Date/Time    Sodium 142 09/07/2022 03:17 AM    Potassium 3.8 09/07/2022 03:17 AM    Chloride 103 09/07/2022 03:17 AM    CO2 35 (H) 09/07/2022 03:17 AM    BUN 9 09/07/2022 03:17 AM    Creatinine 0.75 09/07/2022 03:17 AM    Glucose 112 (H) 09/07/2022 03:17 AM    Calcium 9.6 09/07/2022 03:17 AM    Magnesium 1.9 09/06/2022 02:37 AM    Phosphorus 2.9 09/06/2022 02:37 AM    Lactic acid 1.8 08/12/2022 09:38 AM       Lab Results   Component Value Date/Time    WBC 7.1 09/07/2022 03:17 AM    HGB 8.0 (L) 09/07/2022 03:17 AM    PLATELET 690 44/24/0620 03:17 AM    MCV 98.6 09/07/2022 03:17 AM       Lab Results   Component Value Date/Time    INR 1.1 07/26/2022 01:22 AM    aPTT 24.5 07/26/2022 01:22 AM    Alk.  phosphatase 53 09/07/2022 03:17 AM    Protein, total 6.3 (L) 09/07/2022 03:17 AM    Albumin 2.6 (L) 09/07/2022 03:17 AM    Globulin 3.7 09/07/2022 03:17 AM       Lab Results   Component Value Date/Time    Iron 41 09/06/2022 02:37 AM    TIBC 309 09/06/2022 02:37 AM    Iron % saturation 13 (L) 09/06/2022 02:37 AM    Ferritin 128 09/06/2022 02:37 AM       No results found for: SR, CRP, EDGARDO, ANAIGG, RA, RPR, RPRT, VDRLT, VDRLS, TSH, TSHEXT, TSHEXT     No results found for: PH, PHI, PCO2, PCO2I, PO2, PO2I, HCO3, HCO3I, FIO2, FIO2I    Lab Results   Component Value Date/Time    CK 56 08/13/2022 06:40 AM        Lab Results   Component Value Date/Time    Culture result:  09/05/2022 12:55 PM     NO GROWTH THUS FAR Culture performed on Unspun Fluid    Culture result: No significant growth, <10,000 CFU/mL 08/05/2022 12:00 PM       Lab Results   Component Value Date/Time    Hepatitis B surface Ag <0.10 04/13/2022 09:56 AM       Lab Results   Component Value Date/Time    CK 56 08/13/2022 06:40 AM    CK 89 08/11/2022 10:36 PM       Lab Results   Component Value Date/Time    Color Yellow 08/11/2022 10:36 PM    Appearance Hazy (A) 08/11/2022 10:36 PM    Specific gravity >1.030 (H) 07/21/2022 01:41 AM    pH (UA) 5.0 08/11/2022 10:36 PM    Protein Trace (A) 08/11/2022 10:36 PM    Glucose Normal (A) 08/11/2022 10:36 PM    Ketone Negative 08/11/2022 10:36 PM    Bilirubin 3 (A) 08/11/2022 10:36 PM    Blood Negative 08/11/2022 10:36 PM    Urobilinogen Normal 08/11/2022 10:36 PM    Nitrites Negative 08/11/2022 10:36 PM    Leukocyte Esterase Negative 08/11/2022 10:36 PM    WBC 5-10 08/11/2022 10:36 PM    RBC 0-5 08/11/2022 10:36 PM    Bacteria Negative 08/11/2022 10:36 PM       IMPRESSION  Pleural effusion  Intraabdominal fluid collection  Breast cancer  Recent perforated gastric ulcer s/p repair  PARMINDER, noncompliant with CPAP    PLAN  Goal sats 88% or higher, wean O2 as toelrated; currently 95% on 2L  O2 assessment prior to discharge  Effusion is small and not easily amenable to drainage; agree with surgery that this is likely reactive to intraabdominal process  Fluid studies from the abdomen appear to have been done under prior thoracentesis orders, was not documented to have had pleural fluid drained. Abx per primary team/surgery; fu cultures  Diuresis per primary team; watch creat    Patient is stable from a pulmonary standpoint. We will sign off and be available as needed. Please call with questions.       Juanita Fischer, [address]

## 2022-09-07 NOTE — PROGRESS NOTES
9/7/2022  Case Management Progress Note    2:55 PM  Patient was declined by [facility] and [employer]. Sent to [employer]. MARCO A Villalba    12:04 PM  Patient is 65year old female admitted 9/4 with pleural effusion  Patient's RUR is 22% red/high risk for readmission  Covid test: none this admission, not indicated  Chart reviewed--patient discussed at IDR rounds  Per rounds this morning patient is likely ready for discharge today. She will discharge with the drain and I have discussed at rounds that patient should be taught by nursing to take care of the drain at home and asked if we could send her home with some supplies. Sent a referral to [facility] in [place] and [facility] out of [place] to see if they might be able to offer any visits for patient. Will continue to follow and assist with discharge planning as needed.      Transition of Care plan   Continue medical management/treatment  Sent referrals for home health to see if patient can get any coverage at all  Family will transport at discharge  Follow up outpatient as indicated, especially with PCP  CM will continue to follow    MARCO A Villalba

## 2022-09-07 NOTE — PROGRESS NOTES
[facility]  [address] [facility], [facility]  [phone]         Hospitalist Progress Note        NAME:  Luis Lorenzo   :  1960   MRN:  [id number]    Date/Time:  2022     Patient PCP:  Brandyn Kingston NP    Emergency Contact:    Extended Emergency Contact Information  Primary Emergency Contact: Haley Phone: [phone]  Relation: Daughter  Secondary Emergency Contact: Stevie Coates Phone: [phone]  Relation: Unknown      Code: Full Code     Isolation Precautions: There are currently no Active Isolations        Subjective:     REASON FOR VISIT:  Recheck abdominal fluid collection and pleural effusion    HPI & INTERVAL HISTORY:     Ms. Megan Samuel is a 66 yo hx of HTN, DM, breast CA, asthma, perforated gastric ulcer repair, presented w/ chest pain, abd fluid collection, L pleural effusion    : Patient was seen and examined. She was cleared by surgery for follow-up for drain removal next week. BP has been elevated despite medications and patient is very hesitant on going home stating that she does not feel well.    : Patient was seen and examined she was up in chair reports that her breathing is somewhat better today but still with some shortness of breath. Abdominal drain in place seen alongside surgeon we will continue to monitor patient. No acute events overnight.     ALLERGIES  Allergies   Allergen Reactions    Nuts [Tree Nut] Rash and Itching     Throat itching         ROS:  Gen:   Negative  Eyes:  negative  ENT:    negative  Resp:  shortness of breath  CVS:   negative  GI:       abdominal pain           Objective:      Visit Vitals  BP (!) 183/72 (BP 1 Location: Right upper arm, BP Patient Position: At rest)   Pulse 91   Temp 98.7 °F (37.1 °C)   Resp 18   Ht 5' 2\" (1.575 m)   Wt 141.2 kg (311 lb 4.6 oz)   SpO2 94%   Breastfeeding No   BMI 56.94 kg/m²       Physical Exam:  General: alert, no distress, morbidly obese, and chronically ill appearing  Head: Normocephalic, without obvious abnormality, atraumatic  Eyes: anicteric sclerae and conjuntiva clear  ENT: lips, mucosa, and tongue normal  Neck: normal, supple, and no tenderness  Lungs: CTA  Heart: S1, S2 normal, regular rate, and regular rhythm  Abd: not distended, soft, mild generalized tenderness to palpation, BS present and normactive abdominal drain in place with little drainage  Ext: no cyanosis and no edema  Skin: normal skin color, no rashes, and texture normal  Neuro:  alert, oriented, no defects noted in general exam.  Psych: not anxious, cooperative, appropriate affect      Medications:  Current Facility-Administered Medications   Medication Dose Route Frequency    amLODIPine (NORVASC) tablet 10 mg  10 mg Oral DAILY    bumetanide (BUMEX) injection 1 mg  1 mg IntraVENous DAILY    oxyCODONE IR (ROXICODONE) tablet 5 mg  5 mg Oral Q6H PRN    oxyCODONE IR (ROXICODONE) tablet 10 mg  10 mg Oral Q6H PRN    L.acidophilus-paracasei-S.thermophil-bifidobacter (RISAQUAD) 8 billion cell capsule  1 Capsule Oral DAILY    piperacillin-tazobactam (ZOSYN) 3.375 g in 0.9% sodium chloride (MBP/ADV) 100 mL MBP  3.375 g IntraVENous Q8H    HYDROmorphone (DILAUDID) injection 2 mg  2 mg IntraVENous Q4H PRN    potassium chloride SR (KLOR-CON 10) tablet 40 mEq  40 mEq Oral DAILY    sodium chloride (NS) flush 5-40 mL  5-40 mL IntraVENous Q8H    sodium chloride (NS) flush 5-40 mL  5-40 mL IntraVENous PRN    acetaminophen (TYLENOL) tablet 650 mg  650 mg Oral Q6H PRN    Or    acetaminophen (TYLENOL) suppository 650 mg  650 mg Rectal Q6H PRN    polyethylene glycol (MIRALAX) packet 17 g  17 g Oral DAILY PRN    ondansetron (ZOFRAN ODT) tablet 4 mg  4 mg Oral Q8H PRN    Or    ondansetron (ZOFRAN) injection 4 mg  4 mg IntraVENous Q6H PRN    enoxaparin (LOVENOX) injection 30 mg  30 mg SubCUTAneous Q12H    albuterol (PROVENTIL VENTOLIN) nebulizer solution 2.5 mg  2.5 mg Nebulization Q4H PRN arformoteroL (BROVANA) neb solution 15 mcg  15 mcg Nebulization BID RT    lidocaine-prilocaine (EMLA) 2.5-2.5 % cream   Topical PRN    pantoprazole (PROTONIX) tablet 40 mg  40 mg Oral BID    insulin lispro (HUMALOG) injection   SubCUTAneous AC&HS    glucose chewable tablet 16 g  4 Tablet Oral PRN    glucagon (GLUCAGEN) injection 1 mg  1 mg IntraMUSCular PRN    dextrose 10% infusion 0-250 mL  0-250 mL IntraVENous PRN        Labs:  Recent Labs     09/07/22  0317   WBC 7.1   HGB 8.0*   HCT 28.5*          Recent Labs     09/07/22 0317 09/06/22  0237    140   K 3.8 4.5    99   CO2 35* 35*   * 124*   BUN 9 10   CREA 0.75 1.03*   CA 9.6 10.9*   MG  --  1.9   PHOS  --  2.9   ALB 2.6*  --    TBILI 0.4  --    ALT 15  --        Radiology:  No results found. I personally reviewed and interpreted the imaging studies and agree with official reading    The labs, imaging studies, and medications was reviewed by me on: September 7, 2022         Assessment/Plan:      66 yo hx of HTN, DM, breast CA, asthma, perforated gastric ulcer repair, presented w/ chest pain, abd fluid collection, L pleural effusion     Abd fluid collection/pain concerning for abscess with recent gastric ulcer repair:  IR drain inserted on 9/03  Cultures pending but no growth thus far continue Zosyn  Cleared for discharge from surgical standpoint with drain for removal next week     L pleural effusion: unclear etiology. Could be related to abd fluid collection and recent gastric ulcer repair. Pulmonary considers effusion too small to be amenable for drainage and agrees with surgery this is likely reactive intra-abdominal process     Anasarca: due to malnutrition. Last echo with EF 60-65%. Will start daily IV bumex. Monitor I/O, BMP     Severe protein calorie malnutrition: due to recent surgery. Will keep on clears for now.     Might need TPN if repeat surgery     DM type 2:   Continue ISS     HTN:   BP not well controlled  Continue amlodipine and add lisinopril and as needed hydralazine     Breast CA: s/p chemo    Body mass index is 56.94 kg/m².:  40 or above:   Morbid obesity      Risk of deterioration: high      Discussed:  Pt's condition, Imaging findings, Lab findings, Assessment, and Care Plan discussed with: Patient, RN, Specialist, and Care Manager    Prophylaxis:  Lovenox SQ    Probable disposition:  SNF      Total time: 40 minutes **I personally saw and examined the patient during this time period**      Date of service:    9/7/2022                ___________________________________________________    Admitting Physician: Mckinley Martinez MD

## 2022-09-07 NOTE — PROGRESS NOTES
Problem: Self Care Deficits Care Plan (Adult)  Goal: *Acute Goals and Plan of Care (Insert Text)  Description: FUNCTIONAL STATUS PRIOR TO ADMISSION: Patient was modified independent using a rolling walker for functional mobility. Prior to July 2022, pt was independent with ADLs. She underwent perforated gastric ulcer repair in July and since then has required assistance from dtr for dressing and bathing. Pt has home O2 at home, but does not use it. HOME SUPPORT: The patient lived with daughter and required minimal assistance/contact guard assist for bathing and dressing, and some toileting. Occupational Therapy Goals  Initiated 9/6/2022  1. Patient will perform grooming with supervision/set-up within 7 day(s). 2.  Patient will perform lower body dressing with minimal assistance/contact guard assist using LH AE within 7 day(s). 3.  Patient will perform bathing with minimal assistance/contact guard assist within 7 day(s). 4.  Patient will perform toilet transfers with modified independence within 7 day(s). 5.  Patient will perform all aspects of toileting with minimal assistance/contact guard assist using AE within 7 day(s). 6.  Patient will participate in upper extremity therapeutic exercise/activities with modified independence for 10 minutes within 7 day(s). 9/7/2022 1554 by Rosita Cronin OT  Outcome: Progressing Towards Goal   OCCUPATIONAL THERAPY TREATMENT  Patient: Ricci Peabody (15 y.o. female)  Date: 9/7/2022  Diagnosis: Pleural effusion, left [J90]  Pleural effusion [J90] <principal problem not specified>      Precautions:    Chart, occupational therapy assessment, plan of care, and goals were reviewed. ASSESSMENT  Patient continues with skilled OT services and is progressing towards goals. Patient received with O2 NC off  satting ~90% coming to EOB and voicing frustration with current disposition, offered therapeutic listening/reassurance and discussed pt goals.  She was AxOx4 but unable to recall some events from earlier in the day surrounding care (when O2 removed, when she was last able to get up). Pt continues to be agreeable to therapy and intervention today. She states she does not like using the walker for short distances so ambulates via [place] assist and gaitbelt 3 ft to [facility] 2/2 urgency. Pt requires Mod A for bladder hygeine d/t decreased reach. She ambulates short distance in room with HHA to recliner chair, pt with rapid O2 desaturation to 77%, unable to recover with PLB and rest in chair past 81%, requires 5L O2 for ~30 seconds for recovery back to 85%, pt with SOB although stating not uncomfortable during desaturation. Replaced O2 to 2L (via chart) O2 at 92% and alerted RN who came in room to check saturations at conclusion of session. Pt offers good participation and is very pleasant, anticipate HHOT at d/c and supervision with LB ADLs and transfers at medically stable d/c. Current Level of Function Impacting Discharge (ADLs): Mod A toileting, CGA functional ambulation via [place] assist    Other factors to consider for discharge: supplemental O2, comorbidities, PLOF         PLAN :  Patient continues to benefit from skilled intervention to address the above impairments. Continue treatment per established plan of care to address goals. Recommend with staff: OOB to recliner as tolerated.  OOB to BSC/bathroom with assist x1 and RW for safety     Recommend next OT session: Progress functional ambulation, progress standing ADLs    Recommendation for discharge: (in order for the patient to meet his/her long term goals)  Occupational therapy at least 2 days/week in the home AND ensure assist and/or supervision for safety with transfers and LB ADLs    This discharge recommendation:  Has not yet been discussed the attending provider and/or case management    IF patient discharges home will need the following DME: TBD       SUBJECTIVE:   Patient stated I'm frustrated because every time I get home I have to come back here with something else.     OBJECTIVE DATA SUMMARY:   Cognitive/Behavioral Status:  Neurologic State: Alert  Orientation Level: Oriented X4  Cognition: Appropriate decision making; Appropriate safety awareness; Follows commands  Perception: Appears intact  Perseveration: No perseveration noted  Safety/Judgement: Awareness of environment    Functional Mobility and Transfers for ADLs:  Bed Mobility:  Rolling: Stand-by assistance  Supine to Sit: Stand-by assistance  Scooting: Stand-by assistance    Transfers:  Sit to Stand: Stand-by assistance  Functional Transfers  Bathroom Mobility: Contact guard assistance  Toilet Transfer : Contact guard assistance  Bed to Chair: Contact guard assistance    Balance:  Sitting: Intact; Without support  Standing: Impaired  Standing - Static: Good;Constant support  Standing - Dynamic : Fair;Constant support    ADL Intervention:     Toileting  Bladder Hygiene: Moderate assistance  Clothing Management: Contact guard assistance  Cues: Verbal cues provided  Adaptive Equipment:  (BSC)    Cognitive Retraining  Safety/Judgement: Awareness of environment      Pain:  none    Activity Tolerance:   Poor, desaturates with exertion and requires oxygen, requires rest breaks, and observed SOB with activity    After treatment patient left in no apparent distress:   Sitting in chair, Heels elevated for pressure relief, Call bell within reach, and Bed / chair alarm activated    COMMUNICATION/COLLABORATION:   The patients plan of care was discussed with: Registered nurse.      Real Must, OT  Time Calculation: 24 mins

## 2022-09-07 NOTE — PROGRESS NOTES
[facility]      Subjective     No acute events  Some pain, but improved from previous    Objective     Patient Vitals for the past 24 hrs:   Temp Pulse Resp BP SpO2   09/07/22 0925 -- -- -- (!) 177/69 --   09/07/22 0802 98.7 °F (37.1 °C) 91 18 (!) 183/72 94 %   09/07/22 0730 -- -- -- -- 95 %   09/07/22 0454 98.4 °F (36.9 °C) 96 20 (!) 151/89 95 %   09/07/22 0022 97.9 °F (36.6 °C) 92 19 (!) 149/81 97 %   09/06/22 2025 -- -- -- -- 98 %   09/06/22 2000 98.7 °F (37.1 °C) -- 19 (!) 144/77 97 %   09/06/22 1619 98.4 °F (36.9 °C) 82 18 (!) 155/74 95 %   09/06/22 1139 99.7 °F (37.6 °C) 86 18 136/81 95 %       Date 09/06/22 0700 - 09/07/22 0659 09/07/22 0700 - 09/08/22 0659   Shift 9335-8982 0641-6473 24 Hour Total 2960-6196 1601-1847 24 Hour Total   INTAKE   P.O.  150 150        P. O.  150 150      I. V.(mL/kg/hr)  100(0.1) 100(0)        Volume (piperacillin-tazobactam (ZOSYN) 3.375 g in 0.9% sodium chloride (MBP/ADV) 100 mL MBP)  100 100      NG/GT 20  20        Intake (ml) (Drain [facility] Drainage 09/04/22 Left Other (comment)) 20  20      Shift Total(mL/kg) 20(0.1) 250(1.8) 270(1.9)      OUTPUT   Urine(mL/kg/hr)  600(0.4) 600(0.2) 200  200     Urine Voided  600 600 200  200     Urine Occurrence(s) 1 x 2 x 3 x 2 x  2 x   Emesis/NG output           Emesis Occurrence(s) 0 x  0 x      Drains  35 35        Output (ml) (Drain [facility] Drainage 09/04/22 Left Other (comment))  35 35      Stool           Stool Occurrence(s) 0 x  0 x      Shift Total(mL/kg)  635(4.5) 635(4.5) 200(1.4)  200(1.4)   NET 20 -385 -365 -200  -200   Weight (kg) 141.2 141.2 141.2 141.2 141.2 141.2       PE  GEN - Awake, alert, communicating appropriately.   NAD  Pulm - CTAB  CV - RRR  Abd - soft, NTND, drain hazy, output 35 ml      Labs  Recent Results (from the past 24 hour(s))   GLUCOSE, POC    Collection Time: 09/06/22 11:10 AM   Result Value Ref Range    Glucose (POC) 111 65 - 117 mg/dL    Performed by Nico Baker RN    GLUCOSE, POC    Collection Time: 09/06/22  4:27 PM   Result Value Ref Range    Glucose (POC) 107 65 - 117 mg/dL    Performed by Vicky Nelson    GLUCOSE, POC    Collection Time: 09/06/22  9:27 PM   Result Value Ref Range    Glucose (POC) 138 (H) 65 - 117 mg/dL    Performed by Savannah Fuller    CBC WITH AUTOMATED DIFF    Collection Time: 09/07/22  3:17 AM   Result Value Ref Range    WBC 7.1 3.6 - 11.0 K/uL    RBC 2.89 (L) 3.80 - 5.20 M/uL    HGB 8.0 (L) 11.5 - 16.0 g/dL    HCT 28.5 (L) 35.0 - 47.0 %    MCV 98.6 80.0 - 99.0 FL    MCH 27.7 26.0 - 34.0 PG    MCHC 28.1 (L) 30.0 - 36.5 g/dL    RDW 17.0 (H) 11.5 - 14.5 %    PLATELET 200 672 - 345 K/uL    MPV 8.7 (L) 8.9 - 12.9 FL    NRBC 0.0 0  WBC    ABSOLUTE NRBC 0.00 0.00 - 0.01 K/uL    NEUTROPHILS 69 32 - 75 %    LYMPHOCYTES 10 (L) 12 - 49 %    MONOCYTES 14 (H) 5 - 13 %    EOSINOPHILS 6 0 - 7 %    BASOPHILS 0 0 - 1 %    IMMATURE GRANULOCYTES 1 (H) 0.0 - 0.5 %    ABS. NEUTROPHILS 4.9 1.8 - 8.0 K/UL    ABS. LYMPHOCYTES 0.7 (L) 0.8 - 3.5 K/UL    ABS. MONOCYTES 1.0 0.0 - 1.0 K/UL    ABS. EOSINOPHILS 0.4 0.0 - 0.4 K/UL    ABS. BASOPHILS 0.0 0.0 - 0.1 K/UL    ABS. IMM. GRANS. 0.1 (H) 0.00 - 0.04 K/UL    DF SMEAR SCANNED      RBC COMMENTS ANISOCYTOSIS  1+        RBC COMMENTS HYPOCHROMIA  1+       METABOLIC PANEL, COMPREHENSIVE    Collection Time: 09/07/22  3:17 AM   Result Value Ref Range    Sodium 142 136 - 145 mmol/L    Potassium 3.8 3.5 - 5.1 mmol/L    Chloride 103 97 - 108 mmol/L    CO2 35 (H) 21 - 32 mmol/L    Anion gap 4 (L) 5 - 15 mmol/L    Glucose 112 (H) 65 - 100 mg/dL    BUN 9 6 - 20 MG/DL    Creatinine 0.75 0.55 - 1.02 MG/DL    BUN/Creatinine ratio 12 12 - 20      GFR est AA >60 >60 ml/min/1.73m2    GFR est non-AA >60 >60 ml/min/1.73m2    Calcium 9.6 8.5 - 10.1 MG/DL    Bilirubin, total 0.4 0.2 - 1.0 MG/DL    ALT (SGPT) 15 12 - 78 U/L    AST (SGOT) 12 (L) 15 - 37 U/L    Alk.  phosphatase 53 45 - 117 U/L Protein, total 6.3 (L) 6.4 - 8.2 g/dL    Albumin 2.6 (L) 3.5 - 5.0 g/dL    Globulin 3.7 2.0 - 4.0 g/dL    A-G Ratio 0.7 (L) 1.1 - 2.2     GLUCOSE, POC    Collection Time: 09/07/22  7:54 AM   Result Value Ref Range    Glucose (POC) 110 65 - 117 mg/dL    Performed by Franky Bai is a 65 y. o.yr old female s/p repair gastric perforated ulcer, readmitted with abdominal fluid collection, s/p IR drainage  Improving clinically, output continues to be fairly low  NGTD on cultures  Off O2 currently    Plan     Ok for discharge from surgical standpoint once cleared by medical team  Plan for drain removal in clinic  Follow up with me early next week    20 mins of time was spent with the patient of which > 50% of the time involved face-to-face counseling of the patient regarding the proposed treatment plan.     Jami Anderson MD

## 2022-09-08 LAB
GLUCOSE BLD STRIP.AUTO-MCNC: 115 MG/DL (ref 65–117)
GLUCOSE BLD STRIP.AUTO-MCNC: 125 MG/DL (ref 65–117)
GLUCOSE BLD STRIP.AUTO-MCNC: 128 MG/DL (ref 65–117)
GLUCOSE BLD STRIP.AUTO-MCNC: 136 MG/DL (ref 65–117)
SERVICE CMNT-IMP: ABNORMAL
SERVICE CMNT-IMP: NORMAL

## 2022-09-08 PROCEDURE — 74011250637 HC RX REV CODE- 250/637: Performed by: INTERNAL MEDICINE

## 2022-09-08 PROCEDURE — 94761 N-INVAS EAR/PLS OXIMETRY MLT: CPT

## 2022-09-08 PROCEDURE — 74011250637 HC RX REV CODE- 250/637: Performed by: HOSPITALIST

## 2022-09-08 PROCEDURE — 74011250636 HC RX REV CODE- 250/636: Performed by: INTERNAL MEDICINE

## 2022-09-08 PROCEDURE — 97535 SELF CARE MNGMENT TRAINING: CPT

## 2022-09-08 PROCEDURE — 74011000250 HC RX REV CODE- 250: Performed by: INTERNAL MEDICINE

## 2022-09-08 PROCEDURE — 94640 AIRWAY INHALATION TREATMENT: CPT

## 2022-09-08 PROCEDURE — 74011000258 HC RX REV CODE- 258: Performed by: INTERNAL MEDICINE

## 2022-09-08 PROCEDURE — 94760 N-INVAS EAR/PLS OXIMETRY 1: CPT

## 2022-09-08 PROCEDURE — 77010033678 HC OXYGEN DAILY

## 2022-09-08 PROCEDURE — 82962 GLUCOSE BLOOD TEST: CPT

## 2022-09-08 PROCEDURE — 77030003560 HC NDL HUBR BARD -A

## 2022-09-08 PROCEDURE — 74011000250 HC RX REV CODE- 250: Performed by: SURGERY

## 2022-09-08 PROCEDURE — 65270000029 HC RM PRIVATE

## 2022-09-08 PROCEDURE — 97116 GAIT TRAINING THERAPY: CPT

## 2022-09-08 PROCEDURE — 97530 THERAPEUTIC ACTIVITIES: CPT

## 2022-09-08 PROCEDURE — 94664 DEMO&/EVAL PT USE INHALER: CPT

## 2022-09-08 RX ORDER — HYDRALAZINE HYDROCHLORIDE 25 MG/1
50 TABLET, FILM COATED ORAL 3 TIMES DAILY
Status: DISCONTINUED | OUTPATIENT
Start: 2022-09-08 | End: 2022-09-13 | Stop reason: HOSPADM

## 2022-09-08 RX ADMIN — HYDRALAZINE HYDROCHLORIDE 50 MG: 25 TABLET, FILM COATED ORAL at 21:59

## 2022-09-08 RX ADMIN — PANTOPRAZOLE SODIUM 40 MG: 40 TABLET, DELAYED RELEASE ORAL at 18:51

## 2022-09-08 RX ADMIN — HYDRALAZINE HYDROCHLORIDE 50 MG: 25 TABLET, FILM COATED ORAL at 17:01

## 2022-09-08 RX ADMIN — ARFORMOTEROL TARTRATE 15 MCG: 15 SOLUTION RESPIRATORY (INHALATION) at 07:10

## 2022-09-08 RX ADMIN — HYDRALAZINE HYDROCHLORIDE 25 MG: 25 TABLET, FILM COATED ORAL at 07:55

## 2022-09-08 RX ADMIN — ENOXAPARIN SODIUM 30 MG: 100 INJECTION SUBCUTANEOUS at 21:00

## 2022-09-08 RX ADMIN — PIPERACILLIN AND TAZOBACTAM 3.38 G: 3; .375 INJECTION, POWDER, FOR SOLUTION INTRAVENOUS at 12:09

## 2022-09-08 RX ADMIN — Medication 10 ML: at 17:01

## 2022-09-08 RX ADMIN — Medication 5 ML: at 22:00

## 2022-09-08 RX ADMIN — POTASSIUM CHLORIDE 40 MEQ: 750 TABLET, FILM COATED, EXTENDED RELEASE ORAL at 11:51

## 2022-09-08 RX ADMIN — LISINOPRIL 20 MG: 20 TABLET ORAL at 08:52

## 2022-09-08 RX ADMIN — BUMETANIDE 1 MG: 0.25 INJECTION, SOLUTION INTRAMUSCULAR; INTRAVENOUS at 10:27

## 2022-09-08 RX ADMIN — PANTOPRAZOLE SODIUM 40 MG: 40 TABLET, DELAYED RELEASE ORAL at 08:51

## 2022-09-08 RX ADMIN — Medication 1 CAPSULE: at 08:52

## 2022-09-08 RX ADMIN — ENOXAPARIN SODIUM 30 MG: 100 INJECTION SUBCUTANEOUS at 08:49

## 2022-09-08 RX ADMIN — PIPERACILLIN AND TAZOBACTAM 3.38 G: 3; .375 INJECTION, POWDER, FOR SOLUTION INTRAVENOUS at 18:50

## 2022-09-08 RX ADMIN — ARFORMOTEROL TARTRATE 15 MCG: 15 SOLUTION RESPIRATORY (INHALATION) at 19:35

## 2022-09-08 RX ADMIN — HYDRALAZINE HYDROCHLORIDE 25 MG: 25 TABLET, FILM COATED ORAL at 12:08

## 2022-09-08 RX ADMIN — AMLODIPINE BESYLATE 10 MG: 5 TABLET ORAL at 08:51

## 2022-09-08 NOTE — PROGRESS NOTES
Problem: Mobility Impaired (Adult and Pediatric)  Goal: *Acute Goals and Plan of Care (Insert Text)  Description: FUNCTIONAL STATUS PRIOR TO ADMISSION: Patient was modified independent using a rolling walker for functional mobility. HOME SUPPORT PRIOR TO ADMISSION: The patient lived with daughter but did not require assist for mobility. Physical Therapy Goals  Initiated 9/6/2022  1. Patient will move from supine to sit and sit to supine  in bed with modified independence within 7 day(s). 2.  Patient will transfer from bed to chair and chair to bed with modified independence using the least restrictive device within 7 day(s). 3.  Patient will perform sit to stand with modified independence within 7 day(s). 4.  Patient will ambulate with modified independence for 50 feet with the least restrictive device within 7 day(s). 5.  Patient will ascend/descend 5 stairs with b/l handrail(s) with modified independence within 7 day(s). Outcome: Progressing Towards Goal   PHYSICAL THERAPY TREATMENT  Patient: Glenn Luna (89 y.o. female)  Date: 9/8/2022  Diagnosis: Pleural effusion, left [J90]  Pleural effusion [J90] <principal problem not specified>      Precautions:    Chart, physical therapy assessment, plan of care and goals were reviewed. ASSESSMENT  Patient continues with skilled PT services and is progressing towards goals. Patient agreeable to OOB for home O2 assessment. Received in supine on RA with sats at 84%. Needed 2L to regain > 90% O2 sats. Performed gait in room and desaturated to 87%, needed 3L to maintain but quickly fatigued after one lap around the room. Returned to supine and titrated down to 2L. Continues to have poor activity tolerance, will benefit from HHPT at d/c with 24/7 support at home for functional mobility tasks.      Pulse Oximetry Assessment    84% at rest on room air  N/A% while ambulating on room air  94% at rest on 2 LPM  92% while ambulating on 3 LPM    Current Level of Function Impacting Discharge (mobility/balance): SBA to CGA, gait x 30'    Other factors to consider for discharge:          PLAN :  Patient continues to benefit from skilled intervention to address the above impairments. Continue treatment per established plan of care. to address goals. Recommendation for discharge: (in order for the patient to meet his/her long term goals)  Physical therapy at least 2 days/week in the home AND ensure assist and/or supervision for safety with functional mobility    This discharge recommendation:  Has been made in collaboration with the attending provider and/or case management    IF patient discharges home will need the following DME: patient owns DME required for discharge       SUBJECTIVE:   Patient stated I'm just so tired this afternoon.     OBJECTIVE DATA SUMMARY:   Critical Behavior:  Neurologic State: Alert  Orientation Level: Oriented X4  Cognition: Appropriate decision making, Appropriate for age attention/concentration, Appropriate safety awareness, Follows commands  Safety/Judgement: Awareness of environment, Fall prevention  Functional Mobility Training:  Bed Mobility:     Supine to Sit: Stand-by assistance; Additional time     Scooting: Stand-by assistance        Transfers:  Sit to Stand: Stand-by assistance;Contact guard assistance  Stand to Sit: Stand-by assistance        Bed to Chair: Stand-by assistance;Contact guard assistance                    Balance:  Sitting: Intact  Standing: With support  Standing - Static: Good;Constant support  Standing - Dynamic : Good;Fair;Constant support  Ambulation/Gait Training:  Distance (ft): 30 Feet (ft)  Assistive Device: Gait belt;Walker, rolling  Ambulation - Level of Assistance: Stand-by assistance                 Base of Support: Widened     Speed/[place]: Pace decreased (<100 feet/min)  Step Length: Right shortened;Left shortened                    Pain Rating:  None    Activity Tolerance:   Fair, desaturates with exertion and requires oxygen, and requires rest breaks    After treatment patient left in no apparent distress:   Supine in bed and Call bell within reach    COMMUNICATION/COLLABORATION:   The patients plan of care was discussed with: Occupational therapist and Registered nurse.      Natalia Muñiz, PT   Time Calculation: 28 mins

## 2022-09-08 NOTE — PROGRESS NOTES
Bedside shift change report given to Hipolito Palacio (oncoming nurse) by Leighton Hensley (offgoing nurse). Report included the following information SBAR, Kardex, Intake/Output, and MAR.

## 2022-09-08 NOTE — PROGRESS NOTES
9/8/2022  Case Management Progress Note    10:05 AM  Patient is 65year old female admitted 9/4 with pleural effusion  Patient's RUR is 22% red/high risk for readmission  Covid test: none this admission, not indicated  Chart reviewed  Per chart patient was not feeling well enough for home yesterday. I have tried all the home health companies that take her insurance and go to her area plus might have been able to see her with no PCP and all have denied [facility], [facility]). Patient is aware she needs to see a PCP to get home health set up. No other needs noted at this time, will continue to follow and assist as able.      Transition of Care Plan   Continue medical management/treatment  Patient will need to go home with drain supplies  Attempted to get home health but unable without PCP  Family will transport home at discharge  Follow up outpatient as indicated, especially with PCP  CM will continue to follow    Lendel Nissen, MSW

## 2022-09-08 NOTE — PROGRESS NOTES
Problem: Self Care Deficits Care Plan (Adult)  Goal: *Acute Goals and Plan of Care (Insert Text)  Description: FUNCTIONAL STATUS PRIOR TO ADMISSION: Patient was modified independent using a rolling walker for functional mobility. Prior to July 2022, pt was independent with ADLs. She underwent perforated gastric ulcer repair in July and since then has required assistance from dtr for dressing and bathing. Pt has home O2 at home, but does not use it. HOME SUPPORT: The patient lived with daughter and required minimal assistance/contact guard assist for bathing and dressing, and some toileting. Occupational Therapy Goals  Initiated 9/6/2022  1. Patient will perform grooming with supervision/set-up within 7 day(s). 2.  Patient will perform lower body dressing with minimal assistance/contact guard assist using LH AE within 7 day(s). 3.  Patient will perform bathing with minimal assistance/contact guard assist within 7 day(s). 4.  Patient will perform toilet transfers with modified independence within 7 day(s). 5.  Patient will perform all aspects of toileting with minimal assistance/contact guard assist using AE within 7 day(s). 6.  Patient will participate in upper extremity therapeutic exercise/activities with modified independence for 10 minutes within 7 day(s). Outcome: Progressing Towards Goal     OCCUPATIONAL THERAPY TREATMENT  Patient: Renée Tapia (60 y.o. female)  Date: 9/8/2022  Diagnosis: Pleural effusion, left [J90]  Pleural effusion [J90] <principal problem not specified>      Precautions:    Chart, occupational therapy assessment, plan of care, and goals were reviewed. ASSESSMENT  Patient continues with skilled OT services and is progressing towards goals. Patient today received in bed on RA, unable to achieve accurate SpO2 reading until seated EOB. She is observed to 93% on RA at rest and desaturates to 80s with activity.  Pt completes mobility to bathroom for ADLs and requires mod A for hygiene. She continues to require increased assistance for LB ADLs and fatigues quickly. She is seated up in chair at end of session with all needs met. Continue to recommend [facility] and increased support. Current Level of Function Impacting Discharge (ADLs): up to mod A for toileting; max A for LB dressing; set up for seated UB ADLs; CGA for ADL transfers    Other factors to consider for discharge: medical history; supplemental O2 needs         PLAN :  Patient continues to benefit from skilled intervention to address the above impairments. Continue treatment per established plan of care to address goals. Recommend with staff: OOB to chair for al meals; mobility to bathroom for toileting; ADLs as needed    Recommend next OT session: LB ADLs; LH AE    Recommendation for discharge: (in order for the patient to meet his/her long term goals)  Occupational therapy at least 2 days/week in the home     This discharge recommendation:  Has been made in collaboration with the attending provider and/or case management    IF patient discharges home will need the following DME: home O2       SUBJECTIVE:   Patient stated I'm tired today.     OBJECTIVE DATA SUMMARY:   Cognitive/Behavioral Status:  Neurologic State: Alert  Orientation Level: Oriented X4  Cognition: Appropriate decision making; Appropriate for age attention/concentration; Appropriate safety awareness; Follows commands  Perception: Appears intact  Perseveration: No perseveration noted  Safety/Judgement: Awareness of environment; Fall prevention    Functional Mobility and Transfers for ADLs:  Bed Mobility:  Supine to Sit: Stand-by assistance; Additional time  Scooting: Stand-by assistance    Transfers:  Sit to Stand: Stand-by assistance;Contact guard assistance  Functional Transfers  Bathroom Mobility: Contact guard assistance  Toilet Transfer : Contact guard assistance  Bed to Chair: Stand-by assistance;Contact guard assistance    Balance:  Sitting: Intact  Standing: With support  Standing - Static: Good;Constant support  Standing - Dynamic : Good;Fair;Constant support    ADL Intervention:  Grooming  Grooming Assistance: Set-up  Position Performed: Seated in chair  Washing Hands: Set-up    Toileting  Bladder Hygiene: Moderate assistance  Clothing Management: Stand-by assistance  Cues: Verbal cues provided  Adaptive Equipment: Grab bars; Walker    Cognitive Retraining  Safety/Judgement: Awareness of environment; Fall prevention    Pain:  Pt reporting minimal pain    Activity Tolerance:   Fair and desaturates with exertion and requires oxygen    After treatment patient left in no apparent distress:   Sitting in chair, Call bell within reach, and Bed / chair alarm activated    COMMUNICATION/COLLABORATION:   The patients plan of care was discussed with: Physical therapist and Registered nurse.      Luis Gutierrez OT  Time Calculation: 20 mins

## 2022-09-08 NOTE — PROGRESS NOTES
Problem: Impaired Skin Integrity/Pressure Injury Treatment  Goal: *Prevention of pressure injury  Description: Document Eliecer Scale and appropriate interventions in the flowsheet. Outcome: Progressing Towards Goal  Note: Pressure Injury Interventions:  Sensory Interventions: Assess changes in LOC    Moisture Interventions: Absorbent underpads    Activity Interventions: Assess need for specialty bed    Mobility Interventions: Assess need for specialty bed, HOB 30 degrees or less    Nutrition Interventions: Document food/fluid/supplement intake, Offer support with meals,snacks and hydration    Friction and Shear Interventions: HOB 30 degrees or less, Lift sheet, Minimize layers                Problem: Falls - Risk of  Goal: *Absence of Falls  Description: Document Sofia Fall Risk and appropriate interventions in the flowsheet.   Outcome: Progressing Towards Goal  Note: Fall Risk Interventions:  Mobility Interventions: Bed/chair exit alarm, Patient to call before getting OOB         Medication Interventions: Bed/chair exit alarm, Patient to call before getting OOB, Teach patient to arise slowly    Elimination Interventions: Bed/chair exit alarm, Call light in reach, Patient to call for help with toileting needs, Stay With Me (per policy)    History of Falls Interventions: Bed/chair exit alarm

## 2022-09-08 NOTE — PROGRESS NOTES
Received patient without IV access. Prev port access was pulled out accidentally. Tried to re access port with same needle size  but no blood return (smear only) prev needle site from port is leaking fluids, MD made aware too. IV peripheral tried once since Vascular Team not available but failed. Escalated to SR nurse but pt. Is refusing IV insertion not until yolanda. MD made aware of missing doses of Abx tonight.

## 2022-09-08 NOTE — PROGRESS NOTES
[facility]  [address] [facility]  [phone]         Hospitalist Progress Note        NAME:  Elyse Vera   :  1960   MRN:  [id number]    Date/Time:  2022     Patient PCP:  Olga Leahy NP    Emergency Contact:    Extended Emergency Contact Information  Primary Emergency Contact: Haley Phone: [phone]  Relation: Daughter  Secondary Emergency Contact: Stevie Coates Phone: [phone]  Relation: Unknown      Code: Full Code     Isolation Precautions: There are currently no Active Isolations        Subjective:     REASON FOR VISIT:  Recheck abdominal fluid collection and pleural effusion    HPI & INTERVAL HISTORY:     Ms. Mango Moore is a 64 yo hx of HTN, DM, breast CA, asthma, perforated gastric ulcer repair, presented w/ chest pain, abd fluid collection, L pleural effusion    : Patient was seen and examined. She reports not feeling very well today and blood pressures have been elevated. Her oxygen saturations decreased to upper 70s yesterday with activity. Denies chest pain. She is adamant about not going home with a drain in place. : Patient was seen and examined. She was cleared by surgery for follow-up for drain removal next week. BP has been elevated despite medications and patient is very hesitant on going home stating that she does not feel well.    : Patient was seen and examined she was up in chair reports that her breathing is somewhat better today but still with some shortness of breath. Abdominal drain in place seen alongside surgeon we will continue to monitor patient. No acute events overnight.     ALLERGIES  Allergies   Allergen Reactions    Nuts [Tree Nut] Rash and Itching     Throat itching         ROS:  Gen:   Negative  Eyes:  negative  ENT:    negative  Resp:  shortness of breath  CVS:   negative  GI:       abdominal pain           Objective:      Visit Vitals  BP (!) 155/69 Pulse 88   Temp 98.7 °F (37.1 °C)   Resp 16   Ht 5' 2\" (1.575 m)   Wt 141.5 kg (311 lb 15.2 oz)   SpO2 98%   Breastfeeding No   BMI 57.06 kg/m²       Physical Exam:  General: alert, no distress, morbidly obese, and chronically ill appearing  Head: Normocephalic, without obvious abnormality, atraumatic  Eyes: anicteric sclerae and conjuntiva clear  ENT: lips, mucosa, and tongue normal  Neck: normal, supple, and no tenderness  Lungs: CTA  Heart: S1, S2 normal, regular rate, and regular rhythm  Abd: not distended, soft, mild generalized tenderness to palpation, BS present and normactive abdominal drain in place with very little drainage  Ext: no cyanosis and no edema  Skin: normal skin color, no rashes, and texture normal  Neuro:  alert, oriented, no defects noted in general exam.  Psych: not anxious, cooperative, appropriate affect      Medications:  Current Facility-Administered Medications   Medication Dose Route Frequency    hydrALAZINE (APRESOLINE) tablet 50 mg  50 mg Oral TID    lisinopriL (PRINIVIL, ZESTRIL) tablet 20 mg  20 mg Oral DAILY    amLODIPine (NORVASC) tablet 10 mg  10 mg Oral DAILY    bumetanide (BUMEX) injection 1 mg  1 mg IntraVENous DAILY    oxyCODONE IR (ROXICODONE) tablet 5 mg  5 mg Oral Q6H PRN    oxyCODONE IR (ROXICODONE) tablet 10 mg  10 mg Oral Q6H PRN    L.acidophilus-paracasei-S.thermophil-bifidobacter (RISAQUAD) 8 billion cell capsule  1 Capsule Oral DAILY    piperacillin-tazobactam (ZOSYN) 3.375 g in 0.9% sodium chloride (MBP/ADV) 100 mL MBP  3.375 g IntraVENous Q8H    HYDROmorphone (DILAUDID) injection 2 mg  2 mg IntraVENous Q4H PRN    potassium chloride SR (KLOR-CON 10) tablet 40 mEq  40 mEq Oral DAILY    sodium chloride (NS) flush 5-40 mL  5-40 mL IntraVENous Q8H    sodium chloride (NS) flush 5-40 mL  5-40 mL IntraVENous PRN    acetaminophen (TYLENOL) tablet 650 mg  650 mg Oral Q6H PRN    Or    acetaminophen (TYLENOL) suppository 650 mg  650 mg Rectal Q6H PRN    polyethylene glycol (MIRALAX) packet 17 g  17 g Oral DAILY PRN    ondansetron (ZOFRAN ODT) tablet 4 mg  4 mg Oral Q8H PRN    Or    ondansetron (ZOFRAN) injection 4 mg  4 mg IntraVENous Q6H PRN    enoxaparin (LOVENOX) injection 30 mg  30 mg SubCUTAneous Q12H    albuterol (PROVENTIL VENTOLIN) nebulizer solution 2.5 mg  2.5 mg Nebulization Q4H PRN    arformoteroL (BROVANA) neb solution 15 mcg  15 mcg Nebulization BID RT    lidocaine-prilocaine (EMLA) 2.5-2.5 % cream   Topical PRN    pantoprazole (PROTONIX) tablet 40 mg  40 mg Oral BID    insulin lispro (HUMALOG) injection   SubCUTAneous AC&HS    glucose chewable tablet 16 g  4 Tablet Oral PRN    glucagon (GLUCAGEN) injection 1 mg  1 mg IntraMUSCular PRN    dextrose 10% infusion 0-250 mL  0-250 mL IntraVENous PRN        Labs:  Recent Labs     09/07/22  0317   WBC 7.1   HGB 8.0*   HCT 28.5*          Recent Labs     09/07/22 0317 09/06/22  0237    140   K 3.8 4.5    99   CO2 35* 35*   * 124*   BUN 9 10   CREA 0.75 1.03*   CA 9.6 10.9*   MG  --  1.9   PHOS  --  2.9   ALB 2.6*  --    TBILI 0.4  --    ALT 15  --        Radiology:  No results found. I personally reviewed and interpreted the imaging studies and agree with official reading    The labs, imaging studies, and medications was reviewed by me on: September 8, 2022         Assessment/Plan:      66 yo hx of HTN, DM, breast CA, asthma, perforated gastric ulcer repair, presented w/ chest pain, abd fluid collection, L pleural effusion     Abd fluid collection/pain concerning for abscess with recent gastric ulcer repair:  IR drain inserted on 9/03 very little drainage  Cultures pending but no growth thus far continue Zosyn  Cleared for discharge from surgical standpoint with drain for removal next week but patient does not want to leave with drain     L pleural effusion: unclear etiology. Could be related to abd fluid collection and recent gastric ulcer repair.     Pulmonary considers effusion too small to be amenable for drainage and agrees with surgery this is likely reactive intra-abdominal process     Anasarca: due to malnutrition. Last echo with EF 60-65%. Will start daily IV bumex. Monitor I/O, BMP     Severe protein calorie malnutrition: due to recent surgery. Will keep on clears for now. Might need TPN if repeat surgery     DM type 2:   Continue ISS     HTN:   BP not well controlled  Continue amlodipine, lisinopril added yesterday, and will make hydralazine scheduled. Hypoxia  Patient on oxygen but did experience an episode of hypoxia with saturations in the upper 70s yesterday. Will reorder home O2 assessment. Breast CA: s/p chemo    Body mass index is 57.06 kg/m².:  40 or above:   Morbid obesity      Risk of deterioration: high      Discussed:  Pt's condition, Imaging findings, Lab findings, Assessment, and Care Plan discussed with: Patient, RN, Specialist, and Care Manager    Prophylaxis:  Lovenox SQ    Probable disposition:  SNF      Total time: -35- minutes **I personally saw and examined the patient during this time period**      Date of service:    9/8/2022                ___________________________________________________    Admitting Physician: Farhan Petersen MD

## 2022-09-09 ENCOUNTER — APPOINTMENT (OUTPATIENT)
Dept: CT IMAGING | Age: 62
DRG: 248 | End: 2022-09-09
Attending: SURGERY
Payer: MEDICAID

## 2022-09-09 LAB
BACTERIA SPEC CULT: NORMAL
GLUCOSE BLD STRIP.AUTO-MCNC: 121 MG/DL (ref 65–117)
GLUCOSE BLD STRIP.AUTO-MCNC: 124 MG/DL (ref 65–117)
GLUCOSE BLD STRIP.AUTO-MCNC: 127 MG/DL (ref 65–117)
GLUCOSE BLD STRIP.AUTO-MCNC: 137 MG/DL (ref 65–117)
GRAM STN SPEC: NORMAL
GRAM STN SPEC: NORMAL
SERVICE CMNT-IMP: ABNORMAL
SERVICE CMNT-IMP: NORMAL

## 2022-09-09 PROCEDURE — 82962 GLUCOSE BLOOD TEST: CPT

## 2022-09-09 PROCEDURE — 74011250636 HC RX REV CODE- 250/636: Performed by: INTERNAL MEDICINE

## 2022-09-09 PROCEDURE — 74011000258 HC RX REV CODE- 258: Performed by: INTERNAL MEDICINE

## 2022-09-09 PROCEDURE — 74011250637 HC RX REV CODE- 250/637: Performed by: HOSPITALIST

## 2022-09-09 PROCEDURE — 94761 N-INVAS EAR/PLS OXIMETRY MLT: CPT

## 2022-09-09 PROCEDURE — 65270000029 HC RM PRIVATE

## 2022-09-09 PROCEDURE — 74011250637 HC RX REV CODE- 250/637: Performed by: INTERNAL MEDICINE

## 2022-09-09 PROCEDURE — 2709999900 HC NON-CHARGEABLE SUPPLY

## 2022-09-09 PROCEDURE — 74011000636 HC RX REV CODE- 636: Performed by: HOSPITALIST

## 2022-09-09 PROCEDURE — 77010033678 HC OXYGEN DAILY

## 2022-09-09 PROCEDURE — 74011000250 HC RX REV CODE- 250: Performed by: INTERNAL MEDICINE

## 2022-09-09 PROCEDURE — 99231 SBSQ HOSP IP/OBS SF/LOW 25: CPT | Performed by: SURGERY

## 2022-09-09 PROCEDURE — 94640 AIRWAY INHALATION TREATMENT: CPT

## 2022-09-09 PROCEDURE — 74160 CT ABDOMEN W/CONTRAST: CPT

## 2022-09-09 PROCEDURE — 74011000250 HC RX REV CODE- 250: Performed by: SURGERY

## 2022-09-09 RX ORDER — SPIRONOLACTONE 25 MG/1
25 TABLET ORAL DAILY
Status: DISCONTINUED | OUTPATIENT
Start: 2022-09-09 | End: 2022-09-13 | Stop reason: HOSPADM

## 2022-09-09 RX ADMIN — Medication 10 ML: at 06:31

## 2022-09-09 RX ADMIN — Medication 10 ML: at 13:45

## 2022-09-09 RX ADMIN — SPIRONOLACTONE 25 MG: 25 TABLET ORAL at 09:55

## 2022-09-09 RX ADMIN — PANTOPRAZOLE SODIUM 40 MG: 40 TABLET, DELAYED RELEASE ORAL at 18:01

## 2022-09-09 RX ADMIN — LISINOPRIL 20 MG: 20 TABLET ORAL at 09:55

## 2022-09-09 RX ADMIN — POTASSIUM CHLORIDE 40 MEQ: 750 TABLET, FILM COATED, EXTENDED RELEASE ORAL at 11:57

## 2022-09-09 RX ADMIN — IOPAMIDOL 100 ML: 755 INJECTION, SOLUTION INTRAVENOUS at 10:57

## 2022-09-09 RX ADMIN — PIPERACILLIN AND TAZOBACTAM 3.38 G: 3; .375 INJECTION, POWDER, FOR SOLUTION INTRAVENOUS at 11:57

## 2022-09-09 RX ADMIN — AMLODIPINE BESYLATE 10 MG: 5 TABLET ORAL at 09:54

## 2022-09-09 RX ADMIN — Medication 1 CAPSULE: at 09:54

## 2022-09-09 RX ADMIN — PIPERACILLIN AND TAZOBACTAM 3.38 G: 3; .375 INJECTION, POWDER, FOR SOLUTION INTRAVENOUS at 18:01

## 2022-09-09 RX ADMIN — ENOXAPARIN SODIUM 30 MG: 100 INJECTION SUBCUTANEOUS at 09:53

## 2022-09-09 RX ADMIN — ONDANSETRON 4 MG: 2 INJECTION INTRAMUSCULAR; INTRAVENOUS at 16:27

## 2022-09-09 RX ADMIN — PIPERACILLIN AND TAZOBACTAM 3.38 G: 3; .375 INJECTION, POWDER, FOR SOLUTION INTRAVENOUS at 02:36

## 2022-09-09 RX ADMIN — HYDRALAZINE HYDROCHLORIDE 50 MG: 25 TABLET, FILM COATED ORAL at 16:27

## 2022-09-09 RX ADMIN — ENOXAPARIN SODIUM 30 MG: 100 INJECTION SUBCUTANEOUS at 20:50

## 2022-09-09 RX ADMIN — HYDRALAZINE HYDROCHLORIDE 50 MG: 25 TABLET, FILM COATED ORAL at 09:55

## 2022-09-09 RX ADMIN — ARFORMOTEROL TARTRATE 15 MCG: 15 SOLUTION RESPIRATORY (INHALATION) at 19:47

## 2022-09-09 RX ADMIN — ARFORMOTEROL TARTRATE 15 MCG: 15 SOLUTION RESPIRATORY (INHALATION) at 07:35

## 2022-09-09 RX ADMIN — PANTOPRAZOLE SODIUM 40 MG: 40 TABLET, DELAYED RELEASE ORAL at 09:55

## 2022-09-09 RX ADMIN — BUMETANIDE 1 MG: 0.25 INJECTION, SOLUTION INTRAMUSCULAR; INTRAVENOUS at 09:54

## 2022-09-09 RX ADMIN — HYDRALAZINE HYDROCHLORIDE 50 MG: 25 TABLET, FILM COATED ORAL at 20:51

## 2022-09-09 NOTE — PROGRESS NOTES
Problem: Impaired Skin Integrity/Pressure Injury Treatment  Goal: *Improvement of Existing Pressure Injury  Outcome: Progressing Towards Goal  Goal: *Prevention of pressure injury  Description: Document Eliecer Scale and appropriate interventions in the flowsheet. Outcome: Progressing Towards Goal  Note: Pressure Injury Interventions:  Sensory Interventions: Assess changes in LOC    Moisture Interventions: Absorbent underpads    Activity Interventions: Assess need for specialty bed    Mobility Interventions: Assess need for specialty bed, HOB 30 degrees or less    Nutrition Interventions: Document food/fluid/supplement intake, Offer support with meals,snacks and hydration    Friction and Shear Interventions: HOB 30 degrees or less, Lift sheet, Minimize layers                Problem: Falls - Risk of  Goal: *Absence of Falls  Description: Document Sofia Fall Risk and appropriate interventions in the flowsheet.   Outcome: Progressing Towards Goal  Note: Fall Risk Interventions:  Mobility Interventions: Bed/chair exit alarm, Patient to call before getting OOB         Medication Interventions: Bed/chair exit alarm, Patient to call before getting OOB, Teach patient to arise slowly    Elimination Interventions: Bed/chair exit alarm, Call light in reach, Patient to call for help with toileting needs, Stay With Me (per policy)    History of Falls Interventions: Bed/chair exit alarm

## 2022-09-09 NOTE — PROGRESS NOTES
Occupational therapy note:  Chart reviewed. Patient currently off the zhou for testing. Will follow up with patient at later time. Yelitza Berg MS OTR/L

## 2022-09-09 NOTE — PROGRESS NOTES
[facility]  [address] [facility] [address]  [phone]         Hospitalist Progress Note        NAME:  Oumou Dobbs   :  1960   MRN:  [id number]    Date/Time:  2022     Patient PCP:  Herber Arreaga NP    Emergency Contact:    Extended Emergency Contact Information  Primary Emergency Contact: Haley Phone: [phone]  Relation: Daughter  Secondary Emergency Contact: Stevie Coates Phone: [phone]  Relation: Unknown      Code: Full Code     Isolation Precautions: There are currently no Active Isolations        Subjective:     REASON FOR VISIT:  Recheck abdominal fluid collection and pleural effusion    HPI & INTERVAL HISTORY:     Ms. Geovany Palacios is a 66 yo hx of HTN, DM, breast CA, asthma, perforated gastric ulcer repair, presented w/ chest pain, abd fluid collection, L pleural effusion    : Patient was seen and examined. She was resting comfortably no complaints. Blood pressure remains elevated despite changes to medications. I have added spironolactone. Surgery evaluated patient for drain removal but still shows small but improving fluid collection/abscess on CT with a slight output in the drainage since yesterday. Drain left in place. : Patient was seen and examined. She reports not feeling very well today and blood pressures have been elevated. Her oxygen saturations decreased to upper 70s yesterday with activity. Denies chest pain. She is adamant about not going home with a drain in place. : Patient was seen and examined. She was cleared by surgery for follow-up for drain removal next week. BP has been elevated despite medications and patient is very hesitant on going home stating that she does not feel well.    : Patient was seen and examined she was up in chair reports that her breathing is somewhat better today but still with some shortness of breath.   Abdominal drain in place seen alongside surgeon we will continue to monitor patient. No acute events overnight.     ALLERGIES  Allergies   Allergen Reactions    Nuts [Tree Nut] Rash and Itching     Throat itching         ROS:  Gen:   Negative  Eyes:  negative  ENT:    negative  Resp:  shortness of breath  CVS:   negative  GI:       abdominal pain           Objective:      Visit Vitals  BP (!) 161/70   Pulse 85   Temp 98.5 °F (36.9 °C)   Resp 16   Ht 5' 2\" (1.575 m)   Wt 145.6 kg (320 lb 15.8 oz)   SpO2 92%   Breastfeeding No   BMI 58.71 kg/m²       Physical Exam:  General: Chronically ill appearing but no acute distress  Head: Normocephalic, without obvious abnormality, atraumatic  Eyes: anicteric sclerae and conjuntiva clear  ENT: lips, mucosa, and tongue normal  Neck: normal, supple, and no tenderness  Lungs: CTA and normal respiratory effort  Heart: S1, S2 normal, regular rate, and regular rhythm  Abd: not distended, soft, mild generalized tenderness to palpation, BS present and normactive abdominal drain in place with small amount of drainage*  Ext: no cyanosis and no edema  Skin: normal skin color, no rashes, and texture normal  Neuro:  alert, oriented, no defects noted in general exam.  Psych: not anxious, cooperative, appropriate affect      Medications:  Current Facility-Administered Medications   Medication Dose Route Frequency    diatrizoate constanza-diatrizoat sod (MD-GASTROVIEW,GASTROGRAFIN) 66-10 % contrast solution 30 mL  30 mL Oral ONCE    iopamidoL (ISOVUE-370) 76 % injection 100 mL  100 mL IntraVENous RAD ONCE    spironolactone (ALDACTONE) tablet 25 mg  25 mg Oral DAILY    hydrALAZINE (APRESOLINE) tablet 50 mg  50 mg Oral TID    lisinopriL (PRINIVIL, ZESTRIL) tablet 20 mg  20 mg Oral DAILY    amLODIPine (NORVASC) tablet 10 mg  10 mg Oral DAILY    bumetanide (BUMEX) injection 1 mg  1 mg IntraVENous DAILY    oxyCODONE IR (ROXICODONE) tablet 5 mg  5 mg Oral Q6H PRN    oxyCODONE IR (ROXICODONE) tablet 10 mg  10 mg Oral Q6H PRN L.acidophilus-paracasei-S.thermophil-bifidobacter (RISAQUAD) 8 billion cell capsule  1 Capsule Oral DAILY    piperacillin-tazobactam (ZOSYN) 3.375 g in 0.9% sodium chloride (MBP/ADV) 100 mL MBP  3.375 g IntraVENous Q8H    HYDROmorphone (DILAUDID) injection 2 mg  2 mg IntraVENous Q4H PRN    potassium chloride SR (KLOR-CON 10) tablet 40 mEq  40 mEq Oral DAILY    sodium chloride (NS) flush 5-40 mL  5-40 mL IntraVENous Q8H    sodium chloride (NS) flush 5-40 mL  5-40 mL IntraVENous PRN    acetaminophen (TYLENOL) tablet 650 mg  650 mg Oral Q6H PRN    Or    acetaminophen (TYLENOL) suppository 650 mg  650 mg Rectal Q6H PRN    polyethylene glycol (MIRALAX) packet 17 g  17 g Oral DAILY PRN    ondansetron (ZOFRAN ODT) tablet 4 mg  4 mg Oral Q8H PRN    Or    ondansetron (ZOFRAN) injection 4 mg  4 mg IntraVENous Q6H PRN    enoxaparin (LOVENOX) injection 30 mg  30 mg SubCUTAneous Q12H    albuterol (PROVENTIL VENTOLIN) nebulizer solution 2.5 mg  2.5 mg Nebulization Q4H PRN    arformoteroL (BROVANA) neb solution 15 mcg  15 mcg Nebulization BID RT    lidocaine-prilocaine (EMLA) 2.5-2.5 % cream   Topical PRN    pantoprazole (PROTONIX) tablet 40 mg  40 mg Oral BID    insulin lispro (HUMALOG) injection   SubCUTAneous AC&HS    glucose chewable tablet 16 g  4 Tablet Oral PRN    glucagon (GLUCAGEN) injection 1 mg  1 mg IntraMUSCular PRN    dextrose 10% infusion 0-250 mL  0-250 mL IntraVENous PRN        Labs:  Recent Labs     09/07/22  0317   WBC 7.1   HGB 8.0*   HCT 28.5*          Recent Labs     09/07/22  0317      K 3.8      CO2 35*   *   BUN 9   CREA 0.75   CA 9.6   ALB 2.6*   TBILI 0.4   ALT 15       Radiology:  No results found.   I personally reviewed and interpreted the imaging studies and agree with official reading    The labs, imaging studies, and medications was reviewed by me on: September 9, 2022         Assessment/Plan:      64 yo hx of HTN, DM, breast CA, asthma, perforated gastric ulcer repair, presented w/ chest pain, abd fluid collection, L pleural effusion     Abd fluid collection/pain concerning for abscess with recent gastric ulcer repair:  IR drain inserted on 9/03 small amount of drainage of but more than yesterday  Cultures no growth thus far. Continue Zosyn but consider transitioning over to Augmentin tomorrow or the day after depending on how the patient is doing and cultures. Surgery notes reviewed and appreciated drain will stay in place for now. But may remove if output decreases. L pleural effusion: unclear etiology. Could be related to abd fluid collection and recent gastric ulcer repair. Pulmonary considers effusion too small to be amenable for drainage and agrees with surgery this is likely reactive intra-abdominal process     Anasarca: due to malnutrition. Last echo with EF 60-65%. Continue IV bumex. Monitor I/O, BMP     Severe protein calorie malnutrition: due to recent surgery. Monitor diet     DM type 2:   Continue ISS     HTN:   BP not well controlled  Continue amlodipine, lisinopril, hydralazine, and added Aldactone for now. Hypoxia  Patient on oxygen but did experience an episode of hypoxia with saturations in the upper 70s yesterday. Will reorder home O2 assessment. Breast CA: s/p chemo    Body mass index is 58.71 kg/m².:  40 or above:   Morbid obesity      Risk of deterioration: high      Discussed:  Pt's condition, Imaging findings, Lab findings, Assessment, and Care Plan discussed with: Patient, RN, Specialist, and Care Manager    Prophylaxis:  Lovenox SQ    Probable disposition:  SNF      Total time: 30 minutes **I personally saw and examined the patient during this time period**      Date of service:    9/9/2022                ___________________________________________________    Admitting Physician: Isabelle Kim MD

## 2022-09-09 NOTE — PROGRESS NOTES
Bedside shift change report given to [address] [place] [address] (oncoming nurse) by Syd Barros (offgoing nurse). Report included the following information SBAR, Kardex, and MAR.

## 2022-09-09 NOTE — PROGRESS NOTES
Comprehensive Nutrition Assessment    Type and Reason for Visit: Reassess    Nutrition Recommendations/Plan:   Continue regular diet  Modify ONS - Provide Ensure Enlive once daily (350 kcal, 44 g carbs, 20 g P) to aid in meeting kcal/protein needs. Malnutrition Assessment:  Malnutrition Status: Moderate malnutrition (09/09/22 1224)    Context:  Acute illness     Findings of the 6 clinical characteristics of malnutrition:   Energy Intake:  75% or less of est energy req for 7 or more days  Weight Loss:  No significant weight loss     Body Fat Loss:  No significant body fat loss,     Muscle Mass Loss:  No significant muscle mass loss,    Fluid Accumulation:  Moderate to severe, Ascites   Strength:  Not performed     Nutrition Assessment:     9/9: Follow up. Patient reports little improvement in appetite - lack of PO intake documented. Has grown tired of clear liquid ONS and requests normal Ensure. RD to modify. No N/V/D. Denies chewing/swallowing problems. Provided additional meal preferences. Edema has worsened; per documentation patient has gained 9#, believe this to be related to increased edema. Needs remain calculated based on admission weight. Patient Vitals for the past 168 hrs:   % Diet Eaten   09/08/22 1247 26 - 50%   09/08/22 0923 76 - 100%   09/05/22 1227 26 - 50%   09/04/22 0827 76 - 100%     Last 3 Recorded Weights in this Encounter    09/06/22 1139 09/07/22 1911 09/09/22 0017   Weight: 141.2 kg (311 lb 4.6 oz) 141.5 kg (311 lb 15.2 oz) 145.6 kg (320 lb 15.8 oz)       Nutrition Related Findings:      Wound Type: Multiple, Surgical incision  Last Bowel Movement Date: 09/09/22  Stool Appearance: Loose  Abdominal Assessment: Obese, Semi-soft  Appetite: Good  Bowel Sounds: Active   Edema:Generalized: 2+ (9/9/2022  9:55 AM)  LLE: Non-pitting (9/9/2022  9:55 AM)  LUE: Non-pitting (9/9/2022  9:55 AM)  RLE: Non-pitting (9/9/2022  9:55 AM)  RUE: Non-pitting (9/9/2022  9:55 AM)      Nutr.  Labs:    Lab Results   Component Value Date/Time    GFR est AA >60 09/07/2022 03:17 AM    GFR est non-AA >60 09/07/2022 03:17 AM    Creatinine 0.75 09/07/2022 03:17 AM    BUN 9 09/07/2022 03:17 AM    Sodium 142 09/07/2022 03:17 AM    Potassium 3.8 09/07/2022 03:17 AM    Chloride 103 09/07/2022 03:17 AM    CO2 35 (H) 09/07/2022 03:17 AM       Lab Results   Component Value Date/Time    Glucose 112 (H) 09/07/2022 03:17 AM    Glucose (POC) 121 (H) 09/09/2022 11:49 AM       Lab Results   Component Value Date/Time    Hemoglobin A1c 5.7 (H) 07/21/2022 01:41 AM       Nutr. Meds:  Norvasc, bumex, Lovenox, humalog, risaquad, lisinopril, zofran PRN, zosyn, miralax PRN, klor-con, aldactone      Current Nutrition Intake & Therapies:  Average Meal Intake: 26-50%  Average Supplement Intake: Refusing to take  ADULT DIET Regular  ADULT ORAL NUTRITION SUPPLEMENT Dinner; Standard High Calorie/High Protein  DIET ONE TIME MESSAGE  DIET ONE TIME MESSAGE    Anthropometric Measures:  Height: 5' 2\" (157.5 cm)  Ideal Body Weight (IBW): 110 lbs (50 kg)     Current Body Wt:  145.6 kg (320 lb 15.8 oz), 291.8 % IBW.  Standing scale  Current BMI (kg/m2): 58.7  Usual Body Weight: 137 kg (302 lb)  % Weight Change (Calculated): 3.1  Weight Adjustment: No adjustment                 BMI Category: Obese class 3 (BMI 40.0 or greater)    Estimated Daily Nutrient Needs:  Energy Requirements Based On: Formula  Weight Used for Energy Requirements: Admission  Energy (kcal/day): 2010 (MSJ x 1.2 x 1.1 - 500)  Weight Used for Protein Requirements: Ideal  Protein (g/day): 100-125 (2.0-2.5 g/kg IBW)  Method Used for Fluid Requirements: 1 ml/kcal  Fluid (ml/day): 2010    Nutrition Diagnosis:   Moderate malnutrition related to increased demand for energy/nutrients, impaired nutrient utilization, inadequate protein-energy intake as evidenced by localized or generalized fluid accumulation, intake 26-50%  Inadequate oral intake related to inadequate protein-energy intake as evidenced by NPO or clear liquid status due to medical condition- RESOLVED     Nutrition Interventions:   Food and/or Nutrient Delivery: Continue current diet, Modify oral nutrition supplement  Nutrition Education/Counseling: No recommendations at this time  Coordination of Nutrition Care: Continue to monitor while inpatient, Interdisciplinary rounds  Plan of Care discussed with: IDR team    Goals:  Previous Goal Met: Progressing toward goal(s)  Goals: PO intake 75% or greater, by next RD assessment       Nutrition Monitoring and Evaluation:   Behavioral-Environmental Outcomes: None identified  Food/Nutrient Intake Outcomes: Food and nutrient intake, Supplement intake  Physical Signs/Symptoms Outcomes: Hemodynamic status, Biochemical data, Weight, GI status    Discharge Planning:     Too soon to determine    Adeola West MS, RD  Contact: Ext: [phone], or via [employer]

## 2022-09-09 NOTE — PROGRESS NOTES
9/9/2022  Case Management Progress Note    10:15 AM  Patient is 65year old female admitted 9/4 with pleural effusion  Patient's RUR is 21% red/high risk for readmission  Covid test: none this admission, not indicated  Chart reviewed  Per rounds yesterday patient is very averse to going home with her drain in place and had still not been feeling well enough to go home. Surgery is working on a plan to take her drain out. We attempted to find home health for patient but were unable given limited service in her area and lack of a PCP. Patient was informed on admission that she needs to see her PCP in order to get home health set up. Will continue to follow and assist with discharge planning as needed.      Transition of Care Plan   Continue medical management/treatment  Home with family assistance when ready   Family will transport at discharge  Follow up outpatient as indicated, especially important to follow up with PCP  CM will continue to follow    MARCO A Donato

## 2022-09-09 NOTE — PROGRESS NOTES
1005  Pt refusing to drink oral contrast CT and MD notified. 1900  Bedside and Verbal shift change report given to Vignesh Jackman RN (oncoming nurse) by CHRISTIAN Nolan (offgoing nurse). Report included the following information SBAR, Kardex, Intake/Output, MAR, and Recent Results.

## 2022-09-09 NOTE — PROGRESS NOTES
Physical Therapy    Attempted to see patient today for routine therapy session. Patient currently ALONSO for testing. Will follow later as able and appropriate.     Johana Cagle, MS, PT

## 2022-09-09 NOTE — PROGRESS NOTES
[facility]        Subjective     Feels somewhat better today  Having frequent loose stools  Pain decreased    Objective     Patient Vitals for the past 24 hrs:   Temp Pulse Resp BP SpO2   09/09/22 1154 98 °F (36.7 °C) 82 16 (!) 162/71 95 %   09/09/22 0756 98.5 °F (36.9 °C) 85 16 (!) 161/70 92 %   09/09/22 0611 97.6 °F (36.4 °C) 84 16 (!) 148/55 91 %   09/09/22 0004 98.5 °F (36.9 °C) 81 16 (!) 156/72 94 %   09/08/22 2242 98.2 °F (36.8 °C) 83 16 (!) 161/72 94 %   09/08/22 2159 -- 89 -- (!) 165/81 --   09/08/22 1620 99.1 °F (37.3 °C) 94 18 (!) 170/70 95 %       Date 09/08/22 0700 - 09/09/22 0659 09/09/22 0700 - 09/10/22 0659   Shift 9562-0283 3299-0918 24 Hour Total 0728-6113 1018-9229 24 Hour Total   INTAKE   P.O. 240 240 480 0  0     P. O. 240 240 480 0  0   I. V.(mL/kg/hr)  700(0.4) 700(0.2) 0  0     I.V.    0  0     Volume (piperacillin-tazobactam (ZOSYN) 3.375 g in 0.9% sodium chloride (MBP/ADV) 100 mL MBP)  700 700      Blood    0  0     Autotransfused    0  0   Other    0  0     Other    0  0   NG/GT    0  0     Intake (ml) (Drain [facility] Drainage 09/04/22 Left Other (comment))    0  0   Shift Total(mL/kg) 240(1.7) 940(6.5) 1180(8.1) 0(0)  0(0)   OUTPUT   Urine(mL/kg/hr)    0  0     Urine Voided    0  0     Urine Occurrence(s) 5 x 3 x 8 x 3 x  3 x   Emesis/NG output    0  0     Emesis    0  0     Emesis Occurrence(s)    0 x  0 x   Drains 25 15 40        Output (ml) (Drain [facility] Drainage 09/04/22 Left Other (comment)) 25 15 40      Other    0  0     Other Output    0  0   Stool    0  0     Stool Occurrence(s) 2 x  2 x 3 x  3 x     Stool    0  0   Blood    0  0     Quantitative Blood Loss    0  0     Blood    0  0   Shift Total(mL/kg) 25(0.2) 15(0.1) 40(0.3) 0(0)  0(0)    725 9460 0  0   Weight (kg) 141.5 145.6 145.6 145.6 145.6 145.6       PE  GEN - Awake, alert, communicating appropriately.   NAD  Pulm - CTAB  CV - RRR  Abd - soft, ND, mild TTP epigastric/LUQ, decreased from previous, drain clearer, 40 ml      Labs  Recent Results (from the past 24 hour(s))   GLUCOSE, POC    Collection Time: 09/08/22  3:39 PM   Result Value Ref Range    Glucose (POC) 115 65 - 117 mg/dL    Performed by [address], POC    Collection Time: 09/08/22 10:34 PM   Result Value Ref Range    Glucose (POC) 128 (H) 65 - 117 mg/dL    Performed by [address], POC    Collection Time: 09/09/22  8:24 AM   Result Value Ref Range    Glucose (POC) 137 (H) 65 - 117 mg/dL    Performed by Bethany Flores    GLUCOSE, POC    Collection Time: 09/09/22 11:49 AM   Result Value Ref Range    Glucose (POC) 121 (H) 65 - 117 mg/dL    Performed by Freddie Avilez (Float)          Assessment     Fernando Negron is a 65 y. o.yr old female s/p recent repair perforated gastric ulcer with fluid collection/abscess  CT shows decrease in collection size  Output slightly up yesterday    Plan     Will continue drain for now, but may be able to remove if output goes down      20 mins of time was spent with the patient of which > 50% of the time involved face-to-face counseling of the patient regarding the proposed treatment plan.     Praveen Talavera MD

## 2022-09-10 LAB
AMYLASE FLD-CCNC: 8 U/L
BODY FLD TYPE: NORMAL
GLUCOSE BLD STRIP.AUTO-MCNC: 116 MG/DL (ref 65–117)
GLUCOSE BLD STRIP.AUTO-MCNC: 134 MG/DL (ref 65–117)
GLUCOSE BLD STRIP.AUTO-MCNC: 139 MG/DL (ref 65–117)
GLUCOSE BLD STRIP.AUTO-MCNC: 144 MG/DL (ref 65–117)
PH FLD: 6.8 [PH]
PROT FLD-MCNC: 1.4 G/DL
SERVICE CMNT-IMP: ABNORMAL
SERVICE CMNT-IMP: NORMAL
SPECIMEN SOURCE FLD: NORMAL
SPECIMEN SOURCE FLD: NORMAL

## 2022-09-10 PROCEDURE — 74011250637 HC RX REV CODE- 250/637: Performed by: INTERNAL MEDICINE

## 2022-09-10 PROCEDURE — 74011000250 HC RX REV CODE- 250: Performed by: INTERNAL MEDICINE

## 2022-09-10 PROCEDURE — 94640 AIRWAY INHALATION TREATMENT: CPT

## 2022-09-10 PROCEDURE — 74011250637 HC RX REV CODE- 250/637: Performed by: HOSPITALIST

## 2022-09-10 PROCEDURE — 65270000029 HC RM PRIVATE

## 2022-09-10 PROCEDURE — 74011250636 HC RX REV CODE- 250/636: Performed by: INTERNAL MEDICINE

## 2022-09-10 PROCEDURE — 84157 ASSAY OF PROTEIN OTHER: CPT

## 2022-09-10 PROCEDURE — 74011250637 HC RX REV CODE- 250/637: Performed by: SURGERY

## 2022-09-10 PROCEDURE — 77010033678 HC OXYGEN DAILY

## 2022-09-10 PROCEDURE — 94761 N-INVAS EAR/PLS OXIMETRY MLT: CPT

## 2022-09-10 PROCEDURE — 74011000250 HC RX REV CODE- 250: Performed by: SURGERY

## 2022-09-10 PROCEDURE — 83986 ASSAY PH BODY FLUID NOS: CPT

## 2022-09-10 PROCEDURE — 82150 ASSAY OF AMYLASE: CPT

## 2022-09-10 PROCEDURE — 82962 GLUCOSE BLOOD TEST: CPT

## 2022-09-10 PROCEDURE — 74011000258 HC RX REV CODE- 258: Performed by: INTERNAL MEDICINE

## 2022-09-10 RX ORDER — LABETALOL HCL 20 MG/4 ML
10 SYRINGE (ML) INTRAVENOUS
Status: DISCONTINUED | OUTPATIENT
Start: 2022-09-10 | End: 2022-09-13 | Stop reason: HOSPADM

## 2022-09-10 RX ADMIN — ARFORMOTEROL TARTRATE 15 MCG: 15 SOLUTION RESPIRATORY (INHALATION) at 20:14

## 2022-09-10 RX ADMIN — ONDANSETRON 4 MG: 2 INJECTION INTRAMUSCULAR; INTRAVENOUS at 08:51

## 2022-09-10 RX ADMIN — HYDRALAZINE HYDROCHLORIDE 50 MG: 25 TABLET, FILM COATED ORAL at 08:42

## 2022-09-10 RX ADMIN — AMLODIPINE BESYLATE 10 MG: 5 TABLET ORAL at 08:42

## 2022-09-10 RX ADMIN — ENOXAPARIN SODIUM 30 MG: 100 INJECTION SUBCUTANEOUS at 08:42

## 2022-09-10 RX ADMIN — PIPERACILLIN AND TAZOBACTAM 3.38 G: 3; .375 INJECTION, POWDER, FOR SOLUTION INTRAVENOUS at 18:00

## 2022-09-10 RX ADMIN — Medication 10 ML: at 20:43

## 2022-09-10 RX ADMIN — PANTOPRAZOLE SODIUM 40 MG: 40 TABLET, DELAYED RELEASE ORAL at 17:51

## 2022-09-10 RX ADMIN — HYDRALAZINE HYDROCHLORIDE 50 MG: 25 TABLET, FILM COATED ORAL at 17:51

## 2022-09-10 RX ADMIN — Medication 10 ML: at 05:19

## 2022-09-10 RX ADMIN — ONDANSETRON 4 MG: 2 INJECTION INTRAMUSCULAR; INTRAVENOUS at 16:43

## 2022-09-10 RX ADMIN — ARFORMOTEROL TARTRATE 15 MCG: 15 SOLUTION RESPIRATORY (INHALATION) at 07:25

## 2022-09-10 RX ADMIN — LISINOPRIL 20 MG: 20 TABLET ORAL at 08:42

## 2022-09-10 RX ADMIN — BUMETANIDE 1 MG: 0.25 INJECTION, SOLUTION INTRAMUSCULAR; INTRAVENOUS at 08:41

## 2022-09-10 RX ADMIN — PIPERACILLIN AND TAZOBACTAM 3.38 G: 3; .375 INJECTION, POWDER, FOR SOLUTION INTRAVENOUS at 12:06

## 2022-09-10 RX ADMIN — SPIRONOLACTONE 25 MG: 25 TABLET ORAL at 08:42

## 2022-09-10 RX ADMIN — POTASSIUM CHLORIDE 40 MEQ: 750 TABLET, FILM COATED, EXTENDED RELEASE ORAL at 12:06

## 2022-09-10 RX ADMIN — PANTOPRAZOLE SODIUM 40 MG: 40 TABLET, DELAYED RELEASE ORAL at 08:42

## 2022-09-10 RX ADMIN — OXYCODONE 10 MG: 5 TABLET ORAL at 08:50

## 2022-09-10 RX ADMIN — Medication 1 CAPSULE: at 08:41

## 2022-09-10 RX ADMIN — PIPERACILLIN AND TAZOBACTAM 3.38 G: 3; .375 INJECTION, POWDER, FOR SOLUTION INTRAVENOUS at 03:36

## 2022-09-10 RX ADMIN — HYDRALAZINE HYDROCHLORIDE 50 MG: 25 TABLET, FILM COATED ORAL at 20:43

## 2022-09-10 RX ADMIN — ENOXAPARIN SODIUM 30 MG: 100 INJECTION SUBCUTANEOUS at 20:42

## 2022-09-10 NOTE — PROGRESS NOTES
[facility]  [address], [facility] [address]  [phone]         Hospitalist Progress Note        NAME:  Fabiola Golden   :  1960   MRN:  [id number]    Date/Time:  9/10/2022     Patient PCP:  Julian Paul NP    Emergency Contact:    Extended Emergency Contact Information  Primary Emergency Contact: Haley Phone: [phone]  Relation: Daughter  Secondary Emergency Contact: Stevie Coates Phone: [phone]  Relation: Unknown      Code: Full Code     Isolation Precautions: There are currently no Active Isolations        Subjective:     REASON FOR VISIT:  Recheck abdominal fluid collection and pleural effusion    HPI & INTERVAL HISTORY:     Ms. Earle Hair is a 64 yo hx of HTN, DM, breast CA, asthma, perforated gastric ulcer repair, presented w/ chest pain, abd fluid collection, L pleural effusion    9/10: Patient is looking and feeling better with blood pressure improving however continues to have mild abdominal discomfort. Surgery working to rule out gastric leak. Drain remains in place. : Patient was seen and examined. She was resting comfortably no complaints. Blood pressure remains elevated despite changes to medications. I have added spironolactone. Surgery evaluated patient for drain removal but still shows small but improving fluid collection/abscess on CT with a slight output in the drainage since yesterday. Drain left in place. : Patient was seen and examined. She reports not feeling very well today and blood pressures have been elevated. Her oxygen saturations decreased to upper 70s yesterday with activity. Denies chest pain. She is adamant about not going home with a drain in place. : Patient was seen and examined. She was cleared by surgery for follow-up for drain removal next week.  BP has been elevated despite medications and patient is very hesitant on going home stating that she does not feel well.    9/06: Patient was seen and examined she was up in chair reports that her breathing is somewhat better today but still with some shortness of breath. Abdominal drain in place seen alongside surgeon we will continue to monitor patient. No acute events overnight.     ALLERGIES  Allergies   Allergen Reactions    Nuts [Tree Nut] Rash and Itching     Throat itching         ROS:  Gen:   Negative  Resp:  negative  CVS:   negative  GI:       abdominal pain           Objective:      Visit Vitals  BP (!) 144/72 (BP 1 Location: Right lower arm, BP Patient Position: At rest)   Pulse 79   Temp 98.1 °F (36.7 °C)   Resp 16   Ht 5' 2\" (1.575 m)   Wt 142 kg (313 lb 0.9 oz)   SpO2 96%   Breastfeeding No   BMI 57.26 kg/m²       Physical Exam:  General: Chronically ill appearing but no acute distress  Head: Normocephalic, without obvious abnormality, atraumatic  Eyes: anicteric sclerae and conjuntiva clear  ENT: lips, mucosa, and tongue normal  Neck: normal, supple, and no tenderness  Lungs: CTA on O2NC  Heart: S1, S2 normal, regular rate, and regular rhythm  Abd: not distended, soft, mild generalized tenderness to palpation, BS present and normactive abdominal drain in place with small amount of drainage*  Ext: no cyanosis and no edema  Skin: normal skin color, no rashes, and texture normal  Neuro:  alert, oriented, no defects noted in general exam.  Psych: not anxious, cooperative, appropriate affect      Medications:  Current Facility-Administered Medications   Medication Dose Route Frequency    spironolactone (ALDACTONE) tablet 25 mg  25 mg Oral DAILY    hydrALAZINE (APRESOLINE) tablet 50 mg  50 mg Oral TID    lisinopriL (PRINIVIL, ZESTRIL) tablet 20 mg  20 mg Oral DAILY    amLODIPine (NORVASC) tablet 10 mg  10 mg Oral DAILY    bumetanide (BUMEX) injection 1 mg  1 mg IntraVENous DAILY    oxyCODONE IR (ROXICODONE) tablet 5 mg  5 mg Oral Q6H PRN    oxyCODONE IR (ROXICODONE) tablet 10 mg  10 mg Oral Q6H PRN L.acidophilus-paracasei-S.thermophil-bifidobacter (RISAQUAD) 8 billion cell capsule  1 Capsule Oral DAILY    piperacillin-tazobactam (ZOSYN) 3.375 g in 0.9% sodium chloride (MBP/ADV) 100 mL MBP  3.375 g IntraVENous Q8H    HYDROmorphone (DILAUDID) injection 2 mg  2 mg IntraVENous Q4H PRN    potassium chloride SR (KLOR-CON 10) tablet 40 mEq  40 mEq Oral DAILY    sodium chloride (NS) flush 5-40 mL  5-40 mL IntraVENous Q8H    sodium chloride (NS) flush 5-40 mL  5-40 mL IntraVENous PRN    acetaminophen (TYLENOL) tablet 650 mg  650 mg Oral Q6H PRN    Or    acetaminophen (TYLENOL) suppository 650 mg  650 mg Rectal Q6H PRN    polyethylene glycol (MIRALAX) packet 17 g  17 g Oral DAILY PRN    ondansetron (ZOFRAN ODT) tablet 4 mg  4 mg Oral Q8H PRN    Or    ondansetron (ZOFRAN) injection 4 mg  4 mg IntraVENous Q6H PRN    enoxaparin (LOVENOX) injection 30 mg  30 mg SubCUTAneous Q12H    albuterol (PROVENTIL VENTOLIN) nebulizer solution 2.5 mg  2.5 mg Nebulization Q4H PRN    arformoteroL (BROVANA) neb solution 15 mcg  15 mcg Nebulization BID RT    lidocaine-prilocaine (EMLA) 2.5-2.5 % cream   Topical PRN    pantoprazole (PROTONIX) tablet 40 mg  40 mg Oral BID    insulin lispro (HUMALOG) injection   SubCUTAneous AC&HS    glucose chewable tablet 16 g  4 Tablet Oral PRN    glucagon (GLUCAGEN) injection 1 mg  1 mg IntraMUSCular PRN    dextrose 10% infusion 0-250 mL  0-250 mL IntraVENous PRN        Labs:  No results for input(s): WBC, HGB, HCT, PLT, HGBEXT, HCTEXT, PLTEXT, HGBEXT, HCTEXT, PLTEXT in the last 72 hours. No results for input(s): NA, K, CL, CO2, GLU, BUN, CREA, CA, MG, PHOS, ALB, TBIL, TBILI, ALT in the last 72 hours. No lab exists for component: SGOT    Radiology:  CT ABD W CONT    Result Date: 9/9/2022  1. Interval significant decrease in size of left subdiaphragmatic thick-walled fluid collection status post percutaneous drain placement.  2.  Partially visualized left pleural effusion and left lower lobe collapse/consolidation. I personally reviewed and interpreted the imaging studies and agree with official reading    The labs, imaging studies, and medications was reviewed by me on: September 10, 2022         Assessment/Plan:      64 yo hx of HTN, DM, breast CA, asthma, perforated gastric ulcer repair, presented w/ chest pain, abd fluid collection, L pleural effusion     Abd fluid collection/pain concerning for abscess with recent gastric ulcer repair:  IR drain inserted on 9/03   Does have drainage in bag with no recording. Cultures no growth thus far. Continue Zosyn for now while working up for gastric leak  Urine note reviewed. May need upper GI with prone to rule out gastric leak. Drain remains in place. L pleural effusion: unclear etiology. Could be related to abd fluid collection and recent gastric ulcer repair. Pulmonary considers effusion too small to be amenable for drainage and agrees with surgery this is likely reactive intra-abdominal process     Anasarca: due to malnutrition. Last echo with EF 60-65%. Continue IV bumex. Monitor I/O, BMP     Severe protein calorie malnutrition: due to recent surgery. Monitor diet     DM type 2:   Continue ISS     HTN poorly controlled:   Slight improvement remains elevated  Continue amlodipine, lisinopril, hydralazine, and Aldactone which is new. Hypoxia  Continue supplemental O2     Breast CA: s/p chemo    Body mass index is 57.26 kg/m².:  40 or above:   Morbid obesity      Risk of deterioration: high      Discussed:  Pt's condition, Imaging findings, Lab findings, Assessment, and Care Plan discussed with: Patient, RN, Specialist, and Care Manager    Prophylaxis:  Lovenox SQ    Probable disposition:  SNF      Total time: -25- minutes **I personally saw and examined the patient during this time period**      Date of service:    9/10/2022                ___________________________________________________    Admitting Physician: Kris Boeck Earl Moore MD

## 2022-09-10 NOTE — PROGRESS NOTES
[facility]        Subjective     No acute events  Still some shoulder pain    Objective     Patient Vitals for the past 24 hrs:   Temp Pulse Resp BP SpO2   09/10/22 1011 99.1 °F (37.3 °C) 84 16 (!) 157/66 96 %   09/10/22 0725 -- -- -- -- 96 %   09/10/22 0513 98.1 °F (36.7 °C) 79 16 (!) 144/72 96 %   09/09/22 2316 98.3 °F (36.8 °C) 81 16 (!) 159/71 96 %   09/09/22 2004 98.2 °F (36.8 °C) 83 17 (!) 153/70 95 %   09/09/22 1611 -- 83 -- (!) 169/72 --   09/09/22 1608 98.2 °F (36.8 °C) 83 16 (!) 167/70 95 %   09/09/22 1154 98 °F (36.7 °C) 82 16 (!) 162/71 95 %       Date 09/09/22 0700 - 09/10/22 0659 09/10/22 0700 - 09/11/22 0659   Shift 0293-6461 8671-7360 24 Hour Total 9177-4493 9907-0083 24 Hour Total   INTAKE   P.O. 0 100 100        P. O. 0 100 100      I. V.(mL/kg/hr) 100(0.1)  100(0)        I.V. 0  0        Volume (dextrose 10% infusion 0-250 mL) 0  0        Volume (piperacillin-tazobactam (ZOSYN) 3.375 g in 0.9% sodium chloride (MBP/ADV) 100 mL MBP) 100  100      Blood 0  0        Autotransfused 0  0      Other 0  0        Other 0  0      NG/GT 0  0        Intake (ml) (Drain [facility] Drainage 09/04/22 Left Other (comment)) 0  0      Shift Total(mL/kg) 100(0.7) 100(0.7) 200(1.4)      OUTPUT   Urine(mL/kg/hr) 0(0) 200(0.1) 200(0.1)        Urine Voided 0 200 200        Urine Occurrence(s) 5 x 1 x 6 x      Emesis/NG output 0  0        Emesis 0  0        Emesis Occurrence(s) 0 x  0 x      Other 0  0        Other Output 0  0      Stool 0  0        Stool Occurrence(s) 6 x 6 x 12 x        Stool 0  0      Blood 0  0        Quantitative Blood Loss 0  0        Blood 0  0      Shift Total(mL/kg) 0(0) 200(1.4) 200(1.4)       -100 0      Weight (kg) 145. 6 142 142 142 142 142       PE  GEN - Awake, alert, communicating appropriately.   NAD  Pulm - CTAB  CV - RRR  Abd - soft, ND, TTP LUQ, drain white-straw colored      Labs  Recent Results (from the past 24 hour(s))   GLUCOSE, POC    Collection Time: 09/09/22 11:49 AM   Result Value Ref Range    Glucose (POC) 121 (H) 65 - 117 mg/dL    Performed by Pilar LeonardFloat)    GLUCOSE, POC    Collection Time: 09/09/22  4:29 PM   Result Value Ref Range    Glucose (POC) 127 (H) 65 - 117 mg/dL    Performed by Rachna Puri, POC    Collection Time: 09/09/22  8:45 PM   Result Value Ref Range    Glucose (POC) 124 (H) 65 - 117 mg/dL    Performed by Neri Cannon RN    GLUCOSE, POC    Collection Time: 09/10/22  7:28 AM   Result Value Ref Range    Glucose (POC) 116 65 - 117 mg/dL    Performed by Facundo Rahman is a 65 y. o.yr old female with history of recent repair perforated gastric ulcer, with dunia-gastric fluid collection post drain placement  NGTD on cultures  No output amount recorded, but decent amount in bag    Plan     Will check labs on fluid to r/o gastric leak  May need UGI with prone or upright images    20 mins of time was spent with the patient of which > 50% of the time involved face-to-face counseling of the patient regarding the proposed treatment plan.     Karine Giordano MD

## 2022-09-10 NOTE — PROGRESS NOTES
Bedside and Verbal shift change report given to Ayla (oncoming nurse) by Alissa Lira (offgoing nurse). Report included the following information SBAR, Kardex, Intake/Output, MAR, and Recent Results.

## 2022-09-10 NOTE — PROGRESS NOTES
Problem: Pain  Goal: *Control of Pain  Outcome: Progressing Towards Goal     Problem: Patient Education: Go to Patient Education Activity  Goal: Patient/Family Education  Outcome: Progressing Towards Goal     Problem: Impaired Skin Integrity/Pressure Injury Treatment  Goal: *Improvement of Existing Pressure Injury  Outcome: Progressing Towards Goal  Goal: *Prevention of pressure injury  Description: Document Eliecer Scale and appropriate interventions in the flowsheet. Outcome: Progressing Towards Goal  Note: Pressure Injury Interventions:  Sensory Interventions: Assess need for specialty bed, Float heels, Keep linens dry and wrinkle-free    Moisture Interventions: Absorbent underpads, Minimize layers    Activity Interventions: Assess need for specialty bed    Mobility Interventions: Assess need for specialty bed, HOB 30 degrees or less    Nutrition Interventions: Document food/fluid/supplement intake    Friction and Shear Interventions: HOB 30 degrees or less, Lift sheet, Minimize layers                Problem: Patient Education: Go to Patient Education Activity  Goal: Patient/Family Education  Outcome: Progressing Towards Goal     Problem: Falls - Risk of  Goal: *Absence of Falls  Description: Document Sofia Fall Risk and appropriate interventions in the flowsheet.   Outcome: Progressing Towards Goal  Note: Fall Risk Interventions:  Mobility Interventions: Bed/chair exit alarm    Mentation Interventions: Bed/chair exit alarm    Medication Interventions: Patient to call before getting OOB    Elimination Interventions: Call light in reach    History of Falls Interventions: Bed/chair exit alarm         Problem: Patient Education: Go to Patient Education Activity  Goal: Patient/Family Education  Outcome: Progressing Towards Goal     Problem: Patient Education: Go to Patient Education Activity  Goal: Patient/Family Education  Outcome: Progressing Towards Goal     Problem: Nutrition Deficit  Goal: *Optimize nutritional status  Outcome: Progressing Towards Goal     Problem: Patient Education: Go to Patient Education Activity  Goal: Patient/Family Education  Outcome: Progressing Towards Goal

## 2022-09-11 LAB
GLUCOSE BLD STRIP.AUTO-MCNC: 120 MG/DL (ref 65–117)
GLUCOSE BLD STRIP.AUTO-MCNC: 142 MG/DL (ref 65–117)
GLUCOSE BLD STRIP.AUTO-MCNC: 151 MG/DL (ref 65–117)
GLUCOSE BLD STRIP.AUTO-MCNC: 156 MG/DL (ref 65–117)
SERVICE CMNT-IMP: ABNORMAL

## 2022-09-11 PROCEDURE — 74011250637 HC RX REV CODE- 250/637: Performed by: HOSPITALIST

## 2022-09-11 PROCEDURE — 74011636637 HC RX REV CODE- 636/637: Performed by: INTERNAL MEDICINE

## 2022-09-11 PROCEDURE — 74011250636 HC RX REV CODE- 250/636: Performed by: INTERNAL MEDICINE

## 2022-09-11 PROCEDURE — 74011250637 HC RX REV CODE- 250/637: Performed by: INTERNAL MEDICINE

## 2022-09-11 PROCEDURE — 77010033678 HC OXYGEN DAILY

## 2022-09-11 PROCEDURE — 94640 AIRWAY INHALATION TREATMENT: CPT

## 2022-09-11 PROCEDURE — 94761 N-INVAS EAR/PLS OXIMETRY MLT: CPT

## 2022-09-11 PROCEDURE — 65270000029 HC RM PRIVATE

## 2022-09-11 PROCEDURE — 74011000258 HC RX REV CODE- 258: Performed by: INTERNAL MEDICINE

## 2022-09-11 PROCEDURE — 74011000250 HC RX REV CODE- 250: Performed by: INTERNAL MEDICINE

## 2022-09-11 PROCEDURE — 82962 GLUCOSE BLOOD TEST: CPT

## 2022-09-11 PROCEDURE — 74011000250 HC RX REV CODE- 250: Performed by: SURGERY

## 2022-09-11 RX ADMIN — Medication 10 ML: at 16:19

## 2022-09-11 RX ADMIN — ARFORMOTEROL TARTRATE 15 MCG: 15 SOLUTION RESPIRATORY (INHALATION) at 07:26

## 2022-09-11 RX ADMIN — BUMETANIDE 1 MG: 0.25 INJECTION, SOLUTION INTRAMUSCULAR; INTRAVENOUS at 08:40

## 2022-09-11 RX ADMIN — PANTOPRAZOLE SODIUM 40 MG: 40 TABLET, DELAYED RELEASE ORAL at 17:46

## 2022-09-11 RX ADMIN — ENOXAPARIN SODIUM 30 MG: 100 INJECTION SUBCUTANEOUS at 08:41

## 2022-09-11 RX ADMIN — Medication 10 ML: at 04:00

## 2022-09-11 RX ADMIN — PIPERACILLIN AND TAZOBACTAM 3.38 G: 3; .375 INJECTION, POWDER, FOR SOLUTION INTRAVENOUS at 20:49

## 2022-09-11 RX ADMIN — HYDRALAZINE HYDROCHLORIDE 50 MG: 25 TABLET, FILM COATED ORAL at 20:50

## 2022-09-11 RX ADMIN — AMLODIPINE BESYLATE 10 MG: 5 TABLET ORAL at 08:41

## 2022-09-11 RX ADMIN — ONDANSETRON 4 MG: 2 INJECTION INTRAMUSCULAR; INTRAVENOUS at 20:49

## 2022-09-11 RX ADMIN — Medication 1 CAPSULE: at 08:43

## 2022-09-11 RX ADMIN — ENOXAPARIN SODIUM 30 MG: 100 INJECTION SUBCUTANEOUS at 20:49

## 2022-09-11 RX ADMIN — LISINOPRIL 20 MG: 20 TABLET ORAL at 08:42

## 2022-09-11 RX ADMIN — PIPERACILLIN AND TAZOBACTAM 3.38 G: 3; .375 INJECTION, POWDER, FOR SOLUTION INTRAVENOUS at 03:59

## 2022-09-11 RX ADMIN — HYDRALAZINE HYDROCHLORIDE 50 MG: 25 TABLET, FILM COATED ORAL at 16:19

## 2022-09-11 RX ADMIN — PIPERACILLIN AND TAZOBACTAM 3.38 G: 3; .375 INJECTION, POWDER, FOR SOLUTION INTRAVENOUS at 13:03

## 2022-09-11 RX ADMIN — Medication 10 ML: at 20:50

## 2022-09-11 RX ADMIN — HYDRALAZINE HYDROCHLORIDE 50 MG: 25 TABLET, FILM COATED ORAL at 08:41

## 2022-09-11 RX ADMIN — SPIRONOLACTONE 25 MG: 25 TABLET ORAL at 08:41

## 2022-09-11 RX ADMIN — INSULIN LISPRO 2 UNITS: 100 INJECTION, SOLUTION INTRAVENOUS; SUBCUTANEOUS at 08:54

## 2022-09-11 RX ADMIN — PANTOPRAZOLE SODIUM 40 MG: 40 TABLET, DELAYED RELEASE ORAL at 08:43

## 2022-09-11 RX ADMIN — POTASSIUM CHLORIDE 40 MEQ: 750 TABLET, FILM COATED, EXTENDED RELEASE ORAL at 13:03

## 2022-09-11 NOTE — PROGRESS NOTES
Bedside shift change report given to Ayla   (oncoming nurse) by Cassi Ramos (offgoing nurse). Report included the following information SBAR.

## 2022-09-11 NOTE — PROGRESS NOTES
[address]  [phone]         Hospitalist Progress Note        NAME:  Marquis Curry   :  1960   MRN:  [id number]    Date/Time:  2022     Patient PCP:  Rylan Armendariz NP    Emergency Contact:    Extended Emergency Contact Information  Primary Emergency Contact: Haley Phone: [phone]  Relation: Daughter  Secondary Emergency Contact: Stevie Coates Phone: [phone]  Relation: Unknown      Code: Full Code     Isolation Precautions: There are currently no Active Isolations        Subjective:     REASON FOR VISIT:  Recheck abdominal fluid collection and pleural effusion    HPI & INTERVAL HISTORY:     Ms. Faustina Colunga is a 66 yo hx of HTN, DM, breast CA, asthma, perforated gastric ulcer repair, presented w/ chest pain, abd fluid collection, L pleural effusion    : Patient seen and examined. Continues to well today. Mild abdominal discomfort especially left upper quadrant as could be expected. Drain remains in place. Seen by surgery. 9/10: Patient is looking and feeling better with blood pressure improving however continues to have mild abdominal discomfort. Surgery working to rule out gastric leak. Drain remains in place. : Patient was seen and examined. She was resting comfortably no complaints. Blood pressure remains elevated despite changes to medications. I have added spironolactone. Surgery evaluated patient for drain removal but still shows small but improving fluid collection/abscess on CT with a slight output in the drainage since yesterday. Drain left in place. : Patient was seen and examined. She reports not feeling very well today and blood pressures have been elevated. Her oxygen saturations decreased to upper 70s yesterday with activity. Denies chest pain. She is adamant about not going home with a drain in place.     : Patient was seen and examined. She was cleared by surgery for follow-up for drain removal next week. BP has been elevated despite medications and patient is very hesitant on going home stating that she does not feel well.    9/06: Patient was seen and examined she was up in chair reports that her breathing is somewhat better today but still with some shortness of breath. Abdominal drain in place seen alongside surgeon we will continue to monitor patient. No acute events overnight.     ALLERGIES  Allergies   Allergen Reactions    Nuts [Tree Nut] Rash and Itching     Throat itching         ROS:  Gen:   Negative  Resp:  negative  CVS:   negative  GI:       abdominal pain           Objective:      Visit Vitals  BP (!) 149/66 (BP 1 Location: Right upper arm, BP Patient Position: Sitting)   Pulse 81   Temp 98.8 °F (37.1 °C)   Resp 16   Ht 5' 2\" (1.575 m)   Wt 139.3 kg (307 lb 1.6 oz)   SpO2 92%   Breastfeeding No   BMI 56.17 kg/m²       Physical Exam:  General: Chronically ill appearing, morbidly obese, NAD  Head: Normocephalic, without obvious abnormality, atraumatic  Eyes: anicteric sclerae and conjuntiva clear  ENT: lips, mucosa, and tongue normal  Neck: normal, supple, and no tenderness  Lungs: clear to auscultation, good breath sounds, and normal respiratory effort on O2NC  Heart: S1, S2 normal, regular rate, and regular rhythm  Abd: not distended, soft, mild generalized tenderness to palpation, BS present and normactive abdominal drain in place with small amount of drainage*  Ext: no cyanosis and no edema  Skin: normal skin color, no rashes, and texture normal  Neuro:  alert, oriented, no defects noted in general exam.  Psych: not anxious, cooperative, appropriate affect      Medications:  Current Facility-Administered Medications   Medication Dose Route Frequency    labetaloL (NORMODYNE;TRANDATE) 20 mg/4 mL (5 mg/mL) injection 10 mg  10 mg IntraVENous Q4H PRN    spironolactone (ALDACTONE) tablet 25 mg  25 mg Oral DAILY    hydrALAZINE (APRESOLINE) tablet 50 mg  50 mg Oral TID    lisinopriL (PRINIVIL, ZESTRIL) tablet 20 mg  20 mg Oral DAILY    amLODIPine (NORVASC) tablet 10 mg  10 mg Oral DAILY    bumetanide (BUMEX) injection 1 mg  1 mg IntraVENous DAILY    oxyCODONE IR (ROXICODONE) tablet 5 mg  5 mg Oral Q6H PRN    oxyCODONE IR (ROXICODONE) tablet 10 mg  10 mg Oral Q6H PRN    L.acidophilus-paracasei-S.thermophil-bifidobacter (RISAQUAD) 8 billion cell capsule  1 Capsule Oral DAILY    piperacillin-tazobactam (ZOSYN) 3.375 g in 0.9% sodium chloride (MBP/ADV) 100 mL MBP  3.375 g IntraVENous Q8H    HYDROmorphone (DILAUDID) injection 2 mg  2 mg IntraVENous Q4H PRN    potassium chloride SR (KLOR-CON 10) tablet 40 mEq  40 mEq Oral DAILY    sodium chloride (NS) flush 5-40 mL  5-40 mL IntraVENous Q8H    sodium chloride (NS) flush 5-40 mL  5-40 mL IntraVENous PRN    acetaminophen (TYLENOL) tablet 650 mg  650 mg Oral Q6H PRN    Or    acetaminophen (TYLENOL) suppository 650 mg  650 mg Rectal Q6H PRN    polyethylene glycol (MIRALAX) packet 17 g  17 g Oral DAILY PRN    ondansetron (ZOFRAN ODT) tablet 4 mg  4 mg Oral Q8H PRN    Or    ondansetron (ZOFRAN) injection 4 mg  4 mg IntraVENous Q6H PRN    enoxaparin (LOVENOX) injection 30 mg  30 mg SubCUTAneous Q12H    albuterol (PROVENTIL VENTOLIN) nebulizer solution 2.5 mg  2.5 mg Nebulization Q4H PRN    arformoteroL (BROVANA) neb solution 15 mcg  15 mcg Nebulization BID RT    lidocaine-prilocaine (EMLA) 2.5-2.5 % cream   Topical PRN    pantoprazole (PROTONIX) tablet 40 mg  40 mg Oral BID    insulin lispro (HUMALOG) injection   SubCUTAneous AC&HS    glucose chewable tablet 16 g  4 Tablet Oral PRN    glucagon (GLUCAGEN) injection 1 mg  1 mg IntraMUSCular PRN    dextrose 10% infusion 0-250 mL  0-250 mL IntraVENous PRN        Labs:  No results for input(s): WBC, HGB, HCT, PLT, HGBEXT, HCTEXT, PLTEXT, HGBEXT, HCTEXT, PLTEXT in the last 72 hours.     No results for input(s): NA, K, CL, CO2, GLU, BUN, CREA, CA, MG, PHOS, ALB, TBIL, TBILI, ALT in the last 72 hours. No lab exists for component: SGOT    Radiology:  No results found. I personally reviewed and interpreted the imaging studies and agree with official reading    The labs, imaging studies, and medications was reviewed by me on: September 11, 2022         Assessment/Plan:      64 yo hx of HTN, DM, breast CA, asthma, perforated gastric ulcer repair, presented w/ chest pain, abd fluid collection, L pleural effusion     Abd fluid collection/pain concerning for abscess with recent gastric ulcer repair:  IR drain inserted on 9/03   Cultures no growth thus far. Continue Zosyn for now while working up for gastric leak  Urine note reviewed. May need upper GI with prone to rule out gastric leak. Drain remains in place. Potential for discharge tomorrow if cleared by surgery. L pleural effusion: unclear etiology. Could be related to abd fluid collection and recent gastric ulcer repair. Pulmonary considers effusion too small to be amenable for drainage and agrees with surgery this is likely reactive intra-abdominal process     Anasarca: due to malnutrition. Last echo with EF 60-65%. Continue IV bumex. Monitor I/O, BMP     Severe protein calorie malnutrition: due to recent surgery. Monitor diet     DM type 2:   Continue ISS     HTN poorly controlled:   Slight improvement remains elevated  Continue amlodipine, lisinopril, hydralazine, and Aldactone which is new. Hypoxia  Continue supplemental O2     Breast CA: s/p chemo    Body mass index is 56.17 kg/m².:  40 or above:   Morbid obesity      Risk of deterioration: high      Discussed:  Pt's condition, Imaging findings, Lab findings, Assessment, and Care Plan discussed with: Patient, RN, Specialist, and Care Manager    Prophylaxis:  Lovenox SQ    Probable disposition:  SNF      Total time: 30 minutes **I personally saw and examined the patient during this time period**      Date of service:    9/11/2022 ___________________________________________________    Admitting Physician: Brandi Mcdonnell MD

## 2022-09-11 NOTE — PROGRESS NOTES
Problem: Pain  Goal: *Control of Pain  Outcome: Progressing Towards Goal     Problem: Patient Education: Go to Patient Education Activity  Goal: Patient/Family Education  Outcome: Progressing Towards Goal     Problem: Impaired Skin Integrity/Pressure Injury Treatment  Goal: *Improvement of Existing Pressure Injury  Outcome: Progressing Towards Goal  Goal: *Prevention of pressure injury  Description: Document Eliecer Scale and appropriate interventions in the flowsheet. Outcome: Progressing Towards Goal  Note: Pressure Injury Interventions:  Sensory Interventions: Assess need for specialty bed, Float heels, Keep linens dry and wrinkle-free    Moisture Interventions: Absorbent underpads    Activity Interventions: Pressure redistribution bed/mattress(bed type)    Mobility Interventions: Pressure redistribution bed/mattress (bed type)    Nutrition Interventions: Document food/fluid/supplement intake    Friction and Shear Interventions: Minimize layers                Problem: Patient Education: Go to Patient Education Activity  Goal: Patient/Family Education  Outcome: Progressing Towards Goal     Problem: Falls - Risk of  Goal: *Absence of Falls  Description: Document Sofia Fall Risk and appropriate interventions in the flowsheet.   Outcome: Progressing Towards Goal  Note: Fall Risk Interventions:  Mobility Interventions: Bed/chair exit alarm    Mentation Interventions: Bed/chair exit alarm    Medication Interventions: Evaluate medications/consider consulting pharmacy, Patient to call before getting OOB    Elimination Interventions: Call light in reach, Bed/chair exit alarm    History of Falls Interventions: Bed/chair exit alarm         Problem: Patient Education: Go to Patient Education Activity  Goal: Patient/Family Education  Outcome: Progressing Towards Goal     Problem: Patient Education: Go to Patient Education Activity  Goal: Patient/Family Education  Outcome: Progressing Towards Goal     Problem: Nutrition Deficit  Goal: *Optimize nutritional status  Outcome: Progressing Towards Goal     Problem: Patient Education: Go to Patient Education Activity  Goal: Patient/Family Education  Outcome: Progressing Towards Goal     Problem: Pressure Injury - Risk of  Goal: *Prevention of pressure injury  Description: Document Eliecer Scale and appropriate interventions in the flowsheet.   Outcome: Progressing Towards Goal     Problem: Patient Education: Go to Patient Education Activity  Goal: Patient/Family Education  Outcome: Progressing Towards Goal

## 2022-09-11 NOTE — PROGRESS NOTES
[facility]        Subjective     No acute events  Feels somewhat better today    Objective     Patient Vitals for the past 24 hrs:   Temp Pulse Resp BP SpO2   09/11/22 0920 -- -- -- (!) 159/67 --   09/11/22 0757 99.3 °F (37.4 °C) 80 14 (!) 171/63 93 %   09/11/22 0726 -- -- -- -- 94 %   09/11/22 0325 99 °F (37.2 °C) 91 20 (!) 155/67 93 %   09/10/22 2315 99.2 °F (37.3 °C) 82 18 (!) 145/60 96 %   09/10/22 2014 -- -- -- -- 97 %   09/10/22 2012 99 °F (37.2 °C) 77 18 126/74 95 %   09/10/22 1725 98 °F (36.7 °C) 79 16 (!) 159/66 93 %   09/10/22 1255 98.9 °F (37.2 °C) 75 14 (!) 157/66 98 %       Date 09/10/22 0700 - 09/11/22 0659 09/11/22 0700 - 09/12/22 0659   Shift 0751-5240 9746-9348 24 Hour Total 8678-3316 1618-4691 24 Hour Total   INTAKE   P.O. 240 500 740        P. O. 240 500 740      NG/GT  0 0        Intake (ml) (Drain [facility] Drainage 09/04/22 Left Other (comment))  0 0      Shift Total(mL/kg) 240(1.7) 500(3.6) 740(5.3)      OUTPUT   Urine(mL/kg/hr)           Urine Occurrence(s)  2 x 2 x      Emesis/NG output           Emesis Occurrence(s)  0 x 0 x      Drains 10 0 10        Output (ml) (Drain [facility] Drainage 09/04/22 Left Other (comment)) 10 0 10      Stool           Stool Occurrence(s)  0 x 0 x      Shift Total(mL/kg) 10(0.1) 0(0) 10(0.1)       500 730      Weight (kg) 142 139.3 139.3 139.3 139.3 139.3       PE  GEN - Awake, alert, communicating appropriately. NAD  Pulm - CTAB  CV - RRR  Abd - soft, ND, TTP LUQ, improved, drain straw colored drainage      Labs  Recent Results (from the past 24 hour(s))   PH, FLUID    Collection Time: 09/10/22 12:25 PM   Result Value Ref Range    FLUID TYPE(15) PERITONEAL FLUID      FLUID PH 6.8     PROTEIN TOTAL, FLUID    Collection Time: 09/10/22 12:25 PM   Result Value Ref Range    Fluid Type: PERITONEAL FLUID      Protein total, body fld.  1.4 g/dL   AMYLASE, FLUID    Collection Time: 09/10/22 12:25 PM   Result Value Ref Range    Fluid Type: PERITONEAL FLUID      Amylase, body fld. 8 U/L   GLUCOSE, POC    Collection Time: 09/10/22  5:42 PM   Result Value Ref Range    Glucose (POC) 134 (H) 65 - 117 mg/dL    Performed by Sonny Ballesteros (CON)    GLUCOSE, POC    Collection Time: 09/10/22  8:42 PM   Result Value Ref Range    Glucose (POC) 144 (H) 65 - 117 mg/dL    Performed by Matilde Boothe RN    GLUCOSE, POC    Collection Time: 09/11/22  7:55 AM   Result Value Ref Range    Glucose (POC) 156 (H) 65 - 117 mg/dL    Performed by Sonny Ballesteros (CON)    GLUCOSE, POC    Collection Time: 09/11/22 11:18 AM   Result Value Ref Range    Glucose (POC) 151 (H) 65 - 117 mg/dL    Performed by Aric Oliveira (CON)          Assessment     Ann Fatima is a 65 y. o.yr old female s/p recent repair of perforated gastric ulcer, with fluid collection/abscess post IR drain placement  Fluid labs not consistent with gastric leak/fistula  Output low, although appears more than recorded amont    Plan     Continue drain for now, but potentially will dc tomorrow if output still low    15 mins of time was spent with the patient of which > 50% of the time involved face-to-face counseling of the patient regarding the proposed treatment plan.     Juan Silva MD

## 2022-09-11 NOTE — PROGRESS NOTES
Chest port has sluggish/ poor blood return this AM. Pt refusing for this RN to perform blood draw from alternate location. Will reevaluate port function.

## 2022-09-12 LAB
ALBUMIN SERPL-MCNC: 2.9 G/DL (ref 3.5–5)
ALBUMIN/GLOB SERPL: 0.7 {RATIO} (ref 1.1–2.2)
ALP SERPL-CCNC: 56 U/L (ref 45–117)
ALT SERPL-CCNC: 19 U/L (ref 12–78)
ANION GAP SERPL CALC-SCNC: 3 MMOL/L (ref 5–15)
AST SERPL-CCNC: 21 U/L (ref 15–37)
BASOPHILS # BLD: 0 K/UL (ref 0–0.1)
BASOPHILS NFR BLD: 0 % (ref 0–1)
BILIRUB SERPL-MCNC: 0.3 MG/DL (ref 0.2–1)
BUN SERPL-MCNC: 15 MG/DL (ref 6–20)
BUN/CREAT SERPL: 16 (ref 12–20)
CALCIUM SERPL-MCNC: 10.2 MG/DL (ref 8.5–10.1)
CHLORIDE SERPL-SCNC: 105 MMOL/L (ref 97–108)
CO2 SERPL-SCNC: 30 MMOL/L (ref 21–32)
CREAT SERPL-MCNC: 0.94 MG/DL (ref 0.55–1.02)
DIFFERENTIAL METHOD BLD: ABNORMAL
EOSINOPHIL # BLD: 0.3 K/UL (ref 0–0.4)
EOSINOPHIL NFR BLD: 4 % (ref 0–7)
ERYTHROCYTE [DISTWIDTH] IN BLOOD BY AUTOMATED COUNT: 16.9 % (ref 11.5–14.5)
GLOBULIN SER CALC-MCNC: 4.2 G/DL (ref 2–4)
GLUCOSE BLD STRIP.AUTO-MCNC: 105 MG/DL (ref 65–117)
GLUCOSE BLD STRIP.AUTO-MCNC: 107 MG/DL (ref 65–117)
GLUCOSE BLD STRIP.AUTO-MCNC: 127 MG/DL (ref 65–117)
GLUCOSE BLD STRIP.AUTO-MCNC: 131 MG/DL (ref 65–117)
GLUCOSE SERPL-MCNC: 132 MG/DL (ref 65–100)
HCT VFR BLD AUTO: 32.6 % (ref 35–47)
HGB BLD-MCNC: 9.5 G/DL (ref 11.5–16)
IMM GRANULOCYTES # BLD AUTO: 0.1 K/UL (ref 0–0.04)
IMM GRANULOCYTES NFR BLD AUTO: 1 % (ref 0–0.5)
LYMPHOCYTES # BLD: 0.7 K/UL (ref 0.8–3.5)
LYMPHOCYTES NFR BLD: 10 % (ref 12–49)
MAGNESIUM SERPL-MCNC: 2.1 MG/DL (ref 1.6–2.4)
MCH RBC QN AUTO: 27.2 PG (ref 26–34)
MCHC RBC AUTO-ENTMCNC: 29.1 G/DL (ref 30–36.5)
MCV RBC AUTO: 93.4 FL (ref 80–99)
MONOCYTES # BLD: 0.8 K/UL (ref 0–1)
MONOCYTES NFR BLD: 12 % (ref 5–13)
NEUTS SEG # BLD: 4.9 K/UL (ref 1.8–8)
NEUTS SEG NFR BLD: 73 % (ref 32–75)
NRBC # BLD: 0 K/UL (ref 0–0.01)
NRBC BLD-RTO: 0 PER 100 WBC
PHOSPHATE SERPL-MCNC: 2.7 MG/DL (ref 2.6–4.7)
PLATELET # BLD AUTO: 318 K/UL (ref 150–400)
PMV BLD AUTO: 8.7 FL (ref 8.9–12.9)
POTASSIUM SERPL-SCNC: 4.8 MMOL/L (ref 3.5–5.1)
PROT SERPL-MCNC: 7.1 G/DL (ref 6.4–8.2)
RBC # BLD AUTO: 3.49 M/UL (ref 3.8–5.2)
RBC MORPH BLD: ABNORMAL
SERVICE CMNT-IMP: ABNORMAL
SERVICE CMNT-IMP: ABNORMAL
SERVICE CMNT-IMP: NORMAL
SERVICE CMNT-IMP: NORMAL
SODIUM SERPL-SCNC: 138 MMOL/L (ref 136–145)
WBC # BLD AUTO: 6.8 K/UL (ref 3.6–11)

## 2022-09-12 PROCEDURE — 83735 ASSAY OF MAGNESIUM: CPT

## 2022-09-12 PROCEDURE — 74011250636 HC RX REV CODE- 250/636: Performed by: INTERNAL MEDICINE

## 2022-09-12 PROCEDURE — 97535 SELF CARE MNGMENT TRAINING: CPT

## 2022-09-12 PROCEDURE — 74011250637 HC RX REV CODE- 250/637: Performed by: INTERNAL MEDICINE

## 2022-09-12 PROCEDURE — 82962 GLUCOSE BLOOD TEST: CPT

## 2022-09-12 PROCEDURE — 94761 N-INVAS EAR/PLS OXIMETRY MLT: CPT

## 2022-09-12 PROCEDURE — 97110 THERAPEUTIC EXERCISES: CPT

## 2022-09-12 PROCEDURE — 80053 COMPREHEN METABOLIC PANEL: CPT

## 2022-09-12 PROCEDURE — 74011000250 HC RX REV CODE- 250: Performed by: SURGERY

## 2022-09-12 PROCEDURE — 74011000258 HC RX REV CODE- 258: Performed by: INTERNAL MEDICINE

## 2022-09-12 PROCEDURE — 97116 GAIT TRAINING THERAPY: CPT

## 2022-09-12 PROCEDURE — 94640 AIRWAY INHALATION TREATMENT: CPT

## 2022-09-12 PROCEDURE — 36415 COLL VENOUS BLD VENIPUNCTURE: CPT

## 2022-09-12 PROCEDURE — 74011250637 HC RX REV CODE- 250/637: Performed by: SURGERY

## 2022-09-12 PROCEDURE — 74011000250 HC RX REV CODE- 250: Performed by: INTERNAL MEDICINE

## 2022-09-12 PROCEDURE — 85025 COMPLETE CBC W/AUTO DIFF WBC: CPT

## 2022-09-12 PROCEDURE — 65270000029 HC RM PRIVATE

## 2022-09-12 PROCEDURE — 74011250637 HC RX REV CODE- 250/637: Performed by: HOSPITALIST

## 2022-09-12 PROCEDURE — 77010033678 HC OXYGEN DAILY

## 2022-09-12 PROCEDURE — 84100 ASSAY OF PHOSPHORUS: CPT

## 2022-09-12 RX ADMIN — PIPERACILLIN AND TAZOBACTAM 3.38 G: 3; .375 INJECTION, POWDER, FOR SOLUTION INTRAVENOUS at 04:37

## 2022-09-12 RX ADMIN — HYDRALAZINE HYDROCHLORIDE 50 MG: 25 TABLET, FILM COATED ORAL at 22:05

## 2022-09-12 RX ADMIN — ENOXAPARIN SODIUM 30 MG: 100 INJECTION SUBCUTANEOUS at 10:42

## 2022-09-12 RX ADMIN — POTASSIUM CHLORIDE 40 MEQ: 750 TABLET, FILM COATED, EXTENDED RELEASE ORAL at 12:45

## 2022-09-12 RX ADMIN — ONDANSETRON 4 MG: 4 TABLET, ORALLY DISINTEGRATING ORAL at 20:13

## 2022-09-12 RX ADMIN — Medication 10 ML: at 22:05

## 2022-09-12 RX ADMIN — AMLODIPINE BESYLATE 10 MG: 5 TABLET ORAL at 10:43

## 2022-09-12 RX ADMIN — LISINOPRIL 20 MG: 20 TABLET ORAL at 10:43

## 2022-09-12 RX ADMIN — ARFORMOTEROL TARTRATE 15 MCG: 15 SOLUTION RESPIRATORY (INHALATION) at 19:42

## 2022-09-12 RX ADMIN — PIPERACILLIN AND TAZOBACTAM 3.38 G: 3; .375 INJECTION, POWDER, FOR SOLUTION INTRAVENOUS at 20:00

## 2022-09-12 RX ADMIN — Medication 1 CAPSULE: at 10:42

## 2022-09-12 RX ADMIN — OXYCODONE 10 MG: 5 TABLET ORAL at 07:37

## 2022-09-12 RX ADMIN — ARFORMOTEROL TARTRATE 15 MCG: 15 SOLUTION RESPIRATORY (INHALATION) at 08:03

## 2022-09-12 RX ADMIN — PANTOPRAZOLE SODIUM 40 MG: 40 TABLET, DELAYED RELEASE ORAL at 18:04

## 2022-09-12 RX ADMIN — Medication 10 ML: at 04:43

## 2022-09-12 RX ADMIN — ALTEPLASE 2 MG: 2.2 INJECTION, POWDER, LYOPHILIZED, FOR SOLUTION INTRAVENOUS at 10:59

## 2022-09-12 RX ADMIN — SPIRONOLACTONE 25 MG: 25 TABLET ORAL at 10:42

## 2022-09-12 RX ADMIN — ENOXAPARIN SODIUM 30 MG: 100 INJECTION SUBCUTANEOUS at 20:16

## 2022-09-12 RX ADMIN — HYDRALAZINE HYDROCHLORIDE 50 MG: 25 TABLET, FILM COATED ORAL at 10:42

## 2022-09-12 RX ADMIN — BUMETANIDE 1 MG: 0.25 INJECTION, SOLUTION INTRAMUSCULAR; INTRAVENOUS at 10:44

## 2022-09-12 RX ADMIN — PANTOPRAZOLE SODIUM 40 MG: 40 TABLET, DELAYED RELEASE ORAL at 10:43

## 2022-09-12 RX ADMIN — Medication 10 ML: at 18:02

## 2022-09-12 RX ADMIN — HYDRALAZINE HYDROCHLORIDE 50 MG: 25 TABLET, FILM COATED ORAL at 18:03

## 2022-09-12 NOTE — PROGRESS NOTES
Abdifatah Guerin [facility]  [address]  [phone]      Hospitalist Progress Note      NAME: Glenn Luna   :  1960  MRM:  [id number]    Date/Time of service: 2022  11:06 AM       Subjective:     Chief Complaint:  Patient was personally seen and examined by me during this time period. Chart reviewed. Still with abd drain. No fevers, chills       Objective:       Vitals:       Last 24hrs VS reviewed since prior progress note.  Most recent are:    Visit Vitals  /66 (BP 1 Location: Right lower arm, BP Patient Position: Sitting)   Pulse 69   Temp 98.7 °F (37.1 °C)   Resp 18   Ht 5' 2\" (1.575 m)   Wt 139.5 kg (307 lb 8.7 oz)   SpO2 98%   Breastfeeding No   BMI 56.25 kg/m²     SpO2 Readings from Last 6 Encounters:   22 98%   22 94%   22 98%   22 95%   22 95%   06/15/22 95%    O2 Flow Rate (L/min): (S) 1 l/min     Intake/Output Summary (Last 24 hours) at 2022 1106  Last data filed at 2022 1144  Gross per 24 hour   Intake 250 ml   Output 295 ml   Net -45 ml          Exam:     Physical Exam:    Gen:  Well-developed, well-nourished, morbidly obese, in no acute distress  HEENT:  Pink conjunctivae, PERRL, hearing intact to voice, moist mucous membranes  Neck:  Supple, without masses, thyroid non-tender  Resp:  No accessory muscle use, clear breath sounds without wheezes rales or rhonchi  Card:  No murmurs, normal S1, S2 without thrills, anasarca   Abd:  Soft, non-tender, non-distended, normoactive bowel sounds are present, has drain   Musc:  No cyanosis or clubbing  Skin:  bilateral LE venous stasis   Neuro:  Cranial nerves 3-12 are grossly intact, follows commands appropriately  Psych:  Good insight, oriented to person, place and time, alert    Medications Reviewed: (see below)    Lab Data Reviewed: (see below)    ______________________________________________________________________    Medications:     Current Facility-Administered Medications   Medication Dose Route Frequency    labetaloL (NORMODYNE;TRANDATE) 20 mg/4 mL (5 mg/mL) injection 10 mg  10 mg IntraVENous Q4H PRN    spironolactone (ALDACTONE) tablet 25 mg  25 mg Oral DAILY    hydrALAZINE (APRESOLINE) tablet 50 mg  50 mg Oral TID    lisinopriL (PRINIVIL, ZESTRIL) tablet 20 mg  20 mg Oral DAILY    amLODIPine (NORVASC) tablet 10 mg  10 mg Oral DAILY    bumetanide (BUMEX) injection 1 mg  1 mg IntraVENous DAILY    oxyCODONE IR (ROXICODONE) tablet 5 mg  5 mg Oral Q6H PRN    oxyCODONE IR (ROXICODONE) tablet 10 mg  10 mg Oral Q6H PRN    L.acidophilus-paracasei-S.thermophil-bifidobacter (RISAQUAD) 8 billion cell capsule  1 Capsule Oral DAILY    piperacillin-tazobactam (ZOSYN) 3.375 g in 0.9% sodium chloride (MBP/ADV) 100 mL MBP  3.375 g IntraVENous Q8H    HYDROmorphone (DILAUDID) injection 2 mg  2 mg IntraVENous Q4H PRN    potassium chloride SR (KLOR-CON 10) tablet 40 mEq  40 mEq Oral DAILY    sodium chloride (NS) flush 5-40 mL  5-40 mL IntraVENous Q8H    sodium chloride (NS) flush 5-40 mL  5-40 mL IntraVENous PRN    acetaminophen (TYLENOL) tablet 650 mg  650 mg Oral Q6H PRN    Or    acetaminophen (TYLENOL) suppository 650 mg  650 mg Rectal Q6H PRN    polyethylene glycol (MIRALAX) packet 17 g  17 g Oral DAILY PRN    ondansetron (ZOFRAN ODT) tablet 4 mg  4 mg Oral Q8H PRN    Or    ondansetron (ZOFRAN) injection 4 mg  4 mg IntraVENous Q6H PRN    enoxaparin (LOVENOX) injection 30 mg  30 mg SubCUTAneous Q12H    albuterol (PROVENTIL VENTOLIN) nebulizer solution 2.5 mg  2.5 mg Nebulization Q4H PRN    arformoteroL (BROVANA) neb solution 15 mcg  15 mcg Nebulization BID RT    lidocaine-prilocaine (EMLA) 2.5-2.5 % cream   Topical PRN    pantoprazole (PROTONIX) tablet 40 mg  40 mg Oral BID    insulin lispro (HUMALOG) injection   SubCUTAneous AC&HS    glucose chewable tablet 16 g  4 Tablet Oral PRN    glucagon (GLUCAGEN) injection 1 mg  1 mg IntraMUSCular PRN    dextrose 10% infusion 0-250 mL  0-250 mL IntraVENous PRN          Lab Review:     Recent Labs     09/12/22  0907   WBC 6.8   HGB 9.5*   HCT 32.6*          Recent Labs     09/12/22  0907      K 4.8      CO2 30   *   BUN 15   CREA 0.94   CA 10.2*   MG 2.1   PHOS 2.7   ALB 2.9*   TBILI 0.3   ALT 19       Lab Results   Component Value Date/Time    Glucose (POC) 131 (H) 09/12/2022 10:57 AM    Glucose (POC) 105 09/12/2022 07:42 AM    Glucose (POC) 142 (H) 09/11/2022 08:57 PM    Glucose (POC) 120 (H) 09/11/2022 04:02 PM    Glucose (POC) 151 (H) 09/11/2022 11:18 AM          Assessment / Plan:     66 yo hx of HTN, DM, breast CA, asthma, perforated gastric ulcer repair, presented w/ chest pain, abd fluid collection/abscess, L pleural effusion    1) Abd fluid collection/abscess/pain: s/p IR drainage on 09/03. Cultures negative. Hx of recent gastric ulcer repair. Empirically on IV Zosyn (will treat for 10 days). Rest of management per Gen surg    2) L pleural effusion: unclear etiology. Could be related to abd fluid collection and recent gastric ulcer repair. Pulm was following     3) Anasarca: due to malnutrition. Last echo with EF 60-65%. Will cont daily IV bumex. Monitor I/O, BMP    4) Severe pro-juan malnutrition: due to recent surgery. Encourage PO    5) DM type 2: cont SSI    6) HTN: BP stable.   Will cont norvasc, hydralazine, lisinopril, aldactone, bumex    7) Breast CA: s/p chemo    Total time spent with patient care: 35 Minutes **I personally saw and examined the patient during this time period**                 Care Plan discussed with: Patient, nursing    Discussed:  Care Plan    Prophylaxis:  Lovenox    Disposition:  SNF/LTC vs HH           ___________________________________________________    Attending Physician: Shalom Segovia MD

## 2022-09-12 NOTE — PROGRESS NOTES
Problem: Mobility Impaired (Adult and Pediatric)  Goal: *Acute Goals and Plan of Care (Insert Text)  Description: FUNCTIONAL STATUS PRIOR TO ADMISSION: Patient was modified independent using a rolling walker for functional mobility. HOME SUPPORT PRIOR TO ADMISSION: The patient lived with daughter but did not require assist for mobility. Physical Therapy Goals  Initiated 9/6/2022  1. Patient will move from supine to sit and sit to supine  in bed with modified independence within 7 day(s). 2.  Patient will transfer from bed to chair and chair to bed with modified independence using the least restrictive device within 7 day(s). 3.  Patient will perform sit to stand with modified independence within 7 day(s). 4.  Patient will ambulate with modified independence for 50 feet with the least restrictive device within 7 day(s). 5.  Patient will ascend/descend 5 stairs with b/l handrail(s) with modified independence within 7 day(s). Outcome: Progressing Towards Goal   PHYSICAL THERAPY TREATMENT  Patient: Silvio Powers (36 y.o. female)  Date: 9/12/2022  Diagnosis: Pleural effusion, left [J90]  Pleural effusion [J90] <principal problem not specified>      Precautions:    Chart, physical therapy assessment, plan of care and goals were reviewed. ASSESSMENT  Patient continues with skilled PT services and is progressing towards goals. Pt received supine in bed and reports fatigue from just working with OT. Agreeable to get OOB for lunch. Pt completes two gait trials of 20 and 40ft while on RA. Slow gait speed with decreased step clearance and no LOB with support of RW. Maintains saturations 88% and greater on first walk and 92% and greater on 2nd attempt with continually cued for PLBing and stadning rest break before ambulating back to the chair. Remains appropriate for d/c home with HHPT. Current Level of Function Impacting Discharge (mobility/balance):  CGA for ambulation with RW    Other factors to consider for discharge: weaned to RA         PLAN :  Patient continues to benefit from skilled intervention to address the above impairments. Continue treatment per established plan of care. to address goals. Recommendation for discharge: (in order for the patient to meet his/her long term goals)  Physical therapy at least 2 days/week in the home     This discharge recommendation:  Has been made in collaboration with the attending provider and/or case management    IF patient discharges home will need the following DME: patient owns DME required for discharge       SUBJECTIVE:   Patient stated I don't want oxygen at home.     OBJECTIVE DATA SUMMARY:   Critical Behavior:  Neurologic State: Alert, Appropriate for age  Orientation Level: Oriented X4  Cognition: Appropriate decision making  Safety/Judgement: Awareness of environment, Fall prevention  Functional Mobility Training:  Bed Mobility:  Rolling: Supervision  Supine to Sit: Supervision              Transfers:  Sit to Stand: Stand-by assistance  Stand to Sit: Supervision                             Balance:  Sitting: Intact  Standing: Intact; With support  Ambulation/Gait Training:  Distance (ft): 40 Feet (ft)  Assistive Device: Gait belt;Walker, rolling  Ambulation - Level of Assistance: Stand-by assistance                 Base of Support: Widened     Speed/[place]: Pace decreased (<100 feet/min); Slow  Step Length: Right shortened;Left shortened                  Activity Tolerance:   Good    After treatment patient left in no apparent distress:   Sitting in chair and Call bell within reach    COMMUNICATION/COLLABORATION:   The patients plan of care was discussed with: Registered nurse.      Silvia Jewell, PT   Time Calculation: 14 mins

## 2022-09-12 NOTE — PROGRESS NOTES
[facility]        Subjective     No acute events  Pain improved    Objective     Patient Vitals for the past 24 hrs:   Temp Pulse Resp BP SpO2   09/12/22 1110 -- -- -- -- 98 %   09/12/22 1039 -- -- -- -- 97 %   09/12/22 1038 98.7 °F (37.1 °C) 69 18 136/66 98 %   09/12/22 0022 98.1 °F (36.7 °C) 79 17 137/68 98 %   09/11/22 2058 98.6 °F (37 °C) 85 17 (!) 143/55 --   09/11/22 1609 99.6 °F (37.6 °C) 81 18 (!) 149/70 93 %       Date 09/11/22 0700 - 09/12/22 0659 09/12/22 0700 - 09/13/22 0659   Shift [id number] 6513-8334 24 Hour Total 3094-2082 5934-8177 24 Hour Total   INTAKE   P.O.  250 250        P. O.  250 250      NG/GT  0 0 0  0     Intake (ml) (Drain [facility] Drainage 09/04/22 Left Other (comment))  0 0 0  0   Shift Total(mL/kg)  250(1.8) 250(1.8) 0(0)  0(0)   OUTPUT   Urine(mL/kg/hr)  250(0.1) 250(0.1)        Urine Voided  250 250        Urine Occurrence(s)  3 x 3 x      Emesis/NG output           Emesis Occurrence(s)  0 x 0 x      Drains 25 20 45 0  0     Output (ml) (Drain [place] Drainage 09/04/22 Left Other (comment)) 25 20 45 0  0   Stool           Stool Occurrence(s)  1 x 1 x      Shift Total(mL/kg) 25(0.2) 270(1.9) 295(2.1) 0(0)  0(0)   NET -25 -20 -45 0  0   Weight (kg) 139.3 139.5 139.5 139.5 139.5 139.5       PE  GEN - Awake, alert, communicating appropriately.   NAD  Pulm - CTAB  CV - RRR  Abd - soft, NT, ND, drain small straw colored draiange      Labs  Recent Results (from the past 24 hour(s))   GLUCOSE, POC    Collection Time: 09/11/22  4:02 PM   Result Value Ref Range    Glucose (POC) 120 (H) 65 - 117 mg/dL    Performed by Yayo DALEY)    GLUCOSE, POC    Collection Time: 09/11/22  8:57 PM   Result Value Ref Range    Glucose (POC) 142 (H) 65 - 117 mg/dL    Performed by Heidi Mason RN    GLUCOSE, POC    Collection Time: 09/12/22  7:42 AM   Result Value Ref Range    Glucose (POC) 105 65 - 117 mg/dL    Performed by Esperanza Palencia    CBC WITH AUTOMATED DIFF    Collection Time: 09/12/22  9:07 AM   Result Value Ref Range    WBC 6.8 3.6 - 11.0 K/uL    RBC 3.49 (L) 3.80 - 5.20 M/uL    HGB 9.5 (L) 11.5 - 16.0 g/dL    HCT 32.6 (L) 35.0 - 47.0 %    MCV 93.4 80.0 - 99.0 FL    MCH 27.2 26.0 - 34.0 PG    MCHC 29.1 (L) 30.0 - 36.5 g/dL    RDW 16.9 (H) 11.5 - 14.5 %    PLATELET 433 341 - 472 K/uL    MPV 8.7 (L) 8.9 - 12.9 FL    NRBC 0.0 0  WBC    ABSOLUTE NRBC 0.00 0.00 - 0.01 K/uL    NEUTROPHILS 73 32 - 75 %    LYMPHOCYTES 10 (L) 12 - 49 %    MONOCYTES 12 5 - 13 %    EOSINOPHILS 4 0 - 7 %    BASOPHILS 0 0 - 1 %    IMMATURE GRANULOCYTES 1 (H) 0.0 - 0.5 %    ABS. NEUTROPHILS 4.9 1.8 - 8.0 K/UL    ABS. LYMPHOCYTES 0.7 (L) 0.8 - 3.5 K/UL    ABS. MONOCYTES 0.8 0.0 - 1.0 K/UL    ABS. EOSINOPHILS 0.3 0.0 - 0.4 K/UL    ABS. BASOPHILS 0.0 0.0 - 0.1 K/UL    ABS. IMM. GRANS. 0.1 (H) 0.00 - 0.04 K/UL    DF SMEAR SCANNED      RBC COMMENTS ANISOCYTOSIS  1+       MAGNESIUM    Collection Time: 09/12/22  9:07 AM   Result Value Ref Range    Magnesium 2.1 1.6 - 2.4 mg/dL   METABOLIC PANEL, COMPREHENSIVE    Collection Time: 09/12/22  9:07 AM   Result Value Ref Range    Sodium 138 136 - 145 mmol/L    Potassium 4.8 3.5 - 5.1 mmol/L    Chloride 105 97 - 108 mmol/L    CO2 30 21 - 32 mmol/L    Anion gap 3 (L) 5 - 15 mmol/L    Glucose 132 (H) 65 - 100 mg/dL    BUN 15 6 - 20 MG/DL    Creatinine 0.94 0.55 - 1.02 MG/DL    BUN/Creatinine ratio 16 12 - 20      GFR est AA >60 >60 ml/min/1.73m2    GFR est non-AA >60 >60 ml/min/1.73m2    Calcium 10.2 (H) 8.5 - 10.1 MG/DL    Bilirubin, total 0.3 0.2 - 1.0 MG/DL    ALT (SGPT) 19 12 - 78 U/L    AST (SGOT) 21 15 - 37 U/L    Alk.  phosphatase 56 45 - 117 U/L    Protein, total 7.1 6.4 - 8.2 g/dL    Albumin 2.9 (L) 3.5 - 5.0 g/dL    Globulin 4.2 (H) 2.0 - 4.0 g/dL    A-G Ratio 0.7 (L) 1.1 - 2.2     PHOSPHORUS    Collection Time: 09/12/22  9:07 AM   Result Value Ref Range    Phosphorus 2.7 2.6 - 4.7 MG/DL   GLUCOSE, POC Collection Time: 09/12/22 10:57 AM   Result Value Ref Range    Glucose (POC) 131 (H) 65 - 117 mg/dL    Performed by Delgado Wilder is a 65 y. o.yr old female s/p recent repair stomach ulcer, with fluid collection post IR drain  Output remains low  Exam improved    Plan     Drain removed  Otherwise no changes from surgical perspective    20 mins of time was spent with the patient of which > 50% of the time involved face-to-face counseling of the patient regarding the proposed treatment plan.     Jeanie Hwang MD

## 2022-09-12 NOTE — PROGRESS NOTES
Problem: Self Care Deficits Care Plan (Adult)  Goal: *Acute Goals and Plan of Care (Insert Text)  Description: FUNCTIONAL STATUS PRIOR TO ADMISSION: Patient was modified independent using a rolling walker for functional mobility. Prior to July 2022, pt was independent with ADLs. She underwent perforated gastric ulcer repair in July and since then has required assistance from dtr for dressing and bathing. Pt has home O2 at home, but does not use it. HOME SUPPORT: The patient lived with daughter and required minimal assistance/contact guard assist for bathing and dressing, and some toileting. Occupational Therapy Goals  Initiated 9/6/2022  1. Patient will perform grooming with supervision/set-up within 7 day(s). 2.  Patient will perform lower body dressing with minimal assistance/contact guard assist using LH AE within 7 day(s). 3.  Patient will perform bathing with minimal assistance/contact guard assist within 7 day(s). 4.  Patient will perform toilet transfers with modified independence within 7 day(s). 5.  Patient will perform all aspects of toileting with minimal assistance/contact guard assist using AE within 7 day(s). 6.  Patient will participate in upper extremity therapeutic exercise/activities with modified independence for 10 minutes within 7 day(s). Outcome: Progressing Towards Goal   OCCUPATIONAL THERAPY TREATMENT  Patient: Julio Cesar Pinto (71 y.o. female)  Date: 9/12/2022  Diagnosis: Pleural effusion, left [J90]  Pleural effusion [J90] <principal problem not specified>      Precautions:    Chart, occupational therapy assessment, plan of care, and goals were reviewed. ASSESSMENT  Patient continues with skilled OT services and is progressing towards goals. Pt seen for ADL re-training and seated edge of bed able to doff/don socks with use of dressing stick, sock aide and reacher to task.  She ambulated to restroom and transfers to commode contact guard, she is able to manage her own hygiene and contact guard cloth mgt. Pt stand at sink contact guard for hand washing. On room air O2 sats 91% following activity. She engaged with one set of UE exercise before return to supine. Current Level of Function Impacting Discharge (ADLs): contact guard LB dress, toileting    Other factors to consider for discharge:          PLAN :  Patient continues to benefit from skilled intervention to address the above impairments. Continue treatment per established plan of care to address goals. Recommend with staff: out of bed for ADL's, there ex, there act, meals    Recommend next OT session: cont towards goals    Recommendation for discharge: (in order for the patient to meet his/her long term goals)  Occupational therapy at least 2 days/week in the home AND ensure assist and/or supervision for safety with ADl's    This discharge recommendation:  Has not yet been discussed the attending provider and/or case management    IF patient discharges home will need the following DME:        SUBJECTIVE:   Patient stated Ok.     OBJECTIVE DATA SUMMARY:   Cognitive/Behavioral Status:  Neurologic State: Alert; Appropriate for age  Orientation Level: Oriented X4  Cognition: Appropriate decision making             Functional Mobility and Transfers for ADLs:  Bed Mobility:   Contact guard    Transfers:  Sit to Stand: Contact guard assistance  Functional Transfers  Toilet Transfer : Contact guard assistance       Balance:  Sitting: Intact  Standing: Impaired    ADL Intervention:       Grooming  Grooming Assistance: Contact guard assistance  Position Performed: Standing  Washing Hands: Contact guard assistance                        Lower Body Dressing Assistance  Socks: Contact guard assistance  Leg Crossed Method Used: No  Position Performed: Seated edge of bed  Adaptive Equipment Used: Dressing stick; Sock aid;Reacher    Toileting  Bowel Hygiene: Modified indpendent  Clothing Management: Contact guard assistance Therapeutic Exercises:     EXERCISE   Sets   Reps   Active Active Assist   Passive   Comments   Finger flex/ext 1 10 [x]           []           []              Wrist flex/ext 1 10 [x]           []           []              Forearm supination/pronation 1 10 [x]           []           []              Elbow flex/ext 1 10 [x]           []           []              Chest presses 1 10 [x]           []           []              Shoulder shrugs 1 10 [x]           []           []              Scapular protraction/retraction 1 10 [x]           []           []              Shoulder flex/ext 1 10 [x]           []           []                 []           []           []                 []           []           []                 []           []           []                   Activity Tolerance:   Fair    After treatment patient left in no apparent distress:   Supine in bed and Call bell within reach    COMMUNICATION/COLLABORATION:   The patients plan of care was discussed with: Physical therapist, Occupational therapist, and Registered nurse.      MADDY Clarke/L  Time Calculation: 34 mins

## 2022-09-12 NOTE — PROGRESS NOTES
9/12/2022  Case Management Progress Note    12:05 PM  Patient is 65year old female admitted 9/4 with pleural effusion  Patient's RUR is 20% red/high risk for readmission  Covid test: none this admission  Chart reviewed--patient discussed at IDR rounds  Per rounds patient may be ready for discharge today pending surgical clearance. Patient does not want to go home with her drain in. Patient does not have a PCP and has been denied by all home health agencies that service her area as a result. Patient was educated on admission about seeing PCP and getting home health set up as needed.      Transition of Care Plan  Continue medical management/treatment  Home with family assistance when ready   Family will transport at discharge  Follow up outpatient as indicated  CM will continue to follow    MARCO A Card

## 2022-09-12 NOTE — PROGRESS NOTES
Problem: Pain  Goal: *Control of Pain  Outcome: Progressing Towards Goal     Problem: Patient Education: Go to Patient Education Activity  Goal: Patient/Family Education  Outcome: Progressing Towards Goal     Problem: Impaired Skin Integrity/Pressure Injury Treatment  Goal: *Improvement of Existing Pressure Injury  Outcome: Progressing Towards Goal  Goal: *Prevention of pressure injury  Description: Document Eliecer Scale and appropriate interventions in the flowsheet. Outcome: Progressing Towards Goal  Note: Pressure Injury Interventions:  Sensory Interventions: Assess need for specialty bed, Float heels, Keep linens dry and wrinkle-free    Moisture Interventions: Absorbent underpads    Activity Interventions: Pressure redistribution bed/mattress(bed type)    Mobility Interventions: Pressure redistribution bed/mattress (bed type)    Nutrition Interventions: Document food/fluid/supplement intake    Friction and Shear Interventions: Minimize layers                Problem: Patient Education: Go to Patient Education Activity  Goal: Patient/Family Education  Outcome: Progressing Towards Goal     Problem: Falls - Risk of  Goal: *Absence of Falls  Description: Document Sofia Fall Risk and appropriate interventions in the flowsheet.   Outcome: Progressing Towards Goal  Note: Fall Risk Interventions:  Mobility Interventions: Bed/chair exit alarm    Mentation Interventions: Bed/chair exit alarm    Medication Interventions: Evaluate medications/consider consulting pharmacy, Patient to call before getting OOB    Elimination Interventions: Call light in reach, Bed/chair exit alarm    History of Falls Interventions: Bed/chair exit alarm         Problem: Patient Education: Go to Patient Education Activity  Goal: Patient/Family Education  Outcome: Progressing Towards Goal     Problem: Patient Education: Go to Patient Education Activity  Goal: Patient/Family Education  Outcome: Progressing Towards Goal     Problem: Nutrition Deficit  Goal: *Optimize nutritional status  Outcome: Progressing Towards Goal     Problem: Patient Education: Go to Patient Education Activity  Goal: Patient/Family Education  Outcome: Progressing Towards Goal     Problem: Pressure Injury - Risk of  Goal: *Prevention of pressure injury  Description: Document Eliecer Scale and appropriate interventions in the flowsheet.   Outcome: Progressing Towards Goal  Note: Pressure Injury Interventions:  Sensory Interventions: Assess need for specialty bed, Float heels, Keep linens dry and wrinkle-free    Moisture Interventions: Absorbent underpads    Activity Interventions: Pressure redistribution bed/mattress(bed type)    Mobility Interventions: Pressure redistribution bed/mattress (bed type)    Nutrition Interventions: Document food/fluid/supplement intake    Friction and Shear Interventions: Minimize layers                Problem: Patient Education: Go to Patient Education Activity  Goal: Patient/Family Education  Outcome: Progressing Towards Goal

## 2022-09-12 NOTE — PROGRESS NOTES
Bedside shift change report given to Courtney Diaz   (oncoming nurse) by Suzy Chamberlain (offgoing nurse). Report included the following information SBAR and Kardex.

## 2022-09-13 VITALS
TEMPERATURE: 99.1 F | RESPIRATION RATE: 18 BRPM | OXYGEN SATURATION: 94 % | SYSTOLIC BLOOD PRESSURE: 143 MMHG | HEART RATE: 88 BPM | BODY MASS INDEX: 53.92 KG/M2 | WEIGHT: 293 LBS | HEIGHT: 62 IN | DIASTOLIC BLOOD PRESSURE: 62 MMHG

## 2022-09-13 PROBLEM — K65.1 ABSCESS OF ABDOMINAL CAVITY (HCC): Status: ACTIVE | Noted: 2022-09-13

## 2022-09-13 LAB
GLUCOSE BLD STRIP.AUTO-MCNC: 124 MG/DL (ref 65–117)
GLUCOSE BLD STRIP.AUTO-MCNC: 132 MG/DL (ref 65–117)
SERVICE CMNT-IMP: ABNORMAL
SERVICE CMNT-IMP: ABNORMAL

## 2022-09-13 PROCEDURE — 74011000258 HC RX REV CODE- 258: Performed by: INTERNAL MEDICINE

## 2022-09-13 PROCEDURE — 77010033678 HC OXYGEN DAILY

## 2022-09-13 PROCEDURE — 82962 GLUCOSE BLOOD TEST: CPT

## 2022-09-13 PROCEDURE — 97535 SELF CARE MNGMENT TRAINING: CPT

## 2022-09-13 PROCEDURE — 74011250637 HC RX REV CODE- 250/637: Performed by: HOSPITALIST

## 2022-09-13 PROCEDURE — 94640 AIRWAY INHALATION TREATMENT: CPT

## 2022-09-13 PROCEDURE — 74011250637 HC RX REV CODE- 250/637: Performed by: INTERNAL MEDICINE

## 2022-09-13 PROCEDURE — 97116 GAIT TRAINING THERAPY: CPT

## 2022-09-13 PROCEDURE — 74011000250 HC RX REV CODE- 250: Performed by: INTERNAL MEDICINE

## 2022-09-13 PROCEDURE — 94761 N-INVAS EAR/PLS OXIMETRY MLT: CPT

## 2022-09-13 PROCEDURE — 74011250636 HC RX REV CODE- 250/636: Performed by: INTERNAL MEDICINE

## 2022-09-13 PROCEDURE — 74011000250 HC RX REV CODE- 250: Performed by: SURGERY

## 2022-09-13 RX ORDER — LABETALOL 100 MG/1
100 TABLET, FILM COATED ORAL 2 TIMES DAILY
Qty: 60 TABLET | Refills: 0 | Status: SHIPPED | OUTPATIENT
Start: 2022-09-13

## 2022-09-13 RX ORDER — ALBUTEROL SULFATE 90 UG/1
2 AEROSOL, METERED RESPIRATORY (INHALATION)
Qty: 18 G | Refills: 1 | Status: SHIPPED | OUTPATIENT
Start: 2022-09-13

## 2022-09-13 RX ORDER — HEPARIN 100 UNIT/ML
500 SYRINGE INTRAVENOUS AS NEEDED
Status: DISCONTINUED | OUTPATIENT
Start: 2022-09-13 | End: 2022-09-13 | Stop reason: HOSPADM

## 2022-09-13 RX ORDER — SPIRONOLACTONE 25 MG/1
25 TABLET ORAL DAILY
Qty: 30 TABLET | Refills: 0 | Status: SHIPPED | OUTPATIENT
Start: 2022-09-14

## 2022-09-13 RX ORDER — FLUTICASONE PROPIONATE AND SALMETEROL 100; 50 UG/1; UG/1
1 POWDER RESPIRATORY (INHALATION) EVERY 12 HOURS
Qty: 60 EACH | Refills: 1 | Status: SHIPPED | OUTPATIENT
Start: 2022-09-13

## 2022-09-13 RX ORDER — BUMETANIDE 1 MG/1
1 TABLET ORAL DAILY
Qty: 30 TABLET | Refills: 0 | Status: SHIPPED | OUTPATIENT
Start: 2022-09-13

## 2022-09-13 RX ORDER — PANTOPRAZOLE SODIUM 40 MG/1
40 TABLET, DELAYED RELEASE ORAL 2 TIMES DAILY
Qty: 60 TABLET | Refills: 0 | Status: SHIPPED | OUTPATIENT
Start: 2022-09-13

## 2022-09-13 RX ORDER — AMLODIPINE BESYLATE 10 MG/1
10 TABLET ORAL DAILY
Qty: 30 TABLET | Refills: 0 | Status: SHIPPED | OUTPATIENT
Start: 2022-09-14

## 2022-09-13 RX ADMIN — Medication 5 ML: at 06:35

## 2022-09-13 RX ADMIN — HEPARIN 500 UNITS: 100 SYRINGE at 16:04

## 2022-09-13 RX ADMIN — ARFORMOTEROL TARTRATE 15 MCG: 15 SOLUTION RESPIRATORY (INHALATION) at 07:35

## 2022-09-13 RX ADMIN — PANTOPRAZOLE SODIUM 40 MG: 40 TABLET, DELAYED RELEASE ORAL at 08:41

## 2022-09-13 RX ADMIN — PIPERACILLIN AND TAZOBACTAM 3.38 G: 3; .375 INJECTION, POWDER, FOR SOLUTION INTRAVENOUS at 03:23

## 2022-09-13 RX ADMIN — LISINOPRIL 20 MG: 20 TABLET ORAL at 08:41

## 2022-09-13 RX ADMIN — HYDRALAZINE HYDROCHLORIDE 50 MG: 25 TABLET, FILM COATED ORAL at 08:41

## 2022-09-13 RX ADMIN — AMLODIPINE BESYLATE 10 MG: 5 TABLET ORAL at 08:41

## 2022-09-13 RX ADMIN — SPIRONOLACTONE 25 MG: 25 TABLET ORAL at 08:41

## 2022-09-13 RX ADMIN — Medication 1 CAPSULE: at 08:41

## 2022-09-13 RX ADMIN — BUMETANIDE 1 MG: 0.25 INJECTION, SOLUTION INTRAMUSCULAR; INTRAVENOUS at 08:40

## 2022-09-13 RX ADMIN — POTASSIUM CHLORIDE 40 MEQ: 750 TABLET, FILM COATED, EXTENDED RELEASE ORAL at 13:19

## 2022-09-13 RX ADMIN — PIPERACILLIN AND TAZOBACTAM 3.38 G: 3; .375 INJECTION, POWDER, FOR SOLUTION INTRAVENOUS at 13:19

## 2022-09-13 RX ADMIN — ENOXAPARIN SODIUM 30 MG: 100 INJECTION SUBCUTANEOUS at 08:42

## 2022-09-13 RX ADMIN — ONDANSETRON 4 MG: 4 TABLET, ORALLY DISINTEGRATING ORAL at 14:20

## 2022-09-13 NOTE — PROGRESS NOTES
9/13/2022  Case Management Progress Note    10:23 AM  Patient is 65year old female admitted 9/4 with pleural effusion  Patient's RUR is 21% red/high risk for readmission  Covid test: none this admission  Chart reviewed   Per chart patient's drain was removed yesterday. She could benefit from home health however does not have a PCP and thus was declined by all home health services in her locality. Patient was made aware on admission that she needed to see a PCP for her home health. We attempted to make an appointment for her but the office she has chosen requires the patient to call. Will continue to follow and assist as needed.      Transition of Care Plan   Continue medical management/treatment  Discharge home with family assistance  Attempted to secure home health--was unable  Family will transport home   Follow up outpatient as indicated  CM will continue to follow    MARCO A Blancas

## 2022-09-13 NOTE — DISCHARGE INSTRUCTIONS
HOSPITALIST DISCHARGE INSTRUCTIONS  NAME: Theo Lennox   :  1960   MRN:  [id number]     Date/Time:  2022 11:51 AM    ADMIT DATE: 2022     DISCHARGE DATE: 2022     ADMITTING DIAGNOSIS:  Abdominal fluid collection/abscess, left pleural effusion    DISCHARGE DIAGNOSIS:  same    MEDICATIONS:  See after visit summary       It is important that you take the medication exactly as they are prescribed. Keep your medication in the bottles provided by the pharmacist and keep a list of the medication names, dosages, and times to be taken in your wallet. Do not take other medications without consulting your doctor     Pain Management: per above medications    What to do at Home    Recommended diet:  Low fat, Low cholesterol    Recommended activity: Activity as tolerated    1) Return to the hospital if you feel worse    2) If you experience any of the following symptoms then please call your primary care physician or return to the emergency room if you cannot get hold of your doctor:  Fever, chills, nausea, vomiting, diarrhea, change in mentation, falling, bleeding, shortness of breath, chest pain, severe headache, severe abdominal pain,     3) Monitor your blood pressure at home. Follow up with your doctors     Follow Up:  Gayathri Rodriguez NP    Schedule an appointment as soon as possible for a visit in 1 week(s)      Ankita Haley MD  [address] [place] [address]  [phone]    Schedule an appointment as soon as possible for a visit in 2 week(s)    . Information obtained by :  I understand that if any problems occur once I am at home I am to contact my physician. I understand and acknowledge receipt of the instructions indicated above.                                                                                                                                            Physician's or R.N.'s Signature Date/Time                                                                                                                                              Patient or Representative Signature                                                          Date/Time          High Blood Pressure: Care Instructions  Overview     It's normal for blood pressure to go up and down throughout the day. But if it stays up, you have high blood pressure. Another name for high blood pressure is hypertension. Despite what a lot of people think, high blood pressure usually doesn't cause headaches or make you feel dizzy or lightheaded. It usually has no symptoms. But it does increase your risk of stroke, heart attack, and other problems. You and your doctor will talk about your risks of these problems based on your blood pressure. Your doctor will give you a goal for your blood pressure. Your goal will be based on your health and your age. Lifestyle changes, such as eating healthy and being active, are always important to help lower blood pressure. You might also take medicine to reach your blood pressure goal.  Follow-up care is a key part of your treatment and safety. Be sure to make and go to all appointments, and call your doctor if you are having problems. It's also a good idea to know your test results and keep a list of the medicines you take. How can you care for yourself at home? Medical treatment  If you stop taking your medicine, your blood pressure will go back up. You may take one or more types of medicine to lower your blood pressure. Be safe with medicines. Take your medicine exactly as prescribed. Call your doctor if you think you are having a problem with your medicine. Talk to your doctor before you start taking aspirin every day. Aspirin can help certain people lower their risk of a heart attack or stroke. But taking aspirin isn't right for everyone, because it can cause serious bleeding.   See your doctor regularly. You may need to see the doctor more often at first or until your blood pressure comes down. If you are taking blood pressure medicine, talk to your doctor before you take decongestants or anti-inflammatory medicine, such as ibuprofen. Some of these medicines can raise blood pressure. Learn how to check your blood pressure at home. Lifestyle changes  Stay at a healthy weight. This is especially important if you put on weight around the waist. Losing even 10 pounds can help you lower your blood pressure. If your doctor recommends it, get more exercise. Walking is a good choice. Bit by bit, increase the amount you walk every day. Try for at least 30 minutes on most days of the week. You also may want to swim, bike, or do other activities. Avoid or limit alcohol. Talk to your doctor about whether you can drink any alcohol. Try to limit how much sodium you eat to less than 2,300 milligrams (mg) a day. Your doctor may ask you to try to eat less than 1,500 mg a day. Eat plenty of fruits (such as bananas and oranges), vegetables, legumes, whole grains, and low-fat dairy products. Lower the amount of saturated fat in your diet. Saturated fat is found in animal products such as milk, cheese, and meat. Limiting these foods may help you lose weight and also lower your risk for heart disease. Do not smoke. Smoking increases your risk for heart attack and stroke. If you need help quitting, talk to your doctor about stop-smoking programs and medicines. These can increase your chances of quitting for good. When should you call for help? Call 911  anytime you think you may need emergency care. This may mean having symptoms that suggest that your blood pressure is causing a serious heart or blood vessel problem. Your blood pressure may be over 180/120. For example, call 911 if:    You have symptoms of a heart attack.  These may include:  Chest pain or pressure, or a strange feeling in the chest.  Sweating. Shortness of breath. Nausea or vomiting. Pain, pressure, or a strange feeling in the back, neck, jaw, or upper belly or in one or both shoulders or arms. Lightheadedness or sudden weakness. A fast or irregular heartbeat. You have symptoms of a stroke. These may include:  Sudden numbness, tingling, weakness, or loss of movement in your face, arm, or leg, especially on only one side of your body. Sudden vision changes. Sudden trouble speaking. Sudden confusion or trouble understanding simple statements. Sudden problems with walking or balance. A sudden, severe headache that is different from past headaches. You have severe back or belly pain. Do not wait until your blood pressure comes down on its own. Get help right away. Call your doctor now or seek immediate care if:    Your blood pressure is much higher than normal (such as 180/120 or higher), but you don't have symptoms. You think high blood pressure is causing symptoms, such as:  Severe headache. Blurry vision. Watch closely for changes in your health, and be sure to contact your doctor if:    Your blood pressure measures higher than your doctor recommends at least 2 times. That means the top number is higher or the bottom number is higher, or both. You think you may be having side effects from your blood pressure medicine. Where can you learn more? Go to http://www.gray.com/  Enter [id number] in the search box to learn more about \"High Blood Pressure: Care Instructions. \"  Current as of: January 10, 2022               Content Version: 13.2  © 2006-2022 [employer]. Care instructions adapted under license by [employer] (which disclaims liability or warranty for this information).  If you have questions about a medical condition or this instruction, always ask your healthcare professional. [place] [address] any warranty or liability for your use of this information. Skin Abscess: Care Instructions  Overview     A skin abscess is a bacterial infection that forms a pocket of pus. A boil is a kind of skin abscess. The doctor may have cut an opening in the abscess so that the pus can drain out. You may have gauze in the cut so that the abscess will stay open and keep draining. You may need antibiotics. You will need to follow up with your doctor to make sure the infection has gone away. The doctor has checked you carefully, but problems can develop later. If you notice any problems or new symptoms, get medical treatment right away. Follow-up care is a key part of your treatment and safety. Be sure to make and go to all appointments, and call your doctor if you are having problems. It's also a good idea to know your test results and keep a list of the medicines you take. How can you care for yourself at home? Apply warm and dry compresses, a heating pad set on low, or a hot water bottle 3 or 4 times a day for pain. Keep a cloth between the heat source and your skin. If your doctor prescribed antibiotics, take them as directed. Do not stop taking them just because you feel better. You need to take the full course of antibiotics. Take pain medicines exactly as directed. If the doctor gave you a prescription medicine for pain, take it as prescribed. If you are not taking a prescription pain medicine, ask your doctor if you can take an over-the-counter medicine. Keep your bandage clean and dry. Change the bandage whenever it gets wet or dirty, or at least one time a day. If the abscess was packed with gauze:  Keep follow-up appointments to have the gauze changed or removed. If the doctor instructed you to remove the gauze, follow the instructions you were given for how to remove it. After the gauze is removed, soak the area in warm water for 15 to 20 minutes 2 times a day, until the wound closes. When should you call for help?    Call your doctor now or seek immediate medical care if:    You have signs of worsening infection, such as: Increased pain, swelling, warmth, or redness. Red streaks leading from the infected skin. Pus draining from the wound. A fever. Watch closely for changes in your health, and be sure to contact your doctor if:    You do not get better as expected. Where can you learn more? Go to http://www.gray.com/  Enter L897 in the search box to learn more about \"Skin Abscess: Care Instructions. \"  Current as of: November 15, 2021               Content Version: 13.2  © 6519-2268 [employer]. Care instructions adapted under license by [employer] (which disclaims liability or warranty for this information). If you have questions about a medical condition or this instruction, always ask your healthcare professional. [address] any warranty or liability for your use of this information.

## 2022-09-13 NOTE — PROGRESS NOTES
Problem: Pain  Goal: *Control of Pain  9/12/2022 2258 by Demetrius Mendez RN  Outcome: Progressing Towards Goal  9/12/2022 2030 by Demetrius Mendez RN  Outcome: Progressing Towards Goal     Problem: Impaired Skin Integrity/Pressure Injury Treatment  Goal: *Prevention of pressure injury  Description: Document Eliecer Scale and appropriate interventions in the flowsheet. 9/12/2022 2258 by Demetrius Mendez RN  Outcome: Progressing Towards Goal  Note: Pressure Injury Interventions:  Sensory Interventions: Assess changes in LOC, Check visual cues for pain    Moisture Interventions: Assess need for specialty bed, Limit adult briefs, Minimize layers, Moisture barrier    Activity Interventions: Assess need for specialty bed    Mobility Interventions: Assess need for specialty bed, HOB 30 degrees or less    Nutrition Interventions: Document food/fluid/supplement intake, Offer support with meals,snacks and hydration    Friction and Shear Interventions: HOB 30 degrees or less, Lift sheet, Minimize layers             9/12/2022 2030 by Demetrius Mendez RN  Outcome: Progressing Towards Goal  Note: Pressure Injury Interventions:  Sensory Interventions: Assess changes in LOC, Check visual cues for pain    Moisture Interventions: Assess need for specialty bed, Limit adult briefs, Minimize layers, Moisture barrier    Activity Interventions: Assess need for specialty bed    Mobility Interventions: Assess need for specialty bed, HOB 30 degrees or less    Nutrition Interventions: Document food/fluid/supplement intake, Offer support with meals,snacks and hydration    Friction and Shear Interventions: HOB 30 degrees or less, Lift sheet, Minimize layers                Problem: Falls - Risk of  Goal: *Absence of Falls  Description: Document Sofia Fall Risk and appropriate interventions in the flowsheet.   9/12/2022 2258 by Demetrius Mendez RN  Outcome: Progressing Towards Goal  Note: Fall Risk Interventions:  Mobility Interventions: Patient to call before getting OOB    Mentation Interventions: Update white board, Toileting rounds    Medication Interventions: Evaluate medications/consider consulting pharmacy, Patient to call before getting OOB, Teach patient to arise slowly    Elimination Interventions: Call light in reach, Patient to call for help with toileting needs, Stay With Me (per policy), Toilet paper/wipes in reach    History of Falls Interventions: Bed/chair exit alarm      9/12/2022 2030 by Linda Stallworth RN  Outcome: Progressing Towards Goal  Note: Fall Risk Interventions:  Mobility Interventions: Patient to call before getting OOB    Mentation Interventions: Update white board, Toileting rounds    Medication Interventions: Evaluate medications/consider consulting pharmacy, Patient to call before getting OOB, Teach patient to arise slowly    Elimination Interventions: Call light in reach, Patient to call for help with toileting needs, Stay With Me (per policy), Toilet paper/wipes in reach    History of Falls Interventions: Bed/chair exit alarm

## 2022-09-13 NOTE — PROGRESS NOTES
Problem: Mobility Impaired (Adult and Pediatric)  Goal: *Acute Goals and Plan of Care (Insert Text)  Description: FUNCTIONAL STATUS PRIOR TO ADMISSION: Patient was modified independent using a rolling walker for functional mobility. HOME SUPPORT PRIOR TO ADMISSION: The patient lived with daughter but did not require assist for mobility. Physical Therapy Goals  Initiated 9/6/2022  1. Patient will move from supine to sit and sit to supine  in bed with modified independence within 7 day(s). 2.  Patient will transfer from bed to chair and chair to bed with modified independence using the least restrictive device within 7 day(s). 3.  Patient will perform sit to stand with modified independence within 7 day(s). 4.  Patient will ambulate with modified independence for 50 feet with the least restrictive device within 7 day(s). 5.  Patient will ascend/descend 5 stairs with b/l handrail(s) with modified independence within 7 day(s). Outcome: Progressing Towards Goal   PHYSICAL THERAPY TREATMENT  Patient: Dina Pino (30 y.o. female)  Date: 9/13/2022  Diagnosis: Pleural effusion, left [J90]  Pleural effusion [J90] Abscess of abdominal cavity (HCC)      Precautions:    Chart, physical therapy assessment, plan of care and goals were reviewed. ASSESSMENT  Patient continues with skilled PT services and is progressing towards goals. Pt up in chair and agreeable to ambulation into the hallway. Pt tolerated increased mobility well with stable vitals on RA. Briefly desaturates to 90% but with seated rest break recovers to 97% via PLB. Completed distance of 140ft this day which is significantly more than any prior day and what she reports she was doing at home prior to admission. Recommending HHPT follow up for progression of endurance and strength. Current Level of Function Impacting Discharge (mobility/balance):  SBA for activity with RW    Other factors to consider for discharge: unable to get HHPT is pt is not established with PCP         PLAN :  Patient continues to benefit from skilled intervention to address the above impairments. Continue treatment per established plan of care. to address goals. Recommendation for discharge: (in order for the patient to meet his/her long term goals)  Physical therapy at least 2 days/week in the home     This discharge recommendation:  Has been made in collaboration with the attending provider and/or case management    IF patient discharges home will need the following DME: patient owns DME required for discharge       SUBJECTIVE:   Patient stated I am fine. I'm ready to go.     OBJECTIVE DATA SUMMARY:   Critical Behavior:  Neurologic State: Alert  Orientation Level: Oriented X4  Cognition: Appropriate decision making, Follows commands  Safety/Judgement: Awareness of environment, Fall prevention  Functional Mobility Training:  Bed Mobility:  Rolling: Modified independent  Supine to Sit: Modified independent     Scooting: Modified independent        Transfers:  Sit to Stand: Stand-by assistance  Stand to Sit: Supervision        Bed to Chair: Supervision                    Balance:  Sitting: Intact  Standing: Intact; With support  Standing - Static: Good  Standing - Dynamic : Good  Ambulation/Gait Training:  Distance (ft): 120 Feet (ft)  Assistive Device: Gait belt;Walker, rolling  Ambulation - Level of Assistance: Stand-by assistance                 Base of Support: Widened     Speed/[place]: Pace decreased (<100 feet/min)  Step Length: Right shortened;Left shortened            Activity Tolerance:   Good and SpO2 stable on RA    After treatment patient left in no apparent distress:   Sitting in chair and Call bell within reach    COMMUNICATION/COLLABORATION:   The patients plan of care was discussed with: Registered nurse.      Lina Carrasco, PT   Time Calculation: 15 mins

## 2022-09-13 NOTE — PROGRESS NOTES
Bedside shift change report given to Eloy Snider RN (oncoming nurse) by Tessa Galeas (offgoing nurse). Report included the following information SBAR, Kardex, Intake/Output, and MAR.

## 2022-09-13 NOTE — DISCHARGE SUMMARY
Abdifatah Guerin melina [address]  [address]  [phone]    Hospitalist Discharge Summary     Patient ID:  Silvio Powers  [id number]  65 y.o.  1960    Admit date: 9/1/2022    Discharge date and time: 9/13/2022 11:52 AM    Admission Diagnoses: Pleural effusion, left [J90]  Pleural effusion [J90]    Discharge Diagnoses:  Principal Diagnosis Abscess of abdominal cavity ([address]                                            Principal Problem:    Abscess of abdominal cavity ([address] (9/13/2022)    Active Problems:    Pleural effusion, left (9/1/2022)      Pleural effusion (9/4/2022)           Hospital Course:     64 yo hx of HTN, DM, breast CA, asthma, perforated gastric ulcer repair, presented w/ chest pain, abd fluid collection/abscess, L pleural effusion     1) Abd fluid collection/abscess/pain: s/p IR drainage on 09/03. Cultures negative. Hx of recent gastric ulcer repair. Patient was on 10 days of IV Zosyn. Gen surg removed drain on 09/12. Will follow up with Dr. Arlene Santos      2) L pleural effusion: unclear etiology. Could be related to abd fluid collection and recent gastric ulcer repair. Pulm was following, no interventions at this time     3) Anasarca: due to malnutrition. Last echo with EF 60-65%. Will cont daily bumex     4) Severe pro-juan malnutrition: due to recent surgery. Encourage PO     5) DM type 2: cont home oral meds     6) HTN: BP stable.   Will cont norvasc, labetalol, aldactone, bumex     7) Breast CA: s/p chemo    PCP: Thea Alfaro NP     Consults: General Surgery    Significant Diagnostic Studies: IR placement of drain for abscess    Discharge Exam:  Physical Exam:    Gen:  Well-developed, well-nourished, morbidly obese, in no acute distress  HEENT:  Pink conjunctivae, PERRL, hearing intact to voice, moist mucous membranes  Neck:  Supple, without masses, thyroid non-tender  Resp:  No accessory muscle use, clear breath sounds without wheezes rales or rhonchi  Card:  No murmurs, normal S1, S2 without thrills, anasarca   Abd:  Soft, non-tender, non-distended, normoactive bowel sounds are present  Musc:  No cyanosis or clubbing  Skin:  bilateral LE venous stasis   Neuro:  Cranial nerves 3-12 are grossly intact, follows commands appropriately  Psych:  Good insight, oriented to person, place and time, alert    Disposition: home  Discharge Condition: Stable    Patient Instructions:   Current Discharge Medication List        START taking these medications    Details   amLODIPine (NORVASC) 10 mg tablet Take 1 Tablet by mouth daily. Indications: high blood pressure  Qty: 30 Tablet, Refills: 0      L.acid,para-B. bifidum-S.therm (RISAQUAD) 8 billion cell cap cap Take 1 Capsule by mouth daily. Qty: 30 Capsule, Refills: 1      bumetanide (BUMEX) 1 mg tablet Take 1 Tablet by mouth daily. Qty: 30 Tablet, Refills: 0      labetaloL (NORMODYNE) 100 mg tablet Take 1 Tablet by mouth two (2) times a day. Indications: high blood pressure  Qty: 60 Tablet, Refills: 0      spironolactone (ALDACTONE) 25 mg tablet Take 1 Tablet by mouth daily. Indications: high blood pressure  Qty: 30 Tablet, Refills: 0           CONTINUE these medications which have CHANGED    Details   albuterol (PROVENTIL HFA, VENTOLIN HFA, PROAIR HFA) 90 mcg/actuation inhaler Take 2 Puffs by inhalation every six (6) hours as needed for Wheezing. Qty: 18 g, Refills: 1      fluticasone propion-salmeteroL (ADVAIR/WIXELA) 100-50 mcg/dose diskus inhaler Take 1 Puff by inhalation every twelve (12) hours. Indications: bronchospasm prevention with COPD  Qty: 60 Each, Refills: 1      pantoprazole (PROTONIX) 40 mg tablet Take 1 Tablet by mouth two (2) times a day. Qty: 60 Tablet, Refills: 0           CONTINUE these medications which have NOT CHANGED    Details   alogliptin (NESINA) 25 mg tablet Take 0.5 Tablets by mouth in the morning.   Qty: 30 Tablet, Refills: 0      lidocaine-prilocaine (EMLA) topical cream Apply thin layer to port 30-60 minutes prior to infusion  Qty: 30 g, Refills: 0    Associated Diagnoses: Malignant neoplasm of lower-inner quadrant of left breast in female, estrogen receptor negative ([address]           STOP taking these medications       dexAMETHasone (DECADRON) 4 mg tablet Comments:   Reason for Stopping:             Activity: Activity as tolerated  Diet: Low fat, Low cholesterol  Wound Care: As directed    Follow-up with  Follow-up Information       Follow up With Specialties Details Why Contact Info    Jakub Roldan NP  Schedule an appointment as soon as possible for a visit in 1 week(s)      Estrellita Juares MD General Surgery Schedule an appointment as soon as possible for a visit in 2 week(s)  [address]  [address] [facility]  [phone]               Follow-up tests/labs none    Signed:  Kelvin Coffey MD  9/13/2022  11:52 AM  **I personally spent 35 min on discharge**

## 2022-09-13 NOTE — PROGRESS NOTES
Problem: Self Care Deficits Care Plan (Adult)  Goal: *Acute Goals and Plan of Care (Insert Text)  Description: FUNCTIONAL STATUS PRIOR TO ADMISSION: Patient was modified independent using a rolling walker for functional mobility. Prior to July 2022, pt was independent with ADLs. She underwent perforated gastric ulcer repair in July and since then has required assistance from dtr for dressing and bathing. Pt has home O2 at home, but does not use it. HOME SUPPORT: The patient lived with daughter and required minimal assistance/contact guard assist for bathing and dressing, and some toileting. Occupational Therapy Goals  Reviewed and updated 9/13/2022  1. Patient will perform grooming with supervision/set-up within 7 day(s). 2.  Patient will perform lower body dressing with supervision/set up using [address] within 7 day(s). 3.  Patient will perform bathing with supervision/set up within 7 day(s). 4.  Patient will perform toilet transfers with modified independence within 7 day(s). 5.  Patient will perform all aspects of toileting with supervision/set up using AE within 7 day(s). 6.  Patient will participate in upper extremity therapeutic exercise/activities with modified independence for 10 minutes within 7 day(s). Occupational Therapy Goals  Initiated 9/6/2022;  1. Patient will perform grooming with supervision/set-up within 7 day(s). 2.  Patient will perform lower body dressing with minimal assistance/contact guard assist using LH AE within 7 day(s). 3.  Patient will perform bathing with minimal assistance/contact guard assist within 7 day(s). 4.  Patient will perform toilet transfers with modified independence within 7 day(s). 5.  Patient will perform all aspects of toileting with minimal assistance/contact guard assist using AE within 7 day(s). 6.  Patient will participate in upper extremity therapeutic exercise/activities with modified independence for 10 minutes within 7 day(s). Outcome: Progressing Towards Goal     OCCUPATIONAL THERAPY TREATMENT/WEEKLY RE-ASSESSMENT  Patient: Jen Ribeiro (57 y.o. female)  Date: 9/13/2022  Diagnosis: Pleural effusion, left [J90]  Pleural effusion [J90] <principal problem not specified>      Precautions:    Chart, occupational therapy assessment, plan of care, and goals were reviewed. ASSESSMENT  Patient received semi supine in bed A&Ox4 and agreeable for weekly RA 2/2 LOS. Patient continues with skilled OT services and is progressing towards goals. Patient now SBA for bed mobility and transfer to commode with gait belt and SBA for toileting/cloth mgmt and SBA grooming standing sink. Pt would benefit from continued skilled OT services while at [facility] in order to increase safety and independence with self care and functional transfers/mobility. Recommend discharge to home with [address] [place] [address] when medically appropriate. Other factors to consider for discharge: time since onset, close to baseline, good family support         PLAN :  Goals have been updated based on progression since last assessment. Patient continues to benefit from skilled intervention to address the above impairments. Continue to follow patient 3 times a week to address goals. Recommend with staff: allow toilet transfers    Recommend next OT session: sink level self care    Recommendation for discharge: (in order for the patient to meet his/her long term goals)  Occupational therapy at least 2 days/week in the home     This discharge recommendation:  Has been made in collaboration with the attending provider and/or case management    IF patient discharges home will need the following DME: lower body dressing equiptment       SUBJECTIVE:   Patient stated I can sit up.     OBJECTIVE DATA SUMMARY:   Cognitive/Behavioral Status:  Neurologic State: Alert  Orientation Level: Oriented X4     Functional Mobility and Transfers for ADLs:  Bed Mobility:  Rolling: Modified independent  Supine to Sit: Modified independent  Scooting: Modified independent    Transfers:  Sit to Stand: Stand-by assistance  Functional Transfers  Bathroom Mobility: Stand-by assistance  Toilet Transfer : Stand-by assistance  Bed to Chair: Stand-by assistance    Balance:  Sitting: Intact  Standing: Intact  Standing - Static: Good  Standing - Dynamic : Good    ADL Intervention:     Grooming  Grooming Assistance: Stand-by assistance  Position Performed: Standing  Washing Face: Stand-by assistance  Washing Hands: Stand-by assistance  Brushing Teeth: Stand-by assistance    Toileting  Bladder Hygiene: Supervision  Bowel Hygiene: Supervision  Clothing Management: Stand-by assistance    Pain:  No pain repoted    Activity Tolerance:   Good  Please refer to the flowsheet for vital signs taken during this treatment. After treatment patient left in no apparent distress:   Sitting in chair and Call bell within reach    COMMUNICATION/COLLABORATION:   The patients plan of care was discussed with: Physical therapist and Registered nurse.      Clemente Samuels  Time Calculation: 17 mins

## 2022-09-13 NOTE — PROGRESS NOTES
I have reviewed discharge instructions with the patient. The patient verbalized understanding, had questions regarding change in BP meds. Contacted MD Stinson who called pt in room to discuss. Pt verbalized understanding after speaking with MD.     Heparinized port before deaccessing. Pt tolerated well. Pt waiting for daughter to arrive at this time. Pt refused assistance to get dressed or collect belongings in to bed, said she would like her daughter to assist her when she arrives. Pt estiimates approx one hour before daughter arrives. Will contact pt volunteer assistance to assist with wheelchair on departure fro room to front of hospital when daughter arrives and pt is dressed and ready to depart.     MIRYAM GONZALES

## 2022-09-13 NOTE — PROGRESS NOTES
[facility]        Subjective     Doing well  Denies pain  Tolerating diet    Objective     Patient Vitals for the past 24 hrs:   Temp Pulse Resp BP SpO2   09/13/22 0839 99.1 °F (37.3 °C) 88 16 136/73 94 %   09/13/22 0431 98.1 °F (36.7 °C) 69 16 (!) 146/65 91 %   09/13/22 0015 -- -- -- (!) 159/65 --   09/12/22 2309 98.7 °F (37.1 °C) 88 20 (!) 174/60 90 %   09/12/22 1957 98 °F (36.7 °C) 86 18 (!) 152/68 93 %   09/12/22 1653 98.4 °F (36.9 °C) 86 18 128/79 91 %   09/12/22 1110 -- -- -- -- 98 %       Date 09/12/22 0700 - 09/13/22 0659 09/13/22 0700 - 09/14/22 0659   Shift 8175-8871 1467-8171 24 Hour Total 1053-2735 2225-6422 24 Hour Total   INTAKE   P.O.  150 150        P. O.  150 150      I. V.(mL/kg/hr)  1000(0.6) 1000(0.3)        Volume (piperacillin-tazobactam (ZOSYN) 3.375 g in 0.9% sodium chloride (MBP/ADV) 100 mL MBP)  1000 1000      NG/GT 0  0        Intake (ml) ([REMOVED] Drain [facility] Drainage 09/04/22 Left Other (comment)) 0  0      Shift Total(mL/kg) 0(0) 1150(8.2) 1150(8.2)      OUTPUT   Urine(mL/kg/hr) 200(0.1)  200(0.1)        Urine Voided 200  200        Urine Occurrence(s)  3 x 3 x      Drains 0  0        Output (ml) ([REMOVED] Drain [facility] Drainage 09/04/22 Left Other (comment)) 0  0      Shift Total(mL/kg) 200(1.4)  200(1.4)      NET -200 1150 950      Weight (kg) 139.5 140.2 140.2 140.2 140.2 140.2       PE  GEN - Awake, alert, communicating appropriately.   NAD  Pulm - CTAB  CV - RRR  Abd - soft, NTND, drain site dressing c/d/i      Labs  Recent Results (from the past 24 hour(s))   GLUCOSE, POC    Collection Time: 09/12/22 10:57 AM   Result Value Ref Range    Glucose (POC) 131 (H) 65 - 117 mg/dL    Performed by [facility], POC    Collection Time: 09/12/22  4:49 PM   Result Value Ref Range    Glucose (POC) 107 65 - 117 mg/dL    Performed by [facility], POC    Collection Time: 09/12/22  9:14 PM   Result Value Ref Range    Glucose (POC) 127 (H) 65 - 117 mg/dL    Performed by [address], POC    Collection Time: 09/13/22  7:37 AM   Result Value Ref Range    Glucose (POC) 124 (H) 65 - 117 mg/dL    Performed by Veena Shelter is a 65 y. o.yr old female s/p recent repair perforated gastric ulcer, readmitted with fluid collection  S/p IR drain placement, removed yesterday  Doing well    Plan     Ok to discharge home from surgical perspective  Follow up with me prn    20 mins of time was spent with the patient of which > 50% of the time involved face-to-face counseling of the patient regarding the proposed treatment plan.     Praveen Talavera MD

## 2022-09-21 ENCOUNTER — TELEPHONE (OUTPATIENT)
Dept: ONCOLOGY | Age: 62
End: 2022-09-21

## 2022-09-21 NOTE — TELEPHONE ENCOUNTER
I called patient to give her the number to Endocrinology. No answer. Unable to leave a voicemail.          Dr. Taya Rocha = [phone]

## 2023-03-02 ENCOUNTER — HOSPITAL ENCOUNTER (OUTPATIENT)
Dept: RADIATION THERAPY | Age: 63
Discharge: HOME | End: 2023-03-02

## 2023-04-23 ENCOUNTER — TRANSCRIBE ORDERS (OUTPATIENT)
Facility: HOSPITAL | Age: 63
End: 2023-04-23

## 2023-04-23 DIAGNOSIS — Z12.31 OTHER SCREENING MAMMOGRAM: Primary | ICD-10-CM

## 2023-04-28 NOTE — PROGRESS NOTES
[facility] [address] [facility]    POD #1    Subjective     On SBT this AM  Abdominal pain  HTN ON    Objective     Patient Vitals for the past 24 hrs:   Temp Pulse Resp BP SpO2   07/21/22 1055 -- (!) 119 -- (!) 178/63 --   07/21/22 1000 -- 95 13 (!) 188/71 100 %   07/21/22 0930 -- 92 20 -- 100 %   07/21/22 0900 -- 85 15 -- 100 %   07/21/22 0830 -- 79 15 -- 100 %   07/21/22 0800 -- 81 14 -- 100 %   07/21/22 0733 -- 82 16 -- 100 %   07/21/22 0730 -- 81 16 -- 100 %   07/21/22 0630 -- 82 16 -- 100 %   07/21/22 0615 -- 84 17 -- 100 %   07/21/22 0600 -- 82 17 -- 100 %   07/21/22 0545 -- 80 16 -- 100 %   07/21/22 0530 -- 84 17 -- 100 %   07/21/22 0515 -- 82 15 -- 100 %   07/21/22 0500 -- 80 15 -- 99 %   07/21/22 0445 -- 83 15 -- 100 %   07/21/22 0430 -- 79 18 -- 100 %   07/21/22 0415 -- 81 18 -- 100 %   07/21/22 0413 -- 81 18 -- 100 %   07/21/22 0400 98.6 °F (37 °C) 76 18 135/60 100 %   07/21/22 0345 -- 78 18 -- 100 %   07/21/22 0330 -- 74 18 -- 100 %   07/21/22 0315 -- 77 18 -- 100 %   07/21/22 0300 -- 73 18 -- 100 %   07/21/22 0245 -- 81 18 -- 100 %   07/21/22 0230 -- 80 18 -- 100 %   07/21/22 0215 -- 80 18 -- 100 %   07/21/22 0200 -- 79 18 -- 100 %   07/21/22 0145 -- 77 18 -- 100 %   07/21/22 0130 -- 80 18 -- 100 %   07/21/22 0115 -- 81 18 -- 100 %   07/21/22 0100 -- 83 18 -- 100 %   07/21/22 0045 -- 77 18 -- 100 %   07/21/22 0030 -- 82 18 -- 100 %   07/21/22 0015 -- 80 18 -- 100 %   07/21/22 0000 98.6 °F (37 °C) 81 18 135/61 100 %   07/20/22 2345 -- 83 18 -- 100 %   07/20/22 2330 -- 82 18 -- 100 %   07/20/22 2315 -- 83 18 -- 100 %   07/20/22 2300 -- 80 18 -- 100 %   07/20/22 2245 -- 76 18 -- 100 %   07/20/22 2230 -- 79 18 -- 100 %   07/20/22 2215 -- 80 18 -- 100 %   07/20/22 2200 -- 82 18 -- 100 %   07/20/22 2145 -- 78 18 -- 100 %   07/20/22 2130 -- 79 18 -- 100 %   07/20/22 2115 -- 82 18 -- 100 %   07/20/22 2100 -- 80 18 -- 100 %   07/20/22 2046 -- -- -- -- 100 % 07/20/22 2045 -- 77 18 -- 100 %   07/20/22 2030 -- 79 18 -- 100 %   07/20/22 2018 -- -- -- -- 100 %   07/20/22 2015 97.9 °F (36.6 °C) 79 14 (!) 149/67 100 %   07/20/22 2010 -- 80 18 -- 100 %   07/20/22 1945 -- 79 18 -- 100 %   07/20/22 1940 -- 80 18 -- 100 %   07/20/22 1935 -- 79 18 -- 100 %   07/20/22 1930 -- 77 18 -- 100 %   07/20/22 1925 -- 79 18 -- 100 %   07/20/22 1920 -- 79 18 -- 100 %   07/20/22 1915 -- 79 18 -- 100 %   07/20/22 1910 -- 80 18 -- 100 %   07/20/22 1905 -- 81 18 -- 100 %   07/20/22 1900 -- 80 18 -- 100 %   07/20/22 1855 -- 80 18 -- 100 %   07/20/22 1850 -- 82 18 -- 100 %   07/20/22 1845 -- 81 18 -- 100 %   07/20/22 1840 -- 83 18 -- 100 %   07/20/22 1835 -- 83 18 -- 100 %   07/20/22 1830 -- 82 18 -- 100 %   07/20/22 1825 -- 82 18 -- 100 %   07/20/22 1820 -- 85 18 -- 100 %   07/20/22 1815 -- 84 18 -- 100 %   07/20/22 1810 -- 84 18 -- 100 %   07/20/22 1805 -- 83 18 -- 100 %   07/20/22 1800 -- 85 18 -- 100 %   07/20/22 1755 -- 85 18 -- 100 %   07/20/22 1750 -- 84 18 -- 100 %   07/20/22 1745 -- 86 18 -- 100 %   07/20/22 1740 -- 86 18 -- 100 %   07/20/22 1735 -- 86 18 -- 100 %   07/20/22 1730 -- 87 18 -- 100 %   07/20/22 1725 -- 88 18 -- 100 %   07/20/22 1720 -- 89 18 -- 100 %   07/20/22 1715 -- 89 18 -- 100 %   07/20/22 1710 -- 88 18 -- 100 %   07/20/22 1705 -- 89 18 -- 100 %   07/20/22 1700 -- 92 17 -- 100 %   07/20/22 1659 98.3 °F (36.8 °C) 94 19 (!) 155/69 100 %   07/20/22 1658 -- 94 18 -- 100 %   07/20/22 1655 -- 93 18 -- 100 %   07/20/22 1650 -- 85 21 -- 100 %   07/20/22 1333 98.7 °F (37.1 °C) (!) 101 18 (!) 201/75 98 %   07/20/22 1118 98.4 °F (36.9 °C) 100 16 (!) 178/76 97 %       Date 07/20/22 0700 - 07/21/22 0659 07/21/22 0700 - 07/22/22 0659   Shift 3590-1246 1403-9089 24 Hour Total 9808-4300 5856-9172 24 Hour Total   INTAKE   I.V.(mL/kg/hr)  519(0.3) 519(0.1)        Diprivan Volume  519 519      Shift Total(mL/kg)  519(3.3) 519(3.3)      OUTPUT   Urine(mL/kg/hr)  950(0.5) 950(0.3) Urine Output (mL) (Urinary Catheter 07/20/22 [facility] - Temperature;2- way)  950 950      Emesis/NG output  100 100        Emesis Occurrence(s)  0 x 0 x        Output (ml) (Nasogastric Tube 07/20/22)  100 100      Stool           Stool Occurrence(s)  0 x 0 x      Shift Total(mL/kg)  1050(6.7) 1050(6.7)      NET  -531.1 -531. 1      Weight (kg) 155. 6 155.6 155.6 155.6 155.6 155.6       PE  GEN - Awake, alert, on vent  Pulm - CTAB  CV - Regular, slight tachy  Abd - soft, dressing c/d, TTP upper abdomen with voluntary guarding    Labs  Recent Results (from the past 24 hour(s))   METABOLIC PANEL, COMPREHENSIVE    Collection Time: 07/20/22 11:45 AM   Result Value Ref Range    Sodium 142 136 - 145 mmol/L    Potassium 4.6 3.5 - 5.1 mmol/L    Chloride 107 97 - 108 mmol/L    CO2 27 21 - 32 mmol/L    Anion gap 8 5 - 15 mmol/L    Glucose 131 (H) 65 - 100 mg/dL    BUN 28 (H) 6 - 20 MG/DL    Creatinine 1.26 (H) 0.55 - 1.02 MG/DL    BUN/Creatinine ratio 22 (H) 12 - 20      GFR est AA 52 (L) >60 ml/min/1.73m2    GFR est non-AA 43 (L) >60 ml/min/1.73m2    Calcium 10.1 8.5 - 10.1 MG/DL    Bilirubin, total 0.8 0.2 - 1.0 MG/DL    ALT (SGPT) 112 (H) 12 - 78 U/L    AST (SGOT) 67 (H) 15 - 37 U/L    Alk.  phosphatase 79 45 - 117 U/L    Protein, total 6.5 6.4 - 8.2 g/dL    Albumin 2.5 (L) 3.5 - 5.0 g/dL    Globulin 4.0 2.0 - 4.0 g/dL    A-G Ratio 0.6 (L) 1.1 - 2.2     CBC WITH AUTOMATED DIFF    Collection Time: 07/20/22 11:45 AM   Result Value Ref Range    WBC 19.9 (H) 3.6 - 11.0 K/uL    RBC 3.78 (L) 3.80 - 5.20 M/uL    HGB 11.2 (L) 11.5 - 16.0 g/dL    HCT 37.5 35.0 - 47.0 %    MCV 99.2 (H) 80.0 - 99.0 FL    MCH 29.6 26.0 - 34.0 PG    MCHC 29.9 (L) 30.0 - 36.5 g/dL    RDW 20.0 (H) 11.5 - 14.5 %    PLATELET 038 431 - 744 K/uL    MPV 9.2 8.9 - 12.9 FL    NRBC 0.0 0  WBC    ABSOLUTE NRBC 0.00 0.00 - 0.01 K/uL    NEUTROPHILS 93 (H) 32 - 75 %    BAND NEUTROPHILS 3 0 - 6 %    LYMPHOCYTES 1 (L) 12 - 49 %    MONOCYTES 3 (L) 5 - 13 % EOSINOPHILS 0 0 - 7 %    BASOPHILS 0 0 - 1 %    IMMATURE GRANULOCYTES 0 %    ABS. NEUTROPHILS 19.1 (H) 1.8 - 8.0 K/UL    ABS. LYMPHOCYTES 0.2 (L) 0.8 - 3.5 K/UL    ABS. MONOCYTES 0.6 0.0 - 1.0 K/UL    ABS. EOSINOPHILS 0.0 0.0 - 0.4 K/UL    ABS. BASOPHILS 0.0 0.0 - 0.1 K/UL    ABS. IMM.  GRANS. 0.0 K/UL    DF MANUAL      RBC COMMENTS ANISOCYTOSIS  2+        RBC COMMENTS MACROCYTOSIS  1+       NT-PRO BNP    Collection Time: 07/20/22 11:45 AM   Result Value Ref Range    NT pro- (H) <125 PG/ML   GLUCOSE, POC    Collection Time: 07/20/22  1:40 PM   Result Value Ref Range    Glucose (POC) 120 (H) 65 - 117 mg/dL    Performed by [address]    Collection Time: 07/20/22  2:50 PM   Result Value Ref Range    Crossmatch Expiration 07/23/2022,2359     ABO/Rh(D) B POSITIVE     Antibody screen NEG    BLOOD GAS, ARTERIAL    Collection Time: 07/20/22  5:35 PM   Result Value Ref Range    pH 7.41 7.35 - 7.45      PCO2 33 (L) 35 - 45 mmHg    PO2 173 (H) 80 - 100 mmHg    O2 SAT 99 (H) 92 - 97 %    BICARBONATE 21 (L) 22 - 26 mmol/L    BASE DEFICIT 3.0 mmol/L    O2 METHOD VENT      FIO2 50 %    MODE ASSIST CONTROL      Tidal volume 400.0      SET RATE 18      PEEP/CPAP 8.0      Sample source ARTERIAL      SITE DRAWN FROM ARTERIAL LINE      MARY'S TEST YES     GLUCOSE, POC    Collection Time: 07/20/22  5:40 PM   Result Value Ref Range    Glucose (POC) 117 65 - 117 mg/dL    Performed by Marlys Price, POC    Collection Time: 07/20/22 11:00 PM   Result Value Ref Range    Glucose (POC) 103 65 - 117 mg/dL    Performed by Alyce Sorto    HEMOGLOBIN A1C WITH EAG    Collection Time: 07/21/22  1:41 AM   Result Value Ref Range    Hemoglobin A1c 5.7 (H) 4.0 - 5.6 %    Est. average glucose 117 mg/dL   URINALYSIS W/ REFLEX CULTURE    Collection Time: 07/21/22  1:41 AM    Specimen: Urine   Result Value Ref Range    Color YELLOW/STRAW      Appearance HAZY (A) CLEAR      Specific gravity >1.030 (H) 1.003 - 1.030 pH (UA) 5.5 5.0 - 8.0      Protein Negative NEG mg/dL    Glucose Negative NEG mg/dL    Ketone Negative NEG mg/dL    Bilirubin Negative NEG      Blood Negative NEG      Urobilinogen 1.0 0.2 - 1.0 EU/dL    Nitrites Negative NEG      Leukocyte Esterase Negative NEG      WBC 0-4 0 - 4 /hpf    RBC 5-10 0 - 5 /hpf    Epithelial cells FEW FEW /lpf    Bacteria Negative NEG /hpf    UA:UC IF INDICATED CULTURE NOT INDICATED BY UA RESULT CNI      Hyaline cast 0-2 0 - 5 /lpf   CBC WITH AUTOMATED DIFF    Collection Time: 07/21/22  1:41 AM   Result Value Ref Range    WBC 10.4 3.6 - 11.0 K/uL    RBC 3.05 (L) 3.80 - 5.20 M/uL    HGB 9.1 (L) 11.5 - 16.0 g/dL    HCT 30.0 (L) 35.0 - 47.0 %    MCV 98.4 80.0 - 99.0 FL    MCH 29.8 26.0 - 34.0 PG    MCHC 30.3 30.0 - 36.5 g/dL    RDW 19.9 (H) 11.5 - 14.5 %    PLATELET 735 272 - 459 K/uL    MPV 8.9 8.9 - 12.9 FL    NRBC 0.0 0  WBC    ABSOLUTE NRBC 0.00 0.00 - 0.01 K/uL    NEUTROPHILS 79 (H) 32 - 75 %    BAND NEUTROPHILS 13 (H) 0 - 6 %    LYMPHOCYTES 2 (L) 12 - 49 %    MONOCYTES 6 5 - 13 %    EOSINOPHILS 0 0 - 7 %    BASOPHILS 0 0 - 1 %    IMMATURE GRANULOCYTES 0 %    ABS. NEUTROPHILS 9.6 (H) 1.8 - 8.0 K/UL    ABS. LYMPHOCYTES 0.2 (L) 0.8 - 3.5 K/UL    ABS. MONOCYTES 0.6 0.0 - 1.0 K/UL    ABS. EOSINOPHILS 0.0 0.0 - 0.4 K/UL    ABS. BASOPHILS 0.0 0.0 - 0.1 K/UL    ABS. IMM.  GRANS. 0.0 K/UL    DF MANUAL      RBC COMMENTS ANISOCYTOSIS  1+        RBC COMMENTS MACROCYTOSIS  1+       METABOLIC PANEL, BASIC    Collection Time: 07/21/22  1:41 AM   Result Value Ref Range    Sodium 143 136 - 145 mmol/L    Potassium 4.3 3.5 - 5.1 mmol/L    Chloride 110 (H) 97 - 108 mmol/L    CO2 27 21 - 32 mmol/L    Anion gap 6 5 - 15 mmol/L    Glucose 141 (H) 65 - 100 mg/dL    BUN 21 (H) 6 - 20 MG/DL    Creatinine 0.93 0.55 - 1.02 MG/DL    BUN/Creatinine ratio 23 (H) 12 - 20      GFR est AA >60 >60 ml/min/1.73m2    GFR est non-AA >60 >60 ml/min/1.73m2    Calcium 9.4 8.5 - 10.1 MG/DL   MAGNESIUM    Collection Time: 07/21/22  1:41 AM   Result Value Ref Range    Magnesium 2.2 1.6 - 2.4 mg/dL   NT-PRO BNP    Collection Time: 07/21/22  1:41 AM   Result Value Ref Range    NT pro- (H) <125 PG/ML   BLOOD GAS, ARTERIAL    Collection Time: 07/21/22  4:22 AM   Result Value Ref Range    pH 7.45 7.35 - 7.45      PCO2 34 (L) 35 - 45 mmHg    PO2 162 (H) 80 - 100 mmHg    O2 SAT 99 (H) 92 - 97 %    BICARBONATE 23 22 - 26 mmol/L    BASE DEFICIT 0.3 mmol/L    O2 METHOD VENT      FIO2 50 %    MODE Pressure regulated volume control      Tidal volume 400.0      SET RATE 18      PEEP/CPAP 8.0      Sample source ARTERIAL      SITE DRAWN FROM ARTERIAL LINE      MARY'S TEST YES     GLUCOSE, POC    Collection Time: 07/21/22  5:14 AM   Result Value Ref Range    Glucose (POC) 125 (H) 65 - 117 mg/dL    Performed by [facility]    BLOOD GAS, ARTERIAL    Collection Time: 07/21/22 10:42 AM   Result Value Ref Range    pH 7.47 (H) 7.35 - 7.45      PCO2 35 35 - 45 mmHg    PO2 75 (L) 80 - 100 mmHg    O2 SAT 96 92 - 97 %    BICARBONATE 25 22 - 26 mmol/L    BASE EXCESS 1.9 mmol/L    O2 METHOD VENT      FIO2 40 %    MODE CPAP      PRESSURE SUPPORT 5      PEEP/CPAP 5.0      Sample source ARTERIAL      SITE DRAWN FROM ARTERIAL LINE      MARY'S TEST NOT APPLICABLE           Assessment     Ashkan Quan is a 65 y. o.yr old female s/p ex lap gastric perforation repair  Remained intubated post op, weaning trial per [facility]  Pain/exam likely appropriate for point in post op course    Plan     Vent and possible extubation per ICU  Continue NPO/NGT  Avoid NSAIDS, probably would do well with PCA post extubation    25 mins of time was spent with the patient of which > 50% of the time involved face-to-face counseling of the patient regarding the proposed treatment plan.     Roverto Guaman MD 5

## 2023-06-30 ENCOUNTER — HOSPITAL ENCOUNTER (OUTPATIENT)
Facility: HOSPITAL | Age: 63
End: 2023-06-30
Payer: MEDICAID

## 2023-06-30 DIAGNOSIS — Z85.3 HX OF BREAST CANCER: ICD-10-CM

## 2023-06-30 PROCEDURE — G0279 TOMOSYNTHESIS, MAMMO: HCPCS

## 2023-06-30 PROCEDURE — 77066 DX MAMMO INCL CAD BI: CPT

## 2023-09-07 ENCOUNTER — HOSPITAL ENCOUNTER (OUTPATIENT)
Facility: HOSPITAL | Age: 63
Discharge: HOME | End: 2023-09-10

## 2023-09-07 VITALS
HEIGHT: 62 IN | RESPIRATION RATE: 16 BRPM | SYSTOLIC BLOOD PRESSURE: 140 MMHG | WEIGHT: 293 LBS | DIASTOLIC BLOOD PRESSURE: 71 MMHG | BODY MASS INDEX: 53.92 KG/M2 | HEART RATE: 77 BPM

## 2023-09-07 DIAGNOSIS — C50.312 MALIGNANT NEOPLASM OF LOWER-INNER QUADRANT OF LEFT BREAST IN FEMALE, ESTROGEN RECEPTOR NEGATIVE (HCC): Primary | ICD-10-CM

## 2023-09-07 DIAGNOSIS — Z17.1 MALIGNANT NEOPLASM OF LOWER-INNER QUADRANT OF LEFT BREAST IN FEMALE, ESTROGEN RECEPTOR NEGATIVE (HCC): Primary | ICD-10-CM

## 2023-09-07 RX ORDER — OMEGA-3S/DHA/EPA/FISH OIL/D3 300MG-1000
400 CAPSULE ORAL DAILY
COMMUNITY

## 2023-09-07 NOTE — PROGRESS NOTES
[facility] [place] at [address] [place] [address]  [facility]    Radiation Oncology Follow Up    Luis Montesinos  [id number]  1960     Diagnosis   1. [id number] triple negative left LIQ breast cancer s/p lumpectomy with left axillary dissection, adjuvant TC, left whole breast RT (4256/1000cGy, EOT 12/30/22)    AJCC Staging has been reviewed. Oncologic History   01/03/2022: Abnormal left screening mammogram showed a 1.3 cm mass at 8:00(LIQ), enlarged left axillary nodes stable since 2017.  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 invitae genetic testing negative   04/13/2022 to 06/15/2022: TC chemotherapy  12/30/2022: She completed a course of left whole breast RT with lumpectomy boost (42.56Gy in 16fx, 10Gy in 4fx). She had a delay in starting RT due to complications from a perforated gastric ulcer. Interval History   Ms. Junito Gandara arrives today for routine follow up about 9 months from end of treatment. Symptomatically she is doing well, states her only issues are related to other medical problems. She does have some decreased ROM in the left shoulder and swelling in the left arm, has not seen PT for this. She also has not made appointments to see Dr. Lc Alvarado or Dr. Rhys Wright. Review of Systems:  A complete review of systems was obtained and negative except as described above. Interval Imaging/Labs   06/30/2023: mammogram performed and showed lumpectomy changes without evidence of recurrence bilaterally.     Above imaging/lab reports were

## 2024-06-14 NOTE — PROGRESS NOTES
Bedside and Verbal shift change report given to Shameka Slade RN (oncoming nurse) by Barber Boyd (offgoing nurse). Report included the following information SBAR and Kardex. Female

## 2024-06-17 ENCOUNTER — TELEPHONE (OUTPATIENT)
Age: 64
End: 2024-06-17

## 2024-06-17 ENCOUNTER — CLINICAL DOCUMENTATION (OUTPATIENT)
Age: 64
End: 2024-06-17

## 2024-06-17 NOTE — PROGRESS NOTES
The patient called stating she has a mammogram appointment next week and hasn't seen Dr. Duran in 2 years and still has her kwame cath in place. She will have her mammogram and make an appointment to see Dr. Duran to follow the mammogram.

## 2024-06-17 NOTE — TELEPHONE ENCOUNTER
Patient called and left a message requesting a call back.   I attempted to call back but she did not answer and I was unable to leave a message.

## 2024-07-09 ENCOUNTER — HOSPITAL ENCOUNTER (OUTPATIENT)
Facility: HOSPITAL | Age: 64
Discharge: HOME | End: 2024-07-12
Attending: STUDENT IN AN ORGANIZED HEALTH CARE EDUCATION/TRAINING PROGRAM
Payer: MEDICAID

## 2024-07-09 DIAGNOSIS — Z17.1 MALIGNANT NEOPLASM OF LOWER-INNER QUADRANT OF LEFT BREAST IN FEMALE, ESTROGEN RECEPTOR NEGATIVE (HCC): ICD-10-CM

## 2024-07-09 DIAGNOSIS — C50.312 MALIGNANT NEOPLASM OF LOWER-INNER QUADRANT OF LEFT BREAST IN FEMALE, ESTROGEN RECEPTOR NEGATIVE (HCC): ICD-10-CM

## 2024-07-09 PROCEDURE — G0279 TOMOSYNTHESIS, MAMMO: HCPCS

## 2024-09-12 ENCOUNTER — HOSPITAL ENCOUNTER (OUTPATIENT)
Facility: HOSPITAL | Age: 64
Discharge: HOME | End: 2024-09-15

## 2024-09-12 VITALS
HEIGHT: 62 IN | DIASTOLIC BLOOD PRESSURE: 67 MMHG | RESPIRATION RATE: 20 BRPM | SYSTOLIC BLOOD PRESSURE: 156 MMHG | WEIGHT: 293 LBS | BODY MASS INDEX: 53.92 KG/M2 | HEART RATE: 79 BPM

## 2024-09-12 DIAGNOSIS — C50.312 MALIGNANT NEOPLASM OF LOWER-INNER QUADRANT OF LEFT BREAST IN FEMALE, ESTROGEN RECEPTOR NEGATIVE (HCC): Primary | ICD-10-CM

## 2024-09-12 DIAGNOSIS — Z17.1 MALIGNANT NEOPLASM OF LOWER-INNER QUADRANT OF LEFT BREAST IN FEMALE, ESTROGEN RECEPTOR NEGATIVE (HCC): Primary | ICD-10-CM

## 2024-09-12 RX ORDER — FLUTICASONE PROPIONATE AND SALMETEROL 50; 250 UG/1; UG/1
POWDER RESPIRATORY (INHALATION) 2 TIMES DAILY
COMMUNITY
Start: 2024-08-01

## 2024-09-12 ASSESSMENT — PAIN SCALES - GENERAL: PAINLEVEL_OUTOF10: 0

## 2025-01-24 ENCOUNTER — TELEPHONE (OUTPATIENT)
Age: 65
End: 2025-01-24

## 2025-01-24 NOTE — TELEPHONE ENCOUNTER
[facility] [place] [facility]  [phone]    1059 Returned call to Dr. Simon at call back number provided. No answer, unable to leave message    1059 Call Placed to [facility] Gynecology clinic. Left message on nurses line that we are unable to advise on IUD placement as patient has not been seen by our office in almost 3 years. Left call back number in case of any questions.

## 2025-01-24 NOTE — TELEPHONE ENCOUNTER
Dr. Evon Simon, a gynecologist from [facility] called and stated that she wanted to know if it was okay if the patient has a Mirena IUD can be placed to help with bleeding the patient has been experiencing. Requested a call back to discuss.     # [phone]

## 2025-04-10 ENCOUNTER — TELEPHONE (OUTPATIENT)
Age: 65
End: 2025-04-10

## 2025-04-10 DIAGNOSIS — C50.312 MALIGNANT NEOPLASM OF LOWER-INNER QUADRANT OF LEFT FEMALE BREAST, UNSPECIFIED ESTROGEN RECEPTOR STATUS: Primary | ICD-10-CM

## 2025-04-10 NOTE — TELEPHONE ENCOUNTER
Patient called and stated that she was seen ay [facility] for red bumps or blisters all over her body and a really sore throat. 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 Pt also stated that she has a follow up with her pcp as well as a procedure with [facility]. She was told to schedule an appt with Dr. Rosa to follow up. Please advise on appt.     CB#[phone]

## 2025-04-11 NOTE — TELEPHONE ENCOUNTER
Michael [facility] [place] at [facility]  [phone]    4/11/2025 1015 Returned patients call regarding possible follow up, patient stated the procedure she will be having is an endometria ablation to treat uterine bleeding, follow up with PCP for sweet syndrome. Informed that Dr. Rosa recommended to start with CT c/a/p vs. 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 Order in for CT, scheduling number provided to patient. Patient stated understanding of next steps and has no further questions at this time.

## 2025-05-03 ENCOUNTER — HOSPITAL ENCOUNTER (OUTPATIENT)
Facility: HOSPITAL | Age: 65
End: 2025-05-03
Attending: INTERNAL MEDICINE
Payer: MEDICARE

## 2025-05-03 DIAGNOSIS — C50.312 MALIGNANT NEOPLASM OF LOWER-INNER QUADRANT OF LEFT FEMALE BREAST, UNSPECIFIED ESTROGEN RECEPTOR STATUS (HCC): ICD-10-CM

## 2025-05-03 LAB — CREAT BLD-MCNC: 1.1 MG/DL (ref 0.6–1.3)

## 2025-05-03 PROCEDURE — 6360000004 HC RX CONTRAST MEDICATION: Performed by: INTERNAL MEDICINE

## 2025-05-03 PROCEDURE — 74177 CT ABD & PELVIS W/CONTRAST: CPT

## 2025-05-03 PROCEDURE — 82565 ASSAY OF CREATININE: CPT

## 2025-05-03 PROCEDURE — 71260 CT THORAX DX C+: CPT

## 2025-05-03 RX ORDER — IOPAMIDOL 755 MG/ML
100 INJECTION, SOLUTION INTRAVASCULAR
Status: COMPLETED | OUTPATIENT
Start: 2025-05-03 | End: 2025-05-03

## 2025-05-03 RX ADMIN — IOPAMIDOL 100 ML: 755 INJECTION, SOLUTION INTRAVENOUS at 12:10

## 2025-05-05 ENCOUNTER — RESULTS FOLLOW-UP (OUTPATIENT)
Age: 65
End: 2025-05-05

## 2025-05-07 NOTE — TELEPHONE ENCOUNTER
----- Message from Dr. Bernardino Rosa MD sent at 5/5/2025  3:42 PM EDT -----  Please let her know this does not show any cancer, she may follow up with pcp

## 2025-05-07 NOTE — TELEPHONE ENCOUNTER
Michael [facility] [place] at [facility]  [phone]    5/7/25 1344 Call placed to patient regarding CT scan results. Per Dr. Rosa this does not show any cancer and she may follow up with PCP. Patient stated understanding and has no further questions at this time.

## 2025-07-10 ENCOUNTER — HOSPITAL ENCOUNTER (OUTPATIENT)
Facility: HOSPITAL | Age: 65
Discharge: HOME | End: 2025-07-13
Payer: MEDICARE

## 2025-07-10 VITALS — HEIGHT: 62 IN | WEIGHT: 293 LBS | BODY MASS INDEX: 53.92 KG/M2

## 2025-07-10 DIAGNOSIS — Z85.3 HX OF BREAST CANCER: ICD-10-CM

## 2025-07-10 PROCEDURE — G0279 TOMOSYNTHESIS, MAMMO: HCPCS

## (undated) DEVICE — CHEST PACK-SFMCASU: Brand: MEDLINE INDUSTRIES, INC.

## (undated) DEVICE — SUTURE VCRL SZ 3-0 L27IN ABSRB UD L26MM SH 1/2 CIR J416H

## (undated) DEVICE — SOL INJ SOD CL 0.9% 500ML BG --

## (undated) DEVICE — BLADE ELECTRODE: Brand: EDGE

## (undated) DEVICE — SUTURE PDS II SZ 0 L60IN ABSRB VLT L48MM CTX 1/2 CIR Z990G

## (undated) DEVICE — LAPAROTOMY-SFMC: Brand: MEDLINE INDUSTRIES, INC.

## (undated) DEVICE — SOL IRRIGATION INJ NACL 0.9% 500ML BTL

## (undated) DEVICE — CANISTER, RIGID, 3000CC: Brand: MEDLINE INDUSTRIES, INC.

## (undated) DEVICE — SUTURE PERMAHAND SZ 3-0 L18IN NONABSORBABLE BLK L26MM SH C013D

## (undated) DEVICE — TOWEL SURG W17XL27IN STD BLU COT NONFENESTRATED PREWASHED

## (undated) DEVICE — SYR 10ML LUER LOK 1/5ML GRAD --

## (undated) DEVICE — SPONGE GZ W4XL4IN COT 12 PLY TYP VII WVN C FLD DSGN

## (undated) DEVICE — COLON CLOSING PACK: Brand: MEDLINE INDUSTRIES, INC.

## (undated) DEVICE — SUTURE PERMAHAND SZ 3-0 L30IN NONABSORBABLE BLK L26MM SH C017D

## (undated) DEVICE — BLUNTFILL WITH FILTER: Brand: MONOJECT

## (undated) DEVICE — STERILE-Z MAYO STAND COVERS CLEAR POLYETHYLENE STERILE UNIVERSAL FIT 20 PER CASE: Brand: STERILE-Z

## (undated) DEVICE — SOL INJ SOD CL0.9% 10ML PREFIL --

## (undated) DEVICE — C-ARM: Brand: UNBRANDED

## (undated) DEVICE — DRAPE FLD WRM W44XL66IN C6L FOR INTRATEMP SYS THERMABASIN

## (undated) DEVICE — STRIP,CLOSURE,WOUND,MEDI-STRIP,1/2X4: Brand: MEDLINE

## (undated) DEVICE — GLOVE SURG SZ 75 L12IN FNGR THK79MIL GRN LTX FREE

## (undated) DEVICE — TOTAL TRAY, 16FR 10ML SIL FOLEY, URN: Brand: MEDLINE

## (undated) DEVICE — INTENDED FOR TISSUE SEPARATION, AND OTHER PROCEDURES THAT REQUIRE A SHARP SURGICAL BLADE TO PUNCTURE OR CUT.: Brand: BARD-PARKER ® CARBON RIB-BACK BLADES

## (undated) DEVICE — COVER US PRB W15XL120CM W/ GEL RUBBERBAND TAPE STRP FLD GEN

## (undated) DEVICE — GLOVE SURG SZ 65 THK91MIL LTX FREE SYN POLYISOPRENE

## (undated) DEVICE — SUT SLK 2-0SH 30IN BLK --

## (undated) DEVICE — DECANTER BAG 9": Brand: MEDLINE INDUSTRIES, INC.

## (undated) DEVICE — SURGICAL PROCEDURE PACK TISS 3X5 IN ABSORBABLE SEPRAFILM

## (undated) DEVICE — ROCKER SWITCH PENCIL BLADE ELECTRODE, HOLSTER: Brand: EDGE

## (undated) DEVICE — HYPODERMIC SAFETY NEEDLE: Brand: MONOJECT

## (undated) DEVICE — CURVED, LARGE JAW, OPEN SEALER/DIVIDER NANO-COATED: Brand: LIGASURE IMPACT

## (undated) DEVICE — Device

## (undated) DEVICE — SOLUTION IRRIG 1000ML 0.9% SOD CHL USP POUR PLAS BTL

## (undated) DEVICE — GLOVE ORANGE PI 7   MSG9070